# Patient Record
Sex: FEMALE | Race: BLACK OR AFRICAN AMERICAN | Employment: UNEMPLOYED | URBAN - METROPOLITAN AREA
[De-identification: names, ages, dates, MRNs, and addresses within clinical notes are randomized per-mention and may not be internally consistent; named-entity substitution may affect disease eponyms.]

---

## 2017-04-27 ENCOUNTER — HOSPITAL ENCOUNTER (EMERGENCY)
Age: 32
Discharge: HOME | End: 2017-04-27
Attending: EMERGENCY MEDICINE

## 2017-04-27 VITALS
BODY MASS INDEX: 26.91 KG/M2 | SYSTOLIC BLOOD PRESSURE: 124 MMHG | WEIGHT: 161.5 LBS | TEMPERATURE: 98.7 F | HEART RATE: 71 BPM | RESPIRATION RATE: 16 BRPM | HEIGHT: 65 IN | DIASTOLIC BLOOD PRESSURE: 69 MMHG | OXYGEN SATURATION: 99 %

## 2017-04-27 DIAGNOSIS — G89.29 CHRONIC ABDOMINAL PAIN: Primary | ICD-10-CM

## 2017-04-27 DIAGNOSIS — R10.9 CHRONIC ABDOMINAL PAIN: Primary | ICD-10-CM

## 2017-04-27 LAB
-: ABNORMAL
ABSOLUTE EOS #: 0.4 K/UL (ref 0–0.4)
ABSOLUTE LYMPH #: 2.9 K/UL (ref 1–4.8)
ABSOLUTE MONO #: 0.8 K/UL (ref 0.2–0.8)
ALBUMIN SERPL-MCNC: 3.8 G/DL (ref 3.5–5.2)
ALBUMIN/GLOBULIN RATIO: ABNORMAL (ref 1–2.5)
ALP BLD-CCNC: 73 U/L (ref 35–104)
ALT SERPL-CCNC: 11 U/L (ref 5–33)
AMORPHOUS: ABNORMAL
ANION GAP SERPL CALCULATED.3IONS-SCNC: 13 MMOL/L (ref 9–17)
AST SERPL-CCNC: 14 U/L
BACTERIA: ABNORMAL
BASOPHILS # BLD: 1 %
BASOPHILS ABSOLUTE: 0.1 K/UL (ref 0–0.2)
BILIRUB SERPL-MCNC: 0.17 MG/DL (ref 0.3–1.2)
BILIRUBIN DIRECT: <0.08 MG/DL
BILIRUBIN URINE: NEGATIVE
BILIRUBIN, INDIRECT: ABNORMAL MG/DL (ref 0–1)
BUN BLDV-MCNC: 13 MG/DL (ref 6–20)
BUN/CREAT BLD: 15 (ref 9–20)
CALCIUM SERPL-MCNC: 8.9 MG/DL (ref 8.6–10.4)
CASTS UA: ABNORMAL /LPF
CHLORIDE BLD-SCNC: 103 MMOL/L (ref 98–107)
CHP ED QC CHECK: YES
CO2: 24 MMOL/L (ref 20–31)
COLOR: YELLOW
COMMENT UA: ABNORMAL
CREAT SERPL-MCNC: 0.87 MG/DL (ref 0.5–0.9)
CRYSTALS, UA: ABNORMAL /HPF
DIFFERENTIAL TYPE: ABNORMAL
EOSINOPHILS RELATIVE PERCENT: 3 %
EPITHELIAL CELLS UA: ABNORMAL /HPF
GFR AFRICAN AMERICAN: >60 ML/MIN
GFR NON-AFRICAN AMERICAN: >60 ML/MIN
GFR SERPL CREATININE-BSD FRML MDRD: NORMAL ML/MIN/{1.73_M2}
GFR SERPL CREATININE-BSD FRML MDRD: NORMAL ML/MIN/{1.73_M2}
GLOBULIN: ABNORMAL G/DL (ref 1.5–3.8)
GLUCOSE BLD-MCNC: 98 MG/DL (ref 70–99)
GLUCOSE URINE: NEGATIVE
HCG, PREGNANCY URINE (POC): NEGATIVE
HCT VFR BLD CALC: 35.7 % (ref 36–46)
HEMOGLOBIN: 12.1 G/DL (ref 12–16)
KETONES, URINE: ABNORMAL
LEUKOCYTE ESTERASE, URINE: ABNORMAL
LYMPHOCYTES # BLD: 26 %
MCH RBC QN AUTO: 26.9 PG (ref 26–34)
MCHC RBC AUTO-ENTMCNC: 33.8 G/DL (ref 31–37)
MCV RBC AUTO: 79.6 FL (ref 80–100)
MONOCYTES # BLD: 7 %
MUCUS: ABNORMAL
NITRITE, URINE: NEGATIVE
OTHER OBSERVATIONS UA: ABNORMAL
PDW BLD-RTO: 14 % (ref 11.5–14.5)
PH UA: 6 (ref 5–8)
PLATELET # BLD: 280 K/UL (ref 130–400)
PLATELET ESTIMATE: ABNORMAL
PMV BLD AUTO: ABNORMAL FL (ref 6–12)
POTASSIUM SERPL-SCNC: 3.9 MMOL/L (ref 3.7–5.3)
PREGNANCY TEST URINE, POC: NORMAL
PROTEIN UA: NEGATIVE
RBC # BLD: 4.49 M/UL (ref 4–5.2)
RBC # BLD: ABNORMAL 10*6/UL
RBC UA: ABNORMAL /HPF (ref 0–2)
RENAL EPITHELIAL, UA: ABNORMAL /HPF
SEG NEUTROPHILS: 63 %
SEGMENTED NEUTROPHILS ABSOLUTE COUNT: 7.1 K/UL (ref 1.8–7.7)
SODIUM BLD-SCNC: 140 MMOL/L (ref 135–144)
SPECIFIC GRAVITY UA: 1.03 (ref 1–1.03)
TOTAL PROTEIN: 6.5 G/DL (ref 6.4–8.3)
TRICHOMONAS: ABNORMAL
TURBIDITY: CLEAR
URINE HGB: NEGATIVE
UROBILINOGEN, URINE: NORMAL
WBC # BLD: 11.3 K/UL (ref 3.5–11)
WBC # BLD: ABNORMAL 10*3/UL
WBC UA: ABNORMAL /HPF (ref 0–5)
YEAST: ABNORMAL

## 2017-04-27 PROCEDURE — 81001 URINALYSIS AUTO W/SCOPE: CPT

## 2017-04-27 PROCEDURE — 6360000002 HC RX W HCPCS: Performed by: NURSE PRACTITIONER

## 2017-04-27 PROCEDURE — 36415 COLL VENOUS BLD VENIPUNCTURE: CPT

## 2017-04-27 PROCEDURE — 87086 URINE CULTURE/COLONY COUNT: CPT

## 2017-04-27 PROCEDURE — 85025 COMPLETE CBC W/AUTO DIFF WBC: CPT

## 2017-04-27 PROCEDURE — 80048 BASIC METABOLIC PNL TOTAL CA: CPT

## 2017-04-27 PROCEDURE — 96372 THER/PROPH/DIAG INJ SC/IM: CPT

## 2017-04-27 PROCEDURE — 99283 EMERGENCY DEPT VISIT LOW MDM: CPT

## 2017-04-27 PROCEDURE — 80076 HEPATIC FUNCTION PANEL: CPT

## 2017-04-27 PROCEDURE — 84703 CHORIONIC GONADOTROPIN ASSAY: CPT

## 2017-04-27 RX ORDER — KETOROLAC TROMETHAMINE 30 MG/ML
60 INJECTION, SOLUTION INTRAMUSCULAR; INTRAVENOUS ONCE
Status: COMPLETED | OUTPATIENT
Start: 2017-04-27 | End: 2017-04-27

## 2017-04-27 RX ORDER — DICYCLOMINE HYDROCHLORIDE 10 MG/1
10 CAPSULE ORAL EVERY 6 HOURS PRN
Qty: 20 CAPSULE | Refills: 0 | Status: SHIPPED | OUTPATIENT
Start: 2017-04-27 | End: 2017-09-19 | Stop reason: ALTCHOICE

## 2017-04-27 RX ADMIN — KETOROLAC TROMETHAMINE 60 MG: 30 INJECTION, SOLUTION INTRAMUSCULAR at 01:11

## 2017-04-27 ASSESSMENT — PAIN DESCRIPTION - PAIN TYPE: TYPE: CHRONIC PAIN

## 2017-04-27 ASSESSMENT — PAIN DESCRIPTION - LOCATION: LOCATION: ABDOMEN

## 2017-04-27 ASSESSMENT — PAIN SCALES - GENERAL
PAINLEVEL_OUTOF10: 4
PAINLEVEL_OUTOF10: 8
PAINLEVEL_OUTOF10: 8

## 2017-04-27 ASSESSMENT — PAIN DESCRIPTION - ORIENTATION: ORIENTATION: LEFT;LOWER

## 2017-04-28 LAB
CULTURE: NORMAL
CULTURE: NORMAL
Lab: NORMAL
SPECIMEN DESCRIPTION: NORMAL
SPECIMEN DESCRIPTION: NORMAL
STATUS: NORMAL

## 2017-05-23 ENCOUNTER — TELEPHONE (OUTPATIENT)
Dept: FAMILY MEDICINE CLINIC | Age: 32
End: 2017-05-23

## 2017-07-30 ENCOUNTER — HOSPITAL ENCOUNTER (EMERGENCY)
Age: 32
Discharge: HOME | End: 2017-07-30
Payer: COMMERCIAL

## 2017-07-30 LAB
-: ABNORMAL
AMORPHOUS: ABNORMAL
BACTERIA: ABNORMAL
BILIRUBIN URINE: NEGATIVE
CASTS UA: ABNORMAL /LPF
COLOR: YELLOW
COMMENT UA: ABNORMAL
CRYSTALS, UA: ABNORMAL /HPF
DIRECT EXAM: ABNORMAL
EPITHELIAL CELLS UA: ABNORMAL /HPF
GLUCOSE URINE: NEGATIVE
HCG(URINE) PREGNANCY TEST: NEGATIVE
KETONES, URINE: NEGATIVE
LEUKOCYTE ESTERASE, URINE: ABNORMAL
Lab: ABNORMAL
MUCUS: ABNORMAL
NITRITE, URINE: NEGATIVE
OTHER OBSERVATIONS UA: ABNORMAL
PH UA: 5.5 (ref 5–8)
PROTEIN UA: NEGATIVE
RBC UA: ABNORMAL /HPF (ref 0–2)
RENAL EPITHELIAL, UA: ABNORMAL /HPF
SPECIFIC GRAVITY UA: 1.03 (ref 1–1.03)
SPECIMEN DESCRIPTION: ABNORMAL
STATUS: ABNORMAL
TRICHOMONAS: ABNORMAL
TURBIDITY: CLEAR
URINE HGB: ABNORMAL
UROBILINOGEN, URINE: NORMAL
WBC UA: ABNORMAL /HPF (ref 0–5)
YEAST: ABNORMAL

## 2017-07-30 PROCEDURE — 87591 N.GONORRHOEAE DNA AMP PROB: CPT

## 2017-07-30 PROCEDURE — 84703 CHORIONIC GONADOTROPIN ASSAY: CPT

## 2017-07-30 PROCEDURE — 87086 URINE CULTURE/COLONY COUNT: CPT

## 2017-07-30 PROCEDURE — 81001 URINALYSIS AUTO W/SCOPE: CPT

## 2017-07-30 PROCEDURE — 96372 THER/PROPH/DIAG INJ SC/IM: CPT

## 2017-07-30 PROCEDURE — 87491 CHLMYD TRACH DNA AMP PROBE: CPT

## 2017-07-30 PROCEDURE — 99283 EMERGENCY DEPT VISIT LOW MDM: CPT

## 2017-07-30 PROCEDURE — 87660 TRICHOMONAS VAGIN DIR PROBE: CPT

## 2017-07-30 PROCEDURE — 87510 GARDNER VAG DNA DIR PROBE: CPT

## 2017-07-30 PROCEDURE — 86403 PARTICLE AGGLUT ANTBDY SCRN: CPT

## 2017-07-30 PROCEDURE — 87480 CANDIDA DNA DIR PROBE: CPT

## 2017-07-31 LAB
C TRACH DNA GENITAL QL NAA+PROBE: NEGATIVE
CULTURE: ABNORMAL
CULTURE: ABNORMAL
Lab: ABNORMAL
N. GONORRHOEAE DNA: NEGATIVE
SPECIMEN DESCRIPTION: ABNORMAL
SPECIMEN DESCRIPTION: ABNORMAL
STATUS: ABNORMAL

## 2017-08-18 ENCOUNTER — OFFICE VISIT (OUTPATIENT)
Dept: INTERNAL MEDICINE | Age: 32
End: 2017-08-18
Payer: COMMERCIAL

## 2017-08-18 VITALS
DIASTOLIC BLOOD PRESSURE: 72 MMHG | HEIGHT: 65 IN | BODY MASS INDEX: 25.83 KG/M2 | SYSTOLIC BLOOD PRESSURE: 118 MMHG | HEART RATE: 75 BPM | WEIGHT: 155 LBS

## 2017-08-18 DIAGNOSIS — F17.200 SMOKER: ICD-10-CM

## 2017-08-18 DIAGNOSIS — R53.83 FATIGUE, UNSPECIFIED TYPE: Primary | ICD-10-CM

## 2017-08-18 DIAGNOSIS — Z02.1 PHYSICAL EXAM, PRE-EMPLOYMENT: ICD-10-CM

## 2017-08-18 PROCEDURE — 99213 OFFICE O/P EST LOW 20 MIN: CPT | Performed by: INTERNAL MEDICINE

## 2017-08-18 ASSESSMENT — PATIENT HEALTH QUESTIONNAIRE - PHQ9
SUM OF ALL RESPONSES TO PHQ QUESTIONS 1-9: 0
1. LITTLE INTEREST OR PLEASURE IN DOING THINGS: 0
2. FEELING DOWN, DEPRESSED OR HOPELESS: 0
SUM OF ALL RESPONSES TO PHQ9 QUESTIONS 1 & 2: 0

## 2017-08-23 ENCOUNTER — HOSPITAL ENCOUNTER (OUTPATIENT)
Age: 32
Setting detail: SPECIMEN
Discharge: HOME | End: 2017-08-23
Payer: COMMERCIAL

## 2017-08-23 DIAGNOSIS — Z02.1 PHYSICAL EXAM, PRE-EMPLOYMENT: ICD-10-CM

## 2017-08-23 LAB — TSH SERPL DL<=0.05 MIU/L-ACNC: 0.81 MIU/L (ref 0.3–5)

## 2017-08-24 ENCOUNTER — HOSPITAL ENCOUNTER (EMERGENCY)
Age: 32
Discharge: HOME | End: 2017-08-24
Attending: EMERGENCY MEDICINE
Payer: COMMERCIAL

## 2017-08-24 VITALS
HEIGHT: 65 IN | DIASTOLIC BLOOD PRESSURE: 70 MMHG | WEIGHT: 156 LBS | RESPIRATION RATE: 18 BRPM | OXYGEN SATURATION: 100 % | TEMPERATURE: 98.4 F | HEART RATE: 65 BPM | SYSTOLIC BLOOD PRESSURE: 120 MMHG | BODY MASS INDEX: 25.99 KG/M2

## 2017-08-24 DIAGNOSIS — N76.0 VAGINITIS AND VULVOVAGINITIS: Primary | ICD-10-CM

## 2017-08-24 LAB
-: ABNORMAL
AMORPHOUS: ABNORMAL
BACTERIA: ABNORMAL
BILIRUBIN URINE: NEGATIVE
CASTS UA: ABNORMAL /LPF
COLOR: YELLOW
COMMENT UA: ABNORMAL
CRYSTALS, UA: ABNORMAL /HPF
DIRECT EXAM: ABNORMAL
EPITHELIAL CELLS UA: ABNORMAL /HPF
GLUCOSE URINE: NEGATIVE
KETONES, URINE: NEGATIVE
LEUKOCYTE ESTERASE, URINE: ABNORMAL
Lab: ABNORMAL
MUCUS: ABNORMAL
NITRITE, URINE: NEGATIVE
OTHER OBSERVATIONS UA: ABNORMAL
PH UA: 6 (ref 5–8)
PROTEIN UA: ABNORMAL
RBC UA: ABNORMAL /HPF (ref 0–2)
RENAL EPITHELIAL, UA: ABNORMAL /HPF
SPECIFIC GRAVITY UA: 1.02 (ref 1–1.03)
SPECIMEN DESCRIPTION: ABNORMAL
STATUS: ABNORMAL
TRICHOMONAS: ABNORMAL
TURBIDITY: ABNORMAL
URINE HGB: ABNORMAL
UROBILINOGEN, URINE: NORMAL
WBC UA: ABNORMAL /HPF (ref 0–5)
YEAST: ABNORMAL

## 2017-08-24 PROCEDURE — 86403 PARTICLE AGGLUT ANTBDY SCRN: CPT

## 2017-08-24 PROCEDURE — 87660 TRICHOMONAS VAGIN DIR PROBE: CPT

## 2017-08-24 PROCEDURE — 99283 EMERGENCY DEPT VISIT LOW MDM: CPT

## 2017-08-24 PROCEDURE — 6370000000 HC RX 637 (ALT 250 FOR IP): Performed by: NURSE PRACTITIONER

## 2017-08-24 PROCEDURE — 87510 GARDNER VAG DNA DIR PROBE: CPT

## 2017-08-24 PROCEDURE — 87491 CHLMYD TRACH DNA AMP PROBE: CPT

## 2017-08-24 PROCEDURE — 84703 CHORIONIC GONADOTROPIN ASSAY: CPT

## 2017-08-24 PROCEDURE — 96372 THER/PROPH/DIAG INJ SC/IM: CPT

## 2017-08-24 PROCEDURE — 87086 URINE CULTURE/COLONY COUNT: CPT

## 2017-08-24 PROCEDURE — 87480 CANDIDA DNA DIR PROBE: CPT

## 2017-08-24 PROCEDURE — 87591 N.GONORRHOEAE DNA AMP PROB: CPT

## 2017-08-24 PROCEDURE — 81001 URINALYSIS AUTO W/SCOPE: CPT

## 2017-08-24 PROCEDURE — 6360000002 HC RX W HCPCS: Performed by: NURSE PRACTITIONER

## 2017-08-24 RX ORDER — CEFTRIAXONE SODIUM 250 MG/1
250 INJECTION, POWDER, FOR SOLUTION INTRAMUSCULAR; INTRAVENOUS ONCE
Status: COMPLETED | OUTPATIENT
Start: 2017-08-24 | End: 2017-08-24

## 2017-08-24 RX ORDER — AZITHROMYCIN 250 MG/1
1000 TABLET, FILM COATED ORAL ONCE
Status: COMPLETED | OUTPATIENT
Start: 2017-08-24 | End: 2017-08-24

## 2017-08-24 RX ORDER — METRONIDAZOLE 500 MG/1
500 TABLET ORAL 2 TIMES DAILY
Qty: 14 TABLET | Refills: 0 | Status: SHIPPED | OUTPATIENT
Start: 2017-08-24 | End: 2017-08-31

## 2017-08-24 RX ADMIN — CEFTRIAXONE SODIUM 250 MG: 250 INJECTION, POWDER, FOR SOLUTION INTRAMUSCULAR; INTRAVENOUS at 16:58

## 2017-08-24 RX ADMIN — AZITHROMYCIN 1000 MG: 250 TABLET, FILM COATED ORAL at 16:57

## 2017-08-24 ASSESSMENT — PAIN DESCRIPTION - LOCATION: LOCATION: VAGINA

## 2017-08-24 ASSESSMENT — ENCOUNTER SYMPTOMS
WHEEZING: 0
CONSTIPATION: 0
DIARRHEA: 0
SHORTNESS OF BREATH: 0
SINUS PRESSURE: 0
VOMITING: 0
RHINORRHEA: 0
NAUSEA: 0
SORE THROAT: 0
ABDOMINAL PAIN: 0
COUGH: 0
COLOR CHANGE: 0

## 2017-08-24 ASSESSMENT — PAIN SCALES - GENERAL: PAINLEVEL_OUTOF10: 2

## 2017-08-25 LAB
C TRACH DNA GENITAL QL NAA+PROBE: NEGATIVE
HCG, PREGNANCY URINE (POC): NEGATIVE
N. GONORRHOEAE DNA: NEGATIVE

## 2017-08-26 LAB
CULTURE: ABNORMAL
Lab: ABNORMAL
SPECIMEN DESCRIPTION: ABNORMAL
SPECIMEN DESCRIPTION: ABNORMAL
STATUS: ABNORMAL

## 2017-09-19 ENCOUNTER — OFFICE VISIT (OUTPATIENT)
Dept: INTERNAL MEDICINE | Age: 32
End: 2017-09-19
Payer: COMMERCIAL

## 2017-09-19 VITALS
WEIGHT: 157 LBS | DIASTOLIC BLOOD PRESSURE: 80 MMHG | SYSTOLIC BLOOD PRESSURE: 129 MMHG | BODY MASS INDEX: 26.13 KG/M2 | HEART RATE: 61 BPM

## 2017-09-19 DIAGNOSIS — N76.1 SUBACUTE VAGINITIS: ICD-10-CM

## 2017-09-19 DIAGNOSIS — Z23 NEEDS FLU SHOT: Primary | ICD-10-CM

## 2017-09-19 PROCEDURE — 99212 OFFICE O/P EST SF 10 MIN: CPT | Performed by: INTERNAL MEDICINE

## 2017-09-19 PROCEDURE — 90688 IIV4 VACCINE SPLT 0.5 ML IM: CPT | Performed by: INTERNAL MEDICINE

## 2017-09-19 PROCEDURE — 90471 IMMUNIZATION ADMIN: CPT | Performed by: INTERNAL MEDICINE

## 2017-09-19 ASSESSMENT — ENCOUNTER SYMPTOMS
BLURRED VISION: 0
VOMITING: 0
ABDOMINAL PAIN: 0

## 2017-10-13 ENCOUNTER — OFFICE VISIT (OUTPATIENT)
Dept: INTERNAL MEDICINE | Age: 32
End: 2017-10-13
Payer: COMMERCIAL

## 2017-10-13 ENCOUNTER — HOSPITAL ENCOUNTER (OUTPATIENT)
Age: 32
Setting detail: SPECIMEN
Discharge: HOME | End: 2017-10-13
Payer: COMMERCIAL

## 2017-10-13 VITALS
BODY MASS INDEX: 26.16 KG/M2 | SYSTOLIC BLOOD PRESSURE: 129 MMHG | WEIGHT: 157 LBS | HEART RATE: 90 BPM | DIASTOLIC BLOOD PRESSURE: 83 MMHG | HEIGHT: 65 IN

## 2017-10-13 DIAGNOSIS — Z12.4 PAP SMEAR FOR CERVICAL CANCER SCREENING: Primary | ICD-10-CM

## 2017-10-13 LAB
DIRECT EXAM: ABNORMAL
Lab: ABNORMAL
SPECIMEN DESCRIPTION: ABNORMAL
STATUS: ABNORMAL

## 2017-10-13 PROCEDURE — 99213 OFFICE O/P EST LOW 20 MIN: CPT | Performed by: INTERNAL MEDICINE

## 2017-10-13 ASSESSMENT — ENCOUNTER SYMPTOMS
RESPIRATORY NEGATIVE: 1
GASTROINTESTINAL NEGATIVE: 1

## 2017-10-13 NOTE — PROGRESS NOTES
Visit Information    Have you changed or started any medications since your last visit including any over-the-counter medicines, vitamins, or herbal medicines? no   Are you having any side effects from any of your medications? -  no  Have you stopped taking any of your medications? Is so, why? -  yes - no medications    Have you seen any other physician or provider since your last visit? No  Have you had any other diagnostic tests since your last visit? No  Have you been seen in the emergency room and/or had an admission to a hospital since we last saw you? No  Have you had your routine dental cleaning in the past 6 months? yes - routine cleaning    Have you activated your [employer] account? If not, what are your barriers?  Yes     Patient Care Team:  Young Edmond MD as PCP - General (Internal Medicine)    Medical History Review  Past Medical, Family, and Social History reviewed and does not contribute to the patient presenting condition    Health Maintenance   Topic Date Due    Cervical cancer screen  03/13/2017    DTaP/Tdap/Td vaccine (2 - Td) 04/25/2024    Flu vaccine  Completed    Pneumococcal med risk  Completed    HIV screen  Completed
sounds. Exam reveals no gallop. No murmur heard. Pulmonary/Chest: Breath sounds normal. No respiratory distress. She has no rales. Abdominal: Soft. Pelvic Exam:   External genitalia: General appearance; normal, Hair distribution; normal, Lesions absent  Vaginal: normal mucosa, no discharge  Cervix: cervical discharge present - white  Adnexa: normal adnexa in size, nontender and no masses  Uterus: normal single, nontender    PAP smear done with no complications. Vaginal culture sent      LABORATORY FINDINGS:    CBC:  Lab Results   Component Value Date    WBC 11.3 04/27/2017    HGB 12.1 04/27/2017     04/27/2017     BMP:    Lab Results   Component Value Date     04/27/2017    K 3.9 04/27/2017     04/27/2017    CO2 24 04/27/2017    BUN 13 04/27/2017    CREATININE 0.87 04/27/2017    GLUCOSE 98 04/27/2017     HEMOGLOBIN A1C: No results found for: LABA1C  MICROALBUMIN URINE: No results found for: MICROALBUR  FASTING LIPID PANEL:  Lab Results   Component Value Date    CHOL 161 05/15/2014    HDL 46 05/15/2014    TRIG 89 05/15/2014     LIVER PROFILE:  Lab Results   Component Value Date    ALT 11 04/27/2017    AST 14 04/27/2017    PROT 6.5 04/27/2017    BILITOT 0.17 04/27/2017    BILIDIR <0.08 04/27/2017    LABALBU 3.8 04/27/2017      THYROID FUNCTION:   Lab Results   Component Value Date    TSH 0.81 08/23/2017      URINE ANALYSIS: No results found for: [id number] [place]:      Health Maintenance Due   Topic Date Due    Cervical cancer screen  03/13/2017       ASSESSMENT AND PLAN:    1. Pap smear for cervical cancer screening    - PAP with HPV reflex  - Vaginitis DNA Probe  - Chlamydia/GC DNA, Thin Prep      FOLLOW UP:       1.  Florentino received counseling on the following healthy behaviors: nutrition, exercise and medication adherence  2. Patient given educational materials - see patient instructions  3. Discussed use, benefit, and side effects of prescribed medications.   Barriers to

## 2017-10-16 LAB
C TRACH DNA GENITAL QL NAA+PROBE: NEGATIVE
N. GONORRHOEAE DNA: NEGATIVE

## 2017-10-18 ENCOUNTER — TELEPHONE (OUTPATIENT)
Dept: HEMATOLOGY | Age: 32
End: 2017-10-18

## 2017-10-18 RX ORDER — METRONIDAZOLE 500 MG/1
500 TABLET ORAL 2 TIMES DAILY
Qty: 20 TABLET | Refills: 0 | Status: SHIPPED | OUTPATIENT
Start: 2017-10-18 | End: 2017-10-28

## 2017-10-18 NOTE — TELEPHONE ENCOUNTER
Pt is positive for Gardnerella vaginalis. I am prescribing Flagyl. Please call pt and ask her to  prescription.

## 2017-10-19 LAB — CYTOLOGY REPORT: NORMAL

## 2017-11-17 ENCOUNTER — HOSPITAL ENCOUNTER (EMERGENCY)
Age: 32
Discharge: HOME | End: 2017-11-17
Payer: COMMERCIAL

## 2017-11-17 VITALS
BODY MASS INDEX: 24.34 KG/M2 | RESPIRATION RATE: 16 BRPM | OXYGEN SATURATION: 100 % | WEIGHT: 146.1 LBS | HEIGHT: 65 IN | DIASTOLIC BLOOD PRESSURE: 75 MMHG | SYSTOLIC BLOOD PRESSURE: 133 MMHG | HEART RATE: 92 BPM | TEMPERATURE: 98.5 F

## 2017-11-17 DIAGNOSIS — B37.31 VAGINAL YEAST INFECTION: Primary | ICD-10-CM

## 2017-11-17 LAB
-: ABNORMAL
AMORPHOUS: ABNORMAL
BACTERIA: ABNORMAL
BILIRUBIN URINE: NEGATIVE
CASTS UA: ABNORMAL /LPF
CHP ED QC CHECK: NORMAL
COLOR: YELLOW
COMMENT UA: ABNORMAL
CRYSTALS, UA: ABNORMAL /HPF
DIRECT EXAM: ABNORMAL
EPITHELIAL CELLS UA: ABNORMAL /HPF (ref 0–5)
GLUCOSE URINE: NEGATIVE
KETONES, URINE: ABNORMAL
LEUKOCYTE ESTERASE, URINE: ABNORMAL
Lab: ABNORMAL
MUCUS: ABNORMAL
NITRITE, URINE: NEGATIVE
OTHER OBSERVATIONS UA: ABNORMAL
PH UA: 6 (ref 5–8)
PREGNANCY TEST URINE, POC: NEGATIVE
PROTEIN UA: NEGATIVE
RBC UA: ABNORMAL /HPF (ref 0–2)
RENAL EPITHELIAL, UA: ABNORMAL /HPF
SPECIFIC GRAVITY UA: 1.02 (ref 1–1.03)
SPECIMEN DESCRIPTION: ABNORMAL
STATUS: ABNORMAL
TRICHOMONAS: ABNORMAL
TURBIDITY: ABNORMAL
URINE HGB: ABNORMAL
UROBILINOGEN, URINE: NORMAL
WBC UA: ABNORMAL /HPF (ref 0–5)
YEAST: ABNORMAL

## 2017-11-17 PROCEDURE — 99283 EMERGENCY DEPT VISIT LOW MDM: CPT

## 2017-11-17 PROCEDURE — 87660 TRICHOMONAS VAGIN DIR PROBE: CPT

## 2017-11-17 PROCEDURE — 87510 GARDNER VAG DNA DIR PROBE: CPT

## 2017-11-17 PROCEDURE — 87491 CHLMYD TRACH DNA AMP PROBE: CPT

## 2017-11-17 PROCEDURE — 87480 CANDIDA DNA DIR PROBE: CPT

## 2017-11-17 PROCEDURE — 81001 URINALYSIS AUTO W/SCOPE: CPT

## 2017-11-17 PROCEDURE — 87591 N.GONORRHOEAE DNA AMP PROB: CPT

## 2017-11-17 PROCEDURE — 87086 URINE CULTURE/COLONY COUNT: CPT

## 2017-11-17 RX ORDER — FLUCONAZOLE 150 MG/1
150 TABLET ORAL ONCE
Qty: 1 TABLET | Refills: 1 | Status: SHIPPED | OUTPATIENT
Start: 2017-11-17 | End: 2017-11-17

## 2017-11-17 ASSESSMENT — PAIN SCALES - GENERAL: PAINLEVEL_OUTOF10: 7

## 2017-11-17 ASSESSMENT — PAIN DESCRIPTION - FREQUENCY: FREQUENCY: INTERMITTENT

## 2017-11-17 ASSESSMENT — PAIN DESCRIPTION - LOCATION: LOCATION: PERINEUM

## 2017-11-18 NOTE — ED PROVIDER NOTES
[address] [place] [address] ED  eMERGENCY dEPARTMENT eNCOUnter      Pt Name: Malina Villa  MRN: [id number]  [place] 1985  Date of evaluation: 11/17/2017  Provider: Veronica Madrigal NP    CHIEF COMPLAINT       Chief Complaint   Patient presents with    Other     irritation, onset 1 day after changing soap, (perineum)         HISTORY OF PRESENT ILLNESS  (Location/Symptom, Timing/Onset, Context/Setting, Quality, Duration, Modifying Factors, Severity.)   Malina Villa is a 29 y.o. female who presents to the emergency department Via private auto with complaint of vaginal itching that started yesterday. She denies pelvic pain or vaginal discharge. She was recently seen by her ObGyn and does not have any concerns for STDs. She was diagnosed with BV and has 1 day of Flagyl left. She also recently changed soaps. No abdominal pain, back pain, urinary complaints or fevers. Nursing Notes were reviewed. ALLERGIES     Review of patient's allergies indicates no known allergies. CURRENT MEDICATIONS       Discharge Medication List as of 11/17/2017  7:43 PM      CONTINUE these medications which have NOT CHANGED    Details   nicotine (NICODERM CQ) 7 MG/24HR Place 1 patch onto the skin every 24 hours, Disp-30 patch, R-3Normal             PAST MEDICAL HISTORY         Diagnosis Date    Chlamydia     IBS (irritable bowel syndrome) 5/7/2015    Trichomonas vaginalis infection 2013       SURGICAL HISTORY     History reviewed. No pertinent surgical history. FAMILY HISTORY     History reviewed. No pertinent family history. No family status information on file. SOCIAL HISTORY      reports that she has been smoking Cigars. She has never used smokeless tobacco. She reports that she drinks alcohol. She reports that she uses drugs, including Marijuana. REVIEW OF SYSTEMS    (2-9 systems for level 4, 10 or more for level 5)     Review of Systems   All other systems reviewed and are negative.        Except as noted above the remainder of the review of systems was reviewed and negative. PHYSICAL EXAM    (up to 7 for level 4, 8 or more for level 5)     ED Triage Vitals [11/17/17 1721]   BP Temp Temp Source Pulse Resp SpO2 Height Weight   133/75 98.5 °F (36.9 °C) Oral 92 16 100 % 5' 5\" (1.651 m) 146 lb 1.6 oz (66.3 kg)       Physical Exam   Constitutional: She is oriented to person, place, and time. She appears well-developed and well-nourished. HENT:   Head: Normocephalic and atraumatic. Cardiovascular: Regular rhythm. Pulmonary/Chest: Breath sounds normal. No respiratory distress. Abdominal: Soft. There is no tenderness. Genitourinary:   Genitourinary Comments: Vaginal introitus is erythematous. Vaginal walls are also significantly erythematous. Moderate to large amount of thick clumpy white discharge. Cervix is within normal limits. No adnexal or cervical motion tenderness. External genitalia is without lesions or ulcers   Neurological: She is alert and oriented to person, place, and time. Skin: Skin is warm and dry. DIAGNOSTIC RESULTS     EKG: All EKG's are interpreted by the Emergency Department Physician who either signs or Co-signs this chart in the absence of a cardiologist.      RADIOLOGY:   Non-plain film images such as CT, Ultrasound and MRI are read by the radiologist. Plain radiographic images are visualized and preliminarily interpreted by the emergency physician with the below findings:    Interpretation per the Radiologist below, if available at the time of this note:    No orders to display         LABS:  Labs Reviewed   VAGINITIS DNA PROBE - Abnormal; Notable for the following:        Result Value    Direct Exam POSITIVE for Gardnerella vaginalis. (*)     Direct Exam POSITIVE for Candida sp.  (*)     All other components within normal limits   UA W/REFLEX CULTURE - Abnormal; Notable for the following:     Turbidity UA CLOUDY (*)     Ketones, Urine 1+ (*)     Urine Hgb TRACE (*)

## 2017-11-20 LAB
C TRACH DNA GENITAL QL NAA+PROBE: NEGATIVE
N. GONORRHOEAE DNA: NEGATIVE

## 2017-11-27 ENCOUNTER — HOSPITAL ENCOUNTER (EMERGENCY)
Age: 32
Discharge: HOME | End: 2017-11-27
Attending: EMERGENCY MEDICINE
Payer: COMMERCIAL

## 2017-11-27 VITALS
RESPIRATION RATE: 16 BRPM | OXYGEN SATURATION: 100 % | BODY MASS INDEX: 24.53 KG/M2 | WEIGHT: 147.2 LBS | SYSTOLIC BLOOD PRESSURE: 128 MMHG | DIASTOLIC BLOOD PRESSURE: 72 MMHG | HEIGHT: 65 IN | TEMPERATURE: 98.7 F | HEART RATE: 92 BPM

## 2017-11-27 DIAGNOSIS — N39.0 URINARY TRACT INFECTION WITHOUT HEMATURIA, SITE UNSPECIFIED: ICD-10-CM

## 2017-11-27 DIAGNOSIS — R19.7 DIARRHEA, UNSPECIFIED TYPE: Primary | ICD-10-CM

## 2017-11-27 LAB
-: ABNORMAL
AMORPHOUS: ABNORMAL
BACTERIA: ABNORMAL
BILIRUBIN URINE: NEGATIVE
BILIRUBIN, POC: NORMAL
BLOOD URINE, POC: NORMAL
CASTS UA: ABNORMAL /LPF
CHP ED QC CHECK: NORMAL
CLARITY, POC: CLEAR
COLOR, POC: YELLOW
COLOR: YELLOW
COMMENT UA: ABNORMAL
CRYSTALS, UA: ABNORMAL /HPF
EPITHELIAL CELLS UA: ABNORMAL /HPF (ref 0–5)
GLUCOSE URINE, POC: NORMAL
GLUCOSE URINE: NEGATIVE
KETONES, POC: NORMAL
KETONES, URINE: NEGATIVE
LEUKOCYTE EST, POC: NORMAL
LEUKOCYTE ESTERASE, URINE: ABNORMAL
MUCUS: ABNORMAL
NITRITE, POC: NORMAL
NITRITE, URINE: NEGATIVE
OTHER OBSERVATIONS UA: ABNORMAL
PH UA: 6 (ref 5–8)
PH, POC: 6
PROTEIN UA: NEGATIVE
PROTEIN, POC: NORMAL
RBC UA: ABNORMAL /HPF (ref 0–2)
RENAL EPITHELIAL, UA: ABNORMAL /HPF
SPECIFIC GRAVITY UA: 1.02 (ref 1–1.03)
SPECIFIC GRAVITY, POC: 1.02
TRICHOMONAS: ABNORMAL
TURBIDITY: ABNORMAL
URINE HGB: NEGATIVE
UROBILINOGEN, POC: 0.2
UROBILINOGEN, URINE: NORMAL
WBC UA: ABNORMAL /HPF (ref 0–5)
YEAST: ABNORMAL

## 2017-11-27 PROCEDURE — 87086 URINE CULTURE/COLONY COUNT: CPT

## 2017-11-27 PROCEDURE — 87505 NFCT AGENT DETECTION GI: CPT

## 2017-11-27 PROCEDURE — 81001 URINALYSIS AUTO W/SCOPE: CPT

## 2017-11-27 PROCEDURE — 99283 EMERGENCY DEPT VISIT LOW MDM: CPT

## 2017-11-27 RX ORDER — CIPROFLOXACIN 500 MG/1
500 TABLET, FILM COATED ORAL 2 TIMES DAILY
Qty: 14 TABLET | Refills: 0 | Status: SHIPPED | OUTPATIENT
Start: 2017-11-27 | End: 2017-12-04

## 2017-11-27 ASSESSMENT — PAIN DESCRIPTION - FREQUENCY: FREQUENCY: CONTINUOUS

## 2017-11-27 ASSESSMENT — PAIN DESCRIPTION - LOCATION: LOCATION: RECTUM

## 2017-11-27 ASSESSMENT — ENCOUNTER SYMPTOMS
COUGH: 0
COLOR CHANGE: 0
WHEEZING: 0
SHORTNESS OF BREATH: 0
NAUSEA: 0
RHINORRHEA: 0
VOMITING: 0
ABDOMINAL PAIN: 0
SORE THROAT: 0
DIARRHEA: 1
CONSTIPATION: 0
SINUS PRESSURE: 0

## 2017-11-27 ASSESSMENT — PAIN DESCRIPTION - DESCRIPTORS: DESCRIPTORS: TENDER

## 2017-11-27 ASSESSMENT — PAIN SCALES - GENERAL: PAINLEVEL_OUTOF10: 6

## 2017-11-27 ASSESSMENT — PAIN SCALES - WONG BAKER: WONGBAKER_NUMERICALRESPONSE: 6

## 2017-11-27 NOTE — ED PROVIDER NOTES
[facility] ED  Emergency Department  Faculty Attestation     Pt Name: Irasema Brown  MRN: [id number]  [place] 1985  Date of evaluation: 11/27/17    I was personally available for consultation in the Emergency Department. Have reviewed everything on the chart that is available and agree with the documentation provided by the [facility], including discussion about the assessment, treatment plan and disposition. Irasema Brown is a 29 y.o. female who presents with Diarrhea (x5 days)      Vitals:   Vitals:    11/27/17 1647   BP: 128/72   Pulse: 92   Resp: 16   Temp: 98.7 °F (37.1 °C)   TempSrc: Oral   SpO2: 100%   Weight: 147 lb 3.2 oz (66.8 kg)   Height: 5' 5\" (1.651 m)       Impression:   1. Diarrhea, unspecified type    2.  Urinary tract infection without hematuria, site unspecified        Nicko Rey MD  Attending Emergency Physician      (Please note that portions of this note were completed with a voice recognition program.  Efforts were made to edit the dictations but occasionally words are mis-transcribed.)        Nicko Rey MD  11/27/17 2499

## 2017-11-28 LAB
CAMPYLOBACTER PCR: NORMAL
CULTURE: NORMAL
CULTURE: NORMAL
Lab: NORMAL
SALMONELLA PCR: NORMAL
SHIGATOXIN GENE PCR: NORMAL
SHIGELLA SP PCR: NORMAL
SPECIMEN DESCRIPTION: NORMAL
SPECIMEN DESCRIPTION: NORMAL
SPECIMEN: NORMAL
STATUS: NORMAL

## 2017-11-28 NOTE — ED PROVIDER NOTES
[facility] ED  eMERGENCY dEPARTMENT eNCOUnter      Pt Name: Vincent Licona  MRN: [id number]  [place] 1985  Date of evaluation: 11/27/2017  Provider: [address] [place] [address] NP, JEROD    CHIEF COMPLAINT       Chief Complaint   Patient presents with    Diarrhea     x5 days         HISTORY OF PRESENT ILLNESS  (Location/Symptom, Timing/Onset, Context/Setting, Quality, Duration, Modifying Factors, Severity.)   Vincent Licona is a 29 y.o. female who presents to the emergency department By private vehicle for evaluation of diarrhea. Patient states that for the last 5 days she's been having loose mucousy stools. She states that beginning she did have some cramping abdominal pain but the pain in her abdomen has subsided. She has not had any nausea or vomiting. She has a recent ill contacts. She has a fevers or chills. Nursing Notes were reviewed. ALLERGIES     Review of patient's allergies indicates no known allergies. CURRENT MEDICATIONS       Discharge Medication List as of 11/27/2017  5:59 PM      CONTINUE these medications which have NOT CHANGED    Details   nicotine (NICODERM CQ) 7 MG/24HR Place 1 patch onto the skin every 24 hours, Disp-30 patch, R-3Normal             PAST MEDICAL HISTORY         Diagnosis Date    Chlamydia     IBS (irritable bowel syndrome) 5/7/2015    Trichomonas vaginalis infection 2013       SURGICAL HISTORY     History reviewed. No pertinent surgical history. FAMILY HISTORY     History reviewed. No pertinent family history. No family status information on file. SOCIAL HISTORY      reports that she has been smoking Cigars. She has never used smokeless tobacco. She reports that she drinks alcohol. She reports that she uses drugs, including Marijuana. REVIEW OF SYSTEMS    (2-9 systems for level 4, 10 or more for level 5)     Review of Systems   Constitutional: Negative for chills, fever and unexpected weight change.    HENT: Negative for congestion,

## 2017-12-27 ENCOUNTER — APPOINTMENT (OUTPATIENT)
Dept: GENERAL RADIOLOGY | Age: 32
End: 2017-12-27
Payer: COMMERCIAL

## 2017-12-27 ENCOUNTER — HOSPITAL ENCOUNTER (EMERGENCY)
Age: 32
Discharge: HOME | End: 2017-12-27
Payer: COMMERCIAL

## 2017-12-27 VITALS
OXYGEN SATURATION: 100 % | SYSTOLIC BLOOD PRESSURE: 117 MMHG | DIASTOLIC BLOOD PRESSURE: 76 MMHG | BODY MASS INDEX: 25.17 KG/M2 | HEART RATE: 72 BPM | WEIGHT: 151.06 LBS | RESPIRATION RATE: 16 BRPM | TEMPERATURE: 98.1 F | HEIGHT: 65 IN

## 2017-12-27 DIAGNOSIS — R09.1 PLEURISY: ICD-10-CM

## 2017-12-27 DIAGNOSIS — J20.9 ACUTE BRONCHITIS, UNSPECIFIED ORGANISM: Primary | ICD-10-CM

## 2017-12-27 LAB
DIRECT EXAM: NORMAL
Lab: NORMAL
SPECIMEN DESCRIPTION: NORMAL
STATUS: NORMAL

## 2017-12-27 PROCEDURE — 71020 XR CHEST STANDARD TWO VW: CPT

## 2017-12-27 PROCEDURE — 87804 INFLUENZA ASSAY W/OPTIC: CPT

## 2017-12-27 PROCEDURE — 99283 EMERGENCY DEPT VISIT LOW MDM: CPT

## 2017-12-27 RX ORDER — HYDROCODONE BITARTRATE AND ACETAMINOPHEN 5; 325 MG/1; MG/1
1 TABLET ORAL EVERY 6 HOURS PRN
Qty: 10 TABLET | Refills: 0 | Status: SHIPPED | OUTPATIENT
Start: 2017-12-27 | End: 2018-01-03

## 2017-12-27 RX ORDER — BENZONATATE 100 MG/1
100 CAPSULE ORAL 3 TIMES DAILY PRN
Qty: 30 CAPSULE | Refills: 0 | Status: SHIPPED | OUTPATIENT
Start: 2017-12-27 | End: 2018-01-03

## 2017-12-27 RX ORDER — IBUPROFEN 600 MG/1
600 TABLET ORAL EVERY 6 HOURS PRN
Qty: 30 TABLET | Refills: 0 | Status: SHIPPED | OUTPATIENT
Start: 2017-12-27 | End: 2018-07-17

## 2017-12-27 RX ORDER — AZITHROMYCIN 250 MG/1
TABLET, FILM COATED ORAL
Qty: 1 PACKET | Refills: 0 | Status: SHIPPED | OUTPATIENT
Start: 2017-12-27 | End: 2018-01-06

## 2017-12-27 ASSESSMENT — ENCOUNTER SYMPTOMS
EYE PAIN: 0
ABDOMINAL PAIN: 0
EYE ITCHING: 0
EYE DISCHARGE: 0
ABDOMINAL DISTENTION: 0
COLOR CHANGE: 0
SINUS PRESSURE: 1
COUGH: 1
CHEST TIGHTNESS: 1
BACK PAIN: 1

## 2017-12-27 ASSESSMENT — PAIN DESCRIPTION - PAIN TYPE: TYPE: ACUTE PAIN

## 2017-12-27 ASSESSMENT — PAIN SCALES - GENERAL: PAINLEVEL_OUTOF10: 7

## 2017-12-27 ASSESSMENT — PAIN DESCRIPTION - LOCATION: LOCATION: CHEST

## 2017-12-27 NOTE — ED PROVIDER NOTES
heat intolerance and polydipsia. Genitourinary: Negative for frequency. Musculoskeletal: Positive for arthralgias and back pain. Skin: Negative. Negative for color change and pallor. Psychiatric/Behavioral: Negative for agitation and behavioral problems. Except as noted above the remainder of the review of systems was reviewed and negative. PHYSICAL EXAM    (up to 7 for level 4, 8 or more for level 5)     ED Triage Vitals [12/27/17 0935]   BP Temp Temp Source Pulse Resp SpO2 Height Weight   117/76 98.1 °F (36.7 °C) Oral 72 16 100 % 5' 5\" (1.651 m) 151 lb 1 oz (68.5 kg)       Physical Exam   Constitutional: She is oriented to person, place, and time. She appears well-developed and well-nourished. HENT:   Head: Normocephalic and atraumatic. Eyes: Conjunctivae are normal.   Neck: Normal range of motion. Neck supple. No JVD present. No tracheal deviation present. No thyromegaly present. Cardiovascular: Normal rate and regular rhythm. Pulmonary/Chest: No respiratory distress. She has wheezes. She exhibits tenderness. Abdominal: Soft. Bowel sounds are normal.   Musculoskeletal: Normal range of motion. Lymphadenopathy:     She has no cervical adenopathy. Neurological: She is alert and oriented to person, place, and time. Skin: Skin is warm and dry. Psychiatric: She has a normal mood and affect. Her behavior is normal.   Nursing note and vitals reviewed. DIAGNOSTIC RESULTS     EKG: All EKG's are interpreted by the Emergency Department Physician who either signs or Co-signs this chart in the absence of a cardiologist.    Not clinically indicated.       RADIOLOGY:   Non-plain film images such as CT, Ultrasound and MRI are read by the radiologist. [facility] radiographic images are visualized and preliminarily interpreted by the emergency physician with the below findings:      Xr Chest Standard (2 Vw)    Result Date: 12/27/2017  EXAMINATION: TWO VIEWS OF THE CHEST 12/27/2017 10:56 am COMPARISON: None. HISTORY: Ordering Physician Provided Reason for Exam: Chest pains today Acuity: Acute Type of Exam: Initial FINDINGS: The lungs are without acute focal process. There is no effusion or pneumothorax. The cardiomediastinal silhouette is normal. The osseous structures are unremarkable. Unremarkable chest.     Interpretation per the Radiologist below, if available at the time of this note:    XR CHEST STANDARD (2 VW)   Final Result   Unremarkable chest.               ED BEDSIDE ULTRASOUND:   Performed by ED Physician - none    LABS:  Labs Reviewed   RAPID INFLUENZA A/B ANTIGENS       All other labs were within normal range or not returned as of this dictation. EMERGENCY DEPARTMENT COURSE and DIFFERENTIAL DIAGNOSIS/MDM:   Vitals:    Vitals:    12/27/17 0935   BP: 117/76   Pulse: 72   Resp: 16   Temp: 98.1 °F (36.7 °C)   TempSrc: Oral   SpO2: 100%   Weight: 151 lb 1 oz (68.5 kg)   Height: 5' 5\" (1.651 m)     No evidence of pneumothorax or pneumonia. CONSULTS:  None    PROCEDURES:  Not clinically indicated. FINAL IMPRESSION      1. Acute bronchitis, unspecified organism    2. Pleurisy          DISPOSITION/PLAN   DISPOSITION Decision To Discharge 12/27/2017 12:02:31 PM      PATIENT REFERRED TO:   Miguel Cummings MD  [address] [place] [id number]  [phone]            DISCHARGE MEDICATIONS:     New Prescriptions    AZITHROMYCIN (ZITHROMAX) 250 MG TABLET    Take 2 tablets (500 mg) on Day 1, followed by 1 tablet (250 mg) once daily on Days 2 through 5. BENZONATATE (TESSALON PERLES) 100 MG CAPSULE    Take 1 capsule by mouth 3 times daily as needed for Cough    HYDROCODONE-ACETAMINOPHEN (NORCO) 5-325 MG PER TABLET    Take 1 tablet by mouth every 6 hours as needed for Pain .     IBUPROFEN (ADVIL;MOTRIN) 600 MG TABLET    Take 1 tablet by mouth every 6 hours as needed for Pain           (Please note that portions of this note were completed with a voice recognition program.  Efforts were made to edit the dictations but occasionally words are mis-transcribed.)    Carina Beltrán MD  Attending Emergency Physician           Carina Beltrán MD  12/27/17 1228       Carina Beltrán MD  12/27/17 [address], [place]  12/27/17 1228

## 2018-01-10 ENCOUNTER — HOSPITAL ENCOUNTER (OUTPATIENT)
Age: 33
Setting detail: SPECIMEN
Discharge: HOME | End: 2018-01-10
Payer: COMMERCIAL

## 2018-01-10 ENCOUNTER — OFFICE VISIT (OUTPATIENT)
Dept: OBGYN | Age: 33
End: 2018-01-10
Payer: COMMERCIAL

## 2018-01-10 VITALS
HEIGHT: 65 IN | HEART RATE: 66 BPM | DIASTOLIC BLOOD PRESSURE: 76 MMHG | BODY MASS INDEX: 25.44 KG/M2 | WEIGHT: 152.7 LBS | TEMPERATURE: 98.2 F | SYSTOLIC BLOOD PRESSURE: 116 MMHG | RESPIRATION RATE: 16 BRPM

## 2018-01-10 DIAGNOSIS — Z30.09 FAMILY PLANNING ADVICE: ICD-10-CM

## 2018-01-10 DIAGNOSIS — Z01.419 WELL WOMAN EXAM WITH ROUTINE GYNECOLOGICAL EXAM: Primary | ICD-10-CM

## 2018-01-10 DIAGNOSIS — Z11.3 SCREEN FOR STD (SEXUALLY TRANSMITTED DISEASE): ICD-10-CM

## 2018-01-10 DIAGNOSIS — N89.8 VAGINAL DISCHARGE: ICD-10-CM

## 2018-01-10 DIAGNOSIS — Z23 IMMUNIZATION DUE: ICD-10-CM

## 2018-01-10 LAB
HEPATITIS B SURFACE ANTIGEN: NONREACTIVE
HEPATITIS C ANTIBODY: NONREACTIVE
HIV AG/AB: NONREACTIVE
T. PALLIDUM, IGG: NONREACTIVE

## 2018-01-10 PROCEDURE — 86803 HEPATITIS C AB TEST: CPT

## 2018-01-10 PROCEDURE — 87660 TRICHOMONAS VAGIN DIR PROBE: CPT

## 2018-01-10 PROCEDURE — 87480 CANDIDA DNA DIR PROBE: CPT

## 2018-01-10 PROCEDURE — 87340 HEPATITIS B SURFACE AG IA: CPT

## 2018-01-10 PROCEDURE — 36415 COLL VENOUS BLD VENIPUNCTURE: CPT

## 2018-01-10 PROCEDURE — 87389 HIV-1 AG W/HIV-1&-2 AB AG IA: CPT

## 2018-01-10 PROCEDURE — 87591 N.GONORRHOEAE DNA AMP PROB: CPT

## 2018-01-10 PROCEDURE — 87491 CHLMYD TRACH DNA AMP PROBE: CPT

## 2018-01-10 PROCEDURE — 99395 PREV VISIT EST AGE 18-39: CPT | Performed by: OBSTETRICS & GYNECOLOGY

## 2018-01-10 PROCEDURE — 86780 TREPONEMA PALLIDUM: CPT

## 2018-01-10 PROCEDURE — 87510 GARDNER VAG DNA DIR PROBE: CPT

## 2018-01-10 RX ORDER — NORGESTIMATE AND ETHINYL ESTRADIOL 0.25-0.035
1 KIT ORAL DAILY
Qty: 1 PACKET | Refills: 3 | Status: SHIPPED | OUTPATIENT
Start: 2018-01-10 | End: 2018-07-17

## 2018-01-10 ASSESSMENT — PATIENT HEALTH QUESTIONNAIRE - PHQ9
SUM OF ALL RESPONSES TO PHQ QUESTIONS 1-9: 0
2. FEELING DOWN, DEPRESSED OR HOPELESS: 0
SUM OF ALL RESPONSES TO PHQ9 QUESTIONS 1 & 2: 0
1. LITTLE INTEREST OR PLEASURE IN DOING THINGS: 0

## 2018-01-10 NOTE — PROGRESS NOTES
there was no evidence of hepatosplenomegaly and there was no costal vertebral kia tenderness bilaterally. No hernias were appreciated. Psych: The patient had a normal Orientation to: Time, Place, Person, and Situation  There is no Mood / Affect changes    Breast:  (Chest)  normal appearance, no masses or tenderness. Bilateral nipple piercing       Pelvic Exam:  Vulva normal without lesions  Vagina normal   Cervix without visible lesions  Uterus non tender freely movable not enlarged  Adnexa normal      Rectovaginal exam: deferred            Musculosk:  Normal Gait and station was noted. Digits were evaluated without abnormal findings. ASSESSMENT:      29 y.o. Annual  1. Well woman exam with routine gynecological exam  Chlamydia/GC DNA, Thin Prep    VAGINITIS DNA PROBE   2. Screen for STD (sexually transmitted disease)  Hepatitis B Surface Antigen    Hepatitis C Antibody    HIV Screen    T. pallidum Ab   3. Family planning advice  norgestimate-ethinyl estradiol (ORTHO-CYCLEN, 28,) 0.25-35 MG-MCG per tablet   4. Immunization due  INFLUENZA, QUADV, 3 YRS AND OLDER, IM, MDV, 0.5ML (FLUZONE QUADV)   5. Vaginal discharge              Chief Complaint   Patient presents with    New Patient          Past Medical History:   Diagnosis Date    Chlamydia     IBS (irritable bowel syndrome) 5/7/2015    Trichomonas vaginalis infection 2013         Patient Active Problem List   Diagnosis    Overweight (BMI 25.0-29. 9)    Contraception    Need for MMR vaccine    Need for Tdap vaccination    Physical exam, pre-employment    Amenorrhea, secondary    Infertility, female, secondary    Chronic abdominal pain    IBS (irritable bowel syndrome)    Late period          Hereditary Breast, Ovarian, Colon and Uterine Cancer screening Done. Tobacco & Secondary smoke risks reviewed; instructed on cessation and avoidance      Counseling Completed: Refer to plan           PLAN:  1.  Well woman exam

## 2018-01-10 NOTE — PATIENT INSTRUCTIONS
or heart attack. You are even more at risk if you have high blood pressure, diabetes, high cholesterol, or if you are overweight. Your risk of stroke or blood clot is highest during your first year of taking birth control pills. Your risk is also high when you restart birth control pills after not taking them for 4 weeks or longer. Smoking can greatly increase your risk of blood clots, stroke, or heart attack. Your risk increases the older you are and the more you smoke. You should not take combination birth control pills if you smoke and are over 28years old. Do not use if you are pregnant. Stop taking this medicine and tell your doctor if you become pregnant, or if you miss two menstrual periods in a row. If you have recently had a baby, wait at least 4 weeks before taking birth control pills. You should not take birth control pills if you have:  · untreated or uncontrolled high blood pressure;  · heart disease (coronary artery disease, uncontrolled heart valve disorder, history of heart attack, stroke, or blood clot);  · chest pain;  · unusual vaginal bleeding that has not been checked by a doctor;  · problems with your eyes, kidneys or circulation caused by diabetes;  · a history of hormone-related cancer such as breast or uterine cancer;  · liver cancer;  · a history of jaundice caused by pregnancy or birth control pills; or  · severe migraine headaches (with aura, numbness, weakness, or vision changes). To make sure birth control pills are safe for you, tell your doctor if you have:  · heart disease, high blood pressure;  · high cholesterol or triglycerides;  · a history of depression;  · gallbladder disease;  · liver or kidney disease;  · diabetes;  · seizures or epilepsy;  · a history of irregular menstrual cycles; or  · a history of fibrocystic breast disease, lumps, nodules, or an abnormal mammogram.  The hormones in birth control pills can pass into breast milk and may harm a nursing baby.  This medication may also slow breast milk production. Do not use if you are breast feeding a baby. How should I take birth control pills? Follow all directions on your prescription label. Do not take this medicine in larger or smaller amounts or for longer than recommended. You will take your first pill on the first day of your period or on the first Sunday after your period begins. You may need to use back-up birth control, such as condoms or a spermicide, when you first start using this medicine. Follow your doctor's instructions. Take one pill every day, no more than 24 hours apart. When the pills run out, start a new pack the following day. You may get pregnant if you do not take one pill daily. Get your prescription refilled before you run out of pills completely. The 28 day birth control pack contains seven \"reminder\" pills to keep you on your regular cycle. Your period will usually begin while you are using these reminder pills. You may have breakthrough bleeding, especially during the first 3 months. Tell your doctor if this bleeding continues or is very heavy. Use a back-up birth control if you are sick with severe vomiting or diarrhea. If you need surgery or medical tests or if you will be on bed rest, you may need to stop using this medication for a short time. Any doctor or surgeon who treats you should know that you are using birth control pills. While taking birth control pills, you will need to visit your doctor regularly. Store this medication at room temperature away from moisture and heat. What happens if I miss a dose? Follow the patient instructions provided with your medicine. Ask your doctor or pharmacist if you do not understand these instructions. Missing a pill increases your risk of becoming pregnant. If you miss one active pill, take two pills on the day that you remember. Then take one pill per day for the rest of the pack.   If you miss two active pills in a row in Week 1 or 2, take two pills per day for two days in a row. Then take one pill per day for the rest of the pack. Use back-up birth control for at least 7 days following the missed pills. If you miss two active pills in a row in Week 3, throw out the rest of the pack and start a new pack the same day if you are a Day 1 starter. If you are a Sunday starter, keep taking a pill every day until Sunday. On Sunday, throw out the rest of the pack and start a new pack that day. If you miss three active pills in a row in Week 1, 2, or 3, throw out the rest of the pack and start a new pack on the same day if you are a Day 1 starter. If you are a Sunday starter, keep taking a pill every day until Sunday. On Sunday, throw out the rest of the pack and start a new pack that day. If you miss two or more pills, you may not have a period during the month. If you miss a period for two months in a row, call your doctor because you might be pregnant. If you miss a reminder pill, throw it away and keep taking one reminder pill per day until the pack is empty. You do not need back-up birth control if you miss a reminder pill. What happens if I overdose? Seek emergency medical attention or call the Poison Help line at 1-647.818.8370. What should I avoid while taking birth control pills? Do not smoke while taking birth control pills, especially if you are older than 28years of age. Birth control pills will not protect you from sexually transmitted diseases--including HIV and AIDS. Using a condom is the only way to protect yourself from these diseases. What are the possible side effects of birth control pills? Get emergency medical help if you have signs of an allergic reaction: hives; difficult breathing; swelling of your face, lips, tongue, or throat.   Stop using birth control pills and call your doctor at once if you have:  · signs of a stroke --sudden numbness or weakness (especially on one side of the body), sudden severe headache,

## 2018-01-11 ENCOUNTER — TELEPHONE (OUTPATIENT)
Dept: OBGYN | Age: 33
End: 2018-01-11

## 2018-01-11 DIAGNOSIS — B96.89 BV (BACTERIAL VAGINOSIS): Primary | ICD-10-CM

## 2018-01-11 DIAGNOSIS — N76.0 BV (BACTERIAL VAGINOSIS): Primary | ICD-10-CM

## 2018-01-11 LAB
C TRACH DNA GENITAL QL NAA+PROBE: NEGATIVE
DIRECT EXAM: ABNORMAL
Lab: ABNORMAL
N. GONORRHOEAE DNA: NEGATIVE
SPECIMEN DESCRIPTION: ABNORMAL
STATUS: ABNORMAL

## 2018-01-11 RX ORDER — METRONIDAZOLE 500 MG/1
500 TABLET ORAL 2 TIMES DAILY
Qty: 14 TABLET | Refills: 0 | Status: SHIPPED | OUTPATIENT
Start: 2018-01-11 | End: 2018-01-18

## 2018-01-11 NOTE — TELEPHONE ENCOUNTER
Call pt to see if she would like to come in today or tomorrow due to risks of flu in  [address] [place] [address] at this time.

## 2018-01-11 NOTE — TELEPHONE ENCOUNTER
1/11/18 called pt regarding flu vaccine pt will be in tomorrow 1/12/18 for flu vaccine she is on Dr. Donya Zamorano schedule is that ok. Please advise! . Thank you.

## 2018-01-11 NOTE — TELEPHONE ENCOUNTER
----- Message from Lidya Turner MD sent at 1/10/2018  5:40 PM EST -----  Regarding: open record  Please complete immunization so the pt record could be closed.

## 2018-02-16 ENCOUNTER — OFFICE VISIT (OUTPATIENT)
Dept: INTERNAL MEDICINE | Age: 33
End: 2018-02-16
Payer: COMMERCIAL

## 2018-02-16 VITALS
HEIGHT: 65 IN | HEART RATE: 74 BPM | BODY MASS INDEX: 24.49 KG/M2 | WEIGHT: 147 LBS | DIASTOLIC BLOOD PRESSURE: 90 MMHG | SYSTOLIC BLOOD PRESSURE: 132 MMHG

## 2018-02-16 DIAGNOSIS — K58.9 IRRITABLE BOWEL SYNDROME, UNSPECIFIED TYPE: ICD-10-CM

## 2018-02-16 PROCEDURE — 99213 OFFICE O/P EST LOW 20 MIN: CPT | Performed by: INTERNAL MEDICINE

## 2018-02-16 ASSESSMENT — ENCOUNTER SYMPTOMS
VOMITING: 0
ABDOMINAL PAIN: 0
SHORTNESS OF BREATH: 0
NAUSEA: 0
COUGH: 0

## 2018-02-16 NOTE — PROGRESS NOTES
[facility]    Progress Note    Date of patient's visit: 2/16/2018  YOB: 1985  Patient's Name:  Sloan Kehr    Patient Care Team:  Yeni Araiza MD as PCP - General (Internal Medicine)    REASON FOR VISIT: Routine outpatient follow for IBS    HISTORY OF PRESENT ILLNESS:    History was obtained from the patient. Sloan Kehr is a 29 y.o. is here for a For routine follow-up for IBS    No more IBS symptoms    Patient was not sure why she is here. She had follow-up with ObGyn month ago and had gardenella the vaginitis she was treated with Flagyl for 7 days    She complains of thin watery discharge, no itching no order  She is premenstrual  Mild lower abdominal pain  No other complaint    Patient Active Problem List   Diagnosis    Overweight (BMI 25.0-29. 9)    Contraception    Need for MMR vaccine    Need for Tdap vaccination    Physical exam, pre-employment    Amenorrhea, secondary    Infertility, female, secondary    Chronic abdominal pain    IBS (irritable bowel syndrome)    Late period       ALLERGIES    No Known Allergies      MEDICATIONS:      Current Outpatient Prescriptions on File Prior to Visit   Medication Sig Dispense Refill    ibuprofen (ADVIL;MOTRIN) 600 MG tablet Take 1 tablet by mouth every 6 hours as needed for Pain 30 tablet 0    norgestimate-ethinyl estradiol (ORTHO-CYCLEN, 28,) 0.25-35 MG-MCG per tablet Take 1 tablet by mouth daily for 28 days 1 tablet daily 1 packet 3    nicotine (NICODERM CQ) 7 MG/24HR Place 1 patch onto the skin every 24 hours 30 patch 3     No current facility-administered medications on file prior to visit. SOCIAL HISTORY    Reviewed and no change from previous record. Iván Michaels  reports that she has been smoking Cigars.   She has never used smokeless tobacco.    FAMILY HISTORY:    Reviewed and No change from previous visit    REVIEW OF SYSTEMS:    Review of Systems   Constitutional: Negative for chills and fever. Respiratory: Negative for cough and shortness of breath. Cardiovascular: Negative for chest pain and leg swelling. Gastrointestinal: Negative for abdominal pain, nausea and vomiting. Genitourinary: Negative. PHYSICAL EXAM:      Vitals:    02/16/18 1347   BP: (!) 132/90   Pulse: 74     Physical Exam   Constitutional: No distress. Eyes: Conjunctivae are normal.   Cardiovascular: Normal rate, regular rhythm and normal heart sounds. Exam reveals no friction rub. No murmur heard. Pulmonary/Chest: Effort normal and breath sounds normal. No respiratory distress. She has no wheezes. She has no rales. Abdominal: Soft. She exhibits no distension. There is no tenderness. Musculoskeletal: She exhibits no edema. LABORATORY FINDINGS:    CBC:  Lab Results   Component Value Date    WBC 11.3 04/27/2017    HGB 12.1 04/27/2017     04/27/2017     BMP:    Lab Results   Component Value Date     04/27/2017    K 3.9 04/27/2017     04/27/2017    CO2 24 04/27/2017    BUN 13 04/27/2017    CREATININE 0.87 04/27/2017    GLUCOSE 98 04/27/2017     HEMOGLOBIN A1C: No results found for: LABA1C  MICROALBUMIN URINE: No results found for: MICROALBUR  FASTING LIPID PANEL:  Lab Results   Component Value Date    CHOL 161 05/15/2014    HDL 46 05/15/2014    TRIG 89 05/15/2014     LIVER PROFILE:  Lab Results   Component Value Date    ALT 11 04/27/2017    AST 14 04/27/2017    PROT 6.5 04/27/2017    BILITOT 0.17 04/27/2017    BILIDIR <0.08 04/27/2017    LABALBU 3.8 04/27/2017      THYROID FUNCTION:   Lab Results   Component Value Date    TSH 0.81 08/23/2017      URINE ANALYSIS: No results found for: [address] [place]:    There are no preventive care reminders to display for this patient. ASSESSMENT AND PLAN:    1. IBS- resolved    No action required. Advised to keep appointment with ObGyn for birth control measures      FOLLOW UP:     1 year    1.   Dakota harrison counseling on the following healthy behaviors: 2. Patient given educational materials - see patient instructions  3. Discussed use, benefit, and side effects of prescribed medications. Barriers to medication compliance addressed. All patient questions answered. Pt voiced understanding. 4.  Reviewed prior labs and health maintenance  5. Continue current medications, diet and exercise.          David Alexander MD  PGY-2 Internal Medicine Resident  [facility]  2/16/2018  2:26 PM

## 2018-03-22 ENCOUNTER — OFFICE VISIT (OUTPATIENT)
Dept: OBGYN | Age: 33
End: 2018-03-22
Payer: COMMERCIAL

## 2018-03-22 VITALS
TEMPERATURE: 99.2 F | DIASTOLIC BLOOD PRESSURE: 82 MMHG | WEIGHT: 149.8 LBS | BODY MASS INDEX: 24.96 KG/M2 | SYSTOLIC BLOOD PRESSURE: 122 MMHG | HEIGHT: 65 IN | RESPIRATION RATE: 16 BRPM | HEART RATE: 75 BPM

## 2018-03-22 DIAGNOSIS — Z30.09 FAMILY PLANNING: Primary | ICD-10-CM

## 2018-03-22 PROCEDURE — G8482 FLU IMMUNIZE ORDER/ADMIN: HCPCS | Performed by: OBSTETRICS & GYNECOLOGY

## 2018-03-22 PROCEDURE — G8420 CALC BMI NORM PARAMETERS: HCPCS | Performed by: OBSTETRICS & GYNECOLOGY

## 2018-03-22 PROCEDURE — G8427 DOCREV CUR MEDS BY ELIG CLIN: HCPCS | Performed by: OBSTETRICS & GYNECOLOGY

## 2018-03-22 PROCEDURE — 4004F PT TOBACCO SCREEN RCVD TLK: CPT | Performed by: OBSTETRICS & GYNECOLOGY

## 2018-03-22 PROCEDURE — 99212 OFFICE O/P EST SF 10 MIN: CPT | Performed by: OBSTETRICS & GYNECOLOGY

## 2018-03-22 PROCEDURE — 99212 OFFICE O/P EST SF 10 MIN: CPT

## 2018-03-22 RX ORDER — MEDROXYPROGESTERONE ACETATE 150 MG/ML
INJECTION, SUSPENSION INTRAMUSCULAR
Qty: 1 ML | Refills: 3 | Status: ON HOLD | OUTPATIENT
Start: 2018-03-22 | End: 2019-06-23 | Stop reason: HOSPADM

## 2018-03-22 NOTE — PROGRESS NOTES
Gail Scales  3/22/2018    YOB: 1985          The patient was seen today. She is here regarding request to restart depo provera . Her bowels are regular and she is voiding without difficulty. HPI:  Gail Scales is a 29 y.o. female  No LMP recorded. Pt is unsure if she had a period this month. She stopped the Ortho cyclen last month due to having difficulty remembering to take the pill. She requests depo provera. Last intercourse 2 days ago. It was unprotected. Obstetric History       T0      L1     SAB0   TAB0   Ectopic0   Molar0   Multiple0   Live Births0       # Outcome Date GA Lbr Gael/2nd Weight Sex Delivery Anes PTL Lv   2             1 AB                   Past Medical History:   Diagnosis Date    Chlamydia     IBS (irritable bowel syndrome) 2015    Trichomonas vaginalis infection        History reviewed. No pertinent surgical history. History reviewed. No pertinent family history. Social History     Social History    Marital status: Single     Spouse name: N/A    Number of children: N/A    Years of education: N/A     Occupational History    Not on file.      Social History Main Topics    Smoking status: Current Every Day Smoker     Types: Cigars    Smokeless tobacco: Never Used    Alcohol use Yes      Comment: week end    Drug use: Yes     Types: Marijuana    Sexual activity: Not on file     Other Topics Concern    Not on file     Social History Narrative    No narrative on file         MEDICATIONS:  Current Outpatient Prescriptions on File Prior to Visit   Medication Sig Dispense Refill    ibuprofen (ADVIL;MOTRIN) 600 MG tablet Take 1 tablet by mouth every 6 hours as needed for Pain 30 tablet 0    norgestimate-ethinyl estradiol (ORTHO-CYCLEN, 28,) 0.25-35 MG-MCG per tablet Take 1 tablet by mouth daily for 28 days 1 tablet daily 1 packet 3    nicotine (NICODERM CQ) 7 MG/24HR Place 1 patch onto the skin every 24

## 2018-03-29 ENCOUNTER — NURSE ONLY (OUTPATIENT)
Dept: OBGYN | Age: 33
End: 2018-03-29
Payer: COMMERCIAL

## 2018-03-29 VITALS — RESPIRATION RATE: 24 BRPM | HEIGHT: 67 IN | WEIGHT: 152 LBS | BODY MASS INDEX: 23.86 KG/M2

## 2018-03-29 DIAGNOSIS — Z30.09 FAMILY PLANNING: Primary | ICD-10-CM

## 2018-03-29 LAB
CONTROL: NORMAL
PREGNANCY TEST URINE, POC: NEGATIVE

## 2018-03-29 PROCEDURE — 99211 OFF/OP EST MAY X REQ PHY/QHP: CPT | Performed by: OBSTETRICS & GYNECOLOGY

## 2018-03-29 PROCEDURE — 81025 URINE PREGNANCY TEST: CPT

## 2018-03-29 PROCEDURE — 96372 THER/PROPH/DIAG INJ SC/IM: CPT | Performed by: OBSTETRICS & GYNECOLOGY

## 2018-03-29 RX ORDER — MEDROXYPROGESTERONE ACETATE 150 MG/ML
150 INJECTION, SUSPENSION INTRAMUSCULAR ONCE
Status: COMPLETED | OUTPATIENT
Start: 2018-03-29 | End: 2018-03-29

## 2018-03-29 RX ADMIN — MEDROXYPROGESTERONE ACETATE 150 MG: 150 INJECTION, SUSPENSION INTRAMUSCULAR at 16:53

## 2018-03-29 NOTE — PROGRESS NOTES
Patient here for Depo-Provera. Patient denies having any issues due to Depo injection. Per provider order patient given and tolerated well. Patient will return in 11-12 weeks for next injection.

## 2018-04-16 ENCOUNTER — HOSPITAL ENCOUNTER (EMERGENCY)
Age: 33
Discharge: HOME | End: 2018-04-16
Attending: EMERGENCY MEDICINE
Payer: COMMERCIAL

## 2018-04-16 VITALS
RESPIRATION RATE: 16 BRPM | HEART RATE: 55 BPM | DIASTOLIC BLOOD PRESSURE: 80 MMHG | BODY MASS INDEX: 22.36 KG/M2 | HEIGHT: 69 IN | WEIGHT: 151 LBS | SYSTOLIC BLOOD PRESSURE: 123 MMHG | TEMPERATURE: 98.3 F

## 2018-04-16 DIAGNOSIS — R11.2 NON-INTRACTABLE VOMITING WITH NAUSEA, UNSPECIFIED VOMITING TYPE: Primary | ICD-10-CM

## 2018-04-16 LAB
-: ABNORMAL
ABSOLUTE EOS #: 0.4 K/UL (ref 0–0.4)
ABSOLUTE IMMATURE GRANULOCYTE: ABNORMAL K/UL (ref 0–0.3)
ABSOLUTE LYMPH #: 2.4 K/UL (ref 1–4.8)
ABSOLUTE MONO #: 0.5 K/UL (ref 0.2–0.8)
ALBUMIN SERPL-MCNC: 3.9 G/DL (ref 3.5–5.2)
ALBUMIN/GLOBULIN RATIO: ABNORMAL (ref 1–2.5)
ALP BLD-CCNC: 54 U/L (ref 35–104)
ALT SERPL-CCNC: 16 U/L (ref 5–33)
AMORPHOUS: ABNORMAL
AMYLASE: 81 U/L (ref 28–100)
ANION GAP SERPL CALCULATED.3IONS-SCNC: 14 MMOL/L (ref 9–17)
AST SERPL-CCNC: 17 U/L
BACTERIA: ABNORMAL
BASOPHILS # BLD: 0 % (ref 0–2)
BASOPHILS ABSOLUTE: 0.1 K/UL (ref 0–0.2)
BILIRUB SERPL-MCNC: 0.38 MG/DL (ref 0.3–1.2)
BILIRUBIN URINE: NEGATIVE
BUN BLDV-MCNC: 9 MG/DL (ref 6–20)
BUN/CREAT BLD: 11 (ref 9–20)
CALCIUM SERPL-MCNC: 8.5 MG/DL (ref 8.6–10.4)
CASTS UA: ABNORMAL /LPF
CHLORIDE BLD-SCNC: 104 MMOL/L (ref 98–107)
CHP ED QC CHECK: NORMAL
CHP ED QC CHECK: NORMAL
CO2: 22 MMOL/L (ref 20–31)
COLOR: YELLOW
COMMENT UA: ABNORMAL
CREAT SERPL-MCNC: 0.85 MG/DL (ref 0.5–0.9)
CRYSTALS, UA: ABNORMAL /HPF
DIFFERENTIAL TYPE: ABNORMAL
EOSINOPHILS RELATIVE PERCENT: 3 % (ref 1–4)
EPITHELIAL CELLS UA: ABNORMAL /HPF (ref 0–5)
GFR AFRICAN AMERICAN: >60 ML/MIN
GFR NON-AFRICAN AMERICAN: >60 ML/MIN
GFR SERPL CREATININE-BSD FRML MDRD: ABNORMAL ML/MIN/{1.73_M2}
GFR SERPL CREATININE-BSD FRML MDRD: ABNORMAL ML/MIN/{1.73_M2}
GLUCOSE BLD-MCNC: 96 MG/DL (ref 70–99)
GLUCOSE URINE: NEGATIVE
HCT VFR BLD CALC: 39.9 % (ref 36–46)
HEMOGLOBIN: 12.6 G/DL (ref 12–16)
IMMATURE GRANULOCYTES: ABNORMAL %
KETONES, URINE: NEGATIVE
LEUKOCYTE ESTERASE, URINE: NEGATIVE
LIPASE: 22 U/L (ref 13–60)
LYMPHOCYTES # BLD: 20 % (ref 24–44)
MCH RBC QN AUTO: 26.3 PG (ref 26–34)
MCHC RBC AUTO-ENTMCNC: 31.6 G/DL (ref 31–37)
MCV RBC AUTO: 83.2 FL (ref 80–100)
MONOCYTES # BLD: 4 % (ref 1–7)
MUCUS: ABNORMAL
NITRITE, URINE: NEGATIVE
NRBC AUTOMATED: ABNORMAL PER 100 WBC
OTHER OBSERVATIONS UA: ABNORMAL
PDW BLD-RTO: 17 % (ref 11.5–14.5)
PH UA: 7 (ref 5–8)
PLATELET # BLD: 287 K/UL (ref 130–400)
PLATELET ESTIMATE: ABNORMAL
PMV BLD AUTO: 8.1 FL (ref 6–12)
POTASSIUM SERPL-SCNC: 4.5 MMOL/L (ref 3.7–5.3)
PREGNANCY TEST URINE, POC: NORMAL
PROTEIN UA: ABNORMAL
RBC # BLD: 4.8 M/UL (ref 4–5.2)
RBC # BLD: ABNORMAL 10*6/UL
RBC UA: ABNORMAL /HPF (ref 0–2)
RENAL EPITHELIAL, UA: ABNORMAL /HPF
SEG NEUTROPHILS: 73 % (ref 36–66)
SEGMENTED NEUTROPHILS ABSOLUTE COUNT: 8.8 K/UL (ref 1.8–7.7)
SODIUM BLD-SCNC: 140 MMOL/L (ref 135–144)
SPECIFIC GRAVITY UA: 1.02 (ref 1–1.03)
TOTAL PROTEIN: 7.1 G/DL (ref 6.4–8.3)
TRICHOMONAS: ABNORMAL
TURBIDITY: ABNORMAL
URINE HGB: ABNORMAL
UROBILINOGEN, URINE: NORMAL
WBC # BLD: 12.1 K/UL (ref 3.5–11)
WBC # BLD: ABNORMAL 10*3/UL
WBC UA: ABNORMAL /HPF (ref 0–5)
YEAST: ABNORMAL

## 2018-04-16 PROCEDURE — 85025 COMPLETE CBC W/AUTO DIFF WBC: CPT

## 2018-04-16 PROCEDURE — 87086 URINE CULTURE/COLONY COUNT: CPT

## 2018-04-16 PROCEDURE — 83690 ASSAY OF LIPASE: CPT

## 2018-04-16 PROCEDURE — 81001 URINALYSIS AUTO W/SCOPE: CPT

## 2018-04-16 PROCEDURE — 6360000002 HC RX W HCPCS: Performed by: PHYSICIAN ASSISTANT

## 2018-04-16 PROCEDURE — 84703 CHORIONIC GONADOTROPIN ASSAY: CPT

## 2018-04-16 PROCEDURE — 80053 COMPREHEN METABOLIC PANEL: CPT

## 2018-04-16 PROCEDURE — 2580000003 HC RX 258: Performed by: PHYSICIAN ASSISTANT

## 2018-04-16 PROCEDURE — 96372 THER/PROPH/DIAG INJ SC/IM: CPT

## 2018-04-16 PROCEDURE — 96361 HYDRATE IV INFUSION ADD-ON: CPT

## 2018-04-16 PROCEDURE — 99284 EMERGENCY DEPT VISIT MOD MDM: CPT

## 2018-04-16 PROCEDURE — 96374 THER/PROPH/DIAG INJ IV PUSH: CPT

## 2018-04-16 PROCEDURE — 82150 ASSAY OF AMYLASE: CPT

## 2018-04-16 RX ORDER — ONDANSETRON 4 MG/1
4 TABLET, ORALLY DISINTEGRATING ORAL EVERY 8 HOURS PRN
Qty: 20 TABLET | Refills: 0 | Status: SHIPPED | OUTPATIENT
Start: 2018-04-16 | End: 2018-07-17

## 2018-04-16 RX ORDER — DICYCLOMINE HYDROCHLORIDE 10 MG/ML
20 INJECTION INTRAMUSCULAR ONCE
Status: COMPLETED | OUTPATIENT
Start: 2018-04-16 | End: 2018-04-16

## 2018-04-16 RX ORDER — 0.9 % SODIUM CHLORIDE 0.9 %
1000 INTRAVENOUS SOLUTION INTRAVENOUS ONCE
Status: COMPLETED | OUTPATIENT
Start: 2018-04-16 | End: 2018-04-16

## 2018-04-16 RX ORDER — DICYCLOMINE HCL 20 MG
20 TABLET ORAL EVERY 6 HOURS
Qty: 40 TABLET | Refills: 0 | Status: SHIPPED | OUTPATIENT
Start: 2018-04-16 | End: 2018-07-17

## 2018-04-16 RX ORDER — PROMETHAZINE HYDROCHLORIDE 25 MG/ML
12.5 INJECTION, SOLUTION INTRAMUSCULAR; INTRAVENOUS ONCE
Status: COMPLETED | OUTPATIENT
Start: 2018-04-16 | End: 2018-04-16

## 2018-04-16 RX ADMIN — PROMETHAZINE HYDROCHLORIDE 12.5 MG: 25 INJECTION INTRAMUSCULAR; INTRAVENOUS at 16:25

## 2018-04-16 RX ADMIN — DICYCLOMINE HYDROCHLORIDE 20 MG: 20 INJECTION, SOLUTION INTRAMUSCULAR at 16:25

## 2018-04-16 RX ADMIN — SODIUM CHLORIDE 1000 ML: 9 INJECTION, SOLUTION INTRAVENOUS at 16:27

## 2018-04-16 ASSESSMENT — PAIN SCALES - GENERAL: PAINLEVEL_OUTOF10: 8

## 2018-04-17 LAB
CULTURE: NORMAL
CULTURE: NORMAL
HCG, PREGNANCY URINE (POC): NEGATIVE
Lab: NORMAL
SPECIMEN DESCRIPTION: NORMAL
SPECIMEN DESCRIPTION: NORMAL
STATUS: NORMAL

## 2018-05-03 ENCOUNTER — TELEPHONE (OUTPATIENT)
Dept: ADMINISTRATIVE | Age: 33
End: 2018-05-03

## 2018-05-22 ENCOUNTER — HOSPITAL ENCOUNTER (OUTPATIENT)
Age: 33
Setting detail: SPECIMEN
Discharge: HOME | End: 2018-05-22
Payer: COMMERCIAL

## 2018-05-22 ENCOUNTER — OFFICE VISIT (OUTPATIENT)
Dept: OBGYN | Age: 33
End: 2018-05-22
Payer: COMMERCIAL

## 2018-05-22 VITALS
HEIGHT: 65 IN | HEART RATE: 88 BPM | DIASTOLIC BLOOD PRESSURE: 81 MMHG | SYSTOLIC BLOOD PRESSURE: 129 MMHG | BODY MASS INDEX: 25.33 KG/M2 | WEIGHT: 152 LBS

## 2018-05-22 DIAGNOSIS — N89.8 VAGINAL DISCHARGE: Primary | ICD-10-CM

## 2018-05-22 LAB
DIRECT EXAM: ABNORMAL
Lab: ABNORMAL
SPECIMEN DESCRIPTION: ABNORMAL
STATUS: ABNORMAL

## 2018-05-22 PROCEDURE — 99213 OFFICE O/P EST LOW 20 MIN: CPT | Performed by: OBSTETRICS & GYNECOLOGY

## 2018-05-22 PROCEDURE — 87480 CANDIDA DNA DIR PROBE: CPT | Performed by: OBSTETRICS & GYNECOLOGY

## 2018-05-22 PROCEDURE — G8427 DOCREV CUR MEDS BY ELIG CLIN: HCPCS | Performed by: OBSTETRICS & GYNECOLOGY

## 2018-05-22 PROCEDURE — G8419 CALC BMI OUT NRM PARAM NOF/U: HCPCS | Performed by: OBSTETRICS & GYNECOLOGY

## 2018-05-22 PROCEDURE — 87800 DETECT AGNT MULT DNA DIREC: CPT | Performed by: OBSTETRICS & GYNECOLOGY

## 2018-05-22 PROCEDURE — 4004F PT TOBACCO SCREEN RCVD TLK: CPT | Performed by: OBSTETRICS & GYNECOLOGY

## 2018-05-23 ENCOUNTER — TELEPHONE (OUTPATIENT)
Dept: OBGYN | Age: 33
End: 2018-05-23

## 2018-05-23 LAB
C TRACH DNA GENITAL QL NAA+PROBE: NEGATIVE
N. GONORRHOEAE DNA: NEGATIVE

## 2018-05-23 RX ORDER — METRONIDAZOLE 500 MG/1
500 TABLET ORAL 2 TIMES DAILY
Qty: 14 TABLET | Refills: 0 | Status: SHIPPED | OUTPATIENT
Start: 2018-05-23 | End: 2018-05-30

## 2018-06-21 ENCOUNTER — NURSE ONLY (OUTPATIENT)
Dept: OBGYN | Age: 33
End: 2018-06-21
Payer: COMMERCIAL

## 2018-06-21 VITALS
WEIGHT: 150.2 LBS | SYSTOLIC BLOOD PRESSURE: 111 MMHG | DIASTOLIC BLOOD PRESSURE: 86 MMHG | TEMPERATURE: 98.6 F | BODY MASS INDEX: 25.02 KG/M2 | HEIGHT: 65 IN | RESPIRATION RATE: 16 BRPM | HEART RATE: 80 BPM

## 2018-06-21 DIAGNOSIS — Z30.09 FAMILY PLANNING: Primary | ICD-10-CM

## 2018-06-21 PROCEDURE — 96372 THER/PROPH/DIAG INJ SC/IM: CPT | Performed by: OBSTETRICS & GYNECOLOGY

## 2018-06-21 PROCEDURE — 99211 OFF/OP EST MAY X REQ PHY/QHP: CPT | Performed by: OBSTETRICS & GYNECOLOGY

## 2018-06-21 RX ORDER — MEDROXYPROGESTERONE ACETATE 150 MG/ML
150 INJECTION, SUSPENSION INTRAMUSCULAR ONCE
Status: COMPLETED | OUTPATIENT
Start: 2018-06-21 | End: 2018-06-21

## 2018-06-21 RX ADMIN — MEDROXYPROGESTERONE ACETATE 150 MG: 150 INJECTION, SUSPENSION INTRAMUSCULAR at 10:15

## 2018-07-17 ENCOUNTER — HOSPITAL ENCOUNTER (EMERGENCY)
Age: 33
Discharge: HOME | End: 2018-07-17
Attending: EMERGENCY MEDICINE
Payer: COMMERCIAL

## 2018-07-17 ENCOUNTER — APPOINTMENT (OUTPATIENT)
Dept: GENERAL RADIOLOGY | Age: 33
End: 2018-07-17
Payer: COMMERCIAL

## 2018-07-17 VITALS
WEIGHT: 150 LBS | OXYGEN SATURATION: 100 % | TEMPERATURE: 98.6 F | HEART RATE: 98 BPM | BODY MASS INDEX: 24.99 KG/M2 | DIASTOLIC BLOOD PRESSURE: 73 MMHG | HEIGHT: 65 IN | RESPIRATION RATE: 16 BRPM | SYSTOLIC BLOOD PRESSURE: 122 MMHG

## 2018-07-17 DIAGNOSIS — S16.1XXA ACUTE STRAIN OF NECK MUSCLE, INITIAL ENCOUNTER: ICD-10-CM
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
## 2018-07-17 LAB
CHP ED QC CHECK: NORMAL
PREGNANCY TEST URINE, POC: NEGATIVE

## 2018-07-17 PROCEDURE — 99284 EMERGENCY DEPT VISIT MOD MDM: CPT

## 2018-07-17 PROCEDURE — 6370000000 HC RX 637 (ALT 250 FOR IP): Performed by: PHYSICIAN ASSISTANT

## 2018-07-17 PROCEDURE — 84703 CHORIONIC GONADOTROPIN ASSAY: CPT

## 2018-07-17 PROCEDURE — 72040 X-RAY EXAM NECK SPINE 2-3 VW: CPT

## 2018-07-17 RX ORDER — ACETAMINOPHEN 325 MG/1
650 TABLET ORAL ONCE
Status: COMPLETED | OUTPATIENT
Start: 2018-07-17 | End: 2018-07-17

## 2018-07-17 RX ORDER — METHOCARBAMOL 750 MG/1
750 TABLET, FILM COATED ORAL 4 TIMES DAILY
Qty: 40 TABLET | Refills: 0 | Status: SHIPPED | OUTPATIENT
Start: 2018-07-17 | End: 2018-07-27

## 2018-07-17 RX ORDER — IBUPROFEN 800 MG/1
800 TABLET ORAL EVERY 8 HOURS PRN
Qty: 30 TABLET | Refills: 0 | Status: SHIPPED | OUTPATIENT
Start: 2018-07-17 | End: 2019-02-09

## 2018-07-17 RX ADMIN — ACETAMINOPHEN 650 MG: 325 TABLET ORAL at 13:59

## 2018-07-17 ASSESSMENT — PAIN SCALES - GENERAL
PAINLEVEL_OUTOF10: 8
PAINLEVEL_OUTOF10: 8
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
## 2018-07-18 ENCOUNTER — CARE COORDINATION (OUTPATIENT)
Dept: CARE COORDINATION | Age: 33
End: 2018-07-18

## 2018-07-18 LAB — HCG, PREGNANCY URINE (POC): NEGATIVE

## 2018-07-18 NOTE — CARE COORDINATION
Attempted to reach patient for ed follow up, patient answered but stated she was at work so could not talk, she was reminded to call pcp for follow up and return call for any further questions or concerns

## 2018-12-18 ENCOUNTER — HOSPITAL ENCOUNTER (EMERGENCY)
Age: 33
Discharge: HOME | End: 2018-12-18
Attending: EMERGENCY MEDICINE
Payer: COMMERCIAL

## 2018-12-18 VITALS
RESPIRATION RATE: 18 BRPM | HEIGHT: 65 IN | OXYGEN SATURATION: 100 % | DIASTOLIC BLOOD PRESSURE: 79 MMHG | TEMPERATURE: 99.1 F | BODY MASS INDEX: 31.56 KG/M2 | WEIGHT: 189.44 LBS | SYSTOLIC BLOOD PRESSURE: 129 MMHG | HEART RATE: 73 BPM

## 2018-12-18 DIAGNOSIS — M54.6 THORACOLUMBAR BACK PAIN: ICD-10-CM
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
## 2018-12-18 DIAGNOSIS — M54.50 THORACOLUMBAR BACK PAIN: ICD-10-CM

## 2018-12-18 PROCEDURE — 81003 URINALYSIS AUTO W/O SCOPE: CPT

## 2018-12-18 PROCEDURE — 99283 EMERGENCY DEPT VISIT LOW MDM: CPT

## 2018-12-18 PROCEDURE — 84703 CHORIONIC GONADOTROPIN ASSAY: CPT

## 2018-12-18 RX ORDER — METAXALONE 800 MG/1
800 TABLET ORAL 3 TIMES DAILY
Qty: 30 TABLET | Refills: 0 | Status: SHIPPED | OUTPATIENT
Start: 2018-12-18 | End: 2018-12-28

## 2018-12-18 RX ORDER — IBUPROFEN 600 MG/1
600 TABLET ORAL EVERY 6 HOURS PRN
Qty: 40 TABLET | Refills: 0 | Status: SHIPPED | OUTPATIENT
Start: 2018-12-18 | End: 2019-02-09

## 2018-12-18 ASSESSMENT — PAIN SCALES - GENERAL: PAINLEVEL_OUTOF10: 7

## 2018-12-18 ASSESSMENT — ENCOUNTER SYMPTOMS
SHORTNESS OF BREATH: 0
BACK PAIN: 1
ABDOMINAL PAIN: 0
COUGH: 0
EYE PAIN: 0
COLOR CHANGE: 0
SORE THROAT: 0

## 2018-12-18 ASSESSMENT — PAIN DESCRIPTION - PAIN TYPE: TYPE: ACUTE PAIN

## 2018-12-18 ASSESSMENT — PAIN DESCRIPTION - DESCRIPTORS: DESCRIPTORS: ACHING

## 2018-12-18 NOTE — ED PROVIDER NOTES
that she drinks alcohol. She reports that she does not use drugs. REVIEW OF SYSTEMS    (2-9 systems for level 4, 10 or more for level 5)     Review of Systems   Constitutional: Negative for activity change, appetite change, chills and fever. HENT: Negative for sore throat. Eyes: Negative for pain and visual disturbance. Respiratory: Negative for cough and shortness of breath. Cardiovascular: Negative for chest pain and leg swelling. Gastrointestinal: Negative for abdominal pain. Genitourinary: Negative for difficulty urinating, dysuria, flank pain and hematuria. Musculoskeletal: Positive for back pain. Negative for arthralgias, joint swelling and neck pain. Negative for loss of bowel or bladder control, saddle anesthesia, spinous process pain. Skin: Negative for color change, pallor, rash and wound. Neurological: Negative for syncope, light-headedness and headaches. Except as noted above the remainder of the review of systems was reviewed and negative. PHYSICAL EXAM    (up to 7 for level 4, 8 or more for level 5)     ED Triage Vitals [12/18/18 1423]   BP Temp Temp src Pulse Resp SpO2 Height Weight   129/79 99.1 °F (37.3 °C) -- 73 18 100 % 5' 5\" (1.651 m) 189 lb 7 oz (85.9 kg)         General Appearance:  Alert, cooperative, Pleasant, giggling    Head:  Normocephalic,  No visible palpable signs of trauma. Eyes:  conjunctiva/corneas clear, EOM's intact. PERRLA.      ENT: Mucous membranes moist. Ear exam reveals gray and flat tympanic membranes. Throat is pink. No facial tenderness. Neck: Supple,  trachea midline. No C-spine tenderness. Lungs:   Lungs clear to auscultation. Juliene Asiya Respirations eupneic. No thoracic pain. Heart: RRR, Normal skin color   Extremities:      Back:   Full ROM without painOr distal swelling. No erythema or ecchymosis with the examination of the back, abdomen and upper torso.     Very mild tenderness with palpation over the paraspinal areas of

## 2018-12-21 LAB — HCG, PREGNANCY URINE (POC): NEGATIVE

## 2019-02-09 ENCOUNTER — HOSPITAL ENCOUNTER (EMERGENCY)
Age: 34
Discharge: HOME | End: 2019-02-09
Attending: EMERGENCY MEDICINE
Payer: COMMERCIAL

## 2019-02-09 VITALS
DIASTOLIC BLOOD PRESSURE: 73 MMHG | SYSTOLIC BLOOD PRESSURE: 128 MMHG | HEART RATE: 76 BPM | WEIGHT: 194.4 LBS | HEIGHT: 65 IN | BODY MASS INDEX: 32.39 KG/M2 | RESPIRATION RATE: 16 BRPM | OXYGEN SATURATION: 100 % | TEMPERATURE: 98.1 F

## 2019-02-09 DIAGNOSIS — M43.6 TORTICOLLIS: Primary | ICD-10-CM

## 2019-02-09 LAB
CHP ED QC CHECK: YES
PREGNANCY TEST URINE, POC: NEGATIVE

## 2019-02-09 PROCEDURE — 84703 CHORIONIC GONADOTROPIN ASSAY: CPT

## 2019-02-09 PROCEDURE — 99283 EMERGENCY DEPT VISIT LOW MDM: CPT

## 2019-02-09 RX ORDER — METHOCARBAMOL 750 MG/1
750 TABLET, FILM COATED ORAL 3 TIMES DAILY
Qty: 30 TABLET | Refills: 0 | Status: SHIPPED | OUTPATIENT
Start: 2019-02-09 | End: 2020-03-18 | Stop reason: ALTCHOICE

## 2019-02-09 RX ORDER — IBUPROFEN 800 MG/1
800 TABLET ORAL EVERY 8 HOURS PRN
Qty: 15 TABLET | Refills: 0 | Status: SHIPPED | OUTPATIENT
Start: 2019-02-09 | End: 2019-05-02

## 2019-02-09 ASSESSMENT — PAIN DESCRIPTION - DESCRIPTORS: DESCRIPTORS: SPASM;TENDER

## 2019-02-09 ASSESSMENT — PAIN DESCRIPTION - LOCATION: LOCATION: BACK

## 2019-02-09 ASSESSMENT — ENCOUNTER SYMPTOMS
COUGH: 0
BACK PAIN: 0
COLOR CHANGE: 0
SHORTNESS OF BREATH: 0

## 2019-02-09 ASSESSMENT — PAIN DESCRIPTION - FREQUENCY: FREQUENCY: INTERMITTENT

## 2019-02-09 ASSESSMENT — PAIN SCALES - GENERAL: PAINLEVEL_OUTOF10: 7

## 2019-02-11 LAB — HCG, PREGNANCY URINE (POC): NEGATIVE

## 2019-05-02 ENCOUNTER — HOSPITAL ENCOUNTER (EMERGENCY)
Age: 34
Discharge: HOME | End: 2019-05-02
Attending: EMERGENCY MEDICINE
Payer: COMMERCIAL

## 2019-05-02 VITALS
OXYGEN SATURATION: 100 % | SYSTOLIC BLOOD PRESSURE: 140 MMHG | WEIGHT: 196.4 LBS | BODY MASS INDEX: 32.72 KG/M2 | DIASTOLIC BLOOD PRESSURE: 88 MMHG | TEMPERATURE: 97.9 F | HEART RATE: 67 BPM | RESPIRATION RATE: 16 BRPM | HEIGHT: 65 IN

## 2019-05-02 VITALS
RESPIRATION RATE: 16 BRPM | HEART RATE: 72 BPM | TEMPERATURE: 98.6 F | SYSTOLIC BLOOD PRESSURE: 139 MMHG | OXYGEN SATURATION: 100 % | DIASTOLIC BLOOD PRESSURE: 89 MMHG

## 2019-05-02 DIAGNOSIS — R10.30 LOWER ABDOMINAL PAIN: Primary | ICD-10-CM

## 2019-05-02 DIAGNOSIS — R10.9 ABDOMINAL PAIN, UNSPECIFIED ABDOMINAL LOCATION: Primary | ICD-10-CM

## 2019-05-02 LAB
ABSOLUTE EOS #: 0.22 K/UL (ref 0–0.44)
ABSOLUTE IMMATURE GRANULOCYTE: 0.04 K/UL (ref 0–0.3)
ABSOLUTE LYMPH #: 2.09 K/UL (ref 1.1–3.7)
ABSOLUTE MONO #: 0.67 K/UL (ref 0.1–1.2)
ALBUMIN SERPL-MCNC: 4 G/DL (ref 3.5–5.2)
ALBUMIN/GLOBULIN RATIO: NORMAL (ref 1–2.5)
ALP BLD-CCNC: 74 U/L (ref 35–104)
ALT SERPL-CCNC: 13 U/L (ref 5–33)
AMYLASE: 82 U/L (ref 28–100)
ANION GAP SERPL CALCULATED.3IONS-SCNC: 12 MMOL/L (ref 9–17)
AST SERPL-CCNC: 17 U/L
BASOPHILS # BLD: 1 % (ref 0–2)
BASOPHILS ABSOLUTE: 0.05 K/UL (ref 0–0.2)
BILIRUB SERPL-MCNC: 0.33 MG/DL (ref 0.3–1.2)
BILIRUBIN DIRECT: 0.09 MG/DL
BILIRUBIN, INDIRECT: 0.24 MG/DL (ref 0–1)
BUN BLDV-MCNC: 9 MG/DL (ref 6–20)
BUN/CREAT BLD: 13 (ref 9–20)
CALCIUM SERPL-MCNC: 9.1 MG/DL (ref 8.6–10.4)
CHLORIDE BLD-SCNC: 103 MMOL/L (ref 98–107)
CO2: 22 MMOL/L (ref 20–31)
CREAT SERPL-MCNC: 0.72 MG/DL (ref 0.5–0.9)
DIFFERENTIAL TYPE: ABNORMAL
EOSINOPHILS RELATIVE PERCENT: 2 % (ref 1–4)
GFR AFRICAN AMERICAN: >60 ML/MIN
GFR NON-AFRICAN AMERICAN: >60 ML/MIN
GFR SERPL CREATININE-BSD FRML MDRD: ABNORMAL ML/MIN/{1.73_M2}
GFR SERPL CREATININE-BSD FRML MDRD: ABNORMAL ML/MIN/{1.73_M2}
GLOBULIN: NORMAL G/DL (ref 1.5–3.8)
GLUCOSE BLD-MCNC: 103 MG/DL (ref 70–99)
HCT VFR BLD CALC: 43.3 % (ref 36.3–47.1)
HEMOGLOBIN: 13.4 G/DL (ref 11.9–15.1)
IMMATURE GRANULOCYTES: 0 %
LIPASE: 23 U/L (ref 13–60)
LYMPHOCYTES # BLD: 22 % (ref 24–43)
MCH RBC QN AUTO: 26.6 PG (ref 25.2–33.5)
MCHC RBC AUTO-ENTMCNC: 30.9 G/DL (ref 28.4–34.8)
MCV RBC AUTO: 86.1 FL (ref 82.6–102.9)
MONOCYTES # BLD: 7 % (ref 3–12)
NRBC AUTOMATED: 0 PER 100 WBC
PDW BLD-RTO: 12.9 % (ref 11.8–14.4)
PLATELET # BLD: 269 K/UL (ref 138–453)
PLATELET ESTIMATE: ABNORMAL
PMV BLD AUTO: 9.7 FL (ref 8.1–13.5)
POTASSIUM SERPL-SCNC: 3.8 MMOL/L (ref 3.7–5.3)
RBC # BLD: 5.03 M/UL (ref 3.95–5.11)
RBC # BLD: ABNORMAL 10*6/UL
SEG NEUTROPHILS: 68 % (ref 36–65)
SEGMENTED NEUTROPHILS ABSOLUTE COUNT: 6.46 K/UL (ref 1.5–8.1)
SODIUM BLD-SCNC: 137 MMOL/L (ref 135–144)
TOTAL PROTEIN: 7 G/DL (ref 6.4–8.3)
WBC # BLD: 9.5 K/UL (ref 3.5–11.3)
WBC # BLD: ABNORMAL 10*3/UL

## 2019-05-02 PROCEDURE — 81003 URINALYSIS AUTO W/O SCOPE: CPT

## 2019-05-02 PROCEDURE — C9113 INJ PANTOPRAZOLE SODIUM, VIA: HCPCS | Performed by: NURSE PRACTITIONER

## 2019-05-02 PROCEDURE — 36415 COLL VENOUS BLD VENIPUNCTURE: CPT

## 2019-05-02 PROCEDURE — 85025 COMPLETE CBC W/AUTO DIFF WBC: CPT

## 2019-05-02 PROCEDURE — 82150 ASSAY OF AMYLASE: CPT

## 2019-05-02 PROCEDURE — 2580000003 HC RX 258: Performed by: NURSE PRACTITIONER

## 2019-05-02 PROCEDURE — 83690 ASSAY OF LIPASE: CPT

## 2019-05-02 PROCEDURE — 80076 HEPATIC FUNCTION PANEL: CPT

## 2019-05-02 PROCEDURE — 84703 CHORIONIC GONADOTROPIN ASSAY: CPT

## 2019-05-02 PROCEDURE — 99283 EMERGENCY DEPT VISIT LOW MDM: CPT

## 2019-05-02 PROCEDURE — 99284 EMERGENCY DEPT VISIT MOD MDM: CPT

## 2019-05-02 PROCEDURE — 96374 THER/PROPH/DIAG INJ IV PUSH: CPT

## 2019-05-02 PROCEDURE — 80048 BASIC METABOLIC PNL TOTAL CA: CPT

## 2019-05-02 PROCEDURE — 96375 TX/PRO/DX INJ NEW DRUG ADDON: CPT

## 2019-05-02 PROCEDURE — 6360000002 HC RX W HCPCS: Performed by: NURSE PRACTITIONER

## 2019-05-02 RX ORDER — 0.9 % SODIUM CHLORIDE 0.9 %
1000 INTRAVENOUS SOLUTION INTRAVENOUS ONCE
Status: COMPLETED | OUTPATIENT
Start: 2019-05-02 | End: 2019-05-02

## 2019-05-02 RX ORDER — ONDANSETRON 2 MG/ML
4 INJECTION INTRAMUSCULAR; INTRAVENOUS ONCE
Status: COMPLETED | OUTPATIENT
Start: 2019-05-02 | End: 2019-05-02

## 2019-05-02 RX ORDER — PANTOPRAZOLE SODIUM 40 MG/10ML
40 INJECTION, POWDER, LYOPHILIZED, FOR SOLUTION INTRAVENOUS ONCE
Status: COMPLETED | OUTPATIENT
Start: 2019-05-02 | End: 2019-05-02

## 2019-05-02 RX ORDER — IBUPROFEN 800 MG/1
800 TABLET ORAL EVERY 6 HOURS PRN
Qty: 21 TABLET | Refills: 0 | Status: ON HOLD | OUTPATIENT
Start: 2019-05-02 | End: 2019-06-23 | Stop reason: HOSPADM

## 2019-05-02 RX ORDER — 0.9 % SODIUM CHLORIDE 0.9 %
10 VIAL (ML) INJECTION ONCE
Status: COMPLETED | OUTPATIENT
Start: 2019-05-02 | End: 2019-05-02

## 2019-05-02 RX ORDER — DICYCLOMINE HYDROCHLORIDE 10 MG/1
10 CAPSULE ORAL EVERY 6 HOURS PRN
Qty: 20 CAPSULE | Refills: 0 | Status: SHIPPED | OUTPATIENT
Start: 2019-05-02 | End: 2021-03-16

## 2019-05-02 RX ORDER — OMEPRAZOLE 40 MG/1
40 CAPSULE, DELAYED RELEASE ORAL DAILY
Qty: 30 CAPSULE | Refills: 0 | Status: SHIPPED | OUTPATIENT
Start: 2019-05-02 | End: 2021-03-16

## 2019-05-02 RX ADMIN — PANTOPRAZOLE SODIUM 40 MG: 40 INJECTION, POWDER, FOR SOLUTION INTRAVENOUS at 22:49

## 2019-05-02 RX ADMIN — SODIUM CHLORIDE 1000 ML: 9 INJECTION, SOLUTION INTRAVENOUS at 22:42

## 2019-05-02 RX ADMIN — ONDANSETRON 4 MG: 2 INJECTION INTRAMUSCULAR; INTRAVENOUS at 22:49

## 2019-05-02 RX ADMIN — SODIUM CHLORIDE 10 ML: 9 INJECTION, SOLUTION INTRAMUSCULAR; INTRAVENOUS; SUBCUTANEOUS at 23:49

## 2019-05-02 ASSESSMENT — ENCOUNTER SYMPTOMS
VOMITING: 0
VOMITING: 0
WHEEZING: 0
COLOR CHANGE: 0
CONSTIPATION: 0
SINUS PRESSURE: 0
COUGH: 0
SHORTNESS OF BREATH: 0
CONSTIPATION: 0
NAUSEA: 0
WHEEZING: 0
DIARRHEA: 0
ABDOMINAL PAIN: 0
COUGH: 0
SINUS PRESSURE: 0
RHINORRHEA: 0
ABDOMINAL PAIN: 1
SORE THROAT: 0
SHORTNESS OF BREATH: 0
RHINORRHEA: 0
SORE THROAT: 0
NAUSEA: 0
DIARRHEA: 0
COLOR CHANGE: 0

## 2019-05-02 ASSESSMENT — PAIN SCALES - GENERAL: PAINLEVEL_OUTOF10: 9

## 2019-05-02 NOTE — LETTER
[facility] ED  [facility] [address]  Phone: [phone]             May 2, 2019    Patient: David Campbell   YOB: 1985   Date of Visit: 5/2/2019       To Whom It May Concern:    Katherine Tyler was seen and treated in our emergency department on 5/2/2019.  She may return to work on May 3,2019      Sincerely,             Signature:__________________________________

## 2019-05-03 LAB — HCG, PREGNANCY URINE (POC): NEGATIVE

## 2019-05-03 NOTE — ED PROVIDER NOTES
Neck supple. Cardiovascular: Normal rate and regular rhythm. Pulmonary/Chest: Effort normal and breath sounds normal. No stridor. No respiratory distress. Abdominal: Soft. Bowel sounds are normal. There is tenderness in the epigastric area. Musculoskeletal: Normal range of motion. Lymphadenopathy:     She has no cervical adenopathy. Neurological: She is alert and oriented to person, place, and time. Skin: Skin is warm and dry. No rash noted. Psychiatric: She has a normal mood and affect. Vitals reviewed.  ]    LABS:  Labs Reviewed   CBC WITH AUTO DIFFERENTIAL - Abnormal; Notable for the following components:       Result Value    Seg Neutrophils 68 (*)     Lymphocytes 22 (*)     All other components within normal limits   BASIC METABOLIC PANEL - Abnormal; Notable for the following components:    Glucose 103 (*)     All other components within normal limits   LIPASE   AMYLASE   HEPATIC FUNCTION PANEL   URINE RT REFLEX TO CULTURE       All other labs were within normal range or not returned as of this dictation. EMERGENCY DEPARTMENT COURSE and DIFFERENTIAL DIAGNOSIS/MDM:   Vitals:    Vitals:    05/02/19 2227 05/02/19 2229   BP: (!) 140/88    Pulse: 67    Resp: 16    Temp: 97.9 °F (36.6 °C)    TempSrc: Oral    SpO2: 100%    Weight:  196 lb 6.4 oz (89.1 kg)   Height:  5' 5\" (1.651 m)       Medical Decision Making: Since laboratory studies to include liver enzymes and amylase and lipase are negative. Patient was have gastritis. We'll place the patient on Prilosec and some Bentyl. Follow up with primary care physician. Medications   pantoprazole (PROTONIX) injection 40 mg (40 mg Intravenous Given 5/2/19 2249)     And   sodium chloride (PF) 0.9 % injection 10 mL (10 mLs Intravenous Given 5/2/19 2349)   ondansetron (ZOFRAN) injection 4 mg (4 mg Intravenous Given 5/2/19 2249)   0.9 % sodium chloride bolus (0 mLs Intravenous Stopped 5/2/19 2342)       FINAL IMPRESSION      1.  Abdominal pain, unspecified abdominal location          DISPOSITION/PLAN   DISPOSITION Decision To Discharge 05/02/2019 11:33:32 PM      PATIENT REFERRED TO:   Destiny Sanches MD  [place] [address] [facility] [address] [facility]  [phone]    Call in 2 days      [facility]  [address] [facility]  [phone]    If symptoms worsen      DISCHARGE MEDICATIONS:     Discharge Medication List as of 5/2/2019 11:34 PM      START taking these medications    Details   omeprazole (PRILOSEC) 40 MG delayed release capsule Take 1 capsule by mouth daily, Disp-30 capsule, R-0Print      dicyclomine (BENTYL) 10 MG capsule Take 1 capsule by mouth every 6 hours as needed (cramps), Disp-20 capsule, R-0Print                 (Please note that portions of this note were completed with a voice recognition program.  Efforts were made to edit the dictations but occasionally words are mis-transcribed.)    [address] [facility] Health System West CampusLaZure Scientific JAIMIE, RIO - CNP  Certified Nurse Practitioner          RIO Nava CNP  05/02/19 [id number]

## 2019-05-03 NOTE — ED PROVIDER NOTES
[facility] ED  eMERGENCY dEPARTMENT eNCOUnter      Pt Name: Lissa Guerrero  MRN: [id number]  Ed[place]uliet 1985  Date of evaluation: 5/2/2019  Provider: Jose Sr NP, APRN - Flory [id number]       Chief Complaint   Patient presents with    Pelvic Pain         HISTORY OF PRESENT ILLNESS  (Location/Symptom, Timing/Onset, Context/Setting, Quality, Duration, Modifying Factors, Severity.)   Lissa Guerrero is a 34 y.o. female who presents to the emergency department by private vehicle for evasive pain to the lower abdomen that she's had for the last 2 days. She states that she is on over-the-counter medication to \"prepare her for pregnancy\". She states that she has a cramping pain all across the lower abdomen. She denies any associated nausea or vomiting. She is not experiencing any pain or burning which urinates or blood in her urine. She denies any vaginal bleeding or vaginal discharge. She states that her last period was in March and in April she had just spotted. She states that there is a chance of pregnancy      Nursing Notes were reviewed. ALLERGIES     Patient has no known allergies. CURRENT MEDICATIONS       Discharge Medication List as of 5/2/2019  8:16 PM      CONTINUE these medications which have NOT CHANGED    Details   methocarbamol (ROBAXIN-750) 750 MG tablet Take 1 tablet by mouth 3 times daily   WARNING:  May cause drowsiness.  May impair ability to operate vehicles or machinery.  Do not use in combination with alcohol., Disp-30 tablet, R-0Print      medroxyPROGESTERone (DEPO-PROVERA) 150 MG/ML injection Please Give IM Injection every 12 weeks. , Disp-1 mL, R-3Normal             PAST MEDICAL HISTORY         Diagnosis Date    Chlamydia     IBS (irritable bowel syndrome) 5/7/2015    Infertility, female, secondary     Trichomonas vaginalis infection 2013       SURGICAL HISTORY     History reviewed. No pertinent surgical history.       FAMILY HISTORY     History reviewed. No pertinent family history. No family status information on file. SOCIAL HISTORY      reports that she has been smoking cigars. She has never used smokeless tobacco. She reports that she drinks alcohol. She reports that she does not use drugs. REVIEW OF SYSTEMS    (2-9 systems for level 4, 10 or more for level 5)     Review of Systems   Constitutional: Negative for chills, fever and unexpected weight change. HENT: Negative for congestion, rhinorrhea, sinus pressure and sore throat. Respiratory: Negative for cough, shortness of breath and wheezing. Cardiovascular: Negative for chest pain and palpitations. Gastrointestinal: Negative for abdominal pain, constipation, diarrhea, nausea and vomiting. Genitourinary: Positive for pelvic pain. Negative for dysuria and hematuria. Musculoskeletal: Negative for arthralgias and myalgias. Skin: Negative for color change and rash. Neurological: Negative for dizziness, weakness and headaches. Hematological: Negative for adenopathy. Except as noted above the remainder of the review of systems was reviewed and negative. PHYSICAL EXAM    (up to 7 for level 4, 8 or more for level 5)     ED Triage Vitals [05/02/19 1959]   BP Temp Temp Source Pulse Resp SpO2 Height Weight   (!) 136/94 98.6 °F (37 °C) Oral 72 16 100 % -- --       Physical Exam   Constitutional: She is oriented to person, place, and time. She appears well-developed and well-nourished. HENT:   Head: Normocephalic and atraumatic. Mouth/Throat: Oropharynx is clear and moist.   Eyes: Pupils are equal, round, and reactive to light. Conjunctivae are normal.   Neck: Normal range of motion. Neck supple. Cardiovascular: Normal rate and regular rhythm. Pulmonary/Chest: Effort normal and breath sounds normal. No stridor. No respiratory distress. Abdominal: Soft. Bowel sounds are normal.   Musculoskeletal: Normal range of motion.    Lymphadenopathy:     She has no cervical adenopathy. Neurological: She is alert and oriented to person, place, and time. Skin: Skin is warm and dry. No rash noted. Psychiatric: She has a normal mood and affect. Vitals reviewed. LABS:  Labs Reviewed - No data to display    All other labs were within normal range or not returned as of this dictation. EMERGENCY DEPARTMENT COURSE and DIFFERENTIAL DIAGNOSIS/MDM:   Vitals:    Vitals:    05/02/19 1959 05/02/19 2010   BP: (!) 136/94 139/89   Pulse: 72    Resp: 16    Temp: 98.6 °F (37 °C)    TempSrc: Oral    SpO2: 100%        Medical Decision Making: Analysis and urine prybar negative. Patient's physical exam is benign. She'll be discharged home. Follow up with her doctor. This pain most likely is related to the medication she is taking over-the-counter to help her  for pregnancy    FINAL IMPRESSION      1.  Lower abdominal pain          DISPOSITION/PLAN   DISPOSITION Decision To Discharge 05/02/2019 08:14:15 PM      PATIENT REFERRED TO:   Delvin Rincon MD  [address] [address]     Call in 2 days      [facility] [place] [facility]  [address] [facility]  [phone]    If symptoms worsen      DISCHARGE MEDICATIONS:     Discharge Medication List as of 5/2/2019  8:16 PM              (Please note that portions of this note were completed with a voice recognition program.  Efforts were made to edit the dictations but occasionally words are mis-transcribed.)    Daniel Caal NP, APRN - [place]  Certified Nurse Practitioner          RIO Mckeon - JEROD  05/02/19 [id number]

## 2019-05-03 NOTE — ED NOTES
Pt arrived to ED via private auto with c/o  Upper abdominal pain and emesis that started a few hours ago. Pt was seen here earlier today and states she had two episodes of emesis since she was discharged. Pt is afebrile and vitals are stable. Bowel sounds active in all 4 quadrants. Even, non-labored breathing.        Bertrand Calix RN  05/02/19 59903 Jennifer Sifuentes RN  05/02/19 3086

## 2019-06-14 ENCOUNTER — HOSPITAL ENCOUNTER (EMERGENCY)
Age: 34
Discharge: HOME | End: 2019-06-14
Attending: EMERGENCY MEDICINE
Payer: COMMERCIAL

## 2019-06-14 VITALS
RESPIRATION RATE: 18 BRPM | BODY MASS INDEX: 33.27 KG/M2 | WEIGHT: 199.7 LBS | SYSTOLIC BLOOD PRESSURE: 127 MMHG | DIASTOLIC BLOOD PRESSURE: 69 MMHG | TEMPERATURE: 98.3 F | HEART RATE: 75 BPM | HEIGHT: 65 IN | OXYGEN SATURATION: 100 %

## 2019-06-14 DIAGNOSIS — Z32.01 PREGNANCY TEST POSITIVE: Primary | ICD-10-CM

## 2019-06-14 LAB
-: ABNORMAL
AMORPHOUS: ABNORMAL
BACTERIA: ABNORMAL
BILIRUBIN URINE: NEGATIVE
CASTS UA: ABNORMAL /LPF
CHP ED QC CHECK: NORMAL
COLOR: YELLOW
COMMENT UA: ABNORMAL
CRYSTALS, UA: ABNORMAL /HPF
EPITHELIAL CELLS UA: ABNORMAL /HPF (ref 0–5)
GLUCOSE URINE: NEGATIVE
HCG, PREGNANCY URINE (POC): POSITIVE
KETONES, URINE: NEGATIVE
LEUKOCYTE ESTERASE, URINE: NEGATIVE
MUCUS: ABNORMAL
NITRITE, URINE: NEGATIVE
OTHER OBSERVATIONS UA: ABNORMAL
PH UA: 6 (ref 5–8)
PREGNANCY TEST URINE, POC: POSITIVE
PROTEIN UA: NEGATIVE
RBC UA: ABNORMAL /HPF (ref 0–2)
RENAL EPITHELIAL, UA: ABNORMAL /HPF
SPECIFIC GRAVITY UA: 1.02 (ref 1–1.03)
TRICHOMONAS: ABNORMAL
TURBIDITY: ABNORMAL
URINE HGB: NEGATIVE
UROBILINOGEN, URINE: NORMAL
WBC UA: ABNORMAL /HPF (ref 0–5)
YEAST: ABNORMAL

## 2019-06-14 PROCEDURE — 81025 URINE PREGNANCY TEST: CPT

## 2019-06-14 PROCEDURE — 99284 EMERGENCY DEPT VISIT MOD MDM: CPT

## 2019-06-14 PROCEDURE — 87086 URINE CULTURE/COLONY COUNT: CPT

## 2019-06-14 PROCEDURE — 81001 URINALYSIS AUTO W/SCOPE: CPT

## 2019-06-14 NOTE — ED PROVIDER NOTES
[address] [place] [address] ED  eMERGENCY dEPARTMENT eNCOUnter      Pt Name: Jer Petty  MRN: [id number]  [place] 1985  Date of evaluation: 6/14/2019  Provider: Dejah Munoz MD    CHIEF COMPLAINT       Chief Complaint   Patient presents with    Abdominal Cramping     lower abd/pelvic         HISTORY OF PRESENT ILLNESS  (Location/Symptom, Timing/Onset, Context/Setting, Quality, Duration, Modifying Factors, Severity.)   Jer Petty is a 34 y.o. female who presents to the emergency department for evaluation of suprapubic discomfort. Patient has had discomfort for 2 days. She has irregular periods. She had a negative pregnancy test on May 13. She denies any vaginal discharge or bleeding. No flank pain back pain fevers or chills. She states she just has an intermittent cramping sensation      Nursing Notes were reviewed. ALLERGIES     Patient has no known allergies. CURRENT MEDICATIONS       Previous Medications    DICYCLOMINE (BENTYL) 10 MG CAPSULE    Take 1 capsule by mouth every 6 hours as needed (cramps)    IBUPROFEN (IBU) 800 MG TABLET    Take 1 tablet by mouth every 6 hours as needed for Pain    MEDROXYPROGESTERONE (DEPO-PROVERA) 150 MG/ML INJECTION    Please Give IM Injection every 12 weeks. METHOCARBAMOL (ROBAXIN-750) 750 MG TABLET    Take 1 tablet by mouth 3 times daily   WARNING:  May cause drowsiness.  May impair ability to operate vehicles or machinery.  Do not use in combination with alcohol. OMEPRAZOLE (PRILOSEC) 40 MG DELAYED RELEASE CAPSULE    Take 1 capsule by mouth daily       PAST MEDICAL HISTORY         Diagnosis Date    Chlamydia     IBS (irritable bowel syndrome) 5/7/2015    Infertility, female, secondary     Trichomonas vaginalis infection 2013       SURGICAL HISTORY     History reviewed. No pertinent surgical history. FAMILY HISTORY     History reviewed. No pertinent family history. No family status information on file.         SOCIAL HISTORY reports that she has been smoking cigars. She has never used smokeless tobacco. She reports that she drinks alcohol. She reports that she does not use drugs. REVIEW OF SYSTEMS    (2-9 systems for level 4, 10 or more for level 5)     Review of Systems   All other systems reviewed and are negative. Except as noted above the remainder of the review of systems was reviewed and negative. PHYSICAL EXAM    (up to 7 for level 4, 8 or more for level 5)     Vitals:    06/14/19 0007   BP: 127/69   Pulse: 75   Resp: 18   Temp: 98.3 °F (36.8 °C)   TempSrc: Oral   SpO2: 100%   Weight: 199 lb 11.2 oz (90.6 kg)   Height: 5' 5\" (1.651 m)       Physical exam reflects a well-nourished well-hydrated female. She is afebrile with stable vital signs to include pulse ox of 100% on room air. She is not hypoxic. She is alert conversive and appropriate in behavior. She is not in distress. Integument warm and dry. Oral pharyngeal exam is without lesion. Heart regular rate and rhythm lungs are clear to auscultation. Her abdomen is completely benign soft throughout no focal pain rebound or guarding no reproducible discomfort. Extremities show no gross abnormality. DIAGNOSTIC RESULTS     LABS:  Labs Reviewed   POCT URINE PREGNANCY - Normal   URINE CULTURE   URINALYSIS       All other labs were within normal range or not returned as of this dictation. EMERGENCY DEPARTMENT COURSE and DIFFERENTIAL DIAGNOSIS/MDM:   Vitals:    Vitals:    06/14/19 0007   BP: 127/69   Pulse: 75   Resp: 18   Temp: 98.3 °F (36.8 °C)   TempSrc: Oral   SpO2: 100%   Weight: 199 lb 11.2 oz (90.6 kg)   Height: 5' 5\" (1.651 m)     She is evaluated. Pregnancy test confirmed positive. She is quite early on in this pregnancy. There is no indication for ultrasound at this point her abdominal exam is completely benign she has no vaginal bleeding and she is too early for any conclusive imaging studies. She does have an OB with whom she follows.   She will contact her OB in the morning to facilitate reevaluation and additional care. CONSULTS:  None    PROCEDURES:  None    FINAL IMPRESSION      1.  Pregnancy test positive          DISPOSITION/PLAN   DISPOSITION Decision To Discharge 06/14/2019 12:51:38 AM      PATIENT REFERRED TO:   contact your OB physician in the morning for re-evaluation          [facility] ED  [address] [facility]  [phone]    As needed      DISCHARGE MEDICATIONS:     New Prescriptions    No medications on file             (Please note that portions of this note were completed with a voice recognition program.  Efforts were made to edit the dictations but occasionally words are mis-transcribed.)    Johan Burton MD  Attending Emergency Physician          Johan Burton MD  06/14/19 1573

## 2019-06-14 NOTE — ED NOTES
Pt arrived to ED with c/o abdominal discomfort in pelvic region that started a couple days ago. Pt denies Emesis. Pt denies changes with urination. Pt states regular bowel movements. Pt had a positive pregnancy test via Urine. Pt updated about pregnancy by Dr. Shoaib Falk. Respirations non labored. Skin warm and dry. Skin color appropriate to race. Pt A&Ox4.       Elieco Duran RN  06/14/19 3715

## 2019-06-15 LAB
CULTURE: NORMAL
Lab: NORMAL
SPECIMEN DESCRIPTION: NORMAL

## 2019-06-17 ENCOUNTER — TELEPHONE (OUTPATIENT)
Dept: ADMINISTRATIVE | Age: 34
End: 2019-06-17

## 2019-06-21 ENCOUNTER — HOSPITAL ENCOUNTER (INPATIENT)
Age: 34
LOS: 1 days | Discharge: HOME | DRG: 545 | End: 2019-06-24
Attending: EMERGENCY MEDICINE | Admitting: OBSTETRICS & GYNECOLOGY
Payer: COMMERCIAL

## 2019-06-21 ENCOUNTER — APPOINTMENT (OUTPATIENT)
Dept: ULTRASOUND IMAGING | Age: 34
DRG: 545 | End: 2019-06-21
Payer: COMMERCIAL

## 2019-06-21 DIAGNOSIS — R10.30 LOWER ABDOMINAL PAIN: Primary | ICD-10-CM

## 2019-06-21 DIAGNOSIS — Z98.890 S/P LAPAROTOMY: ICD-10-CM

## 2019-06-21 DIAGNOSIS — B96.89 BACTERIAL VAGINOSIS: ICD-10-CM

## 2019-06-21 DIAGNOSIS — E34.9 ELEVATED SERUM HCG: ICD-10-CM

## 2019-06-21 DIAGNOSIS — N76.0 BACTERIAL VAGINOSIS: ICD-10-CM

## 2019-06-21 LAB
-: ABNORMAL
AMORPHOUS: ABNORMAL
BACTERIA: ABNORMAL
BILIRUBIN URINE: NEGATIVE
CASTS UA: ABNORMAL /LPF (ref 0–2)
COLOR: YELLOW
COMMENT UA: ABNORMAL
CRYSTALS, UA: ABNORMAL /HPF
DIRECT EXAM: ABNORMAL
EPITHELIAL CELLS UA: ABNORMAL /HPF (ref 0–5)
GLUCOSE URINE: NEGATIVE
HCG QUANTITATIVE: ABNORMAL IU/L
KETONES, URINE: NEGATIVE
LEUKOCYTE ESTERASE, URINE: ABNORMAL
Lab: ABNORMAL
MUCUS: ABNORMAL
NITRITE, URINE: NEGATIVE
OTHER OBSERVATIONS UA: ABNORMAL
PH UA: 6 (ref 5–8)
PROTEIN UA: ABNORMAL
RBC UA: ABNORMAL /HPF (ref 0–2)
RENAL EPITHELIAL, UA: ABNORMAL /HPF
SPECIFIC GRAVITY UA: 1.03 (ref 1–1.03)
SPECIMEN DESCRIPTION: ABNORMAL
TRICHOMONAS: ABNORMAL
TURBIDITY: ABNORMAL
URINE HGB: NEGATIVE
UROBILINOGEN, URINE: NORMAL
WBC UA: ABNORMAL /HPF (ref 0–5)
YEAST: ABNORMAL

## 2019-06-21 PROCEDURE — 87480 CANDIDA DNA DIR PROBE: CPT

## 2019-06-21 PROCEDURE — 87660 TRICHOMONAS VAGIN DIR PROBE: CPT

## 2019-06-21 PROCEDURE — 76817 TRANSVAGINAL US OBSTETRIC: CPT

## 2019-06-21 PROCEDURE — 99285 EMERGENCY DEPT VISIT HI MDM: CPT

## 2019-06-21 PROCEDURE — 87086 URINE CULTURE/COLONY COUNT: CPT

## 2019-06-21 PROCEDURE — 6360000002 HC RX W HCPCS: Performed by: NURSE PRACTITIONER

## 2019-06-21 PROCEDURE — 87491 CHLMYD TRACH DNA AMP PROBE: CPT

## 2019-06-21 PROCEDURE — 87510 GARDNER VAG DNA DIR PROBE: CPT

## 2019-06-21 PROCEDURE — 96374 THER/PROPH/DIAG INJ IV PUSH: CPT

## 2019-06-21 PROCEDURE — 87591 N.GONORRHOEAE DNA AMP PROB: CPT

## 2019-06-21 PROCEDURE — 81001 URINALYSIS AUTO W/SCOPE: CPT

## 2019-06-21 PROCEDURE — 84702 CHORIONIC GONADOTROPIN TEST: CPT

## 2019-06-21 PROCEDURE — 2580000003 HC RX 258: Performed by: NURSE PRACTITIONER

## 2019-06-21 RX ORDER — 0.9 % SODIUM CHLORIDE 0.9 %
1000 INTRAVENOUS SOLUTION INTRAVENOUS ONCE
Status: COMPLETED | OUTPATIENT
Start: 2019-06-22 | End: 2019-06-22

## 2019-06-21 RX ORDER — ONDANSETRON 2 MG/ML
4 INJECTION INTRAMUSCULAR; INTRAVENOUS ONCE
Status: COMPLETED | OUTPATIENT
Start: 2019-06-21 | End: 2019-06-21

## 2019-06-21 RX ADMIN — SODIUM CHLORIDE 1000 ML: 9 INJECTION, SOLUTION INTRAVENOUS at 23:00

## 2019-06-21 RX ADMIN — ONDANSETRON 4 MG: 2 INJECTION INTRAMUSCULAR; INTRAVENOUS at 21:31

## 2019-06-21 ASSESSMENT — PAIN DESCRIPTION - DESCRIPTORS: DESCRIPTORS: THROBBING

## 2019-06-21 ASSESSMENT — PAIN DESCRIPTION - ONSET: ONSET: SUDDEN

## 2019-06-21 ASSESSMENT — PAIN DESCRIPTION - LOCATION: LOCATION: ABDOMEN

## 2019-06-21 ASSESSMENT — ENCOUNTER SYMPTOMS
BACK PAIN: 0
VOMITING: 0
NAUSEA: 0
ABDOMINAL PAIN: 1

## 2019-06-21 ASSESSMENT — PAIN DESCRIPTION - FREQUENCY: FREQUENCY: CONTINUOUS

## 2019-06-21 ASSESSMENT — PAIN SCALES - GENERAL: PAINLEVEL_OUTOF10: 8

## 2019-06-21 ASSESSMENT — PAIN DESCRIPTION - PAIN TYPE: TYPE: ACUTE PAIN

## 2019-06-21 NOTE — LETTER
[facility]  [address] [facility] [address]  Dept: [phone]  Loc: [phone]      To whom it may concern,   Please excuse Shyann Malinconchavelia, from work today 6/22/19 as she was accompanying her daughter overnight and into this morning at the hospital.      Please don't hesitate to call with any questions or concerns      Sincerely,               Tatum Jaimes DO  Ob/Gyn Resident  Charanjit Lemons  6/22/2019, 6:33 AM

## 2019-06-22 ENCOUNTER — ANESTHESIA EVENT (OUTPATIENT)
Dept: OPERATING ROOM | Age: 34
DRG: 545 | End: 2019-06-22
Payer: COMMERCIAL

## 2019-06-22 ENCOUNTER — ANESTHESIA (OUTPATIENT)
Dept: OPERATING ROOM | Age: 34
DRG: 545 | End: 2019-06-22
Payer: COMMERCIAL

## 2019-06-22 VITALS — OXYGEN SATURATION: 100 % | TEMPERATURE: 97.6 F | SYSTOLIC BLOOD PRESSURE: 108 MMHG | DIASTOLIC BLOOD PRESSURE: 47 MMHG

## 2019-06-22 PROBLEM — N92.6 IRREGULAR MENSES: Status: ACTIVE | Noted: 2019-06-22

## 2019-06-22 PROBLEM — O26.899 ABDOMINAL PAIN AFFECTING PREGNANCY: Status: ACTIVE | Noted: 2019-06-22

## 2019-06-22 PROBLEM — R10.9 ABDOMINAL PAIN AFFECTING PREGNANCY: Status: ACTIVE | Noted: 2019-06-22

## 2019-06-22 PROBLEM — O36.80X0 PREGNANCY OF UNKNOWN ANATOMIC LOCATION: Status: ACTIVE | Noted: 2019-06-22

## 2019-06-22 PROBLEM — Z98.890 S/P LAPAROTOMY: Status: ACTIVE | Noted: 2019-06-22

## 2019-06-22 LAB
ABSOLUTE EOS #: 0.09 K/UL (ref 0–0.44)
ABSOLUTE EOS #: 0.31 K/UL (ref 0–0.44)
ABSOLUTE IMMATURE GRANULOCYTE: 0.05 K/UL (ref 0–0.3)
ABSOLUTE IMMATURE GRANULOCYTE: 0.09 K/UL (ref 0–0.3)
ABSOLUTE LYMPH #: 0.77 K/UL (ref 1.1–3.7)
ABSOLUTE LYMPH #: 0.91 K/UL (ref 1.1–3.7)
ABSOLUTE MONO #: 0.37 K/UL (ref 0.1–1.2)
ABSOLUTE MONO #: 0.75 K/UL (ref 0.1–1.2)
ALBUMIN SERPL-MCNC: 3.1 G/DL (ref 3.5–5.2)
ALBUMIN/GLOBULIN RATIO: 1.3 (ref 1–2.5)
ALP BLD-CCNC: 40 U/L (ref 35–104)
ALT SERPL-CCNC: 9 U/L (ref 5–33)
ANION GAP SERPL CALCULATED.3IONS-SCNC: 13 MMOL/L (ref 9–17)
AST SERPL-CCNC: 11 U/L
BASOPHILS # BLD: 0 % (ref 0–2)
BASOPHILS # BLD: 0 % (ref 0–2)
BASOPHILS ABSOLUTE: 0.03 K/UL (ref 0–0.2)
BASOPHILS ABSOLUTE: <0.03 K/UL (ref 0–0.2)
BILIRUB SERPL-MCNC: 0.3 MG/DL (ref 0.3–1.2)
BUN BLDV-MCNC: 8 MG/DL (ref 6–20)
BUN/CREAT BLD: ABNORMAL (ref 9–20)
CALCIUM SERPL-MCNC: 7.7 MG/DL (ref 8.6–10.4)
CHLORIDE BLD-SCNC: 103 MMOL/L (ref 98–107)
CO2: 19 MMOL/L (ref 20–31)
CREAT SERPL-MCNC: 0.63 MG/DL (ref 0.5–0.9)
CULTURE: NORMAL
DIFFERENTIAL TYPE: ABNORMAL
DIFFERENTIAL TYPE: ABNORMAL
EOSINOPHILS RELATIVE PERCENT: 1 % (ref 1–4)
EOSINOPHILS RELATIVE PERCENT: 2 % (ref 1–4)
GFR AFRICAN AMERICAN: >60 ML/MIN
GFR NON-AFRICAN AMERICAN: >60 ML/MIN
GFR SERPL CREATININE-BSD FRML MDRD: ABNORMAL ML/MIN/{1.73_M2}
GFR SERPL CREATININE-BSD FRML MDRD: ABNORMAL ML/MIN/{1.73_M2}
GLUCOSE BLD-MCNC: 143 MG/DL (ref 70–99)
HCT VFR BLD CALC: 30.8 % (ref 36.3–47.1)
HCT VFR BLD CALC: 36.9 % (ref 36.3–47.1)
HEMOGLOBIN: 11.1 G/DL (ref 11.9–15.1)
HEMOGLOBIN: 9.2 G/DL (ref 11.9–15.1)
IMMATURE GRANULOCYTES: 0 %
IMMATURE GRANULOCYTES: 1 %
LYMPHOCYTES # BLD: 5 % (ref 24–43)
LYMPHOCYTES # BLD: 6 % (ref 24–43)
Lab: NORMAL
MCH RBC QN AUTO: 26.9 PG (ref 25.2–33.5)
MCH RBC QN AUTO: 27 PG (ref 25.2–33.5)
MCHC RBC AUTO-ENTMCNC: 29.9 G/DL (ref 28.4–34.8)
MCHC RBC AUTO-ENTMCNC: 30.1 G/DL (ref 28.4–34.8)
MCV RBC AUTO: 89.3 FL (ref 82.6–102.9)
MCV RBC AUTO: 90.3 FL (ref 82.6–102.9)
MONOCYTES # BLD: 2 % (ref 3–12)
MONOCYTES # BLD: 5 % (ref 3–12)
NRBC AUTOMATED: 0 PER 100 WBC
NRBC AUTOMATED: 0 PER 100 WBC
PDW BLD-RTO: 13.9 % (ref 11.8–14.4)
PDW BLD-RTO: 14 % (ref 11.8–14.4)
PLATELET # BLD: 223 K/UL (ref 138–453)
PLATELET # BLD: 235 K/UL (ref 138–453)
PLATELET ESTIMATE: ABNORMAL
PLATELET ESTIMATE: ABNORMAL
PMV BLD AUTO: 10 FL (ref 8.1–13.5)
PMV BLD AUTO: 9.9 FL (ref 8.1–13.5)
POTASSIUM SERPL-SCNC: 4.4 MMOL/L (ref 3.7–5.3)
RBC # BLD: 3.41 M/UL (ref 3.95–5.11)
RBC # BLD: 4.13 M/UL (ref 3.95–5.11)
RBC # BLD: ABNORMAL 10*6/UL
RBC # BLD: ABNORMAL 10*6/UL
SEG NEUTROPHILS: 87 % (ref 36–65)
SEG NEUTROPHILS: 90 % (ref 36–65)
SEGMENTED NEUTROPHILS ABSOLUTE COUNT: 12.69 K/UL (ref 1.5–8.1)
SEGMENTED NEUTROPHILS ABSOLUTE COUNT: 14.56 K/UL (ref 1.5–8.1)
SODIUM BLD-SCNC: 135 MMOL/L (ref 135–144)
SPECIMEN DESCRIPTION: NORMAL
TOTAL PROTEIN: 5.5 G/DL (ref 6.4–8.3)
WBC # BLD: 14.6 K/UL (ref 3.5–11.3)
WBC # BLD: 16.1 K/UL (ref 3.5–11.3)
WBC # BLD: ABNORMAL 10*3/UL
WBC # BLD: ABNORMAL 10*3/UL

## 2019-06-22 PROCEDURE — 10T20ZZ RESECTION OF PRODUCTS OF CONCEPTION, ECTOPIC, OPEN APPROACH: ICD-10-PCS | Performed by: OBSTETRICS & GYNECOLOGY

## 2019-06-22 PROCEDURE — 7100000001 HC PACU RECOVERY - ADDTL 15 MIN: Performed by: OBSTETRICS & GYNECOLOGY

## 2019-06-22 PROCEDURE — 6360000002 HC RX W HCPCS: Performed by: EMERGENCY MEDICINE

## 2019-06-22 PROCEDURE — 96375 TX/PRO/DX INJ NEW DRUG ADDON: CPT

## 2019-06-22 PROCEDURE — 86850 RBC ANTIBODY SCREEN: CPT

## 2019-06-22 PROCEDURE — 2780000010 HC IMPLANT OTHER: Performed by: OBSTETRICS & GYNECOLOGY

## 2019-06-22 PROCEDURE — 3700000000 HC ANESTHESIA ATTENDED CARE: Performed by: OBSTETRICS & GYNECOLOGY

## 2019-06-22 PROCEDURE — 86900 BLOOD TYPING SEROLOGIC ABO: CPT

## 2019-06-22 PROCEDURE — 3600000004 HC SURGERY LEVEL 4 BASE: Performed by: OBSTETRICS & GYNECOLOGY

## 2019-06-22 PROCEDURE — 3600000014 HC SURGERY LEVEL 4 ADDTL 15MIN: Performed by: OBSTETRICS & GYNECOLOGY

## 2019-06-22 PROCEDURE — 85025 COMPLETE CBC W/AUTO DIFF WBC: CPT

## 2019-06-22 PROCEDURE — 2580000003 HC RX 258: Performed by: NURSE ANESTHETIST, CERTIFIED REGISTERED

## 2019-06-22 PROCEDURE — 59120 TREAT ECTOPIC PREGNANCY: CPT | Performed by: OBSTETRICS & GYNECOLOGY

## 2019-06-22 PROCEDURE — 6370000000 HC RX 637 (ALT 250 FOR IP): Performed by: STUDENT IN AN ORGANIZED HEALTH CARE EDUCATION/TRAINING PROGRAM

## 2019-06-22 PROCEDURE — 80053 COMPREHEN METABOLIC PANEL: CPT

## 2019-06-22 PROCEDURE — 0W9G4ZZ DRAINAGE OF PERITONEAL CAVITY, PERCUTANEOUS ENDOSCOPIC APPROACH: ICD-10-PCS | Performed by: OBSTETRICS & GYNECOLOGY

## 2019-06-22 PROCEDURE — 36415 COLL VENOUS BLD VENIPUNCTURE: CPT

## 2019-06-22 PROCEDURE — 6360000002 HC RX W HCPCS: Performed by: ANESTHESIOLOGY

## 2019-06-22 PROCEDURE — 2580000003 HC RX 258: Performed by: OBSTETRICS & GYNECOLOGY

## 2019-06-22 PROCEDURE — 3700000001 HC ADD 15 MINUTES (ANESTHESIA): Performed by: OBSTETRICS & GYNECOLOGY

## 2019-06-22 PROCEDURE — 86920 COMPATIBILITY TEST SPIN: CPT

## 2019-06-22 PROCEDURE — 88305 TISSUE EXAM BY PATHOLOGIST: CPT

## 2019-06-22 PROCEDURE — 2500000003 HC RX 250 WO HCPCS: Performed by: NURSE ANESTHETIST, CERTIFIED REGISTERED

## 2019-06-22 PROCEDURE — 86901 BLOOD TYPING SEROLOGIC RH(D): CPT

## 2019-06-22 PROCEDURE — 0WJG4ZZ INSPECTION OF PERITONEAL CAVITY, PERCUTANEOUS ENDOSCOPIC APPROACH: ICD-10-PCS | Performed by: OBSTETRICS & GYNECOLOGY

## 2019-06-22 PROCEDURE — 2720000010 HC SURG SUPPLY STERILE: Performed by: OBSTETRICS & GYNECOLOGY

## 2019-06-22 PROCEDURE — 2709999900 HC NON-CHARGEABLE SUPPLY: Performed by: OBSTETRICS & GYNECOLOGY

## 2019-06-22 PROCEDURE — 6360000002 HC RX W HCPCS: Performed by: STUDENT IN AN ORGANIZED HEALTH CARE EDUCATION/TRAINING PROGRAM

## 2019-06-22 PROCEDURE — 6360000002 HC RX W HCPCS: Performed by: NURSE ANESTHETIST, CERTIFIED REGISTERED

## 2019-06-22 PROCEDURE — 7100000000 HC PACU RECOVERY - FIRST 15 MIN: Performed by: OBSTETRICS & GYNECOLOGY

## 2019-06-22 PROCEDURE — 2580000003 HC RX 258: Performed by: STUDENT IN AN ORGANIZED HEALTH CARE EDUCATION/TRAINING PROGRAM

## 2019-06-22 PROCEDURE — 0UT50ZZ RESECTION OF RIGHT FALLOPIAN TUBE, OPEN APPROACH: ICD-10-PCS | Performed by: OBSTETRICS & GYNECOLOGY

## 2019-06-22 RX ORDER — SODIUM CHLORIDE 0.9 % (FLUSH) 0.9 %
10 SYRINGE (ML) INJECTION PRN
Status: DISCONTINUED | OUTPATIENT
Start: 2019-06-22 | End: 2019-06-24 | Stop reason: HOSPADM

## 2019-06-22 RX ORDER — MAGNESIUM HYDROXIDE 1200 MG/15ML
LIQUID ORAL PRN
Status: DISCONTINUED | OUTPATIENT
Start: 2019-06-22 | End: 2019-06-22 | Stop reason: ALTCHOICE

## 2019-06-22 RX ORDER — DIPHENHYDRAMINE HYDROCHLORIDE 50 MG/ML
12.5 INJECTION INTRAMUSCULAR; INTRAVENOUS
Status: DISCONTINUED | OUTPATIENT
Start: 2019-06-22 | End: 2019-06-22 | Stop reason: HOSPADM

## 2019-06-22 RX ORDER — MORPHINE SULFATE 4 MG/ML
2 INJECTION, SOLUTION INTRAMUSCULAR; INTRAVENOUS EVERY 5 MIN PRN
Status: DISCONTINUED | OUTPATIENT
Start: 2019-06-22 | End: 2019-06-22 | Stop reason: HOSPADM

## 2019-06-22 RX ORDER — IBUPROFEN 800 MG/1
800 TABLET ORAL EVERY 8 HOURS
Status: DISCONTINUED | OUTPATIENT
Start: 2019-06-22 | End: 2019-06-24 | Stop reason: HOSPADM

## 2019-06-22 RX ORDER — OXYCODONE HYDROCHLORIDE AND ACETAMINOPHEN 5; 325 MG/1; MG/1
2 TABLET ORAL PRN
Status: DISCONTINUED | OUTPATIENT
Start: 2019-06-22 | End: 2019-06-22 | Stop reason: HOSPADM

## 2019-06-22 RX ORDER — ONDANSETRON 2 MG/ML
4 INJECTION INTRAMUSCULAR; INTRAVENOUS
Status: DISCONTINUED | OUTPATIENT
Start: 2019-06-22 | End: 2019-06-22 | Stop reason: HOSPADM

## 2019-06-22 RX ORDER — PROMETHAZINE HYDROCHLORIDE 25 MG/ML
12.5 INJECTION, SOLUTION INTRAMUSCULAR; INTRAVENOUS EVERY 6 HOURS PRN
Status: DISCONTINUED | OUTPATIENT
Start: 2019-06-22 | End: 2019-06-24 | Stop reason: HOSPADM

## 2019-06-22 RX ORDER — ACETAMINOPHEN 10 MG/ML
1000 INJECTION, SOLUTION INTRAVENOUS EVERY 12 HOURS PRN
Status: DISPENSED | OUTPATIENT
Start: 2019-06-22 | End: 2019-06-23

## 2019-06-22 RX ORDER — SODIUM CHLORIDE, SODIUM LACTATE, POTASSIUM CHLORIDE, CALCIUM CHLORIDE 600; 310; 30; 20 MG/100ML; MG/100ML; MG/100ML; MG/100ML
INJECTION, SOLUTION INTRAVENOUS CONTINUOUS PRN
Status: DISCONTINUED | OUTPATIENT
Start: 2019-06-22 | End: 2019-06-22 | Stop reason: SDUPTHER

## 2019-06-22 RX ORDER — PROPOFOL 10 MG/ML
INJECTION, EMULSION INTRAVENOUS PRN
Status: DISCONTINUED | OUTPATIENT
Start: 2019-06-22 | End: 2019-06-22 | Stop reason: SDUPTHER

## 2019-06-22 RX ORDER — DEXAMETHASONE SODIUM PHOSPHATE 10 MG/ML
INJECTION INTRAMUSCULAR; INTRAVENOUS PRN
Status: DISCONTINUED | OUTPATIENT
Start: 2019-06-22 | End: 2019-06-22 | Stop reason: SDUPTHER

## 2019-06-22 RX ORDER — SODIUM CHLORIDE 9 MG/ML
INJECTION, SOLUTION INTRAVENOUS CONTINUOUS
Status: DISCONTINUED | OUTPATIENT
Start: 2019-06-22 | End: 2019-06-24 | Stop reason: HOSPADM

## 2019-06-22 RX ORDER — FENTANYL CITRATE 50 UG/ML
INJECTION, SOLUTION INTRAMUSCULAR; INTRAVENOUS PRN
Status: DISCONTINUED | OUTPATIENT
Start: 2019-06-22 | End: 2019-06-22 | Stop reason: SDUPTHER

## 2019-06-22 RX ORDER — OXYCODONE HYDROCHLORIDE AND ACETAMINOPHEN 5; 325 MG/1; MG/1
1 TABLET ORAL PRN
Status: DISCONTINUED | OUTPATIENT
Start: 2019-06-22 | End: 2019-06-22 | Stop reason: HOSPADM

## 2019-06-22 RX ORDER — LABETALOL HYDROCHLORIDE 5 MG/ML
5 INJECTION, SOLUTION INTRAVENOUS EVERY 10 MIN PRN
Status: DISCONTINUED | OUTPATIENT
Start: 2019-06-22 | End: 2019-06-22 | Stop reason: HOSPADM

## 2019-06-22 RX ORDER — MAGNESIUM HYDROXIDE 1200 MG/15ML
LIQUID ORAL CONTINUOUS PRN
Status: COMPLETED | OUTPATIENT
Start: 2019-06-22 | End: 2019-06-22

## 2019-06-22 RX ORDER — IBUPROFEN 800 MG/1
800 TABLET ORAL EVERY 8 HOURS PRN
Status: DISCONTINUED | OUTPATIENT
Start: 2019-06-22 | End: 2019-06-22

## 2019-06-22 RX ORDER — NEOSTIGMINE METHYLSULFATE 5 MG/5 ML
SYRINGE (ML) INTRAVENOUS PRN
Status: DISCONTINUED | OUTPATIENT
Start: 2019-06-22 | End: 2019-06-22 | Stop reason: SDUPTHER

## 2019-06-22 RX ORDER — LIDOCAINE HYDROCHLORIDE 10 MG/ML
INJECTION, SOLUTION EPIDURAL; INFILTRATION; INTRACAUDAL; PERINEURAL PRN
Status: DISCONTINUED | OUTPATIENT
Start: 2019-06-22 | End: 2019-06-22 | Stop reason: SDUPTHER

## 2019-06-22 RX ORDER — OXYCODONE HYDROCHLORIDE AND ACETAMINOPHEN 5; 325 MG/1; MG/1
2 TABLET ORAL EVERY 4 HOURS PRN
Status: DISCONTINUED | OUTPATIENT
Start: 2019-06-22 | End: 2019-06-24 | Stop reason: HOSPADM

## 2019-06-22 RX ORDER — OXYCODONE HYDROCHLORIDE AND ACETAMINOPHEN 5; 325 MG/1; MG/1
1 TABLET ORAL EVERY 4 HOURS PRN
Status: DISCONTINUED | OUTPATIENT
Start: 2019-06-22 | End: 2019-06-24 | Stop reason: HOSPADM

## 2019-06-22 RX ORDER — FENTANYL CITRATE 50 UG/ML
50 INJECTION, SOLUTION INTRAMUSCULAR; INTRAVENOUS ONCE
Status: COMPLETED | OUTPATIENT
Start: 2019-06-22 | End: 2019-06-22

## 2019-06-22 RX ORDER — CEFAZOLIN SODIUM 2 G/50ML
SOLUTION INTRAVENOUS PRN
Status: DISCONTINUED | OUTPATIENT
Start: 2019-06-22 | End: 2019-06-22 | Stop reason: SDUPTHER

## 2019-06-22 RX ORDER — GLYCOPYRROLATE 1 MG/5 ML
SYRINGE (ML) INTRAVENOUS PRN
Status: DISCONTINUED | OUTPATIENT
Start: 2019-06-22 | End: 2019-06-22 | Stop reason: SDUPTHER

## 2019-06-22 RX ORDER — METRONIDAZOLE 500 MG/1
500 TABLET ORAL EVERY 12 HOURS SCHEDULED
Status: DISCONTINUED | OUTPATIENT
Start: 2019-06-22 | End: 2019-06-24 | Stop reason: HOSPADM

## 2019-06-22 RX ORDER — ONDANSETRON 4 MG/1
4 TABLET, ORALLY DISINTEGRATING ORAL ONCE
Status: DISCONTINUED | OUTPATIENT
Start: 2019-06-22 | End: 2019-06-24 | Stop reason: HOSPADM

## 2019-06-22 RX ORDER — ONDANSETRON 2 MG/ML
INJECTION INTRAMUSCULAR; INTRAVENOUS PRN
Status: DISCONTINUED | OUTPATIENT
Start: 2019-06-22 | End: 2019-06-22 | Stop reason: SDUPTHER

## 2019-06-22 RX ORDER — FENTANYL CITRATE 50 UG/ML
25 INJECTION, SOLUTION INTRAMUSCULAR; INTRAVENOUS EVERY 5 MIN PRN
Status: DISCONTINUED | OUTPATIENT
Start: 2019-06-22 | End: 2019-06-22 | Stop reason: HOSPADM

## 2019-06-22 RX ORDER — DIPHENHYDRAMINE HCL 25 MG
25 TABLET ORAL EVERY 6 HOURS PRN
Status: DISCONTINUED | OUTPATIENT
Start: 2019-06-22 | End: 2019-06-24 | Stop reason: HOSPADM

## 2019-06-22 RX ORDER — ONDANSETRON 2 MG/ML
4 INJECTION INTRAMUSCULAR; INTRAVENOUS EVERY 8 HOURS PRN
Status: DISCONTINUED | OUTPATIENT
Start: 2019-06-22 | End: 2019-06-24 | Stop reason: HOSPADM

## 2019-06-22 RX ORDER — ROCURONIUM BROMIDE 10 MG/ML
INJECTION, SOLUTION INTRAVENOUS PRN
Status: DISCONTINUED | OUTPATIENT
Start: 2019-06-22 | End: 2019-06-22 | Stop reason: SDUPTHER

## 2019-06-22 RX ORDER — PHENYLEPHRINE HYDROCHLORIDE 10 MG/ML
INJECTION INTRAVENOUS PRN
Status: DISCONTINUED | OUTPATIENT
Start: 2019-06-22 | End: 2019-06-22 | Stop reason: SDUPTHER

## 2019-06-22 RX ORDER — DOCUSATE SODIUM 100 MG/1
100 CAPSULE, LIQUID FILLED ORAL DAILY
Status: DISCONTINUED | OUTPATIENT
Start: 2019-06-23 | End: 2019-06-24 | Stop reason: HOSPADM

## 2019-06-22 RX ADMIN — Medication 2 MG: at 04:34

## 2019-06-22 RX ADMIN — HYDROMORPHONE HYDROCHLORIDE 0.5 MG: 1 INJECTION, SOLUTION INTRAMUSCULAR; INTRAVENOUS; SUBCUTANEOUS at 12:30

## 2019-06-22 RX ADMIN — Medication 0.4 MG: at 04:34

## 2019-06-22 RX ADMIN — PHENYLEPHRINE HYDROCHLORIDE 200 MCG: 10 INJECTION INTRAVENOUS at 03:22

## 2019-06-22 RX ADMIN — METRONIDAZOLE 500 MG: 500 TABLET, FILM COATED ORAL at 19:55

## 2019-06-22 RX ADMIN — OXYCODONE HYDROCHLORIDE AND ACETAMINOPHEN 1 TABLET: 5; 325 TABLET ORAL at 21:01

## 2019-06-22 RX ADMIN — METRONIDAZOLE 500 MG: 500 TABLET, FILM COATED ORAL at 10:33

## 2019-06-22 RX ADMIN — DEXAMETHASONE SODIUM PHOSPHATE 10 MG: 10 INJECTION INTRAMUSCULAR; INTRAVENOUS at 02:59

## 2019-06-22 RX ADMIN — HYDROMORPHONE HYDROCHLORIDE 0.5 MG: 1 INJECTION, SOLUTION INTRAMUSCULAR; INTRAVENOUS; SUBCUTANEOUS at 09:35

## 2019-06-22 RX ADMIN — PHENYLEPHRINE HYDROCHLORIDE 100 MCG: 10 INJECTION INTRAVENOUS at 04:27

## 2019-06-22 RX ADMIN — OXYCODONE HYDROCHLORIDE AND ACETAMINOPHEN 1 TABLET: 5; 325 TABLET ORAL at 10:34

## 2019-06-22 RX ADMIN — ONDANSETRON 4 MG: 2 INJECTION, SOLUTION INTRAMUSCULAR; INTRAVENOUS at 04:17

## 2019-06-22 RX ADMIN — PHENYLEPHRINE HYDROCHLORIDE 100 MCG: 10 INJECTION INTRAVENOUS at 04:13

## 2019-06-22 RX ADMIN — FENTANYL CITRATE 50 MCG: 50 INJECTION INTRAMUSCULAR; INTRAVENOUS at 04:42

## 2019-06-22 RX ADMIN — FENTANYL CITRATE 50 MCG: 50 INJECTION INTRAMUSCULAR; INTRAVENOUS at 04:41

## 2019-06-22 RX ADMIN — MORPHINE SULFATE 2 MG: 4 INJECTION INTRAVENOUS at 05:30

## 2019-06-22 RX ADMIN — HYDROMORPHONE HYDROCHLORIDE 0.5 MG: 1 INJECTION, SOLUTION INTRAMUSCULAR; INTRAVENOUS; SUBCUTANEOUS at 16:24

## 2019-06-22 RX ADMIN — CEFAZOLIN SODIUM 2 G: 2 SOLUTION INTRAVENOUS at 03:45

## 2019-06-22 RX ADMIN — PHENYLEPHRINE HYDROCHLORIDE 100 MCG: 10 INJECTION INTRAVENOUS at 03:55

## 2019-06-22 RX ADMIN — SODIUM CHLORIDE: 9 INJECTION, SOLUTION INTRAVENOUS at 07:28

## 2019-06-22 RX ADMIN — SODIUM CHLORIDE, POTASSIUM CHLORIDE, SODIUM LACTATE AND CALCIUM CHLORIDE: 600; 310; 30; 20 INJECTION, SOLUTION INTRAVENOUS at 02:35

## 2019-06-22 RX ADMIN — FENTANYL CITRATE 50 MCG: 50 INJECTION INTRAMUSCULAR; INTRAVENOUS at 00:55

## 2019-06-22 RX ADMIN — PHENYLEPHRINE HYDROCHLORIDE 100 MCG: 10 INJECTION INTRAVENOUS at 03:40

## 2019-06-22 RX ADMIN — IBUPROFEN 800 MG: 800 TABLET, FILM COATED ORAL at 18:38

## 2019-06-22 RX ADMIN — SODIUM CHLORIDE, POTASSIUM CHLORIDE, SODIUM LACTATE AND CALCIUM CHLORIDE: 600; 310; 30; 20 INJECTION, SOLUTION INTRAVENOUS at 04:10

## 2019-06-22 RX ADMIN — PROPOFOL 50 MG: 10 INJECTION, EMULSION INTRAVENOUS at 04:58

## 2019-06-22 RX ADMIN — PHENYLEPHRINE HYDROCHLORIDE 200 MCG: 10 INJECTION INTRAVENOUS at 03:20

## 2019-06-22 RX ADMIN — PROPOFOL 200 MG: 10 INJECTION, EMULSION INTRAVENOUS at 02:40

## 2019-06-22 RX ADMIN — LIDOCAINE HYDROCHLORIDE 50 MG: 10 INJECTION, SOLUTION EPIDURAL; INFILTRATION; INTRACAUDAL; PERINEURAL at 02:40

## 2019-06-22 RX ADMIN — FENTANYL CITRATE 100 MCG: 50 INJECTION INTRAMUSCULAR; INTRAVENOUS at 02:40

## 2019-06-22 RX ADMIN — ROCURONIUM BROMIDE 50 MG: 10 INJECTION INTRAVENOUS at 02:40

## 2019-06-22 RX ADMIN — PHENYLEPHRINE HYDROCHLORIDE 100 MCG: 10 INJECTION INTRAVENOUS at 03:52

## 2019-06-22 RX ADMIN — HYDROMORPHONE HYDROCHLORIDE 0.5 MG: 1 INJECTION, SOLUTION INTRAMUSCULAR; INTRAVENOUS; SUBCUTANEOUS at 07:28

## 2019-06-22 RX ADMIN — MORPHINE SULFATE 2 MG: 4 INJECTION INTRAVENOUS at 05:25

## 2019-06-22 ASSESSMENT — PULMONARY FUNCTION TESTS
PIF_VALUE: 21
PIF_VALUE: 21
PIF_VALUE: 22
PIF_VALUE: 23
PIF_VALUE: 24
PIF_VALUE: 31
PIF_VALUE: 23
PIF_VALUE: 24
PIF_VALUE: 25
PIF_VALUE: 25
PIF_VALUE: 21
PIF_VALUE: 23
PIF_VALUE: 15
PIF_VALUE: 22
PIF_VALUE: 23
PIF_VALUE: 22
PIF_VALUE: 4
PIF_VALUE: 22
PIF_VALUE: 24
PIF_VALUE: 9
PIF_VALUE: 23
PIF_VALUE: 22
PIF_VALUE: 23
PIF_VALUE: 16
PIF_VALUE: 1
PIF_VALUE: 24
PIF_VALUE: 21
PIF_VALUE: 15
PIF_VALUE: 24
PIF_VALUE: 3
PIF_VALUE: 20
PIF_VALUE: 22
PIF_VALUE: 15
PIF_VALUE: 24
PIF_VALUE: 23
PIF_VALUE: 4
PIF_VALUE: 0
PIF_VALUE: 21
PIF_VALUE: 23
PIF_VALUE: 33
PIF_VALUE: 23
PIF_VALUE: 22
PIF_VALUE: 3
PIF_VALUE: 24
PIF_VALUE: 20
PIF_VALUE: 23
PIF_VALUE: 2
PIF_VALUE: 24
PIF_VALUE: 23
PIF_VALUE: 24
PIF_VALUE: 24
PIF_VALUE: 15
PIF_VALUE: 13
PIF_VALUE: 2
PIF_VALUE: 24
PIF_VALUE: 22
PIF_VALUE: 25
PIF_VALUE: 21
PIF_VALUE: 24
PIF_VALUE: 23
PIF_VALUE: 23
PIF_VALUE: 25
PIF_VALUE: 23
PIF_VALUE: 25
PIF_VALUE: 15
PIF_VALUE: 25
PIF_VALUE: 25
PIF_VALUE: 15
PIF_VALUE: 22
PIF_VALUE: 23
PIF_VALUE: 2
PIF_VALUE: 24
PIF_VALUE: 22
PIF_VALUE: 18
PIF_VALUE: 1
PIF_VALUE: 23
PIF_VALUE: 22
PIF_VALUE: 24
PIF_VALUE: 22
PIF_VALUE: 24
PIF_VALUE: 15
PIF_VALUE: 25
PIF_VALUE: 23
PIF_VALUE: 15
PIF_VALUE: 22
PIF_VALUE: 21
PIF_VALUE: 24
PIF_VALUE: 1
PIF_VALUE: 15
PIF_VALUE: 24
PIF_VALUE: 4
PIF_VALUE: 15
PIF_VALUE: 34
PIF_VALUE: 7
PIF_VALUE: 25
PIF_VALUE: 24
PIF_VALUE: 0
PIF_VALUE: 22
PIF_VALUE: 0
PIF_VALUE: 22
PIF_VALUE: 31
PIF_VALUE: 15
PIF_VALUE: 25
PIF_VALUE: 24
PIF_VALUE: 15
PIF_VALUE: 24
PIF_VALUE: 15
PIF_VALUE: 21
PIF_VALUE: 24
PIF_VALUE: 15
PIF_VALUE: 23
PIF_VALUE: 31
PIF_VALUE: 24
PIF_VALUE: 0
PIF_VALUE: 4
PIF_VALUE: 30
PIF_VALUE: 24
PIF_VALUE: 15
PIF_VALUE: 22
PIF_VALUE: 23
PIF_VALUE: 33
PIF_VALUE: 21
PIF_VALUE: 24
PIF_VALUE: 22
PIF_VALUE: 15
PIF_VALUE: 34
PIF_VALUE: 2
PIF_VALUE: 18
PIF_VALUE: 10
PIF_VALUE: 31
PIF_VALUE: 24
PIF_VALUE: 22
PIF_VALUE: 30
PIF_VALUE: 15
PIF_VALUE: 1
PIF_VALUE: 24
PIF_VALUE: 24
PIF_VALUE: 15
PIF_VALUE: 20
PIF_VALUE: 22
PIF_VALUE: 22
PIF_VALUE: 23
PIF_VALUE: 24
PIF_VALUE: 23
PIF_VALUE: 24
PIF_VALUE: 4
PIF_VALUE: 22
PIF_VALUE: 29
PIF_VALUE: 23
PIF_VALUE: 24
PIF_VALUE: 15
PIF_VALUE: 35
PIF_VALUE: 22
PIF_VALUE: 29

## 2019-06-22 ASSESSMENT — PAIN DESCRIPTION - FREQUENCY: FREQUENCY: INTERMITTENT

## 2019-06-22 ASSESSMENT — PAIN SCALES - GENERAL
PAINLEVEL_OUTOF10: 7
PAINLEVEL_OUTOF10: 4
PAINLEVEL_OUTOF10: 7
PAINLEVEL_OUTOF10: 7
PAINLEVEL_OUTOF10: 4
PAINLEVEL_OUTOF10: 9
PAINLEVEL_OUTOF10: 6
PAINLEVEL_OUTOF10: 5
PAINLEVEL_OUTOF10: 8
PAINLEVEL_OUTOF10: 3
PAINLEVEL_OUTOF10: 8
PAINLEVEL_OUTOF10: 9
PAINLEVEL_OUTOF10: 8
PAINLEVEL_OUTOF10: 7
PAINLEVEL_OUTOF10: 5

## 2019-06-22 ASSESSMENT — PAIN DESCRIPTION - PAIN TYPE
TYPE: SURGICAL PAIN

## 2019-06-22 ASSESSMENT — PAIN DESCRIPTION - LOCATION
LOCATION: ABDOMEN

## 2019-06-22 ASSESSMENT — PAIN DESCRIPTION - ORIENTATION
ORIENTATION: LOWER
ORIENTATION: LOWER
ORIENTATION: MID;LOWER

## 2019-06-22 ASSESSMENT — LIFESTYLE VARIABLES: SMOKING_STATUS: 1

## 2019-06-22 ASSESSMENT — PAIN DESCRIPTION - DESCRIPTORS
DESCRIPTORS: ACHING
DESCRIPTORS: ACHING;BURNING
DESCRIPTORS: THROBBING

## 2019-06-22 ASSESSMENT — PAIN DESCRIPTION - PROGRESSION: CLINICAL_PROGRESSION: GRADUALLY IMPROVING

## 2019-06-22 ASSESSMENT — PAIN DESCRIPTION - ONSET: ONSET: ON-GOING

## 2019-06-22 NOTE — ANESTHESIA POSTPROCEDURE EVALUATION
Department of Anesthesiology  Postprocedure Note    Patient: Franklyn Griffin  MRN: [id number]  YOB: 1985  Date of evaluation: 6/22/2019  Time:  5:50 AM     Procedure Summary     Date:  06/22/19 Room / Location:  [address] OR    Anesthesia Start:  0234 Anesthesia Stop:  0518    Procedure:  LAPAROSCOPY EXPLORATORY CONVERTED TO OPEN, RIGHT SALPINGECTOMY (N/A ) Diagnosis:  (Ectopic Pregnancy)    Surgeon:  Jose Martin Sanford DO Responsible Provider:  Catarina Batista MD    Anesthesia Type:  general ASA Status:  2 - Emergent          Anesthesia Type: general    Bhavani Phase I: Bhavani Score: 8    Bhavani Phase II:      Last vitals: Reviewed and per EMR flowsheets.        Anesthesia Post Evaluation    Patient location during evaluation: PACU  Patient participation: complete - patient participated  Level of consciousness: sleepy but conscious  Pain score: 8 (same as preop)  Nausea & Vomiting: no nausea  Cardiovascular status: hemodynamically stable  Respiratory status: nasal cannula  Hydration status: euvolemic

## 2019-06-22 NOTE — ED PROVIDER NOTES
[facility]//MED SURG  Emergency Department Encounter  Mid Level Provider     Pt Name: David Campbell  MRN: [id number]  [place] 1985  Date of evaluation: 6/21/19  PCP:  Re Montoya       Chief Complaint   Patient presents with    Abdominal Pain       HISTORY OF PRESENT ILLNESS  (Location/Symptom, Timing/Onset,Context/Setting, Quality, Duration, Modifying Factors, Severity.)      David Campbell is a 34 y.o. female who presents with sudden onset abdominal pain. Patient states she is pregnant. Patient does not know how many weeks ago she does not know her last menstrual cycle. She states she does know she was not pregnant in May because she had a pregnancy test before having her cholecystectomy surgery. Patient states no urinary symptoms. No vaginal discharge or bleeding. No concern for STD. The patient second pregnancy with no history of miscarriage. One viable birth    [address] / SOCIAL / FAMILY HISTORY      has a past medical history of Chlamydia, IBS (irritable bowel syndrome), Infertility, female, secondary, and Trichomonas vaginalis infection. has a past surgical history that includes pelvic laparoscopy (06/22/2019); salpingectomy (Right, 06/22/2019); Cholecystectomy; and laparoscopy (N/A, 6/22/2019).     Social History     Socioeconomic History    Marital status: Single     Spouse name: Not on file    Number of children: Not on file    Years of education: Not on file    Highest education level: Not on file   Occupational History    Not on file   Social Needs    Financial resource strain: Not on file    Food insecurity:     Worry: Not on file     Inability: Not on file    Transportation needs:     Medical: Not on file     Non-medical: Not on file   Tobacco Use    Smoking status: Current Every Day Smoker     Types: Cigars    Smokeless tobacco: Never Used    Tobacco comment: black and courtney, 1-2 a day   Substance and Sexual Activity    Alcohol use: Yes     Comment: week end    Drug use: No    Sexual activity: Yes     Partners: Male     Birth control/protection: Injection   Lifestyle    Physical activity:     Days per week: Not on file     Minutes per session: Not on file    Stress: Not on file   Relationships    Social connections:     Talks on phone: Not on file     Gets together: Not on file     Attends Quaker service: Not on file     Active member of club or organization: Not on file     Attends meetings of clubs or organizations: Not on file     Relationship status: Not on file    Intimate partner violence:     Fear of current or ex partner: Not on file     Emotionally abused: Not on file     Physically abused: Not on file     Forced sexual activity: Not on file   Other Topics Concern    Not on file   Social History Narrative    Not on file       History reviewed. No pertinent family history. Allergies:  Patient has no known allergies. Home Medications:  Prior to Admission medications    Medication Sig Start Date End Date Taking? Authorizing Provider   ferrous sulfate (FE TABS) 325 (65 Fe) MG EC tablet Take 1 tablet by mouth 2 times daily 6/24/19  Yes Duke Logan, DO   ibuprofen (ADVIL;MOTRIN) 800 MG tablet Take 1 tablet by mouth every 8 hours as needed for Pain Take with food 6/23/19  Yes Willow Almanza, DO   docusate sodium (COLACE) 100 MG capsule Take 1 capsule by mouth daily as needed for Constipation 6/23/19  Yes Agustina Almanza, DO   methocarbamol (ROBAXIN-750) 750 MG tablet Take 1 tablet by mouth 3 times daily   WARNING:  May cause drowsiness.  May impair ability to operate vehicles or machinery.  Do not use in combination with alcohol.  2/9/19  Yes RIO Morrissey CNP   omeprazole (PRILOSEC) 40 MG delayed release capsule Take 1 capsule by mouth daily 5/2/19   RIO Veronica CNP   dicyclomine (BENTYL) 10 MG capsule Take 1 capsule by mouth every 6 hours as needed (cramps) 5/2/19   Natasha Huntley Cloyde Stan, APRN - CNP       REVIEW OF SYSTEMS    (2-9 systems for level 4, 10 or more for level 5)      Review of Systems   Constitutional: Negative for chills and fever. Gastrointestinal: Positive for abdominal pain. Negative for nausea and vomiting. Genitourinary: Negative for difficulty urinating, dysuria, frequency, urgency, vaginal bleeding and vaginal discharge. Musculoskeletal: Negative for back pain. PHYSICALEXAM   (upto 7 for level 4, 8 or more for level 5)      INITIAL VITALS:  height is 5' 5\" (1.651 m) and weight is 195 lb (88.5 kg). Her temperature is 98.7 °F (37.1 °C). Her blood pressure is 106/60 and her pulse is 72. Her respiration is 14 and oxygen saturation is 99%. Physical Exam   Constitutional: She is oriented to person, place, and time. She appears well-developed and well-nourished. No distress. HENT:   Head: Normocephalic. Eyes: Pupils are equal, round, and reactive to light. Neck: Normal range of motion. Neck supple. Cardiovascular: Normal rate. Pulmonary/Chest: Effort normal. No respiratory distress. Abdominal: Soft. There is tenderness (Suprapubic). Genitourinary:   Genitourinary Comments: Small amount of white liquid discharge in vault. No adnexal tenderness bilaterally. There is cervical motion tenderness   Musculoskeletal: Normal range of motion. Neurological: She is alert and oriented to person, place, and time. Skin: Skin is warm and dry. Capillary refill takes less than 2 seconds. Psychiatric: She has a normal mood and affect. Her behavior is normal. Judgment and thought content normal.   Nursing note and vitals reviewed.       DIFFERENTIAL  DIAGNOSIS   Ectopic, miscarriage, STD, UTI    PLAN (LABS / IMAGING / EKG):  Orders Placed This Encounter   Procedures    C.trachomatis N.gonorrhoeae DNA    VAGINITIS DNA PROBE    Urine Culture    US OB TRANSVAGINAL    Urinalysis Reflex to Culture    HCG, Quantitative, Pregnancy    Microscopic Urinalysis    Complex free fluid may represent hemoperitoneum or recent cyst rupture. Recommend close clinical follow-up. LABS:  Results for orders placed or performed during the hospital encounter of 06/21/19   C.trachomatis N.gonorrhoeae DNA   Result Value Ref Range    Specimen Description . CERVIX     C. trachomatis DNA NEGATIVE NEGATIVE    N. gonorrhoeae DNA NEGATIVE NEGATIVE   VAGINITIS DNA PROBE   Result Value Ref Range    Specimen Description . VAGINA     Special Requests NOT REPORTED     Direct Exam POSITIVE for Gardnerella vaginalis. (A)     Direct Exam NEGATIVE for Trichomonas vaginalis     Direct Exam NEGATIVE for Candida sp. Direct Exam       Method of testing is a DNA probe intended for detection and identification of Candida species, Gardnerella vaginalis, and Trichomonas vaginalis nucleic acid in vaginal fluid specimens from patients with symptoms of vaginitis/vaginosis. Urine Culture   Result Value Ref Range    Specimen Description . CLEAN CATCH URINE     Special Requests NOT REPORTED     Culture NO SIGNIFICANT GROWTH    Urinalysis Reflex to Culture   Result Value Ref Range    Color, UA YELLOW YELLOW    Turbidity UA TURBID (A) CLEAR    Glucose, Ur NEGATIVE NEGATIVE    Bilirubin Urine NEGATIVE NEGATIVE    Ketones, Urine NEGATIVE NEGATIVE    Specific Gravity, UA 1.028 1.005 - 1.030    Urine Hgb NEGATIVE NEGATIVE    pH, UA 6.0 5.0 - 8.0    Protein, UA 1+ (A) NEGATIVE    Urobilinogen, Urine Normal Normal    Nitrite, Urine NEGATIVE NEGATIVE    Leukocyte Esterase, Urine TRACE (A) NEGATIVE    Urinalysis Comments Culture ordered based on defined criteria.     HCG, Quantitative, Pregnancy   Result Value Ref Range    hCG Quant 25,920 (H) <5 IU/L   Microscopic Urinalysis   Result Value Ref Range    -          WBC, UA 2 TO 5 0 - 5 /HPF    RBC, UA 0 TO 2 0 - 2 /HPF    Casts UA NOT REPORTED 0 - 2 /LPF    Crystals UA NOT REPORTED None /HPF    Epithelial Cells UA 10 TO 20 0 - 5 /HPF    Renal Epithelial, Urine NOT REPORTED 0 /HPF    Bacteria, UA FEW (A) None    Mucus, UA NOT REPORTED None    Trichomonas, UA NOT REPORTED None    Amorphous, UA NOT REPORTED None    Other Observations UA NOT REPORTED NOT REQ.     Yeast, UA NOT REPORTED None   CBC Auto Differential   Result Value Ref Range    WBC 16.1 (H) 3.5 - 11.3 k/uL    RBC 4.13 3.95 - 5.11 m/uL    Hemoglobin 11.1 (L) 11.9 - 15.1 g/dL    Hematocrit 36.9 36.3 - 47.1 %    MCV 89.3 82.6 - 102.9 fL    MCH 26.9 25.2 - 33.5 pg    MCHC 30.1 28.4 - 34.8 g/dL    RDW 13.9 11.8 - 14.4 %    Platelets 478 357 - 480 k/uL    MPV 9.9 8.1 - 13.5 fL    NRBC Automated 0.0 0.0 per 100 WBC    Differential Type NOT REPORTED     Seg Neutrophils 90 (H) 36 - 65 %    Lymphocytes 6 (L) 24 - 43 %    Monocytes 2 (L) 3 - 12 %    Eosinophils % 1 1 - 4 %    Basophils 0 0 - 2 %    Immature Granulocytes 1 (H) 0 %    Segs Absolute 14.56 (H) 1.50 - 8.10 k/uL    Absolute Lymph # 0.91 (L) 1.10 - 3.70 k/uL    Absolute Mono # 0.37 0.10 - 1.20 k/uL    Absolute Eos # 0.09 0.00 - 0.44 k/uL    Basophils # 0.03 0.00 - 0.20 k/uL    Absolute Immature Granulocyte 0.09 0.00 - 0.30 k/uL    WBC Morphology NOT REPORTED     RBC Morphology NOT REPORTED     Platelet Estimate NOT REPORTED    CBC WITH AUTO DIFFERENTIAL   Result Value Ref Range    WBC 14.6 (H) 3.5 - 11.3 k/uL    RBC 3.41 (L) 3.95 - 5.11 m/uL    Hemoglobin 9.2 (L) 11.9 - 15.1 g/dL    Hematocrit 30.8 (L) 36.3 - 47.1 %    MCV 90.3 82.6 - 102.9 fL    MCH 27.0 25.2 - 33.5 pg    MCHC 29.9 28.4 - 34.8 g/dL    RDW 14.0 11.8 - 14.4 %    Platelets 517 203 - 037 k/uL    MPV 10.0 8.1 - 13.5 fL    NRBC Automated 0.0 0.0 per 100 WBC    Differential Type NOT REPORTED     WBC Morphology NOT REPORTED     RBC Morphology NOT REPORTED     Platelet Estimate NOT REPORTED     Seg Neutrophils 87 (H) 36 - 65 %    Lymphocytes 5 (L) 24 - 43 %    Monocytes 5 3 - 12 %    Eosinophils % 2 1 - 4 %    Basophils 0 0 - 2 %    Immature Granulocytes 0 0 %    Segs Absolute 12.69 (H) 1.50 - 8.10 k/uL    Absolute Lymph # 0.77 (L) 1.10 - 3.70 k/uL    Absolute Mono # 0.75 0.10 - 1.20 k/uL    Absolute Eos # 0.31 0.00 - 0.44 k/uL    Basophils # <0.03 0.00 - 0.20 k/uL    Absolute Immature Granulocyte 0.05 0.00 - 0.30 k/uL   COMPREHENSIVE METABOLIC PANEL   Result Value Ref Range    Glucose 143 (H) 70 - 99 mg/dL    BUN 8 6 - 20 mg/dL    CREATININE 0.63 0.50 - 0.90 mg/dL    Bun/Cre Ratio NOT REPORTED 9 - 20    Calcium 7.7 (L) 8.6 - 10.4 mg/dL    Sodium 135 135 - 144 mmol/L    Potassium 4.4 3.7 - 5.3 mmol/L    Chloride 103 98 - 107 mmol/L    CO2 19 (L) 20 - 31 mmol/L    Anion Gap 13 9 - 17 mmol/L    Alkaline Phosphatase 40 35 - 104 U/L    ALT 9 5 - 33 U/L    AST 11 <32 U/L    Total Bilirubin 0.30 0.3 - 1.2 mg/dL    Total Protein 5.5 (L) 6.4 - 8.3 g/dL    Alb 3.1 (L) 3.5 - 5.2 g/dL    Albumin/Globulin Ratio 1.3 1.0 - 2.5    GFR Non-African American >60 >60 mL/min    GFR African American >60 >60 mL/min    GFR Comment          GFR Staging NOT REPORTED    CBC   Result Value Ref Range    WBC 11.9 (H) 3.5 - 11.3 k/uL    RBC 2.63 (L) 3.95 - 5.11 m/uL    Hemoglobin 7.2 (L) 11.9 - 15.1 g/dL    Hematocrit 23.5 (L) 36.3 - 47.1 %    MCV 89.4 82.6 - 102.9 fL    MCH 27.4 25.2 - 33.5 pg    MCHC 30.6 28.4 - 34.8 g/dL    RDW 14.1 11.8 - 14.4 %    Platelets 407 886 - 706 k/uL    MPV 10.4 8.1 - 13.5 fL    NRBC Automated 0.0 0.0 per 100 WBC   CBC Auto Differential   Result Value Ref Range    WBC 10.6 3.5 - 11.3 k/uL    RBC 3.13 (L) 3.95 - 5.11 m/uL    Hemoglobin 8.8 (L) 11.9 - 15.1 g/dL    Hematocrit 28.4 (L) 36.3 - 47.1 %    MCV 90.7 82.6 - 102.9 fL    MCH 28.1 25.2 - 33.5 pg    MCHC 31.0 28.4 - 34.8 g/dL    RDW 14.4 11.8 - 14.4 %    Platelets 156 738 - 902 k/uL    MPV 10.2 8.1 - 13.5 fL    NRBC Automated 0.0 0.0 per 100 WBC    Differential Type NOT REPORTED     WBC Morphology NOT REPORTED     RBC Morphology NOT REPORTED     Platelet Estimate NOT REPORTED     Seg Neutrophils 63 36 - 65 %    Lymphocytes 27 24 - 43 %    Monocytes 9 3 - 12 %    Eosinophils % 0 (L) 1 - 4 %    Basophils 1 0 - 2 %    Immature Granulocytes 1 (H) 0 %    Segs Absolute 6.65 1.50 - 8.10 k/uL    Absolute Lymph # 2.82 1.10 - 3.70 k/uL    Absolute Mono # 0.91 0.10 - 1.20 k/uL    Absolute Eos # 0.03 0.00 - 0.44 k/uL    Basophils # 0.05 0.00 - 0.20 k/uL    Absolute Immature Granulocyte 0.10 0.00 - 0.30 k/uL   Surgical Pathology   Result Value Ref Range    Surgical Pathology Report       [id number]  [facility]  ANATOMIC PATHOLOGY  [address] [place] [address],  [address] [place] [address] [facility], [address]  [phone]  Fax: [phone]    [address] [facility]     Patient Name: Lesvia Prasad [facility] Rec: [id number]  Path Number: [id number]  Collected: 6/22/2019  Received: 6/24/2019  Reported: 6/25/2019 21:28    -- Diagnosis --  RIGHT FALLOPIAN TUBE, SALPINGECTOMY:            -  INTRATUBAL HYDROPIC CHORIONIC VILLI WITH HEMORRHAGE,  CONSISTENT WITH ECTOPIC TUBAL PREGNANCY. -  NEGATIVE FOR FETAL TISSUE OR MALIGNANCY. Emil Vega M.D.  **Electronically Signed Out**         jet/6/25/2019       Clinical Information  Pre-op Diagnosis:  ECTOPIC PREGNANCY   Operative Findings:  RIGHT FALLOPIAN TUBE, RIGHT CORNUAL ECTOPIC  Operation Performed:  LAPAROSCOPIC EXPLORATORY CONVERTED TO OPEN    Source of Specimen  1: RIGHT FALLOPIAN TUBE, RIGHT CORUAL ECTOPIC (A)    Gross Description  \"JACOBY VALIENTE, RIGHT FALLOPIAN TUBE, RIGHT CORNUAL  ECTOPIC\" 7.5 cm  long x 1.0 cm in diameter disrupted fimbriated fallopian tube segment. In the disrupted area, there is villous tissue. No fetal tissue is  seen. The remaining lumen is unremarkable. Cassette summary:  \"A\"  disrupted area villous tissue, \"B\" fimbriated end entirely. tm      Microscopic Description  Microscopic examination performed.       TYPE AND SCREEN   Result Value Ref Range    Expiration Date 06/25/2019     Arm Band Number [id number]     ABO/Rh O POSITIVE     Antibody Screen NEGATIVE Unit Number [id number]     Product Code Leukocyte Reduced Red Cell     Unit Divison 00     Dispense Status TRANSFUSED     Transfusion Status OK TO TRANSFUSE     Crossmatch Result COMPATIBLE     Unit Number [id number]     Product Code Leukocyte Reduced Red Cell     Unit Divison 00     Dispense Status TRANSFUSED     Transfusion Status OK TO TRANSFUSE     Crossmatch Result COMPATIBLE        EMERGENCY DEPARTMENT COURSE:  Patient with emesis in room. Zofran ordered. Patient advised of ultrasound result. She is aware obstetrician coming down to see her  Dr. Raoul Geller to [address] [facility] client    CONSULTS:  [id number]: OB Agrees to see patient    PROCEDURES:  None    FINAL IMPRESSION      1. Lower abdominal pain    2. Elevated serum hCG    3. Bacterial vaginosis    4. Laparoscopic converted to open wedge resection of right cornual ectopic, right salpingectomy and evacuation of hemoperitoneum 6/22/19          DISPOSITION / PLAN     DISPOSITION     PATIENT REFERRED TO:  Kimberly Austin DO  [address] [facility]  [phone]    In 2 weeks  For postoperative visit    [address] [facility] [address]:  Discharge Medication List as of 6/24/2019  5:37 PM      START taking these medications    Details   ferrous sulfate (FE TABS) 325 (65 Fe) MG EC tablet Take 1 tablet by mouth 2 times daily, Disp-90 tablet, R-3Print      oxyCODONE-acetaminophen (PERCOCET) 5-325 MG per tablet Take 1 tablet by mouth every 6 hours as needed for Pain for up to 7 days. Intended supply: 7 days.  Take lowest dose possible to manage pain, Disp-21 tablet, R-0Print      docusate sodium (COLACE) 100 MG capsule Take 1 capsule by mouth daily as needed for Constipation, Disp-60 capsule, R-0Print             Lysle Marina, APRN - CNP   Emergency Medicine Nurse Practitioner    (Please note that portions of this note were completed with a voice recognitionprogram.  Efforts were made to edit the dictations but occasionally words are mis-transcribed.)        Alecia , RIO - CNP  06/21/19 [address] [facility], APRN - CNP  07/01/19 1032

## 2019-06-22 NOTE — ED NOTES
Bed: 10  Expected date: 6/21/19  Expected time: 8:17 PM  Means of arrival: [facility] SURGICAL AND CARDIOVASCULAR [facility]  Comments:  Medic 6      Ernesto Mccallum [facility]  06/21/19 2023

## 2019-06-22 NOTE — CARE COORDINATION
Case Management Initial Discharge Plan  Chris Dumas,             Met with:patient to discuss discharge plans. Information verified: address, contacts, phone number, , insurance Yes  PCP: [facility]  Date of last visit: unknown    Insurance Provider: NAYLA MENDES    Discharge Planning    Living Arrangements:  Children   Support Systems:  Family Members    Home has 2 stories  3 stairs to climb to get into front door, 1 flight stairs to climb to reach second floor  Location of bedroom/bathroom in home main    Patient able to perform ADL's:Independent    Current Services (outpatient & in home) none  DME equipment: none  DME provider: none    Pharmacy: [facility] Medications:  No  Does patient want to participate in local refill/ meds to beds program?  Yes    Potential Assistance Needed:  N/A    Patient agreeable to home care: No  Somis of choice provided:  no    Prior SNF/Rehab Placement and Facility: none  Agreeable to SNF/Rehab: No  Somis of choice provided: n/a   Evaluation: n/a    Expected Discharge date:  19  Patient expects to be discharged to:  home  Follow Up Appointment: Best Day/ Time: Monday AM    Transportation provider: none  Transportation arrangements needed for discharge: No    Readmission Risk              Risk of Unplanned Readmission:        9             Does patient have a readmission risk score greater than 14?: No  If yes, follow-up appointment must be made within 7 days of discharge.      Discharge Plan: home independent          Electronically signed by Elizabeth Pleitez RN on 19 at 6:11 PM

## 2019-06-22 NOTE — CONSULTS
[facility]    Patient Name: Candice Yanez     Patient : 1985  Room/Bed: [facility]  Admission Date/Time: 2019  8:23 PM  Primary Care Physician: No primary care provider on file. Consulting Provider: Dr. Tawanda Selby  Reason for Consult: concern for ectopic    CC:   Chief Complaint   Patient presents with    Abdominal Pain                HPI: Candice Yanez is a 34 y.o. female  presents sudden-onset lower abdominal pain that started 7:30 pm. No LMP recorded (lmp unknown). Patient is pregnant. Patient had a positive pregnancy test on 19 (with a previous negative test on 19). Reports no blood work or ultrasound was done at that time. Patient does not know her LMP. Mother at bedside reports irregular periods (sometimes 5 months apart) with Hx of Depo-Provera use for a couple of years. Reports Hx of one TAB previously, hx of cholecystectomy and Hx of  section \"because I wouldn't dilate\". She denies associated nausea or vomiting at home but RN note in the ED reveals episode of emesis in the ED. Concern for potential ruptured ectopic today with BHCG of 25,920 and no intrauterine or extrauterine gestation and complex free fluid in the abdomen. Spoke to radiology who reports the endometrium does not appear like a typical molar pregnancy. REVIEW OF SYSTEMS:   A minimum of an eleven point review of systems was completed.     Constitutional: negative fever, negative chills  HEENT: negative visual disturbances, negative headaches  Respiratory: negative dyspnea, negative cough  Cardiovascular: negative chest pain,  negative palpitations  Gastrointestinal: positive abdominal pain, negative RUQ pain, negative N/V, negative diarrhea, negative constipation  Genitourinary: negative dysuria, negative vaginal discharge, negative vaginal bleeding  Dermatological: negative rash, negative wounds  Hematologic: negative bleeding/clotting disorder  Immunologic: non-edematous  Psych:  oriented to time, place and person, mood and affect are within normal limits     OMM EXAM:  The patient did not complain of a Chief complaint requiring OMM. Chief Complaint:none    Structural Exam: No Interest    LAB RESULTS:  Results for orders placed or performed during the hospital encounter of 06/21/19   VAGINITIS DNA PROBE   Result Value Ref Range    Specimen Description . VAGINA     Special Requests NOT REPORTED     Direct Exam POSITIVE for Gardnerella vaginalis. (A)     Direct Exam NEGATIVE for Trichomonas vaginalis     Direct Exam NEGATIVE for Candida sp. Direct Exam       Method of testing is a DNA probe intended for detection and identification of Candida species, Gardnerella vaginalis, and Trichomonas vaginalis nucleic acid in vaginal fluid specimens from patients with symptoms of vaginitis/vaginosis. Urinalysis Reflex to Culture   Result Value Ref Range    Color, UA YELLOW YELLOW    Turbidity UA TURBID (A) CLEAR    Glucose, Ur NEGATIVE NEGATIVE    Bilirubin Urine NEGATIVE NEGATIVE    Ketones, Urine NEGATIVE NEGATIVE    Specific Gravity, UA 1.028 1.005 - 1.030    Urine Hgb NEGATIVE NEGATIVE    pH, UA 6.0 5.0 - 8.0    Protein, UA 1+ (A) NEGATIVE    Urobilinogen, Urine Normal Normal    Nitrite, Urine NEGATIVE NEGATIVE    Leukocyte Esterase, Urine TRACE (A) NEGATIVE    Urinalysis Comments Culture ordered based on defined criteria. HCG, Quantitative, Pregnancy   Result Value Ref Range    hCG Quant 25,920 (H) <5 IU/L   Microscopic Urinalysis   Result Value Ref Range    -          WBC, UA 2 TO 5 0 - 5 /HPF    RBC, UA 0 TO 2 0 - 2 /HPF    Casts UA NOT REPORTED 0 - 2 /LPF    Crystals UA NOT REPORTED None /HPF    Epithelial Cells UA 10 TO 20 0 - 5 /HPF    Renal Epithelial, Urine NOT REPORTED 0 /HPF    Bacteria, UA FEW (A) None    Mucus, UA NOT REPORTED None    Trichomonas, UA NOT REPORTED None    Amorphous, UA NOT REPORTED None    Other Observations UA NOT REPORTED NOT REQ. PLAN:    Ramya Mcwilliams is a 34 y.o. female [id number] with lower abdominal pain with suspected ruptured ectopic pregnancy  - VSS with intermittent lower blood pressures  - BHCG 25,920 and no intrauterine or extrauterine gestation with complex free fluid in the pelvis that may represent hemoperitoneum or recent cyst rupture  - Hgb 13.4 on 5/2, now 11.1  - Leukocytosis of 16.1  - Patient requesting pain medication as she is \"very uncomfortable\"  - Pelvic exam with tenderness over the uterus and voluntary guarding, but no peritoneal signs  - Hemodynamically stable, but with a high BHCG level and a negative pregnancy test on 5/2 and sudden onset abdominal pain, concern for ruptured ectopic pregnancy  - Spoke to radiology who reports the endometrium does not appear like a typical molar pregnancy. - Patient consented for exploratory laparoscopy, possible salpingectomy or salpingostomy possible oophorectomy and other necessary procedures     Patient Active Problem List    Diagnosis Date Noted    Late period 01/08/2016    Chronic abdominal pain 05/07/2015    IBS (irritable bowel syndrome) 05/07/2015    Amenorrhea, secondary 10/24/2014    Infertility, female, secondary 10/24/2014    Need for MMR vaccine 04/25/2014    Need for Tdap vaccination 04/25/2014    Physical exam, pre-employment 04/25/2014    Overweight (BMI 25.0-29.9) 02/14/2013    Contraception 02/14/2013       Plan discussed with Dr. Beacher Kocher, who is agreeable.      Attending's Name: Dr. Ashly WheelerrDO  Ob/Gyn Resident  Pager: [phone]  6/22/2019, 1:31 AM

## 2019-06-22 NOTE — DISCHARGE SUMMARY
Gyn Discharge Summary    Patient Name: Ponce Wood  Patient : 1985  Primary Care Physician: Αμαλίας 28 Date: 2019    Principal Diagnosis: ectopic pregnancy    Other Diagnosis:   Abdominal pain affecting pregnancy [O26.899, R10.9]  S/P ectopic pregnancy [Z87.59]  Patient Active Problem List   Diagnosis    Overweight (BMI 25.0-29. 9)    Contraception    Need for MMR vaccine    Need for Tdap vaccination    Physical exam, pre-employment    Amenorrhea, secondary    Infertility, female, secondary    Chronic abdominal pain    IBS (irritable bowel syndrome)    Late period    Irregular menses    Pregnancy of unknown anatomic location    Abdominal pain affecting pregnancy    Lower abdominal pain    Elevated serum hCG    Pregnancy, ectopic, cornual    Laparoscopic converted to open wedge resection of right cornual ectopic, right salpingectomy and evacuation of hemoperitoneum 19    S/P ectopic pregnancy       Infection: No  Hospital Acquired: No    Surgical Operations & Procedures: Exploratory laparoscopy converted to open laparotomy with wedge resection of cornual ectopic, right salpingectomy and evacuation of hemoperitoneum    Consultations: none and Anesthesia    Pertinent Findings & Procedures:   Ponce Wood is a 34 y.o. female , admitted for suspected ectopic pregnancy; received Ancef x1 when case was converted from laparoscopy to laparotomy. She underwent Exploratory laparoscopy converted to open laparotomy with wedge resection of cornual ectopic, right salpingectomy and evacuation of hemoperitoneum on 19. Course of patient: normal    POD#0: Hgb 11.1 to 9.2; repeat on POD#1 was 7.2 and patient was symptomatic with lighteadedness and dizziness. Patient received 2U PRBCs. Repeat CBC was WNL. Orthostatic BPs were performed prior to discharge and was negative. Patient received Depo-provera.      Discharge to: Home    Readmission planned: No    Recommendations on Discharge:     Medications:     Medication List      START taking these medications    docusate sodium 100 MG capsule  Commonly known as:  COLACE  Take 1 capsule by mouth daily as needed for Constipation     ferrous sulfate 325 (65 Fe) MG EC tablet  Commonly known as:  FE TABS  Take 1 tablet by mouth 2 times daily     oxyCODONE-acetaminophen 5-325 MG per tablet  Commonly known as:  PERCOCET  Take 1 tablet by mouth every 6 hours as needed for Pain for up to 7 days. Intended supply: 7 days. Take lowest dose possible to manage pain        CHANGE how you take these medications    ibuprofen 800 MG tablet  Commonly known as:  ADVIL;MOTRIN  Take 1 tablet by mouth every 8 hours as needed for Pain Take with food  What changed:    · when to take this  · additional instructions        STOP taking these medications    medroxyPROGESTERone 150 MG/ML injection  Commonly known as:  DEPO-PROVERA        ASK your doctor about these medications    dicyclomine 10 MG capsule  Commonly known as:  BENTYL  Take 1 capsule by mouth every 6 hours as needed (cramps)     methocarbamol 750 MG tablet  Commonly known as:  ROBAXIN-750  Take 1 tablet by mouth 3 times daily   WARNING:  May cause drowsiness.  May impair ability to operate vehicles or machinery.  Do not use in combination with alcohol. omeprazole 40 MG delayed release capsule  Commonly known as:  PRILOSEC  Take 1 capsule by mouth daily           Where to Get Your Medications      You can get these medications from any pharmacy    Bring a paper prescription for each of these medications  · docusate sodium 100 MG capsule  · ferrous sulfate 325 (65 Fe) MG EC tablet  · ibuprofen 800 MG tablet  · oxyCODONE-acetaminophen 5-325 MG per tablet           Activity: pelvic rest x 6 weeks, no driving on narcotics, no lifting greater than 15 lbs  Diet: regular diet  Follow up: Follow up in 1-2 weeks. Discharge instructions reviewed and questions answered.     Condition on discharge: good and stable   Discharge Date: 6/24/19    Comments:  Home care, Follow-up care, restrictions reviewed.     Nidia Blanchard DO  Ob/Gyn Resident  [facility]  6/24/2019, 4:09 PM

## 2019-06-22 NOTE — ED PROVIDER NOTES
Segs Absolute 14.56 (*)     Absolute Lymph # 0.91 (*)     All other components within normal limits   C.TRACHOMATIS N.GONORRHOEAE DNA   URINE CULTURE   TYPE AND SCREEN       Us Ob Transvaginal    Result Date: 6/21/2019  EXAMINATION: FIRST TRIMESTER OBSTETRIC ULTRASOUND 6/21/2019 TECHNIQUE: Transvaginal first trimester obstetric pelvic ultrasound was performed with color Doppler flow evaluation. Color Doppler and spectral analysis performed. COMPARISON: 09/25/2014 HISTORY: ORDERING SYSTEM PROVIDED HISTORY: pain in preg; r/o ectopic FINDINGS: Uterus: 10.0 x 6.4 x 6.0 cm Gestational Sac(s):  None visualized Yolk Sac:  Not applicable Fetal Pole:  Not applicable Crown Rump Length:  Not applicable Fetal Heart Rate:  Not applicable Right ovary: 3.1 x 2.6 x 2.6 cm with normal Doppler signal. Left ovary: 4.0 x 6.4 x 6.0 cm with normal Doppler signal. Free fluid: Small amount of complex fluid in the pelvis. No intrauterine or extrauterine gestation visualized. Complex free fluid may represent hemoperitoneum or recent cyst rupture. Recommend close clinical follow-up. RECENT VITALS:     Temp: 98.5 °F (36.9 °C),  Pulse: 77, Resp: 16, BP: 113/62, SpO2: 100 %    This patient is a 34 y.o. Female with chief complaint of acute onset of lower abdominal pain. Work-up initiated, patient found to have elevated serum hCG of 25,000. Transvaginal ultrasound was negative for intrauterine and ectopic pregnancy. OB was consulted. They came down and evaluated the patient. Currently awaiting to hear back plan from attending. 1:17 AM  OB team spoke with attending and discussed patient to go to OR for laparascopy. OUTSTANDING TASKS / RECOMMENDATIONS:    1. Follow up with OB recommendations     FINAL IMPRESSION:     1. Lower abdominal pain    2. Elevated serum hCG    3.  Bacterial vaginosis        DISPOSITION:         DISPOSITION:  []  Discharge   []  Transfer -    [x]  Admission -  OB   []  Against Medical Advice   [] Eloped   FOLLOW-UP: No follow-up provider specified.    DISCHARGE MEDICATIONS: New Prescriptions    No medications on file           Angelita Halsted, MD  Emergency Medicine Resident  [facility] MD  Resident  06/22/19 7258

## 2019-06-22 NOTE — H&P
OB/GYN H&P  [facility]    Patient Name: Isidro Tom     Patient : 1985  Room/Bed: 10/  Admission Date/Time: 2019  8:23 PM  Primary Care Physician: No primary care provider on file. Consulting Provider: Dr. Bj Frances  Reason for Consult: concern for ectopic    CC:   Chief Complaint   Patient presents with    Abdominal Pain                HPI: Isidro Tom is a 34 y.o. female  presents sudden-onset lower abdominal pain that started 7:30 pm. No LMP recorded (lmp unknown). Patient is pregnant. Patient had a positive pregnancy test on 19 (with a previous negative test on 19). Reports no blood work or ultrasound was done at that time. Patient does not know her LMP. Mother at bedside reports irregular periods (sometimes 5 months apart) with Hx of Depo-Provera use for a couple of years. Reports Hx of one TAB previously, hx of cholecystectomy and Hx of  section \"because I wouldn't dilate\". She denies any vaginal bleeding or spotting, denies any passage of tissue. She denies associated nausea or vomiting at home but RN note in the ED reveals episode of emesis in the ED. Concern for potential ruptured ectopic today with BHCG of 25,920 and no intrauterine or extrauterine gestation and complex free fluid in the abdomen. Spoke to radiology who reports the endometrium does not appear like a typical molar pregnancy. REVIEW OF SYSTEMS:   A minimum of an eleven point review of systems was completed.     Constitutional: negative fever, negative chills  HEENT: negative visual disturbances, negative headaches  Respiratory: negative dyspnea, negative cough  Cardiovascular: negative chest pain,  negative palpitations  Gastrointestinal: positive abdominal pain, negative RUQ pain, negative N/V, negative diarrhea, negative constipation  Genitourinary: negative dysuria, negative vaginal discharge, negative vaginal bleeding  Dermatological: negative rash, negative wounds  Hematologic: negative bleeding/clotting disorder  Immunologic: negative recent illness, negative recent sick contact, negative allergic reactions  Lymphatic: negative lymph nodes  Musculoskeletal: negative back pain, negative myalgias, negative arthralgias  Neurological:  negative dizziness, negative weakness  Behavior/Psych: negative depression, negative anxiety    _______________________________________________________________________      OBSTETRICAL HISTORY:   OB History    Para Term  AB Living   3 1 1 0 1 1   SAB TAB Ectopic Molar Multiple Live Births   0 0 0 0 0 0      # Outcome Date GA Lbr Gael/2nd Weight Sex Delivery Anes PTL Lv   3 Current            2 Term      Vag-Spont      1 AB                PAST MEDICAL HISTORY:   has a past medical history of Chlamydia, IBS (irritable bowel syndrome), Infertility, female, secondary, and Trichomonas vaginalis infection. PAST SURGICAL HISTORY:   has no past surgical history on file. ALLERGIES:  Allergies as of 2019    (No Known Allergies)       MEDICATIONS:  Current Facility-Administered Medications   Medication Dose Route Frequency Provider Last Rate Last Dose    0.9 % sodium chloride bolus  1,000 mL Intravenous Once RIO Silver CNP         Current Outpatient Medications   Medication Sig Dispense Refill    ibuprofen (IBU) 800 MG tablet Take 1 tablet by mouth every 6 hours as needed for Pain 21 tablet 0    omeprazole (PRILOSEC) 40 MG delayed release capsule Take 1 capsule by mouth daily 30 capsule 0    dicyclomine (BENTYL) 10 MG capsule Take 1 capsule by mouth every 6 hours as needed (cramps) 20 capsule 0    methocarbamol (ROBAXIN-750) 750 MG tablet Take 1 tablet by mouth 3 times daily   WARNING:  May cause drowsiness.  May impair ability to operate vehicles or machinery.  Do not use in combination with alcohol. 30 tablet 0    medroxyPROGESTERone (DEPO-PROVERA) 150 MG/ML injection Please Give IM Injection every 12 weeks. 1 mL 3       FAMILY HISTORY:  Family History of Breast, Ovarian, Colon or Uterine Cancer: No   family history is not on file. SOCIAL HISTORY:   reports that she has been smoking cigars. She has never used smokeless tobacco. She reports that she drinks alcohol. She reports that she does not use drugs. ________________________________________________________________________                                    Kendall Prophet:  Vitals:    06/21/19 2302 06/21/19 2320 06/21/19 2331 06/22/19 0054   BP: (!) 98/51 (!) 94/32 (!) 94/52 (!) 113/59   Pulse:    77   Resp:    16   Temp:       TempSrc:       SpO2: 99% 99% 100% 100%   Weight:       Height:                                                        INPUT/OUTPUT:  No intake/output data recorded. No intake/output data recorded. PHYSICAL EXAM:     General Appearance: Appears healthy. Alert; in no acute distress. Pleasant. Skin: Skin color, texture, turgor normal. No rashes or lesions. Respiratory: Normal expansion. Clear to auscultation. No rales, rhonchi, or wheezing.   Cardiovascular: regular rate and rhythm, no murmurs rubs or gallops  Abdomen: soft, non-tender, non-distended, no right upper quadrant tenderness and no CVA tenderness, pfannenstiel and laparoscopic cholecystectomy abdominal scars  Pelvic Exam: tender midline tenderness, no RLQ or LLQ tenderness, bimanual reveals 10 week sized uterus, positive guarding, negative peritoneal signs  External genitalia: General appearance; normal, Hair distribution; normal, Lesions absent  Urinary system: urethral meatus normal, negative bladder sweep  Vaginal: normal mucosa,   Cervix: normal appearing cervix without discharge or lesions, negative CMT  Adnexa: normal adnexa in size, nontender and no masses  Uterus: normal single, nontender  Rectal Exam: exam declined by patient  Musculoskeletal: Spine range of motion normal. Muscular strength intact. , Range of motion normal in hips, knees, shoulders, and spine. , no gross abnormalities  Extremities: non-tender BLE and non-edematous  Psych:  oriented to time, place and person, mood and affect are within normal limits     OMM EXAM:  The patient did not complain of a Chief complaint requiring OMM. Chief Complaint:none    Structural Exam: No Interest    LAB RESULTS:  Results for orders placed or performed during the hospital encounter of 06/21/19   VAGINITIS DNA PROBE   Result Value Ref Range    Specimen Description . VAGINA     Special Requests NOT REPORTED     Direct Exam POSITIVE for Gardnerella vaginalis. (A)     Direct Exam NEGATIVE for Trichomonas vaginalis     Direct Exam NEGATIVE for Candida sp. Direct Exam       Method of testing is a DNA probe intended for detection and identification of Candida species, Gardnerella vaginalis, and Trichomonas vaginalis nucleic acid in vaginal fluid specimens from patients with symptoms of vaginitis/vaginosis. Urinalysis Reflex to Culture   Result Value Ref Range    Color, UA YELLOW YELLOW    Turbidity UA TURBID (A) CLEAR    Glucose, Ur NEGATIVE NEGATIVE    Bilirubin Urine NEGATIVE NEGATIVE    Ketones, Urine NEGATIVE NEGATIVE    Specific Gravity, UA 1.028 1.005 - 1.030    Urine Hgb NEGATIVE NEGATIVE    pH, UA 6.0 5.0 - 8.0    Protein, UA 1+ (A) NEGATIVE    Urobilinogen, Urine Normal Normal    Nitrite, Urine NEGATIVE NEGATIVE    Leukocyte Esterase, Urine TRACE (A) NEGATIVE    Urinalysis Comments Culture ordered based on defined criteria.     HCG, Quantitative, Pregnancy   Result Value Ref Range    hCG Quant 25,920 (H) <5 IU/L   Microscopic Urinalysis   Result Value Ref Range    -          WBC, UA 2 TO 5 0 - 5 /HPF    RBC, UA 0 TO 2 0 - 2 /HPF    Casts UA NOT REPORTED 0 - 2 /LPF    Crystals UA NOT REPORTED None /HPF    Epithelial Cells UA 10 TO 20 0 - 5 /HPF    Renal Epithelial, Urine NOT REPORTED 0 /HPF    Bacteria, UA FEW (A) None    Mucus, UA NOT REPORTED None    Trichomonas, UA NOT REPORTED None    Amorphous, UA NOT REPORTED None    Other Observations UA NOT REPORTED NOT REQ. Yeast, UA NOT REPORTED None         DIAGNOSTICS:  Us Ob Transvaginal    Result Date: 6/21/2019  EXAMINATION: FIRST TRIMESTER OBSTETRIC ULTRASOUND 6/21/2019 TECHNIQUE: Transvaginal first trimester obstetric pelvic ultrasound was performed with color Doppler flow evaluation. Color Doppler and spectral analysis performed. COMPARISON: 09/25/2014 HISTORY: ORDERING SYSTEM PROVIDED HISTORY: pain in preg; r/o ectopic FINDINGS: Uterus: 10.0 x 6.4 x 6.0 cm Gestational Sac(s):  None visualized Yolk Sac:  Not applicable Fetal Pole:  Not applicable Crown Rump Length:  Not applicable Fetal Heart Rate:  Not applicable Right ovary: 3.1 x 2.6 x 2.6 cm with normal Doppler signal. Left ovary: 4.0 x 6.4 x 6.0 cm with normal Doppler signal. Free fluid: Small amount of complex fluid in the pelvis. No intrauterine or extrauterine gestation visualized. Complex free fluid may represent hemoperitoneum or recent cyst rupture. Recommend close clinical follow-up.        ASSESSMENT & PLAN:    Kelly Ann is a 34 y.o. female [id number] with lower abdominal pain with suspected ruptured ectopic pregnancy  - VSS with intermittent lower blood pressures  - BHCG 25,920 and no intrauterine or extrauterine gestation with complex free fluid in the pelvis that may represent hemoperitoneum or recent cyst rupture  - Hgb 13.4 on 5/2, now 11.1  - Leukocytosis of 16.1  - Patient requesting pain medication as she is \"very uncomfortable\"  - Vaginitis positive for BV  - GC/C pending  - Pelvic exam with tenderness over the uterus and voluntary guarding, but no peritoneal signs  - Hemodynamically stable, but with a high BHCG level and a negative pregnancy test on 5/2 and sudden onset abdominal pain, concern for ruptured ectopic pregnancy  - Spoke to radiology who reports the endometrium does not appear like a typical molar pregnancy. - Patient consented for exploratory laparoscopy, possible salpingectomy or salpingostomy possible oophorectomy and other necessary procedures     Patient Active Problem List    Diagnosis Date Noted    Late period 01/08/2016    Chronic abdominal pain 05/07/2015    IBS (irritable bowel syndrome) 05/07/2015    Amenorrhea, secondary 10/24/2014    Infertility, female, secondary 10/24/2014    Need for MMR vaccine 04/25/2014    Need for Tdap vaccination 04/25/2014    Physical exam, pre-employment 04/25/2014    Overweight (BMI 25.0-29.9) 02/14/2013    Contraception 02/14/2013       Plan discussed with Dr. Alyssa Robledo, who is agreeable.      Attending's Name: Dr. Haylee Berry DO  Ob/Gyn Resident  Pager: [phone]  6/22/2019, 12:04 AM

## 2019-06-22 NOTE — ANESTHESIA PRE PROCEDURE
(DEPO-PROVERA) 150 MG/ML injection Please Give IM Injection every 12 weeks. 1 mL 3       Allergies:  No Known Allergies    Problem List:    Patient Active Problem List   Diagnosis Code    Overweight (BMI 25.0-29. 9) E66.3    Contraception Z78.9    Need for MMR vaccine Z23    Need for Tdap vaccination Z23    Physical exam, pre-employment Z02.1    Amenorrhea, secondary N91.1    Infertility, female, secondary N97.9    Chronic abdominal pain R10.9, G89.29    IBS (irritable bowel syndrome) K58.9    Late period N92.6       Past Medical History:        Diagnosis Date    Chlamydia     IBS (irritable bowel syndrome) 5/7/2015    Infertility, female, secondary     Trichomonas vaginalis infection 2013       Past Surgical History:  History reviewed. No pertinent surgical history. Social History:    Social History     Tobacco Use    Smoking status: Current Every Day Smoker     Types: Cigars    Smokeless tobacco: Never Used   Substance Use Topics    Alcohol use: Yes     Comment: week end                                Ready to quit: Not Answered  Counseling given: Not Answered      Vital Signs (Current):   Vitals:    06/21/19 2302 06/21/19 2320 06/21/19 2331 06/22/19 0054   BP: (!) 98/51 (!) 94/32 (!) 94/52 (!) 113/59   Pulse:    77   Resp:    16   Temp:       TempSrc:       SpO2: 99% 99% 100% 100%   Weight:       Height:                                                  BP Readings from Last 3 Encounters:   06/22/19 (!) 113/59   06/14/19 127/69   05/02/19 (!) 140/88       NPO Status:  5 PM                                                                               BMI:   Wt Readings from Last 3 Encounters:   06/21/19 195 lb (88.5 kg)   06/14/19 199 lb 11.2 oz (90.6 kg)   05/02/19 196 lb 6.4 oz (89.1 kg)     Body mass index is 32.45 kg/m².     CBC:   Lab Results   Component Value Date    WBC 16.1 06/22/2019    RBC 4.13 06/22/2019    HGB 11.1 06/22/2019    HCT 36.9 06/22/2019    MCV 89.3 06/22/2019    RDW 13.9 06/22/2019     06/22/2019       CMP:   Lab Results   Component Value Date     05/02/2019    K 3.8 05/02/2019     05/02/2019    CO2 22 05/02/2019    BUN 9 05/02/2019    CREATININE 0.72 05/02/2019    GFRAA >60 05/02/2019    LABGLOM >60 05/02/2019    GLUCOSE 103 05/02/2019    PROT 7.0 05/02/2019    CALCIUM 9.1 05/02/2019    BILITOT 0.33 05/02/2019    ALKPHOS 74 05/02/2019    AST 17 05/02/2019    ALT 13 05/02/2019       POC Tests: No results for input(s): POCGLU, POCNA, POCK, POCCL, POCBUN, POCHEMO, POCHCT in the last 72 hours. Coags: No results found for: PROTIME, INR, APTT    HCG (If Applicable):   Lab Results   Component Value Date    PREGTESTUR positive 06/14/2019    HCG POSITIVE (A) 06/14/2019    HCGQUANT 25,920 (H) 06/21/2019        ABGs: No results found for: PHART, PO2ART, HGX8ZGG, PNC0UBP, BEART, K5DAEHXA     Type & Screen (If Applicable):  No results found for: LABABO, LABRH    Anesthesia Evaluation   no history of anesthetic complications:   Airway: Mallampati: II     Neck ROM: full   Dental:          Pulmonary:   (+) current smoker    (-) recent URI                           Cardiovascular:Negative CV ROS                      Neuro/Psych:               GI/Hepatic/Renal:   (+) GERD:,          ROS comment: IBS  Ectopic   vomiting. Endo/Other:        (-) diabetes mellitus               Abdominal:           Vascular:                                      Anesthesia Plan      general     ASA 2 - emergent     (Asa 2 E)  Induction: intravenous.                           Kimo Liu MD   6/22/2019

## 2019-06-22 NOTE — BRIEF OP NOTE
Brief Operative Note  Department of Obstetrics and Gynecology  [facility]     Patient: Matt Gann   : 1985  MRN: [id number]       Acct: [de-identified]   Date of Procedure: 19     Pre-operative Diagnosis: 34 y.o. female  with sudden onsent lower abdominal pain  Pregnancy of unknown location with BHCG of >25,000 with no intrauterine or extrauterine pregnancy identified on TVUS  Suspected ruptured ectopic pregnancy  Hx of  section x 1  Hx of laparoscopic cholecystectomy  Hx of TAB x1  Bacterial Vaginosis    Post-operative Diagnosis:   Right Cornual ectopic  Hemoperitoneum    Procedure: Exploratory laparoscopy converted to open laparotomy with wedge resection of cornual ectopic, right salpingectomy and evacuation of hemoperitoneum    Surgeon: Dr. Patrick Beyer     Assistant(s): Gianna Rodriguez DO, PGY3    Anesthesia: general via ET tube    Findings:  Hemoperitoneum (750mL in abdomen on entry), anteverted 10 week size uterus with right cornual ectopic pregnancy (approximately 3cm at its greatest dimension), normal left fallopian tube and normal distal end of the right fallopian tube, normal bilateral ovaries  Total IV fluids/Blood products:  1600 ml crystalloid  Urine Output:  275 ml    Estimated blood loss:  1000ml  Drains:  griffith catheter  Specimens:  Right fallopian tube, Right cornual ectopic  Instrument and Sponge Count: Correct  Complications:  none  Condition:  good, transferred to post anesthesia recovery    See full operative report for further details.       Gianna Rodriguez DO  Ob/Gyn Resident  Pager: [phone]  2019, 5:33 AM

## 2019-06-22 NOTE — ED PROVIDER NOTES
I performed a history and physical examination of the patient and discussed management with the resident. I reviewed the residents note and agree with the documented findings and plan of care. Any areas of disagreement are noted on the chart. I was personally present for the key portions of any procedures. I have documented in the chart those procedures where I was not present during the key portions. I have reviewed the emergency nurses triage note. I agree with the chief complaint, past medical history, past surgical history, allergies, medications, social and family history as documented unless otherwise noted below. Documentation of the HPI, Physical Exam and Medical Decision Making performed by medical students or scribes is based on my personal performance of the HPI, PE and MDM. For Phys Assistant/ Nurse Practitioner cases/documentation I have personally evaluated this patient and have completed at least one if not all key elements of the E/M (history, physical exam, and MDM). I find the patient's history and physical exam are consistent with the NP/PA documentation. I agree with the care provided, treatment rendered, disposition and followup plan. Additional findings are as noted. Clara Guzman. Tameka Franklin MD  Attending Emergency  Physician    C/O SUDDEN ONSET OF LOWER ABD PAIN TONIGHT WHILE WALKING. RECENTLY DISCOVERED PREGNANCY. NO TRAUMA, NAUSEA, VOMITING, VAG BLEEDING/DISCHARGE. NO FEVER, CHILLS, DYSURIA, HEMATURIA, FREQUENCY, URGENCY. , NO CONTRACEPTION. AWAKE, ALERT, COOP, RESP. LUNGS CLEAR CHITO. NO RALES, RHONCHI, WHEEZES, STRIDOR, RETRACTIONS. CARDIAC-S1S2, RRR, NO MRG. ABD SOFT, NONDISTENDED, MILD SUPRAPUBIC TENDERNESS. NORMAL BOWEL SOUNDS. NO GUARDING, REBOUND, ORGANOMEGALY, MASS, BRUIT. PELVIC EXAM PER FRANCISCO NAILS WHO REPORTS MARKED TENDERNESS WITH CERVICAL MOTION. NO UTERINE OR ADNEXAL MASSES. NO UTERINE TENDERNESS. AMN-[id number]. UA UNREMARKABLE. VAG PROBE POS FOR BV.   TVUS PENDING. IMP-PREGNANCY, POSS ECTOPIC, POSS PID. PLAN-AWAITING TVUS RESULT. Ethel Pritchett MD  06/21/19 9313  TVUS-NO IUP DETECTABLE. POS FREE FLUID. IMP-PROB ECTOPIC PREGNANCY. PLAN-OB CONSULT. Ethel Pritchett MD  06/21/19 4609  TVUS INCONCLUSIVE-?RUPTURED ECTOPIC, ? MOLAR PREGNANCY(HEIGHT OF BHCG COMPARED WITH DATES AND LACK OF FINDINGS ON TVUS)  OB RESIDENT HAS EVALUATED PATIENT AND IS IN DISCUSSION WITH OBGYN ATTENDING.      SIGNED OUT TO DR. Everette Walker MD  06/22/19 3427

## 2019-06-22 NOTE — OP NOTE
Operative Note  Department of Obstetrics and Gynecology  [facility]       Patient: Cinthya Wray   : 1985  MRN: [id number]       Acct: [de-identified]   PCP: No primary care provider on file. Date of Procedure: 19    Pre-operative Diagnosis: 34 y.o. female  with sudden onsent lower abdominal pain  Pregnancy of unknown location with BHCG of >25,000 with no intrauterine or extrauterine pregnancy identified on TVUS  Suspected ruptured ectopic pregnancy  Hx of  section x 1  Hx of laparoscopic cholecystectomy  Hx of TAB x1  Bacterial Vaginosis     Post-operative Diagnosis:   Right Cornual ectopic  Hemoperitoneum     Procedure: Exploratory laparoscopy converted to open laparotomy with wedge resection of cornual ectopic, right salpingectomy and evacuation of hemoperitoneum     Surgeon: Dr. Peralta Main     Assistants: Tatum Jaimes DO PGY3    Anesthesia:  general via ET tube    Indications: Cinthya Wray is a 34 y.o.  at unknown gestational age who found out she was pregnant on 19 (no BHCG quant or TVUS done at that time). She presented with sudden onset abdominal pain with a BHCG of 25,920 and a TVUS that revealed no intrauterine or extrauterine gestation. Complex free fluid was present however, that may represent hemoperitoneum or recent cyst rupture. With worsening abdominal pain, decreased Hgb count and intermittent hypotension and suspicion for ruptured ectopic pregnancy, decision was made to proceed to the OR for surgical management. All patient questions and concerns were discussed with the patient and her mother and informed consent was obtained. Procedure Details   The patient was seen in the pre-op room. The risks, benefits, complications, treatment options, and expected outcomes were discussed with the patient. The patient concurred with the proposed plan, giving informed consent.  The patient was taken to the Operating Room and identified as Suze Xiao and the procedure was verified. A Time Out was held and the above information confirmed. After administration of general anesthesia, the patient was placed in the dorsolithotomy position with yellofin stirrups and examination under general anesthesia was performed with findings as noted below. A griffith catheter was inserted into the bladder. The patient was prepped and draped in the usual sterile fashion. A sponge stick was placed in the vagina to help with visualization during the case. Attention was then turned to the abdominal portion of the case. An approximately 10 mm infraumbilical incision was made and using blunt dissection was carried out down to the fascia with kocher clamps. While carefully tenting up the fascia, sharp dissection was carried out to the fascia and peritoneum. A 10 mm trocar was then inserted and the laparoscope was used to confirm intraperitoneal placement. CO2 gas was then used to create a pneumoperitoneum. The patient was then placed in trendelenberg position. Survey of the pelvis was then performed, revealing hemoperitoneum with old and fresh blood. A 5 mm port was then placed in the RLQ and LLQ under direct visualization. Laparoscopic grasper instrument was utilized to manipulate the clots, and evacuating the clots revealed an enlarged 10 week anteverted uterus with a right cornual ectopic pregnancy, (approximately 3cm at its greatest dimension), normal left fallopian tube and normal distal end of the right fallopian tube, normal bilateral ovaries. Further survey of the pelvis was not possible secondary to the amount of hemoperitoneum. Laparoscopy at this point was abandoned and decision was made to proceed with an open laparotomy to perform a wedge resection of the cornual ectopic. All instruments were then removed. Pneumoperitoneum was evacuated. Fascia was closed with 0-PDS.       A Pfannenstiel incision was made and carried down through the subcutaneous tissue to the fascia using scalpel. Fascial incision was made and extended transversely using bovie electrocautery for sharp dissection. The fascia was  from the underlying rectus tissue superiorly and inferiorly using blunt dissection and bovie electrocautery. The peritoneum was identified, lifted with hemostats and entered bluntly. Entry was confirmed with intraperitoneal blood extravasating from abdominal cavity. Blunt dissection was used to take the peritoneum down sharply. The uterus was elevated through the incision, bowel was packed with two lap sponges and the medial aspect of the cornual ectopic was grasped with Roly clamp. The distal end of the right fallopian tube (with evidence of damage medially by the cornua) was grasped with roma clamp and elevated off the ovary. The utero-ovarian ligament was ligated with the handheld Ligasure device. Hemostasis was noted. A roly clamp was then placed just medial to the previous Roly clamp and transected with scalpel, releasing the cornual ectopic and attached right fallopian tube. Specimen was sent to pathology. Multiple figure-of-eight sutures were oriented across the wedge resection edges achieving suture ligation with good hemostasis noted across the resection edges. The remaining uterine outline appeared normal. Throughout the procedure, multiple small blood clots were extracted with manipulation of the pelvic viscera. The wedge resection was again inspected and found to be oozy on the right medial aspect of the broad ligament where the fallopian tube was ligated from the mesosalpinx. Hemostasis was achieved by using the Ligasure. Hemostasis was observed. An imbricating layer was not deemed necessary secondary to excellent hemostasis. Endometrial cavity was not entered. The two sponges packing the bowel back were removed from the abdomen. The abdomen was thoroughly irrigated and all further identified clots were extracted.  Wedge resection was further inspected with excellent hemostasis. Cricket was then placed overlying the entire repair. The uterus was reintroduced into its normal anatomic position. Left tube and bilateral ovaries were visualized and appeared normal. Peritoneum was reapproximated with running suture of 2-0 Vicryl. Rectus muscles were inspected and found to be hemostatic. The fascia was then reapproximated with running sutures of 0 Vicryl running from either apex overlying in the midline. The subcuticular space was irrigated copiously. The subcuticular space was closed using a 2-0 plain gut suture in a running fashion. Incisions were clean, dried and dressed in sterile fashion. Skin at the laparoscopic port sites were closed with 4-0 Monocryl in standard fashion. The pfannenstiel skin incision was reapproximated with a 4-0 Monocryl. The skin was then cleansed and dressed with bandages in sterile fashion. Instrument, sponge, and needle counts were correct prior the abdominal closure and at the conclusion of the case. The urine remained clear throughout the case. Ancef was given for antibiotic prophylaxis at decision to proceed with exporatory laparotomy. SCDs for DVT prophylaxis remain in place for the post operative period. Sponge stick was then removed from the vagina. Griffith catheter was kept in place for the postoperative period. Dr. Marika Goode was present for the entire operation.     Findings:  Hemoperitoneum (750mL in abdomen on entry), anteverted 10 week size uterus with right cornual ectopic pregnancy (approximately 3cm at its greatest dimension), normal left fallopian tube and normal distal end of the right fallopian tube, normal bilateral ovaries  Total IV fluids/Blood products:  1600 ml crystalloid  Urine Output:  275 ml    Estimated blood loss:  1000ml  Drains:  griffith catheter  Specimens:  Right fallopian tube, Right cornual ectopic  Instrument and Sponge Count: Correct  Complications: none  Condition:  good, transferred to post anesthesia recovery        Arpita Fenton DO  Ob/Gyn Resident  6/22/2019, 10:23 AM          Attending Physician Statement  I have discussed the care of Jer Caller, including pertinent history and exam findings,  with the resident. I have seen and examined the patient and the key elements of all parts of the encounter have been performed by me. I agree with the assessment, plan and orders as documented by the resident. (GC Modifier)    I was present for and scrubbed into the entire case.     Freddy Freitas DO

## 2019-06-22 NOTE — PROGRESS NOTES
Progress Note    Date: 2019  Time: 5:51 PM    Ramya Mcwilliams 34 y.o. female , POD # 0    Patient seen and examined. She complained of mild incisional pain that improves with pain medication. Pain is controlled. Patient is  tolerating oral intake. Goldman in place draining clear dark yellow urine. She denies any vaginal bleeding. She has not yet been out of bed. She is not passing flatus. She denies Fever/Chills, Chest Pain, SOB, N/V, Calf Pain    Vitals:  Vitals:    19 0545 19 0700 19 1200 19 1708   BP: (!) 123/52 130/72 121/63 123/62   Pulse: 69 97 92 85   Resp: 20 16 15 17   Temp: 97.8 °F (36.6 °C) 98.5 °F (36.9 °C) 98.6 °F (37 °C) 98.7 °F (37.1 °C)   TempSrc: Temporal      SpO2: 100% 100% 99% 100%   Weight:       Height:             Intake/Output:   Last Shift: I/O last 3 completed shifts: In: 7253 [I.V.:1650]  Out: 8983 [Urine:350; Blood:1000]  Current Shift: No intake/output data recorded.     Date 19 0000 - 19 235   Shift 9769-4358 3074-4952 0999-1414 24 Hour Total   INTAKE   I.V.(mL/kg) 1650(18.7)   1650(18.7)   Shift Total(mL/kg) 7133(07.9)   1650(18.7)   OUTPUT   Urine(mL/kg/hr) 350(0.5)   350   Blood(mL/kg) 1000(11.3)   1000(11.3)   Shift Total(mL/kg) 1350(15.3)   1350(15.3)   Weight (kg) 88.5 88.5 88.5 88.5       Physical Exam:  General:  no apparent distress, alert and cooperative  Neurologic:  alert, oriented, normal speech, no focal findings or movement disorder noted  Lungs:  No increased work of breathing, good air exchange, clear to auscultation bilaterally, no crackles or wheezing  Heart:  regular rate and rhythm and no murmur    Abdomen: soft, non-distended, appropriate tenderness, no CVA tenderness  Incision: clean, dry and intact  Extremities:  no calf tenderness, non edematous    Lab:  Complete Blood Count:   Recent Labs     19  0034 19  1223   WBC 16.1* 14.6*   HGB 11.1* 9.2*   HCT 36.9 30.8*    235        PT/INR:    No results found for: PROTIME, INR  PTT:    No results found for: APTT, PTT    Metabolic Profile:   Recent Labs     06/22/19  1223      K 4.4      CO2 19*   BUN 8   CREATININE 0.63   GLUCOSE 143*   CALCIUM 7.7*   PROT 5.5*   LABALBU 3.1*   BILITOT 0.30   ALKPHOS 40   AST 11   ALT 9        Assessment/Plan:  Cedric Washington 34 y.o. female [id number], POD #0 s/p Exploratory laparoscopy converted to open laparotomy with wedge resection of cornual ectopic, right salpingectomy and evacuation of hemoperitoneum   - Doing well, vitals stable    - Encourage ambulation and use of incentive spirometry   - Griffith in place; ok to discontinue griffith, UOP 40ml/hour for last 10 hours   - IV fluids: discontinued   - Pain control: Tylenol/Motrin/Percocet PRN   - DVT prophylaxis: SCDs   - Diet: General diet   - Labs: CBC in AM   - Path: pending    Anemia   - Hgb dropped from 11.1 to 9.2   - Pt currently asymptomatic   - VSS      Fannie Kawasaki, DO  Ob/Gyn Resident  Pager: [phone]  6/22/2019, 5:51 PM

## 2019-06-23 PROBLEM — Z87.59 S/P ECTOPIC PREGNANCY: Status: ACTIVE | Noted: 2019-06-23

## 2019-06-23 LAB
HCT VFR BLD CALC: 23.5 % (ref 36.3–47.1)
HEMOGLOBIN: 7.2 G/DL (ref 11.9–15.1)
MCH RBC QN AUTO: 27.4 PG (ref 25.2–33.5)
MCHC RBC AUTO-ENTMCNC: 30.6 G/DL (ref 28.4–34.8)
MCV RBC AUTO: 89.4 FL (ref 82.6–102.9)
NRBC AUTOMATED: 0 PER 100 WBC
PDW BLD-RTO: 14.1 % (ref 11.8–14.4)
PLATELET # BLD: 204 K/UL (ref 138–453)
PMV BLD AUTO: 10.4 FL (ref 8.1–13.5)
RBC # BLD: 2.63 M/UL (ref 3.95–5.11)
WBC # BLD: 11.9 K/UL (ref 3.5–11.3)

## 2019-06-23 PROCEDURE — 6370000000 HC RX 637 (ALT 250 FOR IP): Performed by: STUDENT IN AN ORGANIZED HEALTH CARE EDUCATION/TRAINING PROGRAM

## 2019-06-23 PROCEDURE — 2580000003 HC RX 258: Performed by: STUDENT IN AN ORGANIZED HEALTH CARE EDUCATION/TRAINING PROGRAM

## 2019-06-23 PROCEDURE — 36415 COLL VENOUS BLD VENIPUNCTURE: CPT

## 2019-06-23 PROCEDURE — 86900 BLOOD TYPING SEROLOGIC ABO: CPT

## 2019-06-23 PROCEDURE — P9016 RBC LEUKOCYTES REDUCED: HCPCS

## 2019-06-23 PROCEDURE — 36430 TRANSFUSION BLD/BLD COMPNT: CPT

## 2019-06-23 PROCEDURE — 85027 COMPLETE CBC AUTOMATED: CPT

## 2019-06-23 PROCEDURE — 1200000000 HC SEMI PRIVATE

## 2019-06-23 RX ORDER — IBUPROFEN 800 MG/1
800 TABLET ORAL EVERY 8 HOURS PRN
Qty: 30 TABLET | Refills: 0 | Status: SHIPPED | OUTPATIENT
Start: 2019-06-23 | End: 2020-03-18 | Stop reason: ALTCHOICE

## 2019-06-23 RX ORDER — OXYCODONE HYDROCHLORIDE AND ACETAMINOPHEN 5; 325 MG/1; MG/1
1 TABLET ORAL EVERY 6 HOURS PRN
Qty: 21 TABLET | Refills: 0 | Status: SHIPPED | OUTPATIENT
Start: 2019-06-23 | End: 2019-06-30

## 2019-06-23 RX ORDER — DOCUSATE SODIUM 100 MG/1
100 CAPSULE, LIQUID FILLED ORAL DAILY PRN
Qty: 60 CAPSULE | Refills: 0 | Status: SHIPPED | OUTPATIENT
Start: 2019-06-23 | End: 2021-07-23

## 2019-06-23 RX ORDER — 0.9 % SODIUM CHLORIDE 0.9 %
250 INTRAVENOUS SOLUTION INTRAVENOUS ONCE
Status: COMPLETED | OUTPATIENT
Start: 2019-06-23 | End: 2019-06-24

## 2019-06-23 RX ADMIN — METRONIDAZOLE 500 MG: 500 TABLET, FILM COATED ORAL at 09:17

## 2019-06-23 RX ADMIN — SODIUM CHLORIDE 250 ML: 9 INJECTION, SOLUTION INTRAVENOUS at 14:00

## 2019-06-23 RX ADMIN — OXYCODONE HYDROCHLORIDE AND ACETAMINOPHEN 1 TABLET: 5; 325 TABLET ORAL at 01:09

## 2019-06-23 RX ADMIN — METRONIDAZOLE 500 MG: 500 TABLET, FILM COATED ORAL at 21:07

## 2019-06-23 RX ADMIN — IBUPROFEN 800 MG: 800 TABLET, FILM COATED ORAL at 09:17

## 2019-06-23 RX ADMIN — OXYCODONE HYDROCHLORIDE AND ACETAMINOPHEN 1 TABLET: 5; 325 TABLET ORAL at 13:17

## 2019-06-23 RX ADMIN — IBUPROFEN 800 MG: 800 TABLET, FILM COATED ORAL at 21:07

## 2019-06-23 RX ADMIN — OXYCODONE HYDROCHLORIDE AND ACETAMINOPHEN 1 TABLET: 5; 325 TABLET ORAL at 06:42

## 2019-06-23 RX ADMIN — DOCUSATE SODIUM 100 MG: 100 CAPSULE, LIQUID FILLED ORAL at 09:17

## 2019-06-23 RX ADMIN — IBUPROFEN 800 MG: 800 TABLET, FILM COATED ORAL at 02:19

## 2019-06-23 ASSESSMENT — PAIN DESCRIPTION - PAIN TYPE: TYPE: SURGICAL PAIN

## 2019-06-23 ASSESSMENT — PAIN SCALES - GENERAL
PAINLEVEL_OUTOF10: 7
PAINLEVEL_OUTOF10: 7
PAINLEVEL_OUTOF10: 5
PAINLEVEL_OUTOF10: 8
PAINLEVEL_OUTOF10: 7
PAINLEVEL_OUTOF10: 8
PAINLEVEL_OUTOF10: 7
PAINLEVEL_OUTOF10: 5

## 2019-06-23 ASSESSMENT — PAIN DESCRIPTION - DESCRIPTORS: DESCRIPTORS: ACHING

## 2019-06-23 ASSESSMENT — PAIN DESCRIPTION - ORIENTATION: ORIENTATION: LOWER

## 2019-06-23 ASSESSMENT — PAIN DESCRIPTION - LOCATION: LOCATION: ABDOMEN

## 2019-06-23 NOTE — PROGRESS NOTES
Obstetric/Gynecology Resident Interval Note    Patient seen and examined with Dr. Lubna Romo. Patient is sitting in bed resting comfortably. She reports that she is feeling tired today but that she is lighteaded and dizzy like she is going to pass out. She was ambulating yesterday but hasn't today secondary to her symptoms. She is occasionally hypotensive. Vitals:    06/23/19 1217 06/23/19 1402 06/23/19 1422 06/23/19 1530   BP: (!) 87/50 (!) 110/55 (!) 118/58 (!) 115/58   Pulse: 65 95 90 89   Resp: 16 16 14 16   Temp: 99.1 °F (37.3 °C) 98.7 °F (37.1 °C) 98.9 °F (37.2 °C) 98.9 °F (37.2 °C)   TempSrc: Oral Oral Oral Oral   SpO2: 96% 97% 100% 100%   Weight:       Height:           Discussed that her Hgb had dropped down to 7.2 from 11.1 on admission, and with her being symptomatic, we recommend a blood transfusion. Patient is amenable to blood transfusion. Abdomen soft and non-tender on physical exam. Incision is clean, dry and intact. Will plan to transfuse two units and if patient is feeling better this evening, she is to ambulate with assistance. Plan for CBC in the morning. If improved, anticipate patient can be discharged home tomorrow.      Cassandra Estrada DO   OB/GYN Resident, PGY3  [facility] [place]  6/23/2019, 1:18 PM

## 2019-06-23 NOTE — FLOWSHEET NOTE
Assessment: Patient is a 34 y.o. female who was admitted to the hospital due to \"abdominal pain. \" Patient was resting in hospital bed, at time of 's visit. Patient woke upon  knocking on door and entering room. Patient was coping well and indicated no needs at time of 's visit. Per chart, patient's emergency contact is her parent, Josefa Pérez ([phone]). Intervention:  visited patient per initial rounding visits.  introduced herself to patient and provided support to her at bedside.  encouraged patient to reach out for further support throughout her hospitalization. Outcomes: Patient was receptive and thanked  for visit. Recommendation: Chaplains can make follow-up visit, per request. Debbie Hills can be reached 24/7 via [employer]. Anh Bustillo     06/22/19 2014   Encounter Summary   Services provided to: Patient   Referral/Consult From: Rounding   Support System Parent   Continue Visiting   (6/22/2019)   Complexity of Encounter Low   Length of Encounter 15 minutes   Routine   Type Initial   Spiritual/Amish   Type Spiritual support   Assessment Calm; Approachable; Hopeful;Coping   Intervention Active listening;Explored feelings, thoughts, concerns;Nurtured hope;Assisted with financial resources   Outcome Comfort;Expressed gratitude;Receptive

## 2019-06-23 NOTE — PROGRESS NOTES
Progress Note    Date: 2019  Time: 2:44 AM    Artur Anthony 34 y.o. female , POD # 1    Patient seen and examined. Pain is somewhat controlled. Patient is  tolerating oral intake. She is urinating well. She denies any vaginal bleeding. She is  ambulating without difficulty. She is not passing flatus. She denies Fever/Chills, Chest Pain, SOB, N/V, Calf Pain    Vitals:  Vitals:    19 1200 19 1708 19 2020 19 2354   BP: 121/63 123/62 111/62 (!) 106/58   Pulse: 92 85 88 80   Resp: 15 17 16 18   Temp: 98.6 °F (37 °C) 98.7 °F (37.1 °C) 99.2 °F (37.3 °C) 99.3 °F (37.4 °C)   TempSrc:   Oral Oral   SpO2: 99% 100% 100% 100%   Weight:       Height:             Intake/Output:   Last Shift: I/O last 3 completed shifts:   In: 3207 [I.V.:3207]  Out:  [Urine:1050; Blood:1000]  Current Shift: I/O this shift:  In: -   Out: 200 [Urine:200]        Physical Exam:  General:  no apparent distress, alert and cooperative  Neurologic:  alert, oriented, normal speech, no focal findings or movement disorder noted  Lungs:  No increased work of breathing, good air exchange, clear to auscultation bilaterally, no crackles or wheezing  Heart:  regular rate and rhythm and no murmur    Abdomen: soft, non-distended, appropriate tenderness, no CVA tenderness; hypoactive bowel sounds  Incision: clean, dry and intact; bandage removed  Extremities:  no calf tenderness, non edematous        Lab:  Complete Blood Count:   Recent Labs     19  0034 19  1223   WBC 16.1* 14.6*   HGB 11.1* 9.2*   HCT 36.9 30.8*    235        PT/INR:    No results found for: PROTIME, INR  PTT:    No results found for: APTT, PTT    Metabolic Profile:   Recent Labs     19  1223      K 4.4      CO2 19*   BUN 8   CREATININE 0.63   GLUCOSE 143*   CALCIUM 7.7*   PROT 5.5*   LABALBU 3.1*   BILITOT 0.30   ALKPHOS 40   AST 11   ALT 9        Assessment/Plan:  Artur Anthony 34 y.o. female , POD #1 s/p Exploratory laparoscopy converted to open laparotomy with wedge resection of cornual ectopic, right salpingectomy and evacuation of hemoperitoneum              - Doing well, vitals stable               - Encourage ambulation and use of incentive spirometry              - UOP adequate              - IV fluids: discontinued              - Pain control: Tylenol/Motrin/Percocet PRN              - DVT prophylaxis: SCDs              - Diet: General diet              - Labs: CBC in AM              - Path: pending     Anemia              - Hgb dropped from 11.1 to 9.2              - Pt currently asymptomatic        Hugo Zeng DO  Ob/Gyn Resident   6/23/2019, 2:44 AM            Attending Physician Statement  I have discussed the care of Isidro Tom, including pertinent history and exam findings,  with the resident. I have seen and examined the patient and the key elements of all parts of the encounter have been performed by me. I agree with the assessment, plan and orders as documented by the resident. ([address] [facility])    Patient seen and examined. Agree with resident note. Pt doing well, pain controlled. She is ambulating, but she is reporting lightheadedness and dizziness when she is walking to the bathroom. Patient's Hgb this morning was 7.2 and her most recent blood pressure was 87/50. Pt did have ~1000mL of blood in her abdomen from the ectopic pregnancy rupturing. Because of the blood loss, her current Hgb and her being symptomatic, I offered the pt a blood transfusion, and she is willing to accept this. Will give her 2 units pRBC and see if her lightheadedness/dizziness improves. Continue current management, will keep pt overnight again to see how she responds to the transfusion, possible discharge home tomorrow.     Narayan Delarosa DO

## 2019-06-24 VITALS
SYSTOLIC BLOOD PRESSURE: 106 MMHG | WEIGHT: 195 LBS | BODY MASS INDEX: 32.49 KG/M2 | HEIGHT: 65 IN | OXYGEN SATURATION: 99 % | TEMPERATURE: 98.7 F | HEART RATE: 72 BPM | DIASTOLIC BLOOD PRESSURE: 60 MMHG | RESPIRATION RATE: 14 BRPM

## 2019-06-24 LAB
ABO/RH: NORMAL
ABSOLUTE EOS #: 0.03 K/UL (ref 0–0.44)
ABSOLUTE IMMATURE GRANULOCYTE: 0.1 K/UL (ref 0–0.3)
ABSOLUTE LYMPH #: 2.82 K/UL (ref 1.1–3.7)
ABSOLUTE MONO #: 0.91 K/UL (ref 0.1–1.2)
ANTIBODY SCREEN: NEGATIVE
ARM BAND NUMBER: NORMAL
BASOPHILS # BLD: 1 % (ref 0–2)
BASOPHILS ABSOLUTE: 0.05 K/UL (ref 0–0.2)
BLD PROD TYP BPU: NORMAL
BLD PROD TYP BPU: NORMAL
C TRACH DNA GENITAL QL NAA+PROBE: NEGATIVE
CROSSMATCH RESULT: NORMAL
CROSSMATCH RESULT: NORMAL
DIFFERENTIAL TYPE: ABNORMAL
DISPENSE STATUS BLOOD BANK: NORMAL
DISPENSE STATUS BLOOD BANK: NORMAL
EOSINOPHILS RELATIVE PERCENT: 0 % (ref 1–4)
EXPIRATION DATE: NORMAL
HCT VFR BLD CALC: 28.4 % (ref 36.3–47.1)
HEMOGLOBIN: 8.8 G/DL (ref 11.9–15.1)
IMMATURE GRANULOCYTES: 1 %
LYMPHOCYTES # BLD: 27 % (ref 24–43)
MCH RBC QN AUTO: 28.1 PG (ref 25.2–33.5)
MCHC RBC AUTO-ENTMCNC: 31 G/DL (ref 28.4–34.8)
MCV RBC AUTO: 90.7 FL (ref 82.6–102.9)
MONOCYTES # BLD: 9 % (ref 3–12)
N. GONORRHOEAE DNA: NEGATIVE
NRBC AUTOMATED: 0 PER 100 WBC
PDW BLD-RTO: 14.4 % (ref 11.8–14.4)
PLATELET # BLD: 193 K/UL (ref 138–453)
PLATELET ESTIMATE: ABNORMAL
PMV BLD AUTO: 10.2 FL (ref 8.1–13.5)
RBC # BLD: 3.13 M/UL (ref 3.95–5.11)
RBC # BLD: ABNORMAL 10*6/UL
SEG NEUTROPHILS: 63 % (ref 36–65)
SEGMENTED NEUTROPHILS ABSOLUTE COUNT: 6.65 K/UL (ref 1.5–8.1)
SPECIMEN DESCRIPTION: NORMAL
TRANSFUSION STATUS: NORMAL
TRANSFUSION STATUS: NORMAL
UNIT DIVISION: 0
UNIT DIVISION: 0
UNIT NUMBER: NORMAL
UNIT NUMBER: NORMAL
WBC # BLD: 10.6 K/UL (ref 3.5–11.3)
WBC # BLD: ABNORMAL 10*3/UL

## 2019-06-24 PROCEDURE — 6370000000 HC RX 637 (ALT 250 FOR IP): Performed by: STUDENT IN AN ORGANIZED HEALTH CARE EDUCATION/TRAINING PROGRAM

## 2019-06-24 PROCEDURE — 6360000002 HC RX W HCPCS: Performed by: STUDENT IN AN ORGANIZED HEALTH CARE EDUCATION/TRAINING PROGRAM

## 2019-06-24 PROCEDURE — 85025 COMPLETE CBC W/AUTO DIFF WBC: CPT

## 2019-06-24 PROCEDURE — 36415 COLL VENOUS BLD VENIPUNCTURE: CPT

## 2019-06-24 RX ORDER — MEDROXYPROGESTERONE ACETATE 150 MG/ML
150 INJECTION, SUSPENSION INTRAMUSCULAR ONCE
Status: COMPLETED | OUTPATIENT
Start: 2019-06-24 | End: 2019-06-24

## 2019-06-24 RX ORDER — MEDROXYPROGESTERONE ACETATE 150 MG/ML
150 INJECTION, SUSPENSION INTRAMUSCULAR
Status: DISCONTINUED | OUTPATIENT
Start: 2019-06-24 | End: 2019-06-24

## 2019-06-24 RX ORDER — LANOLIN ALCOHOL/MO/W.PET/CERES
325 CREAM (GRAM) TOPICAL 2 TIMES DAILY
Qty: 90 TABLET | Refills: 3 | Status: SHIPPED | OUTPATIENT
Start: 2019-06-24 | End: 2021-07-23

## 2019-06-24 RX ADMIN — OXYCODONE HYDROCHLORIDE AND ACETAMINOPHEN 1 TABLET: 5; 325 TABLET ORAL at 14:16

## 2019-06-24 RX ADMIN — IBUPROFEN 800 MG: 800 TABLET, FILM COATED ORAL at 09:57

## 2019-06-24 RX ADMIN — MEDROXYPROGESTERONE ACETATE 150 MG: 150 INJECTION, SUSPENSION INTRAMUSCULAR at 17:33

## 2019-06-24 RX ADMIN — DOCUSATE SODIUM 100 MG: 100 CAPSULE, LIQUID FILLED ORAL at 09:57

## 2019-06-24 RX ADMIN — IBUPROFEN 800 MG: 800 TABLET, FILM COATED ORAL at 17:33

## 2019-06-24 RX ADMIN — METRONIDAZOLE 500 MG: 500 TABLET, FILM COATED ORAL at 09:57

## 2019-06-24 RX ADMIN — OXYCODONE HYDROCHLORIDE AND ACETAMINOPHEN 1 TABLET: 5; 325 TABLET ORAL at 00:45

## 2019-06-24 RX ADMIN — IBUPROFEN 800 MG: 800 TABLET, FILM COATED ORAL at 02:28

## 2019-06-24 ASSESSMENT — PAIN SCALES - GENERAL
PAINLEVEL_OUTOF10: 6
PAINLEVEL_OUTOF10: 7
PAINLEVEL_OUTOF10: 5
PAINLEVEL_OUTOF10: 8
PAINLEVEL_OUTOF10: 7

## 2019-06-24 NOTE — PROGRESS NOTES
Gyn Attending:    Patient seen and examined. Agree with resident notes. Pt doing well. Pain is controlled, she is ambulating and tolerating PO.  +flatus, +void, no BM. Denies F/C/HA/CP/SOB/N/V/Calf pain. Patient reports the dizziness is greatly improved from yesterday. After her 2 units of pRBC her hgb went from 7.2 to 8.8. I believe the patient will be ok for discharge this evening. I strongly encouraged her, just as Dr. Felipe Adams did, to refrain from becoming pregnant for at least 10 months. Pt plans on using depo provera. Encouraged her to follow up at the [facility] next week for a post op appt. I reviewed discharge instructions, follow up and home care. I reviewed the signs and symptoms of infection and bleeding and encouraged her to return to the hospital immediately if these occur. Pt voiced her understanding.     Alyx Zavala DO

## 2019-06-24 NOTE — PROGRESS NOTES
Gynecology Progress Note      Favian Patton is a 34 y.o. female , POD # 2    Patient seen and examined. She is resting comfortably in bed. Pain is controlled. Patient is  tolerating oral intake. She is urinating without difficulty. She denies any vaginal bleeding. She is  ambulating with minimal dizziness since her blood transfusions. She is  passing flatus. She denies Fever/Chills, Chest Pain, SOB, N/V, Calf Pain.     Vitals:  Vitals:    19 2019 19 2345 19 0114 19 0431   BP: 108/66 (!) 91/56 (!) 99/50 113/67   Pulse: 93 102  83   Resp:    Temp: 99 °F (37.2 °C) 100.2 °F (37.9 °C) 99.4 °F (37.4 °C) 98.4 °F (36.9 °C)   TempSrc: Oral Oral     SpO2: 100% 97%  97%   Weight:       Height:           Physical Exam:  General:  no apparent distress, alert and cooperative  Neurologic:  alert, oriented, normal speech, no focal findings or movement disorder noted  Lungs:  No increased work of breathing, good air exchange, clear to auscultation bilaterally, no crackles or wheezing  Heart:  regular rate and rhythm and no murmur    Abdomen: soft, non-distended, appropriate tenderness, no CVA tenderness, normal bowel sounds  Incision: clean, dry and intact  Extremities:  no calf tenderness, non edematous    Lab:  Recent Results (from the past 24 hour(s))   CBC    Collection Time: 19  5:47 AM   Result Value Ref Range    WBC 11.9 (H) 3.5 - 11.3 k/uL    RBC 2.63 (L) 3.95 - 5.11 m/uL    Hemoglobin 7.2 (L) 11.9 - 15.1 g/dL    Hematocrit 23.5 (L) 36.3 - 47.1 %    MCV 89.4 82.6 - 102.9 fL    MCH 27.4 25.2 - 33.5 pg    MCHC 30.6 28.4 - 34.8 g/dL    RDW 14.1 11.8 - 14.4 %    Platelets 003 372 - 757 k/uL    MPV 10.4 8.1 - 13.5 fL    NRBC Automated 0.0 0.0 per 100 WBC         Assessment/Plan:  Favian Patton 34 y.o. female [id number], POD #2 s/p Exploratory laparoscopy converted to open laparotomy with wedge resection of cornual ectopic, right salpingectomy and evacuation of hemoperitoneum   - Doing well, vitals stable   - Encourage ambulation and use of incentive spirometer   - Pain control: Motrin/Percocet   - Labs: CBC pending this AM   - DVT Proph: SCDs   - Abx: N/A   - Diet: General   - IVF: NS @ 125 ml/hr   - Path: pending    Anemia of Acute Blood Loss   - Hgb on admisson 11.1   - Hgb on POD#1 dropped to 7.2 with symptoms   - s/p 2U PRBCs   - Repeat CBC pending this AM    Bacterial Vaginosis   - Flagyl 500 mg BID x 7 days    Active Problems:    Irregular menses    Pregnancy of unknown anatomic location    Abdominal pain affecting pregnancy    Lower abdominal pain    Elevated serum hCG    Pregnancy, ectopic, cornual    Laparoscopic converted to open wedge resection of right cornual ectopic, right salpingectomy and evacuation of hemoperitoneum 19    S/P ectopic pregnancy  Resolved Problems:    * No resolved hospital problems. *      Please page the resident named below with questions and concerns. Gio Brito DO  OBGYRIGO Resident  2019  5:20 AM       Date: 2019  Time: 10:51 AM      Patient Name: Sheridan Landeros  Patient : 1985  Room/Bed: 0949/5315-65  Admission Date/Time: 2019  8:23 PM        Attending Physician Statement  I have personally seen, evaluated and discussed the care of Sheridan Landeros, including pertinent history and exam findings with the resident. I have reviewed and edited their note in the electronic medical record. The key elements of all parts of the encounter have been performed/reviewed by me. I agree with the assessment, plan and orders as documented by the resident. The level of care submitted represents to the best of my ability the care documented in the medical record today. GC Modifier. This service has been performed in part by a resident under the direction of a teaching physician. Attending's Name:  Kailyn Silva MD        Patient reports some continued dizziness with position changes.  She is s/p 2 units PRBCs with improved hemoglobin to 8.8. We discussed the cornual ectopic and the wedge resection and that she should delay 9-10 months for an 18 month window before having a baby and laboring against the uterine scar. She verbalized her understanding of this recommendation. She is otherwise doing well. Will obtain orthostatic evaluation of blood pressure and pulse and have her ambulate before discharge this PM if stable. Plan for depo prior to discharge.

## 2019-06-24 NOTE — PLAN OF CARE
Problem: Pain:  Goal: Pain level will decrease  Description  Pain level will decrease  6/24/2019 0341 by Penny Mak RN  Outcome: Ongoing  6/23/2019 1659 by Brian Alejandro RN  Outcome: Ongoing  Goal: Control of acute pain  Description  Control of acute pain  6/24/2019 0341 by Penny Mak RN  Outcome: Ongoing  6/23/2019 1659 by Brian Alejandro RN  Outcome: Ongoing  Pt pain was able to be controlled this shift with oral pain medications. Pt educated on nonpharmacologic pain interventions as well.    Goal: Control of chronic pain  Description  Control of chronic pain  6/24/2019 0341 by Penny Mak RN  Outcome: Ongoing  6/23/2019 1659 by Brian Alejandro RN  Outcome: Ongoing

## 2019-06-24 NOTE — PROGRESS NOTES
Writer informed physician that patient has a BP of 99/50 pt is asymptomatic. Will continue to monitor for any changes.   Devika Rico  1:26 AM

## 2019-06-25 LAB — SURGICAL PATHOLOGY REPORT: NORMAL

## 2019-07-01 ASSESSMENT — ENCOUNTER SYMPTOMS
VOMITING: 0
ABDOMINAL PAIN: 1
NAUSEA: 0
BACK PAIN: 0

## 2019-07-02 ENCOUNTER — OFFICE VISIT (OUTPATIENT)
Dept: OBGYN | Age: 34
End: 2019-07-02
Payer: COMMERCIAL

## 2019-07-02 VITALS
BODY MASS INDEX: 32.15 KG/M2 | DIASTOLIC BLOOD PRESSURE: 74 MMHG | SYSTOLIC BLOOD PRESSURE: 116 MMHG | HEIGHT: 65 IN | WEIGHT: 193 LBS | HEART RATE: 84 BPM

## 2019-07-02 DIAGNOSIS — Z09 POSTOP CHECK: Primary | ICD-10-CM

## 2019-07-02 DIAGNOSIS — O00.80 PREGNANCY, ECTOPIC, CORNUAL OR CERVICAL: ICD-10-CM

## 2019-07-02 DIAGNOSIS — N93.9 ABNORMAL UTERINE BLEEDING (AUB): ICD-10-CM

## 2019-07-02 PROCEDURE — 99024 POSTOP FOLLOW-UP VISIT: CPT | Performed by: OBSTETRICS & GYNECOLOGY

## 2019-07-02 RX ORDER — MEDROXYPROGESTERONE ACETATE 150 MG/ML
150 INJECTION, SUSPENSION INTRAMUSCULAR
Qty: 1 ML | Refills: 4 | Status: SHIPPED | OUTPATIENT
Start: 2019-09-09 | End: 2020-03-18 | Stop reason: ALTCHOICE

## 2019-07-02 NOTE — PROGRESS NOTES
specimens from patients with symptoms of vaginitis/vaginosis. Urine Culture   Result Value Ref Range    Specimen Description . CLEAN CATCH URINE     Special Requests NOT REPORTED     Culture NO SIGNIFICANT GROWTH    Urinalysis Reflex to Culture   Result Value Ref Range    Color, UA YELLOW YELLOW    Turbidity UA TURBID (A) CLEAR    Glucose, Ur NEGATIVE NEGATIVE    Bilirubin Urine NEGATIVE NEGATIVE    Ketones, Urine NEGATIVE NEGATIVE    Specific Gravity, UA 1.028 1.005 - 1.030    Urine Hgb NEGATIVE NEGATIVE    pH, UA 6.0 5.0 - 8.0    Protein, UA 1+ (A) NEGATIVE    Urobilinogen, Urine Normal Normal    Nitrite, Urine NEGATIVE NEGATIVE    Leukocyte Esterase, Urine TRACE (A) NEGATIVE    Urinalysis Comments Culture ordered based on defined criteria. HCG, Quantitative, Pregnancy   Result Value Ref Range    hCG Quant 25,920 (H) <5 IU/L   Microscopic Urinalysis   Result Value Ref Range    -          WBC, UA 2 TO 5 0 - 5 /HPF    RBC, UA 0 TO 2 0 - 2 /HPF    Casts UA NOT REPORTED 0 - 2 /LPF    Crystals UA NOT REPORTED None /HPF    Epithelial Cells UA 10 TO 20 0 - 5 /HPF    Renal Epithelial, Urine NOT REPORTED 0 /HPF    Bacteria, UA FEW (A) None    Mucus, UA NOT REPORTED None    Trichomonas, UA NOT REPORTED None    Amorphous, UA NOT REPORTED None    Other Observations UA NOT REPORTED NOT REQ.     Yeast, UA NOT REPORTED None   CBC Auto Differential   Result Value Ref Range    WBC 16.1 (H) 3.5 - 11.3 k/uL    RBC 4.13 3.95 - 5.11 m/uL    Hemoglobin 11.1 (L) 11.9 - 15.1 g/dL    Hematocrit 36.9 36.3 - 47.1 %    MCV 89.3 82.6 - 102.9 fL    MCH 26.9 25.2 - 33.5 pg    MCHC 30.1 28.4 - 34.8 g/dL    RDW 13.9 11.8 - 14.4 %    Platelets 959 326 - 032 k/uL    MPV 9.9 8.1 - 13.5 fL    NRBC Automated 0.0 0.0 per 100 WBC    Differential Type NOT REPORTED     Seg Neutrophils 90 (H) 36 - 65 %    Lymphocytes 6 (L) 24 - 43 %    Monocytes 2 (L) 3 - 12 %    Eosinophils % 1 1 - 4 %    Basophils 0 0 - 2 %    Immature Granulocytes 1 (H) 0 %    Segs Laparoscopic converted to open wedge resection of right cornual ectopic, right salpingectomy and evacuation of hemoperitoneum 6/22/19 06/22/2019     Patient counseled extensively on the need to not get pregnant for at least 18 months.  Lower abdominal pain     Elevated serum hCG     Pregnancy, ectopic, cornual     Late period 01/08/2016    Chronic abdominal pain 05/07/2015    IBS (irritable bowel syndrome) 05/07/2015    Amenorrhea, secondary 10/24/2014    Infertility, female, secondary 10/24/2014    Need for MMR vaccine 04/25/2014    Need for Tdap vaccination 04/25/2014    Physical exam, pre-employment 04/25/2014    Overweight (BMI 25.0-29.9) 02/14/2013    Contraception 02/14/2013          POD# 10   Procedure: Exploratory laparoscopy converted to open laparotomy with wedge resection of cornual ectopic, right salpingectomy and evacuation of hemoperitoneum       Stable   Pathology reviewed and found to be benign. Yes    Plan:   Return Between Sept 9-16, 2019, for next depo injection and annual exam.  Pt received depo provera at hospital on 6/24/19 - pt desires to continue using depo. She was educated again on not conceiving for at least a year in order to give her uterus time to heal from the surgery for the cornual ectopic pregnancy.       Rx sent to our pharmacy for Depo    Patient's last pap was 10/2017 and was negative cytology - next pap due [id number] [address]

## 2019-08-05 ENCOUNTER — TELEPHONE (OUTPATIENT)
Dept: OBGYN | Age: 34
End: 2019-08-05

## 2019-08-06 NOTE — TELEPHONE ENCOUNTER
Writer informed pt that her letter for work has been completed and is ready for her to . Pt states she will be on her way to pick letter up.  Letter placed in patient  binder

## 2019-09-11 ENCOUNTER — HOSPITAL ENCOUNTER (OUTPATIENT)
Age: 34
Setting detail: SPECIMEN
Discharge: HOME | End: 2019-09-11
Payer: COMMERCIAL

## 2019-09-11 ENCOUNTER — OFFICE VISIT (OUTPATIENT)
Dept: OBGYN | Age: 34
End: 2019-09-11
Payer: COMMERCIAL

## 2019-09-11 VITALS
DIASTOLIC BLOOD PRESSURE: 80 MMHG | HEIGHT: 65 IN | HEART RATE: 78 BPM | WEIGHT: 193.4 LBS | BODY MASS INDEX: 32.22 KG/M2 | SYSTOLIC BLOOD PRESSURE: 123 MMHG

## 2019-09-11 DIAGNOSIS — Z01.419 WELL WOMAN EXAM WITH ROUTINE GYNECOLOGICAL EXAM: Primary | ICD-10-CM

## 2019-09-11 DIAGNOSIS — Z30.09 FAMILY PLANNING: ICD-10-CM

## 2019-09-11 PROCEDURE — 99211 OFF/OP EST MAY X REQ PHY/QHP: CPT | Performed by: OBSTETRICS & GYNECOLOGY

## 2019-09-11 PROCEDURE — 96372 THER/PROPH/DIAG INJ SC/IM: CPT | Performed by: OBSTETRICS & GYNECOLOGY

## 2019-09-11 PROCEDURE — 99395 PREV VISIT EST AGE 18-39: CPT | Performed by: OBSTETRICS & GYNECOLOGY

## 2019-09-11 RX ORDER — MEDROXYPROGESTERONE ACETATE 150 MG/ML
150 INJECTION, SUSPENSION INTRAMUSCULAR ONCE
Status: COMPLETED | OUTPATIENT
Start: 2019-09-11 | End: 2019-09-11

## 2019-09-11 RX ADMIN — MEDROXYPROGESTERONE ACETATE 150 MG: 150 INJECTION, SUSPENSION INTRAMUSCULAR at 12:02

## 2019-09-11 NOTE — PROGRESS NOTES
Varicella Vaccine (1 of 2 - 13+ 2-dose series) 07/19/2000    Flu vaccine (1) 09/01/2019       Date of Last Pap Smear: 2017 - negative cytology (HPV not ordered)  Abnormal Pap Smear History: denies  Colposcopy History: denies  Date of Last Mammogram: n/a  Date of Last Colonoscopy: n/a  Date of Last Bone Density: n/a      ________________________________________________________________________        REVIEW OF SYSTEMS:       A minimum of an eleven point review of systems was completed. Review Of Systems (11 point):  Constitutional: No fever, chills or malaise; No weight change or fatigue  Head and Eyes: No vision, Headache, Dizziness or trauma in last 12 months  ENT ROS: No hearing, Tinnitis, sinus or taste problems  Hematological and Lymphatic ROS:No Lymphoma, Von Willebrand's, Hemophillia or Bleeding History  Psych ROS: No Depression, Homicidal thoughts,suicidal thoughts, or anxiety  Breast ROS: No prior breast abnormalities or lumps  Respiratory ROS: No SOB, Pneumoniae,Cough, or Pulmonary Embolism History  Cardiovascular ROS: No Chest Pain with Exertion, Palpitations, Syncope, Edema, Arrhythmia  Gastrointestinal ROS: No Indigestion, Heartburn, Nausea, vomiting, Diarrhea, Constipation,or Bowel Changes; No Bloody Stools or melena  Genito-Urinary ROS: No Dysuria, Hematuria or Nocturia.  No Urinary Incontinence or Vaginal Discharge  Musculoskeletal ROS: No Arthralgia, Arthritis,Gout,Osteoporosis or Rheumatism  Neurological ROS: No CVA, Migraines, Epilepsy, Seizure Hx, or Limb Weakness  Dermatological ROS: No Rash, Itching, Hives, Mole Changes or Cancer                                                                                                                                                                                                                                  PHYSICAL Exam:     Constitutional:  Vitals:    09/11/19 1115   BP: 123/80   Site: Left Upper Arm   Position: Sitting   Cuff Size: Medium Adult Inspection negative, No nipple retraction or dimpling, No nipple discharge or bleeding, No axillary or supraclavicular adenopathy, Normal to palpation without dominant masses, Taught monthly breast self examination  Self breast exams were reviewed in detail. Literature was given. Pelvic Exam:  External genitalia: normal general appearance  Urinary system: urethral meatus normal  Vaginal: normal mucosa without prolapse or lesions, normal rugae and vaginitis probe obtained  Cervix: normal appearance, GC prep obtained and nabothian cyst noted at the 10 o'clock position  Adnexa: normal bimanual exam  Uterus: normal single, nontender, anteverted and mid-position  Negative bladder sweep, no lymphadenopathy, negative CMT, no pain with deeper palpation appreciated today. Musculosk:  Normal Gait and station was noted. Digits were evaluated without abnormal findings. Range of motion, stability and strength were evaluated and found to be appropriate for the patients age. OMM Structural Component:  The patient did not complain of a Chief complaint requiring OMM. Chief Complaint:none    Structural Exam: No Interest                ASSESSMENT:      30 y.o. Annual   Diagnosis Orders   1. Well woman exam with routine gynecological exam  Chlamydia Trachomatis & Neisseria gonorrhoeae (GC) by amplified detection    Vaginitis DNA Probe   2.  Family planning  medroxyPROGESTERone (DEPO-PROVERA) injection 150 mg          Chief Complaint   Patient presents with    Gynecologic Exam          Past Medical History:   Diagnosis Date    Chlamydia     IBS (irritable bowel syndrome) 5/7/2015    Infertility, female, secondary     Trichomonas vaginalis infection 2013         Patient Active Problem List    Diagnosis Date Noted    S/P ectopic pregnancy 06/23/2019    Irregular menses 06/22/2019    Pregnancy of unknown anatomic location 06/22/2019    Abdominal pain affecting pregnancy 06/22/2019    Laparoscopic converted to were reviewed. PLAN:  Depo provera - pt to get next injection today  Vaginal cultures collected - will treat if appropriate  Next pap due 2020  If dyspareunia continues, and cultures negative - will consider pelvic ultrasound  Return in about 1 year (around 9/11/2020) for Annual Exam.  Repeat Annual every 1 year  Cervical Cytology Evaluation begins at 24years old. If Negative Cytology, Follow-up screening per current guidelines. Mammograms every 1 year. If 37 yo and last mammogram was negative. Calcium and Vitamin D dosing reviewed. Colonoscopy screening reviewed as well as onset for bone density testing. Birth control and barrier recommendations discussed. STD counseling and prevention reviewed. Gardisil counseling completed for all patients 10-37 yo. Routine health maintenance per patients PCP.     Janis Fruits DO

## 2019-09-12 LAB
C TRACH DNA GENITAL QL NAA+PROBE: NEGATIVE
DIRECT EXAM: NORMAL
Lab: NORMAL
N. GONORRHOEAE DNA: NEGATIVE
SPECIMEN DESCRIPTION: NORMAL
SPECIMEN DESCRIPTION: NORMAL

## 2020-03-14 ENCOUNTER — HOSPITAL ENCOUNTER (EMERGENCY)
Age: 35
Discharge: HOME | End: 2020-03-14
Attending: EMERGENCY MEDICINE
Payer: COMMERCIAL

## 2020-03-14 ENCOUNTER — APPOINTMENT (OUTPATIENT)
Dept: ULTRASOUND IMAGING | Age: 35
End: 2020-03-14
Payer: COMMERCIAL

## 2020-03-14 VITALS
HEART RATE: 83 BPM | TEMPERATURE: 98.6 F | DIASTOLIC BLOOD PRESSURE: 71 MMHG | SYSTOLIC BLOOD PRESSURE: 114 MMHG | HEIGHT: 65 IN | BODY MASS INDEX: 29.99 KG/M2 | RESPIRATION RATE: 16 BRPM | WEIGHT: 180 LBS | OXYGEN SATURATION: 100 %

## 2020-03-14 LAB
ABSOLUTE EOS #: 0.18 K/UL (ref 0–0.44)
ABSOLUTE IMMATURE GRANULOCYTE: 0.05 K/UL (ref 0–0.3)
ABSOLUTE LYMPH #: 3.41 K/UL (ref 1.1–3.7)
ABSOLUTE MONO #: 0.82 K/UL (ref 0.1–1.2)
ALBUMIN SERPL-MCNC: 3.9 G/DL (ref 3.5–5.2)
ALBUMIN/GLOBULIN RATIO: ABNORMAL (ref 1–2.5)
ALP BLD-CCNC: 72 U/L (ref 35–104)
ALT SERPL-CCNC: 16 U/L (ref 5–33)
ANION GAP SERPL CALCULATED.3IONS-SCNC: 12 MMOL/L (ref 9–17)
AST SERPL-CCNC: 17 U/L
BASOPHILS # BLD: 1 % (ref 0–2)
BASOPHILS ABSOLUTE: 0.06 K/UL (ref 0–0.2)
BILIRUB SERPL-MCNC: 0.15 MG/DL (ref 0.3–1.2)
BILIRUBIN DIRECT: <0.08 MG/DL
BILIRUBIN, INDIRECT: ABNORMAL MG/DL (ref 0–1)
BUN BLDV-MCNC: 9 MG/DL (ref 6–20)
BUN/CREAT BLD: 12 (ref 9–20)
CALCIUM SERPL-MCNC: 8.8 MG/DL (ref 8.6–10.4)
CHLORIDE BLD-SCNC: 104 MMOL/L (ref 98–107)
CHP ED QC CHECK: YES
CO2: 20 MMOL/L (ref 20–31)
CREAT SERPL-MCNC: 0.74 MG/DL (ref 0.5–0.9)
DIFFERENTIAL TYPE: NORMAL
EOSINOPHILS RELATIVE PERCENT: 2 % (ref 1–4)
GFR AFRICAN AMERICAN: >60 ML/MIN
GFR NON-AFRICAN AMERICAN: >60 ML/MIN
GFR SERPL CREATININE-BSD FRML MDRD: NORMAL ML/MIN/{1.73_M2}
GFR SERPL CREATININE-BSD FRML MDRD: NORMAL ML/MIN/{1.73_M2}
GLOBULIN: ABNORMAL G/DL (ref 1.5–3.8)
GLUCOSE BLD-MCNC: 84 MG/DL (ref 70–99)
HCG QUANTITATIVE: 328 IU/L
HCT VFR BLD CALC: 40.9 % (ref 36.3–47.1)
HEMOGLOBIN: 12.8 G/DL (ref 11.9–15.1)
IMMATURE GRANULOCYTES: 0 %
LIPASE: 21 U/L (ref 13–60)
LYMPHOCYTES # BLD: 30 % (ref 24–43)
MCH RBC QN AUTO: 27.2 PG (ref 25.2–33.5)
MCHC RBC AUTO-ENTMCNC: 31.3 G/DL (ref 28.4–34.8)
MCV RBC AUTO: 87 FL (ref 82.6–102.9)
MONOCYTES # BLD: 7 % (ref 3–12)
NRBC AUTOMATED: 0 PER 100 WBC
PDW BLD-RTO: 13.2 % (ref 11.8–14.4)
PLATELET # BLD: 262 K/UL (ref 138–453)
PLATELET ESTIMATE: NORMAL
PMV BLD AUTO: 9.7 FL (ref 8.1–13.5)
POTASSIUM SERPL-SCNC: 4.4 MMOL/L (ref 3.7–5.3)
PREGNANCY TEST URINE, POC: POSITIVE
RBC # BLD: 4.7 M/UL (ref 3.95–5.11)
RBC # BLD: NORMAL 10*6/UL
SEG NEUTROPHILS: 60 % (ref 36–65)
SEGMENTED NEUTROPHILS ABSOLUTE COUNT: 6.72 K/UL (ref 1.5–8.1)
SODIUM BLD-SCNC: 136 MMOL/L (ref 135–144)
TOTAL PROTEIN: 6.8 G/DL (ref 6.4–8.3)
WBC # BLD: 11.2 K/UL (ref 3.5–11.3)
WBC # BLD: NORMAL 10*3/UL

## 2020-03-14 PROCEDURE — 76817 TRANSVAGINAL US OBSTETRIC: CPT

## 2020-03-14 PROCEDURE — 81025 URINE PREGNANCY TEST: CPT

## 2020-03-14 PROCEDURE — 83690 ASSAY OF LIPASE: CPT

## 2020-03-14 PROCEDURE — 99284 EMERGENCY DEPT VISIT MOD MDM: CPT

## 2020-03-14 PROCEDURE — 85025 COMPLETE CBC W/AUTO DIFF WBC: CPT

## 2020-03-14 PROCEDURE — 81003 URINALYSIS AUTO W/O SCOPE: CPT

## 2020-03-14 PROCEDURE — 80076 HEPATIC FUNCTION PANEL: CPT

## 2020-03-14 PROCEDURE — 84702 CHORIONIC GONADOTROPIN TEST: CPT

## 2020-03-14 PROCEDURE — 76801 OB US < 14 WKS SINGLE FETUS: CPT

## 2020-03-14 PROCEDURE — 80048 BASIC METABOLIC PNL TOTAL CA: CPT

## 2020-03-14 ASSESSMENT — ENCOUNTER SYMPTOMS
NAUSEA: 0
DIARRHEA: 0
BACK PAIN: 0
ABDOMINAL PAIN: 1
COUGH: 0
CONSTIPATION: 0
VOMITING: 0
SHORTNESS OF BREATH: 0

## 2020-03-14 ASSESSMENT — PAIN DESCRIPTION - DESCRIPTORS: DESCRIPTORS: CRAMPING

## 2020-03-14 ASSESSMENT — PAIN SCALES - GENERAL: PAINLEVEL_OUTOF10: 5

## 2020-03-14 ASSESSMENT — PAIN DESCRIPTION - LOCATION: LOCATION: ABDOMEN

## 2020-03-14 NOTE — ED NOTES
Pt back to room for c/o abdominal pain x 2 weeks. Pt states that she has not had any emesis or diarrhea and that it hurts the most in the LLQ. Pt states that she has been having BMs regularly and that her last one was yesterday at 1800, was soft and large. Pt states that she has not had her period since the end of January. Pt is a & o x 4 and is able to ambulate with no difficulties. Pt's HR is strong, RR are even and unlabored. Pt's skin is warm, dry, intact and appropriate for ethnicity. Pt's abdomen is soft, rounded and tender to the touch on the left lower quadrant.      Roxana Acuna RN  03/14/20 1944

## 2020-03-14 NOTE — ED PROVIDER NOTES
[address] [place] [address] ED  EMERGENCY DEPARTMENT ENCOUNTER      Pt Name: Clinton Park  MRN: [id number]  [place] 1985  Date of evaluation: 3/14/20  CHIEF COMPLAINT       Chief Complaint   Patient presents with    Abdominal Cramping     2 weeks, more with activity last period 1/31     HISTORY OF PRESENT ILLNESS   Presents to the emergency room via private auto with abdominal pain. She has a history of chronic abdominal pain since having surgery for tubal ligation a year ago. She states that her pain is generally at the previous incision site which she rates at a 5 and describes as numb. Particular episode started 2 weeks ago. In addition to her chronic abdominal pain she is concerned for pregnancy. Her LMP was 1/31, 1/2 months ago. She states that her periods have been irregular in the past.  She has not taken a home pregnancy test.  No vaginal discharge, urinary symptoms, back pain or fevers. REVIEW OF SYSTEMS     Review of Systems   Constitutional: Negative for activity change and fever. Respiratory: Negative for cough and shortness of breath. Cardiovascular: Negative for chest pain and palpitations. Gastrointestinal: Positive for abdominal pain. Negative for constipation, diarrhea, nausea and vomiting. Genitourinary: Positive for pelvic pain. Negative for dysuria, flank pain, frequency and vaginal bleeding. Musculoskeletal: Negative for back pain and myalgias. Neurological: Negative for dizziness and headaches.      PASTMEDICAL HISTORY     Past Medical History:   Diagnosis Date    Chlamydia     IBS (irritable bowel syndrome) 5/7/2015    Infertility, female, secondary     Trichomonas vaginalis infection 2013     SURGICAL HISTORY       Past Surgical History:   Procedure Laterality Date    CHOLECYSTECTOMY      LAPAROSCOPY N/A 6/22/2019    LAPAROSCOPY EXPLORATORY CONVERTED TO OPEN, RIGHT SALPINGECTOMY performed by Otis Valenzuela DO at [facility] regular rhythm. Pulmonary:      Effort: Pulmonary effort is normal.      Breath sounds: Normal breath sounds. Abdominal:      General: Bowel sounds are normal.      Palpations: Abdomen is soft. Tenderness: There is no abdominal tenderness. There is no guarding. Musculoskeletal: Normal range of motion. Right lower leg: No edema. Left lower leg: No edema. Skin:     General: Skin is warm and dry. Neurological:      General: No focal deficit present. Mental Status: She is alert and oriented to person, place, and time. Psychiatric:         Mood and Affect: Mood normal.         Thought Content: Thought content normal.         DIAGNOSTIC RESULTS     RADIOLOGY:All plain film, CT, MRI, and formal ultrasound images (except ED bedside ultrasound) are read by the radiologist, see reports below, unless otherwise noted in MDM or here. Interpretation per the Radiologist below, if available at the time of this note:    US OB LESS THAN 14 [address]   Final Result   Simple left ovarian cyst measuring up to 3.3 cm. Otherwise unremarkable   pelvic ultrasound with no evidence of either an intrauterine or extrauterine   pregnancy. Follow-up serial beta hCGs and if clinically indicated, follow-up   ultrasound would be helpful. US OB TRANSVAGINAL    (Results Pending)       LABS:  Labs Reviewed   HEPATIC FUNCTION PANEL - Abnormal; Notable for the following components:       Result Value    Total Bilirubin 0.15 (*)     All other components within normal limits   HCG, QUANTITATIVE, PREGNANCY - Abnormal; Notable for the following components:    hCG Quant 328 (*)     All other components within normal limits   POCT URINE PREGNANCY - Normal   CBC WITH AUTO DIFFERENTIAL   BASIC METABOLIC PANEL   LIPASE       All other labs were within normal range or not returned as of this dictation.     EMERGENCY DEPARTMENT COURSE and DIFFERENTIAL DIAGNOSIS/MDM:   Vitals:    Vitals:    03/14/20 1652 20 1654   BP:  114/71   Pulse:  83   Resp:  16   Temp: 98.7 °F (37.1 °C) 98.6 °F (37 °C)   TempSrc: Oral    SpO2:  100%   Weight: 180 lb (81.6 kg)    Height: 5' 5\" (1.651 m)        Medical Decision Makin-year-old female who is afebrile does not appear ill. She does have some mild pelvic tenderness. Blood work is unremarkable. Urine is positive for pregnancy. Quantitative hCG is only 328. There is concern for ectopic and ultrasound was performed which shows a small cyst but no evidence of intrauterine or extrauterine pregnancy. Patient was advised of these findings. She was educated on the importance of calling her PCP or OB/GYN for [facility] Monday morning to schedule a follow-up appointment. She was given a prescription for outpatient repeat hCG to be performed on Monday. She was advised no alcohol, smoking or drug use. She will use only Tylenol for pain if needed. ED Course as of Mar 14 2022   Sat Mar 14, 2020   1804 Blood work is unremarkable. Urine pregnancy is positive. Quantitative hCG has been added on. Previous blood work shows blood type of O+.    [EL]      ED Course User Index  [EL] Hortensia A Lump, APRN - CNP         The patient was given the following meds in the ED:  No orders of the defined types were placed in this encounter. FINAL IMPRESSION      1. Positive pregnancy test    2. Pelvic pain          DISPOSITION/PLAN   DISPOSITION Decision To Discharge 2020 08:15:57 PM      PATIENT REFERRED TO:   [facility]      Call Monday morning to schedule a follow-up appointment. You also need to have a repeat quantitative hCG performed on Monday.   You have been given a prescription today for that lab work    [facility] ED  [address]  [phone]    If symptoms worsen      DISCHARGE MEDICATIONS:     New Prescriptions    No medications on file       CRITICAL CARE TIME       Please note that portions of this note were completed with a voice recognition program.      Tressie Shone, APRN - [facility] - [facility]ER[facility]  03/14/20 2022

## 2020-03-16 ENCOUNTER — HOSPITAL ENCOUNTER (OUTPATIENT)
Age: 35
Discharge: HOME | End: 2020-03-16
Payer: COMMERCIAL

## 2020-03-16 LAB
HCG QUALITATIVE: POSITIVE
HCG, PREGNANCY URINE (POC): POSITIVE

## 2020-03-16 PROCEDURE — 36415 COLL VENOUS BLD VENIPUNCTURE: CPT

## 2020-03-16 PROCEDURE — 84703 CHORIONIC GONADOTROPIN ASSAY: CPT

## 2020-03-17 ENCOUNTER — TELEPHONE (OUTPATIENT)
Dept: OBGYN | Age: 35
End: 2020-03-17

## 2020-03-18 ENCOUNTER — HOSPITAL ENCOUNTER (OUTPATIENT)
Age: 35
Setting detail: SPECIMEN
Discharge: HOME | End: 2020-03-18
Payer: COMMERCIAL

## 2020-03-18 ENCOUNTER — OFFICE VISIT (OUTPATIENT)
Dept: OBGYN | Age: 35
End: 2020-03-18
Payer: COMMERCIAL

## 2020-03-18 VITALS
HEIGHT: 65 IN | DIASTOLIC BLOOD PRESSURE: 81 MMHG | HEART RATE: 78 BPM | BODY MASS INDEX: 35.62 KG/M2 | WEIGHT: 213.8 LBS | SYSTOLIC BLOOD PRESSURE: 121 MMHG

## 2020-03-18 LAB — HCG QUANTITATIVE: 163 IU/L

## 2020-03-18 PROCEDURE — 4004F PT TOBACCO SCREEN RCVD TLK: CPT | Performed by: STUDENT IN AN ORGANIZED HEALTH CARE EDUCATION/TRAINING PROGRAM

## 2020-03-18 PROCEDURE — G8417 CALC BMI ABV UP PARAM F/U: HCPCS | Performed by: STUDENT IN AN ORGANIZED HEALTH CARE EDUCATION/TRAINING PROGRAM

## 2020-03-18 PROCEDURE — G8427 DOCREV CUR MEDS BY ELIG CLIN: HCPCS | Performed by: STUDENT IN AN ORGANIZED HEALTH CARE EDUCATION/TRAINING PROGRAM

## 2020-03-18 PROCEDURE — 99211 OFF/OP EST MAY X REQ PHY/QHP: CPT | Performed by: STUDENT IN AN ORGANIZED HEALTH CARE EDUCATION/TRAINING PROGRAM

## 2020-03-18 PROCEDURE — G8484 FLU IMMUNIZE NO ADMIN: HCPCS | Performed by: STUDENT IN AN ORGANIZED HEALTH CARE EDUCATION/TRAINING PROGRAM

## 2020-03-18 PROCEDURE — 99213 OFFICE O/P EST LOW 20 MIN: CPT | Performed by: STUDENT IN AN ORGANIZED HEALTH CARE EDUCATION/TRAINING PROGRAM

## 2020-03-18 ASSESSMENT — PATIENT HEALTH QUESTIONNAIRE - PHQ9
SUM OF ALL RESPONSES TO PHQ9 QUESTIONS 1 & 2: 0
1. LITTLE INTEREST OR PLEASURE IN DOING THINGS: 0
SUM OF ALL RESPONSES TO PHQ QUESTIONS 1-9: 0
SUM OF ALL RESPONSES TO PHQ QUESTIONS 1-9: 0
2. FEELING DOWN, DEPRESSED OR HOPELESS: 0

## 2020-03-18 NOTE — PATIENT INSTRUCTIONS
[id number], January 2018           Long-Acting Reversible Contraception: Intrauterine Device and Implant  What are long-acting reversible contraception methods? How effective are long-acting reversible contraception methods? Do long-acting reversible contraception methods protect against sexually transmitted infections? What is the intrauterine device? How does the intrauterine device work? What are the benefits of the intrauterine device? How is the intrauterine device placed? Will I feel anything when the intrauterine device is placed? How is the intrauterine device removed? What are possible side effects of using the intrauterine device? What are possible risks of using the intrauterine device? What is the birth control implant? How does the birth control implant work? What are the benefits of the birth control implant? How is the birth control implant inserted? How is the birth control implant removed? What are possible side effects of using the birth control implant? What are possible risks of using the birth control implant? Glossary    What are long-acting reversible contraception methods? The intrauterine device (IUD) and the birth control implant are long-acting reversible contraception methods. Both are highly effective in preventing pregnancy. They last for several years and are easy to use. Both methods are reversible--if you want to get pregnant or if you want to stop using them, you can have them removed at any time. How effective are long-acting reversible contraception methods? The IUD and the implant are the most effective forms of reversible birth control available. During the first year of use, fewer than 1 in 100 women using an IUD or implant will get pregnant. Over time, LARC methods are 20 times more effective than birth control pills, the patch, or the ring.   Do long-acting reversible contraception methods protect against sexually transmitted infections? The IUD and the implant do not protect against sexually transmitted infections (STIs), including human immunodeficiency virus (HIV). A male or female condom also should be used to provide STI protection if you are at risk of getting an STI. You are at risk of getting an STI if you  have more than one sexual partner   have a partner who has or has had more than one sexual partner   have sex with someone who has an STI   have a history of STIs   use intravenous drugs (injected into a vein) or have a partner who uses intravenous drugs    What is the intrauterine device? The IUD is a small, T-shaped, plastic device that is inserted into and left inside the uterus. There are two types of IUDs:  1. The hormonal IUD releases the hormone progestin into the uterus. There are different brands of hormonal IUDs that last for different lengths of time. Depending on the brand, they are approved for up to 3-5 years of use. 2. The copper IUD releases copper into the uterus. This IUD does not contain hormones. It is approved for up to 10 years of use. How does the intrauterine device work? The IUD works mainly by preventing fertilization of an egg by sperm. The progestin in the hormonal IUD thickens mucus found in the cervix. Thicker mucus makes it harder for sperm to enter the uterus and reach an egg. Progestin also thins the lining of the uterus. The copper in the copper IUD interferes with sperms ability to move. When sperm stop acting normally, it is harder for them to enter the uterus and reach an egg. What are the benefits of the intrauterine device? The IUD has the following benefits: It is easy to use. Once it is in place, you do not have to do anything else to prevent pregnancy. No one can tell that you are using birth control. It does not interfere with sex or daily activities. You can use a tampon with it.    It can be inserted immediately after an , a miscarriage, or childbirth and may be used for pain and bleeding. Hormonal IUDs may cause frequent spotting, more days of bleeding, and heavier bleeding in the first months of use. Over time, the amount of menstrual bleeding and the length of your menstrual period usually decrease. Menstrual pain also usually decreases. For some women using a hormonal IUD, menstrual bleeding stops completely. Some women also may experience other side effects, including headaches, nausea, breast tenderness, and mood changes. What are possible risks of using the intrauterine device? Serious complications from IUDs are rare. However, some women do have problems. These problems usually happen during or soon after insertion:  In a small number of women, the IUD may come out of the uterus. The risk is higher in teenagers, women with heavy menstrual bleeding, and women who have an IUD inserted immediately after childbirth. If the IUD comes out, it is no longer effective. You may be able to have a new IUD placed. The IUD can go through the wall of the uterus during placement. This usually does not cause any major health problems, but the IUD will need to be removed. It is rare and occurs in only about 1 out of every 1,000 placements. Pelvic inflammatory disease (PID) after IUD insertion happens very rarely. Using an IUD does not by itself increase the risk of PID. Women with an undiagnosed STI at the time of IUD insertion are more likely to develop PID than women without an STI. If you are at risk of STIs, you may be screened before you get an IUD. Rarely, pregnancy may occur while a woman is using an IUD. If pregnancy occurs, and you wish to continue the pregnancy, the IUD should be removed if your ob-gyn or other health care professional can see the IUD in the cervix or if the strings are visible. If the IUD remains in place during pregnancy, there are increased risks of miscarriage and infection.    In the rare case that a pregnancy occurs with the IUD in numbed with a local anesthetic. One small incision is made. The implant is removed through the small incision. The procedure usually takes only a few minutes. What are possible side effects of using the birth control implant? Like IUDs, the implant can cause changes in menstrual bleeding. The most common change is unpredictable bleeding. Menstrual periods may be less frequent and may stop completely. But in some women, periods are more frequent and last longer. Other side effects may include digestive difficulties, headaches, breast pain, weight gain, and acne. What are possible risks of using the birth control implant? Possible risks with use of the implant include the following:  Problems with insertion or removal of the implant. These problems are rare. Although rare, if a woman gets pregnant while the implant is inserted, there is a slightly increased risk of ectopic pregnancy. The implant should be removed if pregnancy occurs. Glossary  Birth Control Implant: A small, single obed that is inserted under the skin in the upper arm by a health care professional. It releases a hormone and protects against pregnancy. Cervix: The lower, narrow end of uterus at the top of the vagina. Ectopic Pregnancy: A pregnancy in which the fertilized egg begins to grow in a place other than inside the uterus, usually in one of the fallopian tubes. Egg: The female reproductive cell produced in and released from the ovaries; also called the ovum. Emergency Contraception: Methods that are used to prevent pregnancy after a woman has had sex without birth control, after the method she used has failed, or if a woman is raped. Fertilization: Joining of the egg and sperm. Human Immunodeficiency Virus (HIV): A virus that attacks certain cells of the bodys immune system and causes acquired immunodeficiency syndrome (AIDS).   Intrauterine Device (IUD): A small device that is inserted and left inside the uterus to prevent

## 2020-03-18 NOTE — PROGRESS NOTES
bhcg    Guerda Garza  3/18/2020      Guerda Garza is a 30 y.o. female [id number]       The patient was seen today. She was here to follow-up regarding her labs and diagnostics ordered at her last visit for the diagnosis of:    ICD-10-CM    1. Pregnancy of unknown anatomic location O36.80X0      She had a BHCG of 328 on 3/14/2020 at [facility]. Repeat BHCG was ordered, but was Qualitative BHCG not Quantitative BHCG. Patient has a significant Hx of an Exploratory laparoscopy converted to open laparotomy with wedge resection of cornual ectopic, right salpingectomy and evacuation of hemoperitoneum on 6/22/2019    Endometrial cavity was not entered at that time. She does not have any specific chief complaint today. She does report feeling occasionally nauseous as well as reports breast tenderness. She denies any abdominal pain or cramping. She denies any vaginal bleeding. Her bowels are regular and she is voiding without difficulty. Reviewed strict precautions for return to the ED for any signs or symptoms of ectopic pregnancy that she had a corneal ectopic in June 2019. Patient verbalized understanding that she is to return to the office if she has any of the signs or symptoms above or to the ER if in pain. She is obtaining her repeat beta hCG quant at this time as lab slip was handed to the patient. She knows she is to have close follow-up as she has a pregnancy of unknown location with a significant past uterine surgical history.        Past Medical History:   Diagnosis Date    Chlamydia     IBS (irritable bowel syndrome) 5/7/2015    Infertility, female, secondary     Trichomonas vaginalis infection 2013         Past Surgical History:   Procedure Laterality Date    CHOLECYSTECTOMY      LAPAROSCOPY N/A 6/22/2019    LAPAROSCOPY EXPLORATORY CONVERTED TO OPEN, RIGHT SALPINGECTOMY performed by Ronald Piper DO at [facility]  06/22/2019 x 2.6 cm x 3.1 cm. Free fluid: None     Simple left ovarian cyst measuring up to 3.3 cm. Otherwise unremarkable pelvic ultrasound with no evidence of either an intrauterine or extrauterine pregnancy. Follow-up serial beta hCGs and if clinically indicated, follow-up ultrasound would be helpful. Lab Results:  Results for orders placed or performed during the hospital encounter of 03/16/20   HCG Qualitative, Serum   Result Value Ref Range    hCG Qual POSITIVE (A) NEGATIVE           Assessment:   Diagnosis Orders   1. Pregnancy of unknown anatomic location  HCG, Quantitative, Pregnancy     Chief Complaint   Patient presents with    Follow-up     ER fu regarding [facility]         Patient Active Problem List    Diagnosis Date Noted    Pregnancy of unknown anatomic location 06/22/2019     Priority: High    Pregnancy, ectopic, cornual      Priority: High    S/P ectopic pregnancy 06/23/2019    Irregular menses 06/22/2019    Abdominal pain affecting pregnancy 06/22/2019    Laparoscopic converted to open wedge resection of right cornual ectopic, right salpingectomy and evacuation of hemoperitoneum 6/22/19 06/22/2019     Patient counseled extensively on the need to not get pregnant for at least 18 months.  Lower abdominal pain     Elevated serum hCG     Late period 01/08/2016    Chronic abdominal pain 05/07/2015    IBS (irritable bowel syndrome) 05/07/2015    Amenorrhea, secondary 10/24/2014    Infertility, female, secondary 10/24/2014    Need for MMR vaccine 04/25/2014    Need for Tdap vaccination 04/25/2014    Physical exam, pre-employment 04/25/2014    Overweight (BMI 25.0-29.9) 02/14/2013    Contraception 02/14/2013       PLAN:  Return in about 15 days (around 4/2/2020) for Follow up appointment.   Repeat BHCG ordered for today  Follow up scheduled on 4/2/2020  Patient in understands that she is to refrain from taking prenatal vitamins at this time until pregnancy is determined to be

## 2020-03-20 ENCOUNTER — HOSPITAL ENCOUNTER (OUTPATIENT)
Age: 35
Setting detail: SPECIMEN
Discharge: HOME | End: 2020-03-20
Payer: COMMERCIAL

## 2020-03-20 LAB — HCG QUANTITATIVE: 53 IU/L

## 2020-03-23 ENCOUNTER — HOSPITAL ENCOUNTER (OUTPATIENT)
Age: 35
Setting detail: SPECIMEN
Discharge: HOME | End: 2020-03-23
Payer: COMMERCIAL

## 2020-03-23 LAB — HCG QUALITATIVE: POSITIVE

## 2020-03-31 ENCOUNTER — TELEPHONE (OUTPATIENT)
Dept: OBGYN | Age: 35
End: 2020-03-31

## 2020-03-31 NOTE — TELEPHONE ENCOUNTER
----- Message from Erlinda Coelho DO sent at 3/30/2020  3:57 PM EDT -----  Regarding: Patient needs to obtain BHCG QUANT for Pregnancy of unk location  Patient needs to obtain BHCG QUANT for Pregnancy of unk location. Last quant 48. Discussed result with patient over the phone. Verbalized understanding to obtain BHCG <5 to be considered negative. Please have patient complete blood work and schedule for appointment for IUD insertion and discussion of next steps.        Thank you

## 2020-04-01 ENCOUNTER — HOSPITAL ENCOUNTER (OUTPATIENT)
Age: 35
Setting detail: SPECIMEN
Discharge: HOME | End: 2020-04-01
Payer: COMMERCIAL

## 2020-04-01 LAB — HCG QUANTITATIVE: <1 IU/L

## 2020-04-03 ENCOUNTER — TELEPHONE (OUTPATIENT)
Dept: OBGYN | Age: 35
End: 2020-04-03

## 2020-07-14 ENCOUNTER — OFFICE VISIT (OUTPATIENT)
Dept: INTERNAL MEDICINE | Age: 35
End: 2020-07-14
Payer: COMMERCIAL

## 2020-07-14 VITALS
SYSTOLIC BLOOD PRESSURE: 136 MMHG | TEMPERATURE: 97.7 F | HEIGHT: 65 IN | HEART RATE: 79 BPM | WEIGHT: 205.8 LBS | BODY MASS INDEX: 34.29 KG/M2 | DIASTOLIC BLOOD PRESSURE: 82 MMHG

## 2020-07-14 PROCEDURE — G8417 CALC BMI ABV UP PARAM F/U: HCPCS | Performed by: STUDENT IN AN ORGANIZED HEALTH CARE EDUCATION/TRAINING PROGRAM

## 2020-07-14 PROCEDURE — 99212 OFFICE O/P EST SF 10 MIN: CPT | Performed by: STUDENT IN AN ORGANIZED HEALTH CARE EDUCATION/TRAINING PROGRAM

## 2020-07-14 PROCEDURE — 4004F PT TOBACCO SCREEN RCVD TLK: CPT | Performed by: STUDENT IN AN ORGANIZED HEALTH CARE EDUCATION/TRAINING PROGRAM

## 2020-07-14 PROCEDURE — G8427 DOCREV CUR MEDS BY ELIG CLIN: HCPCS | Performed by: STUDENT IN AN ORGANIZED HEALTH CARE EDUCATION/TRAINING PROGRAM

## 2020-07-14 PROCEDURE — 99211 OFF/OP EST MAY X REQ PHY/QHP: CPT | Performed by: STUDENT IN AN ORGANIZED HEALTH CARE EDUCATION/TRAINING PROGRAM

## 2020-07-14 RX ORDER — NAPROXEN 500 MG/1
500 TABLET ORAL 2 TIMES DAILY WITH MEALS
COMMUNITY
End: 2020-12-22 | Stop reason: ALTCHOICE

## 2020-07-14 RX ORDER — CYCLOBENZAPRINE HCL 10 MG
10 TABLET ORAL 2 TIMES DAILY PRN
COMMUNITY
End: 2020-07-14 | Stop reason: SDUPTHER

## 2020-07-14 RX ORDER — CYCLOBENZAPRINE HCL 10 MG
10 TABLET ORAL NIGHTLY PRN
Qty: 10 TABLET | Refills: 0 | Status: SHIPPED | OUTPATIENT
Start: 2020-07-14 | End: 2021-03-16

## 2020-07-14 RX ORDER — NAPROXEN 500 MG/1
500 TABLET ORAL 2 TIMES DAILY WITH MEALS
Qty: 14 TABLET | Refills: 1 | Status: CANCELLED | OUTPATIENT
Start: 2020-07-14 | End: 2020-07-28

## 2020-07-14 RX ORDER — ACETAMINOPHEN 500 MG
500 TABLET ORAL EVERY 6 HOURS PRN
Qty: 30 TABLET | Refills: 3 | Status: SHIPPED | OUTPATIENT
Start: 2020-07-14 | End: 2021-03-16

## 2020-07-14 NOTE — PATIENT INSTRUCTIONS
Return To Clinic 7/22/2020 for transfer of care. After Visit Summary given and reviewed. The medication list included in this document is our record of what you are currently taking, including any changes that were made at today's visit. If you find any differences when compared to your medications at home, or have any questions that were not answered at your visit, please contact the office. It is very important for your care that you keep your appointment. If for some reason you are unable to keep your appointment, it is equally important that you call our office at [phone] to cancel your appointment and reschedule. Failure to do so may result in your termination from our practice.      Return to Work Letter printed, signed, and given to patient.     -DENISE Smalls
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
## 2020-07-14 NOTE — LETTER
[address]  [address] [facility] [address] [address]  Phone: [phone]  Fax: [phone]    Marquis Demarco MD        July 14, 2020     Patient: Anna Kearney   YOB: 1985   Date of Visit: 7/14/2020       To Whom it May Concern:    Agata Alcala was seen in my clinic on 7/14/2020. She may return to work on 7/21/2020. If you have any questions or concerns, please don't hesitate to call.     Sincerely,         Marquis Demarco MD

## 2020-07-14 NOTE — PROGRESS NOTES
Visit Information    Have you changed or started any medications since your last visit including any over-the-counter medicines, vitamins, or herbal medicines? no   Have you stopped taking any of your medications? Is so, why? -  no  Are you having any side effects from any of your medications? - no    Have you seen any other physician or provider since your last visit?  no   Have you had any other diagnostic tests since your last visit? yes - Labs   Have you been seen in the emergency room and/or had an admission in a hospital since we last saw you?  no   Have you had your routine dental cleaning in the past 6 months?  yes      Do you have an active MyChart account? If no, what is the barrier?   Yes    Patient Care Team:  POST [facility] as PCP - General    Medical History Review  Past Medical, Family, and Social History reviewed and does not contribute to the patient presenting condition    Health Maintenance   Topic Date Due    Varicella vaccine (1 of 2 - 2-dose childhood series) 07/19/1988    Flu vaccine (1) 09/01/2020    Cervical cancer screen  10/13/2020    DTaP/Tdap/Td vaccine (2 - Td) 04/25/2024    Pneumococcal 0-64 years Vaccine  Completed    HIV screen  Completed    Hepatitis A vaccine  Aged Out    Hepatitis B vaccine  Aged Out    Hib vaccine  Aged Out    Meningococcal (ACWY) vaccine  Aged Out
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
## 2020-07-22 ENCOUNTER — OFFICE VISIT (OUTPATIENT)
Dept: INTERNAL MEDICINE | Age: 35
End: 2020-07-22
Payer: COMMERCIAL

## 2020-07-22 VITALS
DIASTOLIC BLOOD PRESSURE: 83 MMHG | SYSTOLIC BLOOD PRESSURE: 136 MMHG | TEMPERATURE: 98.4 F | WEIGHT: 206 LBS | BODY MASS INDEX: 34.32 KG/M2 | HEIGHT: 65 IN | HEART RATE: 90 BPM

## 2020-07-22 PROCEDURE — 4004F PT TOBACCO SCREEN RCVD TLK: CPT | Performed by: INTERNAL MEDICINE

## 2020-07-22 PROCEDURE — G8427 DOCREV CUR MEDS BY ELIG CLIN: HCPCS | Performed by: INTERNAL MEDICINE

## 2020-07-22 PROCEDURE — G8417 CALC BMI ABV UP PARAM F/U: HCPCS | Performed by: INTERNAL MEDICINE

## 2020-07-22 PROCEDURE — 99213 OFFICE O/P EST LOW 20 MIN: CPT | Performed by: INTERNAL MEDICINE

## 2020-07-22 NOTE — PROGRESS NOTES
Visit Information    Have you changed or started any medications since your last visit including any over-the-counter medicines, vitamins, or herbal medicines? no   Have you stopped taking any of your medications? Is so, why? -  no  Are you having any side effects from any of your medications? - no    Have you seen any other physician or provider since your last visit?  no   Have you had any other diagnostic tests since your last visit?  no   Have you been seen in the emergency room and/or had an admission in a hospital since we last saw you?  no   Have you had your routine dental cleaning in the past 6 months?  yes -      Do you have an active MyChart account? If no, what is the barrier?   Yes    Patient Care Team:  Vida Hickey MD as PCP - General (Internal Medicine)    Medical History Review  Past Medical, Family, and Social History reviewed and does contribute to the patient presenting condition    Health Maintenance   Topic Date Due    Flu vaccine (1) 09/01/2020    Cervical cancer screen  10/13/2020    DTaP/Tdap/Td vaccine (2 - Td) 04/25/2024    Pneumococcal 0-64 years Vaccine  Completed    HIV screen  Completed    Hepatitis A vaccine  Aged Out    Hepatitis B vaccine  Aged Out    Hib vaccine  Aged Out    Meningococcal (ACWY) vaccine  Aged Out    Varicella vaccine  Discontinued

## 2020-07-23 ASSESSMENT — ENCOUNTER SYMPTOMS
RESPIRATORY NEGATIVE: 1
GASTROINTESTINAL NEGATIVE: 1
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
## 2020-08-09 ENCOUNTER — APPOINTMENT (OUTPATIENT)
Dept: GENERAL RADIOLOGY | Age: 35
End: 2020-08-09
Payer: COMMERCIAL

## 2020-08-09 ENCOUNTER — HOSPITAL ENCOUNTER (EMERGENCY)
Age: 35
Discharge: HOME | End: 2020-08-10
Attending: EMERGENCY MEDICINE
Payer: COMMERCIAL

## 2020-08-09 VITALS
HEART RATE: 63 BPM | BODY MASS INDEX: 34.16 KG/M2 | TEMPERATURE: 99.1 F | HEIGHT: 65 IN | RESPIRATION RATE: 20 BRPM | SYSTOLIC BLOOD PRESSURE: 139 MMHG | OXYGEN SATURATION: 99 % | WEIGHT: 205 LBS | DIASTOLIC BLOOD PRESSURE: 77 MMHG

## 2020-08-09 LAB
-: ABNORMAL
ABSOLUTE EOS #: 0.21 K/UL (ref 0–0.44)
ABSOLUTE IMMATURE GRANULOCYTE: 0.02 K/UL (ref 0–0.3)
ABSOLUTE LYMPH #: 2.96 K/UL (ref 1.1–3.7)
ABSOLUTE MONO #: 0.64 K/UL (ref 0.1–1.2)
ALBUMIN SERPL-MCNC: 3.9 G/DL (ref 3.5–5.2)
ALBUMIN/GLOBULIN RATIO: ABNORMAL (ref 1–2.5)
ALP BLD-CCNC: 85 U/L (ref 35–104)
ALT SERPL-CCNC: 21 U/L (ref 5–33)
AMORPHOUS: ABNORMAL
ANION GAP SERPL CALCULATED.3IONS-SCNC: 13 MMOL/L (ref 9–17)
AST SERPL-CCNC: 19 U/L
BACTERIA: ABNORMAL
BASOPHILS # BLD: 0 % (ref 0–2)
BASOPHILS ABSOLUTE: 0.03 K/UL (ref 0–0.2)
BILIRUB SERPL-MCNC: 0.23 MG/DL (ref 0.3–1.2)
BILIRUBIN URINE: NEGATIVE
BUN BLDV-MCNC: 8 MG/DL (ref 6–20)
BUN/CREAT BLD: 9 (ref 9–20)
CALCIUM SERPL-MCNC: 8.8 MG/DL (ref 8.6–10.4)
CASTS UA: ABNORMAL /LPF
CHLORIDE BLD-SCNC: 104 MMOL/L (ref 98–107)
CO2: 21 MMOL/L (ref 20–31)
COLOR: ABNORMAL
COMMENT UA: ABNORMAL
CREAT SERPL-MCNC: 0.92 MG/DL (ref 0.5–0.9)
CRYSTALS, UA: ABNORMAL /HPF
DIFFERENTIAL TYPE: NORMAL
EOSINOPHILS RELATIVE PERCENT: 3 % (ref 1–4)
EPITHELIAL CELLS UA: ABNORMAL /HPF (ref 0–5)
GFR AFRICAN AMERICAN: >60 ML/MIN
GFR NON-AFRICAN AMERICAN: >60 ML/MIN
GFR SERPL CREATININE-BSD FRML MDRD: ABNORMAL ML/MIN/{1.73_M2}
GFR SERPL CREATININE-BSD FRML MDRD: ABNORMAL ML/MIN/{1.73_M2}
GLUCOSE BLD-MCNC: 111 MG/DL (ref 70–99)
GLUCOSE URINE: NEGATIVE
HCT VFR BLD CALC: 42.7 % (ref 36.3–47.1)
HEMOGLOBIN: 13.2 G/DL (ref 11.9–15.1)
IMMATURE GRANULOCYTES: 0 %
KETONES, URINE: NEGATIVE
LEUKOCYTE ESTERASE, URINE: ABNORMAL
LYMPHOCYTES # BLD: 37 % (ref 24–43)
MCH RBC QN AUTO: 26.3 PG (ref 25.2–33.5)
MCHC RBC AUTO-ENTMCNC: 30.9 G/DL (ref 28.4–34.8)
MCV RBC AUTO: 85.1 FL (ref 82.6–102.9)
MONOCYTES # BLD: 8 % (ref 3–12)
MUCUS: ABNORMAL
NITRITE, URINE: NEGATIVE
NRBC AUTOMATED: 0 PER 100 WBC
OTHER OBSERVATIONS UA: ABNORMAL
PDW BLD-RTO: 13.6 % (ref 11.8–14.4)
PH UA: 6 (ref 5–8)
PLATELET # BLD: 260 K/UL (ref 138–453)
PLATELET ESTIMATE: NORMAL
PMV BLD AUTO: 10.3 FL (ref 8.1–13.5)
POTASSIUM SERPL-SCNC: 3.8 MMOL/L (ref 3.7–5.3)
PROTEIN UA: ABNORMAL
RBC # BLD: 5.02 M/UL (ref 3.95–5.11)
RBC # BLD: NORMAL 10*6/UL
RBC UA: ABNORMAL /HPF (ref 0–2)
RENAL EPITHELIAL, UA: ABNORMAL /HPF
SEG NEUTROPHILS: 52 % (ref 36–65)
SEGMENTED NEUTROPHILS ABSOLUTE COUNT: 4.12 K/UL (ref 1.5–8.1)
SODIUM BLD-SCNC: 138 MMOL/L (ref 135–144)
SPECIFIC GRAVITY UA: 1.02 (ref 1–1.03)
TOTAL PROTEIN: 6.6 G/DL (ref 6.4–8.3)
TRICHOMONAS: ABNORMAL
TURBIDITY: ABNORMAL
URINE HGB: ABNORMAL
UROBILINOGEN, URINE: NORMAL
WBC # BLD: 8 K/UL (ref 3.5–11.3)
WBC # BLD: NORMAL 10*3/UL
WBC UA: ABNORMAL /HPF (ref 0–5)
YEAST: ABNORMAL

## 2020-08-09 PROCEDURE — 2580000003 HC RX 258: Performed by: EMERGENCY MEDICINE

## 2020-08-09 PROCEDURE — 81001 URINALYSIS AUTO W/SCOPE: CPT

## 2020-08-09 PROCEDURE — 80053 COMPREHEN METABOLIC PANEL: CPT

## 2020-08-09 PROCEDURE — 85025 COMPLETE CBC W/AUTO DIFF WBC: CPT

## 2020-08-09 PROCEDURE — 71045 X-RAY EXAM CHEST 1 VIEW: CPT

## 2020-08-09 PROCEDURE — 6370000000 HC RX 637 (ALT 250 FOR IP): Performed by: EMERGENCY MEDICINE

## 2020-08-09 PROCEDURE — 36415 COLL VENOUS BLD VENIPUNCTURE: CPT

## 2020-08-09 PROCEDURE — 99284 EMERGENCY DEPT VISIT MOD MDM: CPT

## 2020-08-09 RX ORDER — MECLIZINE HCL 12.5 MG/1
25 TABLET ORAL ONCE
Status: COMPLETED | OUTPATIENT
Start: 2020-08-09 | End: 2020-08-09

## 2020-08-09 RX ORDER — 0.9 % SODIUM CHLORIDE 0.9 %
1000 INTRAVENOUS SOLUTION INTRAVENOUS ONCE
Status: COMPLETED | OUTPATIENT
Start: 2020-08-09 | End: 2020-08-09

## 2020-08-09 RX ADMIN — MECLIZINE 25 MG: 12.5 TABLET ORAL at 22:25

## 2020-08-09 RX ADMIN — SODIUM CHLORIDE 1000 ML: 9 INJECTION, SOLUTION INTRAVENOUS at 22:45

## 2020-08-10 RX ORDER — MECLIZINE HYDROCHLORIDE 25 MG/1
25 TABLET ORAL 3 TIMES DAILY PRN
Qty: 15 TABLET | Refills: 0 | Status: SHIPPED | OUTPATIENT
Start: 2020-08-10 | End: 2020-08-20

## 2020-08-10 NOTE — ED PROVIDER NOTES
EMERGENCY DEPARTMENT ENCOUNTER    Pt Name: Adrianne Prado  MRN: [id number]  [place] 1985  Date of evaluation: 8/9/20  CHIEF COMPLAINT     No chief complaint on file. HISTORY OF PRESENT ILLNESS   Patient is a 82-year-old female with PMH of IBS who presents to the ED complaining of \"dizziness \". Symptoms started this morning. She describes feeling lightheaded, world spinning dizziness. No fevers, nasal congestion, ear pain. No nausea or vomiting. Patient reports normal p.o. intake. Symptoms are intermittent, typically present when looking right or left. No recent illness. No chest pain, shortness of breath, abdominal pain, nausea, vomiting, changes in urine or stool. REVIEW OF SYSTEMS     Review of Systems   All other systems reviewed and are negative.     PASTMEDICAL HISTORY     Past Medical History:   Diagnosis Date    Chlamydia     IBS (irritable bowel syndrome) 5/7/2015    Infertility, female, secondary     Trichomonas vaginalis infection 2013     SURGICAL HISTORY       Past Surgical History:   Procedure Laterality Date    CHOLECYSTECTOMY      LAPAROSCOPY N/A 6/22/2019    LAPAROSCOPY EXPLORATORY CONVERTED TO OPEN, RIGHT SALPINGECTOMY performed by Phani Ferrera DO at [facility] [place]  06/22/2019    LAPAROSCOPY EXPLORATORY CONVERTED TO OPEN, RIGHT SALPINGECTOMY     SALPINGECTOMY Right 06/22/2019     CURRENT MEDICATIONS       Previous Medications    ACETAMINOPHEN (APAP EXTRA STRENGTH) 500 MG TABLET    Take 1 tablet by mouth every 6 hours as needed for Pain    CYCLOBENZAPRINE (FLEXERIL) 10 MG TABLET    Take 1 tablet by mouth nightly as needed for Muscle spasms    DICYCLOMINE (BENTYL) 10 MG CAPSULE    Take 1 capsule by mouth every 6 hours as needed (cramps)    DOCUSATE SODIUM (COLACE) 100 MG CAPSULE    Take 1 capsule by mouth daily as needed for Constipation    FERROUS SULFATE (FE TABS) 325 (65 FE) MG EC TABLET    Take 1 tablet by mouth 2 times daily    NAPROXEN UA, meclizine, reassess. DIAGNOSTIC RESULTS   EKG:All EKG's are interpreted by the Emergency Department Physician who either signs or Co-signs this chart in the absence of a cardiologist.        RADIOLOGY:All plain film, CT, MRI, and formal ultrasound images (except ED bedside ultrasound) are read by the radiologist, see reports below, unless otherwisenoted in MDM or here. XR CHEST PORTABLE   Final Result   No acute cardiopulmonary abnormality. LABS: All lab results were reviewed by myself, and all abnormals are listed below. Labs Reviewed   COMPREHENSIVE METABOLIC PANEL W/ REFLEX TO MG FOR LOW K - Abnormal; Notable for the following components:       Result Value    Glucose 111 (*)     CREATININE 0.92 (*)     Total Bilirubin 0.23 (*)     All other components within normal limits   URINE RT REFLEX TO CULTURE - Abnormal; Notable for the following components:    Color, UA NAILA (*)     Turbidity UA CLOUDY (*)     Urine Hgb 3+ (*)     Protein, UA 1+ (*)     Leukocyte Esterase, Urine TRACE (*)     All other components within normal limits   MICROSCOPIC URINALYSIS - Abnormal; Notable for the following components:    Bacteria, UA MANY (*)     All other components within normal limits   CBC WITH AUTO DIFFERENTIAL       EMERGENCY DEPARTMENTCOURSE:   Patient did well in the ED. Labs unremarkable. Chest x-ray negative. UA with trace leukocytes however patient does not have symptoms and will not be treated for UTI. Meclizine improved symptoms. Likely BPPV. No further work-up indicated at this time. Nursing notes reviewed. At this time this is what I find, the patient appears well and does not appear sick or toxic. I gave my usual and customary discussion of the risks and benefits of discharge versus admission. I answered the patient's questions. I gave the patient strict return precautions. Patient expressed understanding of the discharge instructions.     Dictated but not reviewed. Vitals:    Vitals:    08/09/20 2149 08/09/20 2151   BP: 139/77    Pulse: 63    Resp: 20    Temp: 99.1 °F (37.3 °C)    TempSrc: Oral    SpO2: 99%    Weight:  205 lb (93 kg)   Height:  5' 5\" (1.651 m)       The patient was given the following medications while in the emergency department:  Orders Placed This Encounter   Medications    0.9 % sodium chloride bolus    meclizine (ANTIVERT) tablet 25 mg    meclizine (ANTIVERT) 25 MG tablet     Sig: Take 1 tablet by mouth 3 times daily as needed for Dizziness     Dispense:  15 tablet     Refill:  0     CONSULTS:  None    FINAL IMPRESSION      1.  Benign paroxysmal positional vertigo, unspecified laterality          DISPOSITION/PLAN   DISPOSITION Decision To Discharge 08/10/2020 12:11:51 AM      PATIENT REFERRED TO:  MD Nomi Rodriguez [facility]. [address] [phone]    In 2 days      DISCHARGE MEDICATIONS:  New Prescriptions    MECLIZINE (ANTIVERT) 25 MG TABLET    Take 1 tablet by mouth 3 times daily as needed for Dizziness     Genevieve Snow MD  Attending Emergency Physician                    Silas Payton MD  08/10/20 9927

## 2020-08-13 ENCOUNTER — HOSPITAL ENCOUNTER (EMERGENCY)
Age: 35
Discharge: HOME | End: 2020-08-13
Attending: EMERGENCY MEDICINE
Payer: COMMERCIAL

## 2020-08-13 VITALS
HEIGHT: 65 IN | TEMPERATURE: 99.2 F | SYSTOLIC BLOOD PRESSURE: 151 MMHG | DIASTOLIC BLOOD PRESSURE: 85 MMHG | HEART RATE: 66 BPM | OXYGEN SATURATION: 99 % | BODY MASS INDEX: 34.16 KG/M2 | WEIGHT: 205 LBS | RESPIRATION RATE: 14 BRPM

## 2020-08-13 LAB
-: ABNORMAL
ABSOLUTE EOS #: 0.27 K/UL (ref 0–0.44)
ABSOLUTE IMMATURE GRANULOCYTE: 0.04 K/UL (ref 0–0.3)
ABSOLUTE LYMPH #: 2.47 K/UL (ref 1.1–3.7)
ABSOLUTE MONO #: 0.73 K/UL (ref 0.1–1.2)
AMORPHOUS: ABNORMAL
ANION GAP SERPL CALCULATED.3IONS-SCNC: 12 MMOL/L (ref 9–17)
BACTERIA: ABNORMAL
BASOPHILS # BLD: 1 % (ref 0–2)
BASOPHILS ABSOLUTE: 0.07 K/UL (ref 0–0.2)
BILIRUBIN URINE: NEGATIVE
BUN BLDV-MCNC: 10 MG/DL (ref 6–20)
BUN/CREAT BLD: 11 (ref 9–20)
CALCIUM SERPL-MCNC: 8.9 MG/DL (ref 8.6–10.4)
CASTS UA: ABNORMAL /LPF
CHLORIDE BLD-SCNC: 106 MMOL/L (ref 98–107)
CO2: 22 MMOL/L (ref 20–31)
COLOR: ABNORMAL
COMMENT UA: ABNORMAL
CREAT SERPL-MCNC: 0.87 MG/DL (ref 0.5–0.9)
CRYSTALS, UA: ABNORMAL /HPF
DIFFERENTIAL TYPE: NORMAL
EOSINOPHILS RELATIVE PERCENT: 3 % (ref 1–4)
EPITHELIAL CELLS UA: ABNORMAL /HPF (ref 0–5)
GFR AFRICAN AMERICAN: >60 ML/MIN
GFR NON-AFRICAN AMERICAN: >60 ML/MIN
GFR SERPL CREATININE-BSD FRML MDRD: NORMAL ML/MIN/{1.73_M2}
GFR SERPL CREATININE-BSD FRML MDRD: NORMAL ML/MIN/{1.73_M2}
GLUCOSE BLD-MCNC: 91 MG/DL (ref 70–99)
GLUCOSE URINE: NEGATIVE
HCG QUANTITATIVE: <1 IU/L
HCT VFR BLD CALC: 40 % (ref 36.3–47.1)
HEMOGLOBIN: 12.3 G/DL (ref 11.9–15.1)
IMMATURE GRANULOCYTES: 0 %
KETONES, URINE: ABNORMAL
LEUKOCYTE ESTERASE, URINE: NEGATIVE
LYMPHOCYTES # BLD: 25 % (ref 24–43)
MCH RBC QN AUTO: 26.7 PG (ref 25.2–33.5)
MCHC RBC AUTO-ENTMCNC: 30.8 G/DL (ref 28.4–34.8)
MCV RBC AUTO: 86.8 FL (ref 82.6–102.9)
MONOCYTES # BLD: 7 % (ref 3–12)
MUCUS: ABNORMAL
NITRITE, URINE: NEGATIVE
NRBC AUTOMATED: 0 PER 100 WBC
OTHER OBSERVATIONS UA: ABNORMAL
PDW BLD-RTO: 13.7 % (ref 11.8–14.4)
PH UA: 7 (ref 5–8)
PLATELET # BLD: 257 K/UL (ref 138–453)
PLATELET ESTIMATE: NORMAL
PMV BLD AUTO: 10.7 FL (ref 8.1–13.5)
POTASSIUM SERPL-SCNC: 4.1 MMOL/L (ref 3.7–5.3)
PROTEIN UA: ABNORMAL
RBC # BLD: 4.61 M/UL (ref 3.95–5.11)
RBC # BLD: NORMAL 10*6/UL
RBC UA: ABNORMAL /HPF (ref 0–2)
RENAL EPITHELIAL, UA: ABNORMAL /HPF
SEG NEUTROPHILS: 64 % (ref 36–65)
SEGMENTED NEUTROPHILS ABSOLUTE COUNT: 6.25 K/UL (ref 1.5–8.1)
SODIUM BLD-SCNC: 140 MMOL/L (ref 135–144)
SPECIFIC GRAVITY UA: 1.02 (ref 1–1.03)
TRICHOMONAS: ABNORMAL
TURBIDITY: ABNORMAL
URINE HGB: ABNORMAL
UROBILINOGEN, URINE: NORMAL
WBC # BLD: 9.8 K/UL (ref 3.5–11.3)
WBC # BLD: NORMAL 10*3/UL
WBC UA: ABNORMAL /HPF (ref 0–5)
YEAST: ABNORMAL

## 2020-08-13 PROCEDURE — 84702 CHORIONIC GONADOTROPIN TEST: CPT

## 2020-08-13 PROCEDURE — 80048 BASIC METABOLIC PNL TOTAL CA: CPT

## 2020-08-13 PROCEDURE — 99284 EMERGENCY DEPT VISIT MOD MDM: CPT

## 2020-08-13 PROCEDURE — 81001 URINALYSIS AUTO W/SCOPE: CPT

## 2020-08-13 PROCEDURE — 85025 COMPLETE CBC W/AUTO DIFF WBC: CPT

## 2020-08-13 NOTE — ED PROVIDER NOTES
EMERGENCY DEPARTMENT ENCOUNTER    Pt Name: Clarence Lujan  MRN: [id number]  [place] 1985  Date of evaluation: 8/13/20  CHIEF COMPLAINT       Chief Complaint   Patient presents with    Vaginal Bleeding     HISTORY OF PRESENT ILLNESS   31-year-old female presents the emergency room for irregular vaginal bleeding. Patient states that she has been having abnormal bleeding for the last 3 weeks. Patient did have a miscarriage back in March of this year. She states she was very early in the pregnancy when the miscarriage occurred. She did get 1 normal menstrual cycle in June following the miscarriage. Then when she started bleeding a few weeks ago the bleeding never stopped. She has been trying to get a hold of her OB/GYN without success. REVIEW OF SYSTEMS     Review of Systems   All other systems reviewed and are negative. PASTMEDICAL HISTORY     Past Medical History:   Diagnosis Date    Chlamydia     IBS (irritable bowel syndrome) 5/7/2015    Infertility, female, secondary     Trichomonas vaginalis infection 2013     Past Problem List  Patient Active Problem List   Diagnosis Code    Overweight (BMI 25.0-29. 9) E66.3    Contraception [id number]    Need for MMR vaccine Z23    Need for Tdap vaccination Z23    Physical exam, pre-employment Z02.1    Amenorrhea, secondary N91.1    Infertility, female, secondary N97.9    Chronic abdominal pain R10.9, G89.29    IBS (irritable bowel syndrome) K58.9    Late period N92.6    Irregular menses N92.6    Pregnancy of unknown anatomic location O36.80X0    Abdominal pain affecting pregnancy O26.899, R10.9    Lower abdominal pain R10.30    Elevated serum hCG E34.9    Pregnancy, ectopic, cornual O00.80    Laparoscopic converted to open wedge resection of right cornual ectopic, right salpingectomy and evacuation of hemoperitoneum 6/22/19 Z98.890    S/P ectopic pregnancy Z87.59     SURGICAL HISTORY       Past Surgical History:   Procedure Laterality Date    CHOLECYSTECTOMY      LAPAROSCOPY N/A 6/22/2019    LAPAROSCOPY EXPLORATORY CONVERTED TO OPEN, RIGHT SALPINGECTOMY performed by Ana Paula Lei DO at [facility] [place]  06/22/2019    LAPAROSCOPY EXPLORATORY CONVERTED TO OPEN, RIGHT SALPINGECTOMY     SALPINGECTOMY Right 06/22/2019     CURRENT MEDICATIONS       Previous Medications    ACETAMINOPHEN (APAP EXTRA STRENGTH) 500 MG TABLET    Take 1 tablet by mouth every 6 hours as needed for Pain    CYCLOBENZAPRINE (FLEXERIL) 10 MG TABLET    Take 1 tablet by mouth nightly as needed for Muscle spasms    DICYCLOMINE (BENTYL) 10 MG CAPSULE    Take 1 capsule by mouth every 6 hours as needed (cramps)    DOCUSATE SODIUM (COLACE) 100 MG CAPSULE    Take 1 capsule by mouth daily as needed for Constipation    FERROUS SULFATE (FE TABS) 325 (65 FE) MG EC TABLET    Take 1 tablet by mouth 2 times daily    MECLIZINE (ANTIVERT) 25 MG TABLET    Take 1 tablet by mouth 3 times daily as needed for Dizziness    NAPROXEN (NAPROSYN) 500 MG TABLET    Take 500 mg by mouth 2 times daily (with meals)    OMEPRAZOLE (PRILOSEC) 40 MG DELAYED RELEASE CAPSULE    Take 1 capsule by mouth daily     ALLERGIES     has No Known Allergies. FAMILY HISTORY     has no family status information on file. SOCIAL HISTORY       Social History     Tobacco Use    Smoking status: Current Every Day Smoker     Types: Cigars    Smokeless tobacco: Never Used    Tobacco comment: black and milds, 1-2 a day   Substance Use Topics    Alcohol use: Not Currently     Comment: week end    Drug use: No     PHYSICAL EXAM     INITIAL VITALS: BP (!) 151/85   Pulse 66   Temp 99.2 °F (37.3 °C) (Oral)   Resp 14   Ht 5' 5\" (1.651 m)   Wt 205 lb (93 kg)   LMP 07/31/2020 (Approximate)   SpO2 99%   BMI 34.11 kg/m²    Physical Exam  Constitutional:       General: She is not in acute distress. Appearance: She is well-developed. HENT:      Head: Normocephalic.    Eyes: Pupils: Pupils are equal, round, and reactive to light. Cardiovascular:      Rate and Rhythm: Normal rate and regular rhythm. Heart sounds: Normal heart sounds. Pulmonary:      Effort: Pulmonary effort is normal. No respiratory distress. Breath sounds: Normal breath sounds. Abdominal:      General: Bowel sounds are normal.      Palpations: Abdomen is soft. Tenderness: There is no abdominal tenderness. Genitourinary:     Comments: Moderate amount of blood in the vaginal vault with some active bleeding. The cervix is closed  Musculoskeletal: Normal range of motion. Skin:     General: Skin is warm and dry. Neurological:      Mental Status: She is alert and oriented to person, place, and time. MEDICAL DECISION MAKING:     Well-appearing 37-year-old female coming into the emergency room with 3 weeks of irregular bleeding. Patient is not currently pregnant. Does not have any dizziness or fatigue. Her hemoglobin is within normal limits. Her pelvic exam did not show any significant hemorrhage although she did have some active bleeding. Plan is for outpatient transvaginal ultrasound and follow-up with her OB. CRITICAL CARE:       PROCEDURES:    Procedures    DIAGNOSTIC RESULTS   EKG:All EKG's are interpreted by the Emergency Department Physician who either signs or Co-signs this chart in the absence of a cardiologist.        RADIOLOGY:All plain film, CT, MRI, and formal ultrasound images (except ED bedside ultrasound) are read by the radiologist, see reports below, unless otherwisenoted in MDM or here. US NON OB TRANSVAGINAL    (Results Pending)     LABS: All lab results were reviewed by myself, and all abnormals are listed below.   Labs Reviewed   URINE RT REFLEX TO CULTURE - Abnormal; Notable for the following components:       Result Value    Color, UA RED (*)     Turbidity UA CLOUDY (*)     Ketones, Urine TRACE (*)     Urine Hgb 3+ (*)     Protein, UA 2+ (*)     All other

## 2020-08-14 ENCOUNTER — HOSPITAL ENCOUNTER (OUTPATIENT)
Dept: ULTRASOUND IMAGING | Age: 35
Discharge: HOME | End: 2020-08-16
Payer: COMMERCIAL

## 2020-08-14 PROCEDURE — 76830 TRANSVAGINAL US NON-OB: CPT

## 2020-09-02 ENCOUNTER — OFFICE VISIT (OUTPATIENT)
Dept: OBGYN | Age: 35
End: 2020-09-02
Payer: COMMERCIAL

## 2020-09-02 ENCOUNTER — HOSPITAL ENCOUNTER (OUTPATIENT)
Age: 35
Setting detail: SPECIMEN
Discharge: HOME | End: 2020-09-02
Payer: COMMERCIAL

## 2020-09-02 VITALS
DIASTOLIC BLOOD PRESSURE: 85 MMHG | SYSTOLIC BLOOD PRESSURE: 131 MMHG | BODY MASS INDEX: 34.45 KG/M2 | WEIGHT: 206.8 LBS | HEIGHT: 65 IN | HEART RATE: 70 BPM

## 2020-09-02 LAB — TSH SERPL DL<=0.05 MIU/L-ACNC: 2.48 MIU/L (ref 0.3–5)

## 2020-09-02 PROCEDURE — 99213 OFFICE O/P EST LOW 20 MIN: CPT | Performed by: OBSTETRICS & GYNECOLOGY

## 2020-09-02 PROCEDURE — 99212 OFFICE O/P EST SF 10 MIN: CPT | Performed by: OBSTETRICS & GYNECOLOGY

## 2020-09-02 PROCEDURE — G8427 DOCREV CUR MEDS BY ELIG CLIN: HCPCS | Performed by: OBSTETRICS & GYNECOLOGY

## 2020-09-02 PROCEDURE — 81025 URINE PREGNANCY TEST: CPT | Performed by: OBSTETRICS & GYNECOLOGY

## 2020-09-02 PROCEDURE — 4004F PT TOBACCO SCREEN RCVD TLK: CPT | Performed by: OBSTETRICS & GYNECOLOGY

## 2020-09-02 PROCEDURE — G8417 CALC BMI ABV UP PARAM F/U: HCPCS | Performed by: OBSTETRICS & GYNECOLOGY

## 2020-09-02 RX ORDER — MEDROXYPROGESTERONE ACETATE 150 MG/ML
150 INJECTION, SUSPENSION INTRAMUSCULAR
Qty: 1 ML | Refills: 3 | Status: SHIPPED | OUTPATIENT
Start: 2020-09-02 | End: 2021-03-16

## 2020-09-02 NOTE — PROGRESS NOTES
OB/GYN Problem Visit    Sandeep Mitchell  2020                       Primary Care Physician: Mary Harden MD    CC:   Chief Complaint   Patient presents with   [address] from Hospital     ER fu from 20 for AUB and pelvic pain, ultrasound done on 20         HPI: Sandeep Mitchell is a 37 y.o. female [id number] No LMP recorded. The patient was seen and examined. She is here for AUB. She states that since the end of July she has had constant vaginal bleeding. She took 4 pills from her sister's pack and she states she was feeling nausea and stopped the pills and immediately started having vaginal bleeding again. She has been seen in the ED multiple times and seen at [facility] and stated that both times she was told everything  was normal.  Had miscarriage in March and menstrual period has been irregular since then. Her bowel habits are regular. She denies any bloating. She denies dysuria. She denies urinary leaking. She complains of vaginal discharge- bleeding. She is sexually active with single partner, contraception - none. She uses no method for contraception and is not desiring pregnancy at this time.     REVIEW OF SYSTEMS:  Constitutional: negative fever, negative chills  HEENT: negative visual disturbances, negative headaches  Respiratory: negative dyspnea, negative cough  Cardiovascular: negative chest pain,  negative palpitations  Gastrointestinal: negative abdominal pain, negative RUQ pain, negative N/V, negative diarrhea, negative constipation  Genitourinary: negative dysuria, positive vaginal discharge- blood  Dermatological: negative rash  Hematologic: negative bruising  Immunologic/Lymphatic: negative recent illness, negative recent sick contact  Musculoskeletal: negative back pain, negative myalgias, negative arthralgias  Neurological:  negative dizziness, negative weakness  Behavior/Psych: negative depression, negative anxiety    OBSTETRICAL HISTORY:  OB History    Para Term  AB Living   4 1 1   2 1   SAB TAB Ectopic Molar Multiple Live Births     1 1     1      # Outcome Date GA Lbr Gael/2nd Weight Sex Delivery Anes PTL Lv   4             3 Ectopic 19              Birth Comments: Right cornual ectopic,wedge resection with R salpingectomy, evac hemoperitoneum   2 Term 09 40w0d  9 lb 10 oz (4.366 kg) M CS-Unspec Spinal  FLORENCIO   1 TAB               Obstetric Comments   G1: C/S \"pelvis wouldn't open up\"   G2: TAB   G3: Exploratory (pfannenstiel) laparoscopy converted to open laparotomy with wedge resection of cornual ectopic, right salpingectomy and evacuation of hemoperitoneum on 2019. Endometrial cavity was not entered.    G4: current        PAST MEDICAL HISTORY:      Diagnosis Date    Chlamydia     IBS (irritable bowel syndrome) 2015    Infertility, female, secondary     Trichomonas vaginalis infection        PAST SURGICAL HISTORY:                                                                    Procedure Laterality Date    CHOLECYSTECTOMY      LAPAROSCOPY N/A 2019    LAPAROSCOPY EXPLORATORY CONVERTED TO OPEN, RIGHT SALPINGECTOMY performed by Ana Paula Lei DO at [facility]  2019    LAPAROSCOPY EXPLORATORY CONVERTED TO OPEN, RIGHT SALPINGECTOMY     SALPINGECTOMY Right 2019       MEDICATIONS:  Current Outpatient Medications   Medication Sig Dispense Refill    naproxen (NAPROSYN) 500 MG tablet Take 500 mg by mouth 2 times daily (with meals)      cyclobenzaprine (FLEXERIL) 10 MG tablet Take 1 tablet by mouth nightly as needed for Muscle spasms (Patient not taking: Reported on 2020) 10 tablet 0    acetaminophen (APAP EXTRA STRENGTH) 500 MG tablet Take 1 tablet by mouth every 6 hours as needed for Pain (Patient not taking: Reported on 2020) 30 tablet 3    ferrous sulfate (FE TABS) 325 (65 Fe) MG EC tablet Take 1 tablet by mouth 2 times daily (Patient not taking: Reported on 3/18/2020) 90 tablet 3    docusate sodium (COLACE) 100 MG capsule Take 1 capsule by mouth daily as needed for Constipation (Patient not taking: Reported on 3/18/2020) 60 capsule 0    omeprazole (PRILOSEC) 40 MG delayed release capsule Take 1 capsule by mouth daily (Patient not taking: Reported on 3/18/2020) 30 capsule 0    dicyclomine (BENTYL) 10 MG capsule Take 1 capsule by mouth every 6 hours as needed (cramps) (Patient not taking: Reported on 3/18/2020) 20 capsule 0     No current facility-administered medications for this visit. ALLERGIES:   Allergies as of 09/02/2020    (No Known Allergies)                                   VITALS:  Vitals:    09/02/20 1002   BP: 131/85   Site: Left Upper Arm   Position: Sitting   Cuff Size: Medium Adult   Pulse: 70   Weight: 206 lb 12.8 oz (93.8 kg)   Height: 5' 5\" (1.651 m)                                                                                                                                                                         PHYSICAL EXAM:   General Appearance: Appears healthy. Alert; in no acute distress. Pleasant. Abdomen: soft, non-tender, non-distended, no right upper quadrant tenderness and no CVA tenderness  Pelvic Exam: Patient refused  Extremities: non-tender BLE and non-edematous  Musculoskeletal: no gross abnormalities  Psych: Alert and oriented, appropriate affect. ASSESSMENT & PLAN:    Germania Rodriguez is a 37 y.o. female [id number] No LMP recorded. here for [address] in ED WNL    - CBC WNL   - TSH ordered   - Needs annual exam and pelvic with pap smear and GC/C. Patient refused exam today. - Counseled on medical, surgical, expectant management is requesting medical management with Depo-provera. - Patient to return and have UPT and Depo-provera injection tomorrow as she needs to leave to  her child. - Return in 4 weeks for annual exam and evaluation of AUB.      Patient Active Problem List    Diagnosis Date Noted   

## 2020-09-03 ENCOUNTER — NURSE ONLY (OUTPATIENT)
Dept: OBGYN | Age: 35
End: 2020-09-03
Payer: COMMERCIAL

## 2020-09-03 VITALS
WEIGHT: 208 LBS | DIASTOLIC BLOOD PRESSURE: 83 MMHG | HEART RATE: 75 BPM | SYSTOLIC BLOOD PRESSURE: 123 MMHG | BODY MASS INDEX: 34.61 KG/M2

## 2020-09-03 LAB
CONTROL: PRESENT
PREGNANCY TEST URINE, POC: NEGATIVE

## 2020-09-03 PROCEDURE — 96372 THER/PROPH/DIAG INJ SC/IM: CPT

## 2020-09-03 PROCEDURE — 99211 OFF/OP EST MAY X REQ PHY/QHP: CPT | Performed by: OBSTETRICS & GYNECOLOGY

## 2020-09-03 PROCEDURE — 99211 OFF/OP EST MAY X REQ PHY/QHP: CPT

## 2020-09-03 RX ORDER — MEDROXYPROGESTERONE ACETATE 150 MG/ML
150 INJECTION, SUSPENSION INTRAMUSCULAR ONCE
Status: COMPLETED | OUTPATIENT
Start: 2020-09-03 | End: 2020-09-03

## 2020-09-03 RX ADMIN — MEDROXYPROGESTERONE ACETATE 150 MG: 150 INJECTION, SUSPENSION INTRAMUSCULAR at 09:29

## 2020-10-19 ENCOUNTER — HOSPITAL ENCOUNTER (OUTPATIENT)
Age: 35
Setting detail: SPECIMEN
Discharge: HOME | End: 2020-10-19
Payer: COMMERCIAL

## 2020-10-19 ENCOUNTER — OFFICE VISIT (OUTPATIENT)
Dept: OBGYN | Age: 35
End: 2020-10-19
Payer: COMMERCIAL

## 2020-10-19 VITALS
SYSTOLIC BLOOD PRESSURE: 133 MMHG | HEART RATE: 91 BPM | WEIGHT: 200.6 LBS | DIASTOLIC BLOOD PRESSURE: 87 MMHG | BODY MASS INDEX: 33.42 KG/M2 | HEIGHT: 65 IN

## 2020-10-19 PROCEDURE — 99211 OFF/OP EST MAY X REQ PHY/QHP: CPT | Performed by: STUDENT IN AN ORGANIZED HEALTH CARE EDUCATION/TRAINING PROGRAM

## 2020-10-19 PROCEDURE — 90651 9VHPV VACCINE 2/3 DOSE IM: CPT | Performed by: OBSTETRICS & GYNECOLOGY

## 2020-10-19 PROCEDURE — 99395 PREV VISIT EST AGE 18-39: CPT | Performed by: STUDENT IN AN ORGANIZED HEALTH CARE EDUCATION/TRAINING PROGRAM

## 2020-10-19 PROCEDURE — G8417 CALC BMI ABV UP PARAM F/U: HCPCS | Performed by: STUDENT IN AN ORGANIZED HEALTH CARE EDUCATION/TRAINING PROGRAM

## 2020-10-19 PROCEDURE — G8427 DOCREV CUR MEDS BY ELIG CLIN: HCPCS | Performed by: STUDENT IN AN ORGANIZED HEALTH CARE EDUCATION/TRAINING PROGRAM

## 2020-10-19 PROCEDURE — G8484 FLU IMMUNIZE NO ADMIN: HCPCS | Performed by: STUDENT IN AN ORGANIZED HEALTH CARE EDUCATION/TRAINING PROGRAM

## 2020-10-19 PROCEDURE — 4004F PT TOBACCO SCREEN RCVD TLK: CPT | Performed by: STUDENT IN AN ORGANIZED HEALTH CARE EDUCATION/TRAINING PROGRAM

## 2020-10-19 NOTE — PROGRESS NOTES
Attending Physician Statement  I have discussed the care of Buzzy Hoof, including pertinent history and exam findings,  with the resident. I have reviewed the key elements of all parts of the encounter with the resident. I agree with the assessment, plan and orders as documented by the resident.   (GE Modifier)      Adela Brush DO

## 2020-10-19 NOTE — PROGRESS NOTES
After obtaining consent, and per orders of Dr. Destiny Blackburn, injection of Gardasil 9 given in Right deltoid by Olegario White. Patient instructed to remain in clinic for 20 minutes afterwards, and to report any adverse reaction to me immediately.

## 2020-10-20 LAB
DIRECT EXAM: NORMAL
Lab: NORMAL
SPECIMEN DESCRIPTION: NORMAL

## 2020-10-23 LAB
C TRACH DNA GENITAL QL NAA+PROBE: NEGATIVE
N. GONORRHOEAE DNA: NEGATIVE
SPECIMEN DESCRIPTION: NORMAL

## 2020-10-27 LAB — CYTOLOGY REPORT: NORMAL

## 2020-11-26 ENCOUNTER — HOSPITAL ENCOUNTER (EMERGENCY)
Age: 35
Discharge: HOME | End: 2020-11-27
Attending: EMERGENCY MEDICINE
Payer: COMMERCIAL

## 2020-11-26 VITALS
WEIGHT: 195.8 LBS | OXYGEN SATURATION: 100 % | HEIGHT: 65 IN | TEMPERATURE: 98.5 F | RESPIRATION RATE: 16 BRPM | BODY MASS INDEX: 32.62 KG/M2 | SYSTOLIC BLOOD PRESSURE: 135 MMHG | DIASTOLIC BLOOD PRESSURE: 80 MMHG | HEART RATE: 72 BPM

## 2020-11-26 PROCEDURE — 99283 EMERGENCY DEPT VISIT LOW MDM: CPT

## 2020-11-26 PROCEDURE — 81025 URINE PREGNANCY TEST: CPT

## 2020-11-26 PROCEDURE — 87510 GARDNER VAG DNA DIR PROBE: CPT

## 2020-11-26 PROCEDURE — 87660 TRICHOMONAS VAGIN DIR PROBE: CPT

## 2020-11-26 PROCEDURE — 81001 URINALYSIS AUTO W/SCOPE: CPT

## 2020-11-26 PROCEDURE — 87480 CANDIDA DNA DIR PROBE: CPT

## 2020-11-26 PROCEDURE — 87591 N.GONORRHOEAE DNA AMP PROB: CPT

## 2020-11-26 PROCEDURE — 87491 CHLMYD TRACH DNA AMP PROBE: CPT

## 2020-11-26 ASSESSMENT — PAIN SCALES - GENERAL: PAINLEVEL_OUTOF10: 5

## 2020-11-26 ASSESSMENT — PAIN DESCRIPTION - LOCATION: LOCATION: ABDOMEN

## 2020-11-26 ASSESSMENT — PAIN DESCRIPTION - ORIENTATION: ORIENTATION: LOWER

## 2020-11-26 ASSESSMENT — PAIN DESCRIPTION - PAIN TYPE: TYPE: ACUTE PAIN

## 2020-11-27 LAB
-: ABNORMAL
AMORPHOUS: ABNORMAL
BACTERIA: ABNORMAL
BILIRUBIN URINE: NEGATIVE
C. TRACHOMATIS DNA ,URINE: NEGATIVE
CASTS UA: ABNORMAL /LPF
CHP ED QC CHECK: NORMAL
COLOR: YELLOW
COMMENT UA: ABNORMAL
CRYSTALS, UA: ABNORMAL /HPF
DIRECT EXAM: ABNORMAL
EPITHELIAL CELLS UA: ABNORMAL /HPF (ref 0–5)
GLUCOSE URINE: NEGATIVE
KETONES, URINE: NEGATIVE
LEUKOCYTE ESTERASE, URINE: NEGATIVE
Lab: ABNORMAL
MUCUS: ABNORMAL
N. GONORRHOEAE DNA, URINE: NEGATIVE
NITRITE, URINE: NEGATIVE
OTHER OBSERVATIONS UA: ABNORMAL
PH UA: 6.5 (ref 5–8)
PREGNANCY TEST URINE, POC: NEGATIVE
PROTEIN UA: NEGATIVE
RBC UA: ABNORMAL /HPF (ref 0–2)
RENAL EPITHELIAL, UA: ABNORMAL /HPF
SPECIFIC GRAVITY UA: 1.02 (ref 1–1.03)
SPECIMEN DESCRIPTION: ABNORMAL
SPECIMEN DESCRIPTION: NORMAL
TRICHOMONAS: ABNORMAL
TURBIDITY: ABNORMAL
URINE HGB: NEGATIVE
UROBILINOGEN, URINE: NORMAL
WBC UA: ABNORMAL /HPF (ref 0–5)
YEAST: ABNORMAL

## 2020-11-27 RX ORDER — METRONIDAZOLE 500 MG/1
500 TABLET ORAL 2 TIMES DAILY
Qty: 14 TABLET | Refills: 0 | Status: SHIPPED | OUTPATIENT
Start: 2020-11-27 | End: 2020-12-04

## 2020-11-27 NOTE — ED PROVIDER NOTES
EMERGENCY DEPARTMENT ENCOUNTER    Pt Name: Cralos Gonzales  MRN: [id number]  [place] 1985  Date of evaluation: 11/27/20  CHIEF COMPLAINT       Chief Complaint   Patient presents with    Vaginal Discharge     smell x1 week     HISTORY OF PRESENT ILLNESS   Patient is a 28-year-old female who presents to the ED for evaluation of vaginal discharge. Discharge started 2 weeks ago however has recently become malodorous. She has 1 sexual partner and does not believe this is an STI. Her LMP was last month with mild spotting. She began OCPs a few months back and her periods are still not regular. No fever, rash. No abdominal pain, hematuria, dysuria. Also, no cough, shortness breath, chest pain. REVIEW OF SYSTEMS     Review of Systems   All other systems reviewed and are negative.     PASTMEDICAL HISTORY     Past Medical History:   Diagnosis Date    Chlamydia     IBS (irritable bowel syndrome) 5/7/2015    Infertility, female, secondary     Trichomonas vaginalis infection 2013     SURGICAL HISTORY       Past Surgical History:   Procedure Laterality Date    CHOLECYSTECTOMY      LAPAROSCOPY N/A 6/22/2019    LAPAROSCOPY EXPLORATORY CONVERTED TO OPEN, RIGHT SALPINGECTOMY performed by Lei Antunez DO at [facility]  06/22/2019    LAPAROSCOPY EXPLORATORY CONVERTED TO OPEN, RIGHT SALPINGECTOMY     SALPINGECTOMY Right 06/22/2019     CURRENT MEDICATIONS       Previous Medications    ACETAMINOPHEN (APAP EXTRA STRENGTH) 500 MG TABLET    Take 1 tablet by mouth every 6 hours as needed for Pain    CYCLOBENZAPRINE (FLEXERIL) 10 MG TABLET    Take 1 tablet by mouth nightly as needed for Muscle spasms    DICYCLOMINE (BENTYL) 10 MG CAPSULE    Take 1 capsule by mouth every 6 hours as needed (cramps)    DOCUSATE SODIUM (COLACE) 100 MG CAPSULE    Take 1 capsule by mouth daily as needed for Constipation    FERROUS SULFATE (FE TABS) 325 (65 FE) MG EC TABLET    Take 1 tablet by mouth 2 times daily    MEDROXYPROGESTERONE (DEPO-PROVERA) 150 MG/ML INJECTION    Inject 1 mL into the muscle every 3 months    NAPROXEN (NAPROSYN) 500 MG TABLET    Take 500 mg by mouth 2 times daily (with meals)    OMEPRAZOLE (PRILOSEC) 40 MG DELAYED RELEASE CAPSULE    Take 1 capsule by mouth daily     ALLERGIES     has No Known Allergies. FAMILY HISTORY     has no family status information on file. SOCIAL HISTORY       Social History     Tobacco Use    Smoking status: Current Every Day Smoker     Types: Cigars    Smokeless tobacco: Never Used    Tobacco comment: black and milds, 1-2 a day   Substance Use Topics    Alcohol use: Not Currently     Comment: week end    Drug use: No     PHYSICAL EXAM     INITIAL VITALS: /80   Pulse 72   Temp 98.5 °F (36.9 °C) (Oral)   Resp 16   Ht 5' 5\" (1.651 m)   Wt 195 lb 12.8 oz (88.8 kg)   SpO2 100%   BMI 32.58 kg/m²    Physical Exam  HENT:      Head: Normocephalic. Right Ear: External ear normal.      Left Ear: External ear normal.      Nose: Nose normal.   Eyes:      Conjunctiva/sclera: Conjunctivae normal.   Cardiovascular:      Rate and Rhythm: Normal rate. Pulmonary:      Effort: Pulmonary effort is normal.   Abdominal:      General: Abdomen is flat. Genitourinary:     Comments: Deferred  Skin:     General: Skin is dry. Neurological:      Mental Status: She is alert. Mental status is at baseline. Psychiatric:         Mood and Affect: Mood normal.         Behavior: Behavior normal.      Comments:     Limited exam secondary to Covid-19 pandemic. MEDICAL DECISION MAKING:   The patient is hemodynamically stable, afebrile, nontoxic-appearing. Physical exam unremarkable. Based on history and exam differential includes UTI, pregnancy, BV, STIs. ED plan for UA, UPT, GC chlamydia, BV probe, reassess.        DIAGNOSTIC RESULTS   EKG:All EKG's are interpreted by the Emergency Department Physician who either signs or Co-signs this chart in the absence of a cardiologist.        RADIOLOGY:All plain film, CT, MRI, and formal ultrasound images (except ED bedside ultrasound) are read by the radiologist, see reports below, unless otherwisenoted in MDM or here. No orders to display     LABS: All lab results were reviewed by myself, and all abnormals are listed below. Labs Reviewed   VAGINITIS DNA PROBE - Abnormal; Notable for the following components:       Result Value    Direct Exam POSITIVE for Gardnerella vaginalis. (*)     All other components within normal limits   POCT URINE PREGNANCY - Normal   C.TRACHOMATIS N.GONORRHOEAE DNA, URINE   URINE RT REFLEX TO CULTURE       EMERGENCY DEPARTMENTCOURSE:   Patient did well in the ED. Labs remarkable for positive BV. Given Rx for Flagyl. No further work-up indicated this time. Nursing notes reviewed. At this time this is what I find, the patient appears well and does not appear sick or toxic. I gave my usual and customary discussion of the risks and benefits of discharge versus admission. I answered the patient's questions. I gave the patient strict return precautions. Patient expressed understanding of the discharge instructions. Dictated but not reviewed. Vitals:    Vitals:    11/26/20 2326   BP: 135/80   Pulse: 72   Resp: 16   Temp: 98.5 °F (36.9 °C)   TempSrc: Oral   SpO2: 100%   Weight: 195 lb 12.8 oz (88.8 kg)   Height: 5' 5\" (1.651 m)       The patient was given the following medications while in the emergency department:  Orders Placed This Encounter   Medications    metroNIDAZOLE (FLAGYL) 500 MG tablet     Sig: Take 1 tablet by mouth 2 times daily for 7 days     Dispense:  14 tablet     Refill:  0     CONSULTS:  None    FINAL IMPRESSION      1.  Bacterial vaginitis          DISPOSITION/PLAN   DISPOSITION Decision To Discharge 11/27/2020 12:43:00 AM      PATIENT REFERRED TO:  MD Nomi Kerns [facility] [address] [facility] [address] [facility] [address]  [phone]    In 2 days      DISCHARGE MEDICATIONS:  New Prescriptions    METRONIDAZOLE (FLAGYL) 500 MG TABLET    Take 1 tablet by mouth 2 times daily for 7 days     Eliseo Bain MD  Attending Emergency Physician                   Nila Bell MD  11/27/20 3789

## 2020-11-30 LAB — HCG, PREGNANCY URINE (POC): NEGATIVE

## 2020-12-22 ENCOUNTER — APPOINTMENT (OUTPATIENT)
Dept: CT IMAGING | Age: 35
End: 2020-12-22
Payer: COMMERCIAL

## 2020-12-22 ENCOUNTER — HOSPITAL ENCOUNTER (EMERGENCY)
Age: 35
Discharge: HOME | End: 2020-12-22
Attending: EMERGENCY MEDICINE
Payer: COMMERCIAL

## 2020-12-22 VITALS
TEMPERATURE: 98.4 F | WEIGHT: 197.2 LBS | SYSTOLIC BLOOD PRESSURE: 144 MMHG | DIASTOLIC BLOOD PRESSURE: 87 MMHG | RESPIRATION RATE: 14 BRPM | BODY MASS INDEX: 32.82 KG/M2 | HEART RATE: 83 BPM | OXYGEN SATURATION: 100 %

## 2020-12-22 LAB
-: ABNORMAL
ABSOLUTE EOS #: 0.21 K/UL (ref 0–0.44)
ABSOLUTE IMMATURE GRANULOCYTE: 0.06 K/UL (ref 0–0.3)
ABSOLUTE LYMPH #: 2.81 K/UL (ref 1.1–3.7)
ABSOLUTE MONO #: 0.62 K/UL (ref 0.1–1.2)
ALBUMIN SERPL-MCNC: 4 G/DL (ref 3.5–5.2)
ALBUMIN/GLOBULIN RATIO: ABNORMAL (ref 1–2.5)
ALP BLD-CCNC: 76 U/L (ref 35–104)
ALT SERPL-CCNC: 13 U/L (ref 5–33)
AMORPHOUS: ABNORMAL
ANION GAP SERPL CALCULATED.3IONS-SCNC: 10 MMOL/L (ref 9–17)
AST SERPL-CCNC: 14 U/L
BACTERIA: ABNORMAL
BASOPHILS # BLD: 1 % (ref 0–2)
BASOPHILS ABSOLUTE: 0.07 K/UL (ref 0–0.2)
BILIRUB SERPL-MCNC: 0.25 MG/DL (ref 0.3–1.2)
BILIRUBIN URINE: NEGATIVE
BUN BLDV-MCNC: 9 MG/DL (ref 6–20)
BUN/CREAT BLD: 9 (ref 9–20)
CALCIUM SERPL-MCNC: 9.2 MG/DL (ref 8.6–10.4)
CASTS UA: ABNORMAL /LPF
CHLORIDE BLD-SCNC: 107 MMOL/L (ref 98–107)
CO2: 21 MMOL/L (ref 20–31)
COLOR: YELLOW
COMMENT UA: ABNORMAL
CREAT SERPL-MCNC: 1 MG/DL (ref 0.5–0.9)
CRYSTALS, UA: ABNORMAL /HPF
DIFFERENTIAL TYPE: ABNORMAL
EOSINOPHILS RELATIVE PERCENT: 2 % (ref 1–4)
EPITHELIAL CELLS UA: ABNORMAL /HPF (ref 0–5)
GFR AFRICAN AMERICAN: >60 ML/MIN
GFR NON-AFRICAN AMERICAN: >60 ML/MIN
GFR SERPL CREATININE-BSD FRML MDRD: ABNORMAL ML/MIN/{1.73_M2}
GFR SERPL CREATININE-BSD FRML MDRD: ABNORMAL ML/MIN/{1.73_M2}
GLUCOSE BLD-MCNC: 117 MG/DL (ref 70–99)
GLUCOSE URINE: NEGATIVE
HCG QUALITATIVE: NEGATIVE
HCT VFR BLD CALC: 38.2 % (ref 36.3–47.1)
HEMOGLOBIN: 11.3 G/DL (ref 11.9–15.1)
IMMATURE GRANULOCYTES: 1 %
KETONES, URINE: NEGATIVE
LEUKOCYTE ESTERASE, URINE: NEGATIVE
LIPASE: 19 U/L (ref 13–60)
LYMPHOCYTES # BLD: 23 % (ref 24–43)
MCH RBC QN AUTO: 23 PG (ref 25.2–33.5)
MCHC RBC AUTO-ENTMCNC: 29.6 G/DL (ref 28.4–34.8)
MCV RBC AUTO: 77.8 FL (ref 82.6–102.9)
MONOCYTES # BLD: 5 % (ref 3–12)
MUCUS: ABNORMAL
NITRITE, URINE: NEGATIVE
NRBC AUTOMATED: 0 PER 100 WBC
OTHER OBSERVATIONS UA: ABNORMAL
PDW BLD-RTO: 17.3 % (ref 11.8–14.4)
PH UA: 6.5 (ref 5–8)
PLATELET # BLD: 298 K/UL (ref 138–453)
PLATELET ESTIMATE: ABNORMAL
PMV BLD AUTO: 10.2 FL (ref 8.1–13.5)
POTASSIUM SERPL-SCNC: 3.5 MMOL/L (ref 3.7–5.3)
PROTEIN UA: NEGATIVE
RBC # BLD: 4.91 M/UL (ref 3.95–5.11)
RBC # BLD: ABNORMAL 10*6/UL
RBC UA: ABNORMAL /HPF (ref 0–2)
RENAL EPITHELIAL, UA: ABNORMAL /HPF
SEG NEUTROPHILS: 68 % (ref 36–65)
SEGMENTED NEUTROPHILS ABSOLUTE COUNT: 8.28 K/UL (ref 1.5–8.1)
SODIUM BLD-SCNC: 138 MMOL/L (ref 135–144)
SPECIFIC GRAVITY UA: 1.02 (ref 1–1.03)
TOTAL PROTEIN: 6.9 G/DL (ref 6.4–8.3)
TRICHOMONAS: ABNORMAL
TURBIDITY: CLEAR
URINE HGB: ABNORMAL
UROBILINOGEN, URINE: NORMAL
WBC # BLD: 12.1 K/UL (ref 3.5–11.3)
WBC # BLD: ABNORMAL 10*3/UL
WBC UA: ABNORMAL /HPF (ref 0–5)
YEAST: ABNORMAL

## 2020-12-22 PROCEDURE — 83690 ASSAY OF LIPASE: CPT

## 2020-12-22 PROCEDURE — 2580000003 HC RX 258: Performed by: PHYSICIAN ASSISTANT

## 2020-12-22 PROCEDURE — 74176 CT ABD & PELVIS W/O CONTRAST: CPT

## 2020-12-22 PROCEDURE — 96374 THER/PROPH/DIAG INJ IV PUSH: CPT

## 2020-12-22 PROCEDURE — 6360000002 HC RX W HCPCS: Performed by: PHYSICIAN ASSISTANT

## 2020-12-22 PROCEDURE — 81001 URINALYSIS AUTO W/SCOPE: CPT

## 2020-12-22 PROCEDURE — 80053 COMPREHEN METABOLIC PANEL: CPT

## 2020-12-22 PROCEDURE — 85025 COMPLETE CBC W/AUTO DIFF WBC: CPT

## 2020-12-22 PROCEDURE — 99282 EMERGENCY DEPT VISIT SF MDM: CPT

## 2020-12-22 PROCEDURE — 96375 TX/PRO/DX INJ NEW DRUG ADDON: CPT

## 2020-12-22 PROCEDURE — 84703 CHORIONIC GONADOTROPIN ASSAY: CPT

## 2020-12-22 RX ORDER — NAPROXEN 500 MG/1
500 TABLET ORAL 2 TIMES DAILY WITH MEALS
Qty: 20 TABLET | Refills: 0 | Status: SHIPPED | OUTPATIENT
Start: 2020-12-22 | End: 2021-03-16

## 2020-12-22 RX ORDER — 0.9 % SODIUM CHLORIDE 0.9 %
1000 INTRAVENOUS SOLUTION INTRAVENOUS ONCE
Status: COMPLETED | OUTPATIENT
Start: 2020-12-22 | End: 2020-12-22

## 2020-12-22 RX ORDER — KETOROLAC TROMETHAMINE 30 MG/ML
30 INJECTION, SOLUTION INTRAMUSCULAR; INTRAVENOUS ONCE
Status: COMPLETED | OUTPATIENT
Start: 2020-12-22 | End: 2020-12-22

## 2020-12-22 RX ORDER — ONDANSETRON 2 MG/ML
4 INJECTION INTRAMUSCULAR; INTRAVENOUS ONCE
Status: COMPLETED | OUTPATIENT
Start: 2020-12-22 | End: 2020-12-22

## 2020-12-22 RX ORDER — ONDANSETRON 4 MG/1
4 TABLET, ORALLY DISINTEGRATING ORAL EVERY 8 HOURS PRN
Qty: 20 TABLET | Refills: 0 | Status: SHIPPED | OUTPATIENT
Start: 2020-12-22 | End: 2021-03-16

## 2020-12-22 RX ORDER — HYDROCODONE BITARTRATE AND ACETAMINOPHEN 5; 325 MG/1; MG/1
1-2 TABLET ORAL EVERY 8 HOURS PRN
Qty: 12 TABLET | Refills: 0 | Status: SHIPPED | OUTPATIENT
Start: 2020-12-22 | End: 2020-12-25

## 2020-12-22 RX ORDER — MORPHINE SULFATE 4 MG/ML
4 INJECTION, SOLUTION INTRAMUSCULAR; INTRAVENOUS ONCE
Status: COMPLETED | OUTPATIENT
Start: 2020-12-22 | End: 2020-12-22

## 2020-12-22 RX ADMIN — ONDANSETRON 4 MG: 2 INJECTION INTRAMUSCULAR; INTRAVENOUS at 16:55

## 2020-12-22 RX ADMIN — KETOROLAC TROMETHAMINE 30 MG: 30 INJECTION, SOLUTION INTRAMUSCULAR at 18:11

## 2020-12-22 RX ADMIN — SODIUM CHLORIDE 1000 ML: 9 INJECTION, SOLUTION INTRAVENOUS at 16:55

## 2020-12-22 RX ADMIN — MORPHINE SULFATE 4 MG: 4 INJECTION, SOLUTION INTRAMUSCULAR; INTRAVENOUS at 16:55

## 2020-12-22 ASSESSMENT — PAIN SCALES - GENERAL
PAINLEVEL_OUTOF10: 7
PAINLEVEL_OUTOF10: 7
PAINLEVEL_OUTOF10: 9

## 2020-12-22 NOTE — ED PROVIDER NOTES
eMERGENCY dEPARTMENT eNCOUnter   Independent Attestation     Pt Name: Erik Valera  MRN: [id number]  [place] 1985  Date of evaluation: 12/22/20     Erik Valera is a 37 y.o. female with CC: Abdominal Pain and Urinary Frequency      Based on the medical record the care appears appropriate. I was personally available for consultation in the Emergency Department.     Hema Guillaume MD  Attending Emergency Physician                   Hema Guillaume MD  12/22/20 Apolinar Schreiber

## 2020-12-22 NOTE — ED PROVIDER NOTES
TO OPEN, RIGHT SALPINGECTOMY performed by Orlando Rodriguez DO at [facility]  06/22/2019    LAPAROSCOPY EXPLORATORY CONVERTED TO OPEN, RIGHT SALPINGECTOMY     SALPINGECTOMY Right 06/22/2019         FAMILY HISTORY     History reviewed. No pertinent family history. No family status information on file. SOCIAL HISTORY      reports that she has been smoking cigars. She has never used smokeless tobacco. She reports previous alcohol use. She reports that she does not use drugs. REVIEW OFSYSTEMS    (2-9 systems for level 4, 10 or more for level 5)   Review of Systems    Except as noted above the remainder of the review of systems was reviewed and negative. PHYSICAL EXAM    (up to 7 for level 4, 8 or more for level 5)     ED Triage Vitals   BP Temp Temp src Pulse Resp SpO2 Height Weight   12/22/20 1543 12/22/20 1543 -- 12/22/20 1543 12/22/20 1543 12/22/20 1543 -- 12/22/20 1541   (!) 144/87 98.4 °F (36.9 °C)  83 14 100 %  197 lb 3.2 oz (89.4 kg)      Physical Exam  Constitutional:       Appearance: She is well-developed. HENT:      Head: Normocephalic and atraumatic. Neck:      Musculoskeletal: Normal range of motion and neck supple. Cardiovascular:      Rate and Rhythm: Normal rate and regular rhythm. Pulmonary:      Effort: Pulmonary effort is normal.      Breath sounds: Normal breath sounds. Abdominal:      Palpations: Abdomen is soft. Tenderness: There is abdominal tenderness in the left lower quadrant. Musculoskeletal: Normal range of motion. Skin:     General: Skin is warm. Findings: No rash. Neurological:      Mental Status: She is alert and oriented to person, place, and time.    Psychiatric:         Behavior: Behavior normal.                 DIAGNOSTIC RESULTS     EKG: All EKG's are interpreted by the Emergency Department Physician who either signs or Co-signs this chart in the absence of a cardiologist.        RADIOLOGY:   Non-plain film images such as CT, Ultrasound and MRI are read by the radiologist. Plain radiographic images arevisualized and preliminarily interpreted by the emergency physician with the below findings:        Interpretation per the Radiologist below, if available at thetime of this note:          ED BEDSIDE ULTRASOUND:   Performed by ED Physician - none    LABS:  Labs Reviewed   CBC WITH AUTO DIFFERENTIAL - Abnormal; Notable for the following components:       Result Value    WBC 12.1 (*)     Hemoglobin 11.3 (*)     MCV 77.8 (*)     MCH 23.0 (*)     RDW 17.3 (*)     Seg Neutrophils 68 (*)     Lymphocytes 23 (*)     Immature Granulocytes 1 (*)     Segs Absolute 8.28 (*)     All other components within normal limits   COMPREHENSIVE METABOLIC PANEL - Abnormal; Notable for the following components:    Glucose 117 (*)     CREATININE 1.00 (*)     Potassium 3.5 (*)     Total Bilirubin 0.25 (*)     All other components within normal limits   HCG, SERUM, QUALITATIVE   LIPASE   URINE RT REFLEX TO CULTURE       All other labs were within normal range or not returned as of this dictation. EMERGENCY DEPARTMENT COURSE and DIFFERENTIAL DIAGNOSIS/MDM:   Vitals:    Vitals:    12/22/20 1541 12/22/20 1543   BP:  (!) 144/87   Pulse:  83   Resp:  14   Temp:  98.4 °F (36.9 °C)   SpO2:  100%   Weight: 197 lb 3.2 oz (89.4 kg)        5:59 PM EST  Signed out to dr Eli Foy with labs with labs pending. Patient will be signed out with urinalysis pending. Patient informed of kidney stone diagnosis. Toradol ordered. Pre-hypertension/Hypertension: The patient has been informed that they may have pre-hypertension or Hypertension based on a blood pressure reading in the emergency department. I recommend that the patient call the primary care provider listed on their discharge instructions or a physician of their choice this week to arrange follow up for further evaluation of possible pre-hypertension or Hypertension. Maxwell Gooden CONSULTS:  None    PROCEDURES:  Procedures        FINAL IMPRESSION      1. Epigastric pain    2. Elevated blood pressure reading          DISPOSITION/PLAN   DISPOSITION        PATIENTREFERRED TO:   No follow-up provider specified.     DISCHARGE MEDICATIONS:     New Prescriptions    No medications on file           (Please note that portions of this note were completed with a voice recognition program.  Efforts were made to edit thedictations but occasionally words are mis-transcribed.)    SOFIE Cantrell PA-C  12/22/20 1800

## 2020-12-23 ENCOUNTER — NURSE ONLY (OUTPATIENT)
Dept: OBGYN | Age: 35
End: 2020-12-23
Payer: COMMERCIAL

## 2020-12-23 PROCEDURE — 90651 9VHPV VACCINE 2/3 DOSE IM: CPT | Performed by: OBSTETRICS & GYNECOLOGY

## 2020-12-23 PROCEDURE — 96372 THER/PROPH/DIAG INJ SC/IM: CPT | Performed by: OBSTETRICS & GYNECOLOGY

## 2021-03-02 ENCOUNTER — OFFICE VISIT (OUTPATIENT)
Dept: INTERNAL MEDICINE | Age: 36
End: 2021-03-02
Payer: COMMERCIAL

## 2021-03-02 VITALS
WEIGHT: 201 LBS | DIASTOLIC BLOOD PRESSURE: 83 MMHG | HEART RATE: 89 BPM | BODY MASS INDEX: 33.49 KG/M2 | HEIGHT: 65 IN | SYSTOLIC BLOOD PRESSURE: 139 MMHG

## 2021-03-02 DIAGNOSIS — E03.8 OTHER SPECIFIED HYPOTHYROIDISM: ICD-10-CM

## 2021-03-02 DIAGNOSIS — I10 STAGE 1 HYPERTENSION: ICD-10-CM

## 2021-03-02 DIAGNOSIS — J35.1 ENLARGED TONSILS: ICD-10-CM

## 2021-03-02 DIAGNOSIS — K58.1 IRRITABLE BOWEL SYNDROME WITH CONSTIPATION: Primary | ICD-10-CM

## 2021-03-02 DIAGNOSIS — N13.30 HYDRONEPHROSIS, LEFT: ICD-10-CM

## 2021-03-02 PROCEDURE — 99213 OFFICE O/P EST LOW 20 MIN: CPT | Performed by: STUDENT IN AN ORGANIZED HEALTH CARE EDUCATION/TRAINING PROGRAM

## 2021-03-02 PROCEDURE — 99211 OFF/OP EST MAY X REQ PHY/QHP: CPT | Performed by: INTERNAL MEDICINE

## 2021-03-02 PROCEDURE — 4004F PT TOBACCO SCREEN RCVD TLK: CPT | Performed by: STUDENT IN AN ORGANIZED HEALTH CARE EDUCATION/TRAINING PROGRAM

## 2021-03-02 PROCEDURE — G8417 CALC BMI ABV UP PARAM F/U: HCPCS | Performed by: STUDENT IN AN ORGANIZED HEALTH CARE EDUCATION/TRAINING PROGRAM

## 2021-03-02 PROCEDURE — G8427 DOCREV CUR MEDS BY ELIG CLIN: HCPCS | Performed by: STUDENT IN AN ORGANIZED HEALTH CARE EDUCATION/TRAINING PROGRAM

## 2021-03-02 PROCEDURE — G8484 FLU IMMUNIZE NO ADMIN: HCPCS | Performed by: STUDENT IN AN ORGANIZED HEALTH CARE EDUCATION/TRAINING PROGRAM

## 2021-03-02 SDOH — ECONOMIC STABILITY: FOOD INSECURITY: WITHIN THE PAST 12 MONTHS, YOU WORRIED THAT YOUR FOOD WOULD RUN OUT BEFORE YOU GOT MONEY TO BUY MORE.: NEVER TRUE

## 2021-03-02 SDOH — ECONOMIC STABILITY: TRANSPORTATION INSECURITY
IN THE PAST 12 MONTHS, HAS LACK OF TRANSPORTATION KEPT YOU FROM MEETINGS, WORK, OR FROM GETTING THINGS NEEDED FOR DAILY LIVING?: NOT ASKED

## 2021-03-02 SDOH — ECONOMIC STABILITY: INCOME INSECURITY: HOW HARD IS IT FOR YOU TO PAY FOR THE VERY BASICS LIKE FOOD, HOUSING, MEDICAL CARE, AND HEATING?: SOMEWHAT HARD

## 2021-03-02 ASSESSMENT — PATIENT HEALTH QUESTIONNAIRE - PHQ9
1. LITTLE INTEREST OR PLEASURE IN DOING THINGS: 2
SUM OF ALL RESPONSES TO PHQ QUESTIONS 1-9: 2

## 2021-03-02 NOTE — PROGRESS NOTES
She also states that her dentist told her that she had enlarged adenoids need to be checked out. Patient does not complain of any recurrent URTI's. On examination adenoids were mildly enlarged though however with no acute erythema or fever or URTI or discharge. Patient advised that they can occur as result of multiple infection in the past.      Patient Active Problem List   Diagnosis    Overweight (BMI 25.0-29. 9)    Contraception    Need for MMR vaccine    Need for Tdap vaccination    Physical exam, pre-employment    Amenorrhea, secondary    Infertility, female, secondary    Chronic abdominal pain    IBS (irritable bowel syndrome)    Late period    Irregular menses    Pregnancy of unknown anatomic location    Abdominal pain affecting pregnancy    Lower abdominal pain    Elevated serum hCG    Pregnancy, ectopic, cornual    Laparoscopic converted to open wedge resection of right cornual ectopic, right salpingectomy and evacuation of hemoperitoneum 6/22/19    S/P ectopic pregnancy       Health Maintenance Due   Topic Date Due    Hepatitis B vaccine (2 of 3 - 3-dose primary series) 11/03/2000    Flu vaccine (1) 09/01/2020       No Known Allergies      Current Outpatient Medications   Medication Sig Dispense Refill    ondansetron (ZOFRAN ODT) 4 MG disintegrating tablet Take 1 tablet by mouth every 8 hours as needed for Nausea (Patient not taking: Reported on 3/2/2021) 20 tablet 0    naproxen (NAPROSYN) 500 MG tablet Take 1 tablet by mouth 2 times daily (with meals) (Patient not taking: Reported on 3/2/2021) 20 tablet 0    medroxyPROGESTERone (DEPO-PROVERA) 150 MG/ML injection Inject 1 mL into the muscle every 3 months (Patient not taking: Reported on 3/2/2021) 1 mL 3    cyclobenzaprine (FLEXERIL) 10 MG tablet Take 1 tablet by mouth nightly as needed for Muscle spasms (Patient not taking: Reported on 9/2/2020) 10 tablet 0  acetaminophen (APAP EXTRA STRENGTH) 500 MG tablet Take 1 tablet by mouth every 6 hours as needed for Pain (Patient not taking: Reported on 9/2/2020) 30 tablet 3    ferrous sulfate (FE TABS) 325 (65 Fe) MG EC tablet Take 1 tablet by mouth 2 times daily (Patient not taking: Reported on 3/18/2020) 90 tablet 3    docusate sodium (COLACE) 100 MG capsule Take 1 capsule by mouth daily as needed for Constipation (Patient not taking: Reported on 3/18/2020) 60 capsule 0    omeprazole (PRILOSEC) 40 MG delayed release capsule Take 1 capsule by mouth daily (Patient not taking: Reported on 3/18/2020) 30 capsule 0    dicyclomine (BENTYL) 10 MG capsule Take 1 capsule by mouth every 6 hours as needed (cramps) (Patient not taking: Reported on 3/18/2020) 20 capsule 0     No current facility-administered medications for this visit. Social History     Tobacco Use    Smoking status: Current Every Day Smoker     Years: 4.00     Types: Cigars     Start date: 3/2/2017    Smokeless tobacco: Never Used    Tobacco comment: black and milds, 1-2 a day   Substance Use Topics    Alcohol use: Not Currently     Comment: week end    Drug use: No       History reviewed. No pertinent family history.    ________________________________________________________________________  Review of Systems:  CONSTITUTIONAL: Generalized tiredness  PSYCH: Denies: anxiety, depression  ALLERGIES: Denies: urticaria  EYES: Denies: blurry vision, decreased vision, photophobia  ENT: Denies: sore throat, nasal congestion  CARDIOVASCULAR: Denies: chest pain, dyspnea on exertion  RESPIRATORY: Denies: cough, hemoptysis, shortness of breath  GI: Denies: Denies: abdominal pain, flank pain  : Denies: Denies: dysuria, frequency/urgency  NEURO: Denies: dizzy/vertigo, headache  MUSCULOSKELETAL: Denies: back pain, joint pain  SKIN: Denies: rash, itching  ________________________________________________________________________  Physical Exam:  Vitals: 03/02/21 1257 03/02/21 1304   BP: (!) 140/86 139/83   Site: Right Upper Arm Left Upper Arm   Position: Sitting Sitting   Cuff Size: Large Adult Large Adult   Pulse: 89    Weight: 201 lb (91.2 kg)    Height: 5' 5\" (1.651 m)      BP Readings from Last 3 Encounters:   03/02/21 139/83   12/22/20 (!) 144/87   11/26/20 135/80         General appearance - alert, well appearing, and in no distress  HEENT neck - supple, no significant adenopathy, mildly enlarged bilateral adenoids. Chest - clear to auscultation, no wheezes, rales or rhonchi, symmetric air entry  Heart - normal rate, regular rhythm, normal S1, S2, no murmurs, rubs, clicks or gallops  Abdomen - soft, nontender, nondistended, no masses or organomegaly  Back exam -nonsignificant. Neurological - alert, oriented, normal speech, no focal findings or movement disorder noted  Musculoskeletal - no joint tenderness, deformity or swelling  Extremities - peripheral pulses normal, no pedal edema, no clubbing or cyanosis       ________________________________________________________________________  Diagnostic findings:  CBC:  Lab Results   Component Value Date    WBC 12.1 12/22/2020    HGB 11.3 12/22/2020     12/22/2020       BMP:    Lab Results   Component Value Date     12/22/2020    K 3.5 12/22/2020     12/22/2020    CO2 21 12/22/2020    BUN 9 12/22/2020    CREATININE 1.00 12/22/2020    GLUCOSE 117 12/22/2020       HEMOGLOBIN A1C: No results found for: LABA1C    FASTING LIPID PANEL:  Lab Results   Component Value Date    CHOL 161 05/15/2014    HDL 46 05/15/2014    TRIG 89 05/15/2014     ________________________________________________________________________  Assessment and Plan:  Naheed Florence was seen today for employment physical and health maintenance. Diagnoses and all orders for this visit:    Irritable bowel syndrome with constipation:  Continue docusate. Other specified hypothyroidism:  Follow-up TSH and free T4/T3 levels. Enlarged tonsils: Currently nontoxic. We will continue to follow. Stage 1 hypertension: We will order secondary work-up for hypertension, renin/aldosterone levels, renal ultrasound, lipid panel, letter for weight loss and diet modification, advised to quit smoking. History of left-sided hydronephrosis: We will follow up with a renal ultrasound currently patient is asymptomatic.  ________________________________________________________________________  Follow up and instructions:  Return in about 4 weeks (around 3/30/2021) for Follow Up. · Nisha Guy received counseling on the following healthy behaviors: nutrition, exercise, medication adherence and tobacco cessation    · Discussed use, benefit, and side effects of prescribed medications. Barriers to medication compliance addressed. All patient questions answered. Pt voiced understanding. · Patient given educational materials - see patient instructions    Janina Linn MD      Department of Internal Medicine  [facility]         3/2/2021, 1:33 PM      This note is created with the assistance of a speech-recognition program. While intending to generate a document that actually reflects the content of the visit, the document can still have some mistakes which may not have been identified and corrected by editing. Attending Physician Statement  I have discussed the care of Sunshine Lee, including pertinent history and exam findings,  with the resident. I have reviewed the key elements of all parts of the encounter with the resident. I agree with the assessment, plan and orders as documented by the resident. (GE Modifier)    Workup for secondary hypertension initiated. Will obtain renal ultrasound to eval for resolution of hydronephrosis.

## 2021-03-02 NOTE — PATIENT INSTRUCTIONS
Medications e-scribe to pharmacy of pt's choice. Labs given to patient, they will have them done before their next visit. Your doctor has ordered fasting blood work. It can be done at [facility] or at the hospital. Gracy Hinds will need to fast for 12 hours and bring order with you to registration department. Order for US and VL faxed to [address] [place] [address] they will all pt for appt. Please call [phone] in not heard within 2 weeks. Return To Clinic 3/30/2021. After Visit Summary  given and reviewed. It is very important for your care that you keep your appointment. If for some reason you are unable to keep your appointment it is equally important that you call our office at [phone] to cancel your appointment and reschedule. Failure to do so may result in your termination from our practice. SL     Work physical form scanned into media and given to the patient.

## 2021-03-03 ENCOUNTER — HOSPITAL ENCOUNTER (OUTPATIENT)
Age: 36
Setting detail: SPECIMEN
Discharge: HOME | End: 2021-03-03
Payer: COMMERCIAL

## 2021-03-03 ENCOUNTER — HOSPITAL ENCOUNTER (OUTPATIENT)
Age: 36
Discharge: HOME | End: 2021-03-03
Payer: COMMERCIAL

## 2021-03-03 DIAGNOSIS — E03.8 OTHER SPECIFIED HYPOTHYROIDISM: ICD-10-CM

## 2021-03-03 DIAGNOSIS — I10 STAGE 1 HYPERTENSION: ICD-10-CM

## 2021-03-03 DIAGNOSIS — N13.30 HYDRONEPHROSIS, LEFT: ICD-10-CM

## 2021-03-03 LAB
CHOLESTEROL, FASTING: 150 MG/DL
CHOLESTEROL/HDL RATIO: 4.2
HDLC SERPL-MCNC: 36 MG/DL
LDL CHOLESTEROL: 84 MG/DL (ref 0–130)
THYROXINE, FREE: 1 NG/DL (ref 0.93–1.7)
TRIGLYCERIDE, FASTING: 150 MG/DL
TSH SERPL DL<=0.05 MIU/L-ACNC: 1.15 MIU/L (ref 0.3–5)
VLDLC SERPL CALC-MCNC: ABNORMAL MG/DL (ref 1–30)

## 2021-03-03 PROCEDURE — 82088 ASSAY OF ALDOSTERONE: CPT

## 2021-03-03 PROCEDURE — 80061 LIPID PANEL: CPT

## 2021-03-03 PROCEDURE — 36415 COLL VENOUS BLD VENIPUNCTURE: CPT

## 2021-03-03 PROCEDURE — 84439 ASSAY OF FREE THYROXINE: CPT

## 2021-03-03 PROCEDURE — 83835 ASSAY OF METANEPHRINES: CPT

## 2021-03-03 PROCEDURE — 84443 ASSAY THYROID STIM HORMONE: CPT

## 2021-03-03 PROCEDURE — 84244 ASSAY OF RENIN: CPT

## 2021-03-04 LAB
CHOLESTEROL/HDL RATIO: 4.2
CHOLESTEROL: 150 MG/DL
HDLC SERPL-MCNC: 36 MG/DL
LDL CHOLESTEROL: 84 MG/DL (ref 0–130)
THYROXINE, FREE: 1 NG/DL (ref 0.93–1.7)
TRIGL SERPL-MCNC: 150 MG/DL
TSH SERPL DL<=0.05 MIU/L-ACNC: 1.15 MIU/L (ref 0.3–5)
VLDLC SERPL CALC-MCNC: ABNORMAL MG/DL (ref 1–30)

## 2021-03-05 LAB
ALDOSTERONE COMMENT: NORMAL
ALDOSTERONE: 9.1 NG/DL

## 2021-03-06 LAB
METANEPH/PLASMA INTERP: NORMAL
METANEPHRINE: 0.28 NMOL/L (ref 0–0.49)
NORMETANEPHRINE PLASMA: 0.34 NMOL/L (ref 0–0.89)

## 2021-03-08 LAB
RENIN ACTIVITY: 0.3 NG/ML/HR
RENIN COMMENT: NORMAL

## 2021-03-16 ENCOUNTER — OFFICE VISIT (OUTPATIENT)
Dept: OBGYN | Age: 36
End: 2021-03-16
Payer: COMMERCIAL

## 2021-03-16 VITALS
SYSTOLIC BLOOD PRESSURE: 117 MMHG | DIASTOLIC BLOOD PRESSURE: 81 MMHG | HEART RATE: 90 BPM | BODY MASS INDEX: 32.38 KG/M2 | WEIGHT: 194.6 LBS

## 2021-03-16 DIAGNOSIS — N91.2 AMENORRHEA: Primary | ICD-10-CM

## 2021-03-16 LAB
CONTROL: PRESENT
PREGNANCY TEST URINE, POC: NEGATIVE

## 2021-03-16 PROCEDURE — 99213 OFFICE O/P EST LOW 20 MIN: CPT | Performed by: STUDENT IN AN ORGANIZED HEALTH CARE EDUCATION/TRAINING PROGRAM

## 2021-03-16 PROCEDURE — 81025 URINE PREGNANCY TEST: CPT | Performed by: STUDENT IN AN ORGANIZED HEALTH CARE EDUCATION/TRAINING PROGRAM

## 2021-03-16 PROCEDURE — 99211 OFF/OP EST MAY X REQ PHY/QHP: CPT | Performed by: STUDENT IN AN ORGANIZED HEALTH CARE EDUCATION/TRAINING PROGRAM

## 2021-03-16 NOTE — PROGRESS NOTES
OB/GYN Problem Visit    Dayne Grimes  3/16/2021                       Primary Care Physician: Kale Willoughby MD    CC:   Chief Complaint   Patient presents with    Follow-up     Patient has not head a period since stopping depo in December         HPI: Dayne Grimes is a 37 y.o. female [id number]    The patient was seen and examined. She is here for irregular menses. She is s/p Depo provera x6 months due to AUB. She stopped taking Depo in December and has not had a period. Offered the patient to start OCPs for menstrual regulation and she declined. UPT is negative today. Patient is planning to have a tummy tuck in [place]. She states it is \"cheaper there and they know what they are doing more than people in [place]". She had a phone consultation with a surgeon in [place] last week and was told to start taking Floradix OTC liquid Iron and herbal supplement and vitamin C. Her No LMP recorded. (Menstrual status: Irregular periods). Last period was prior to starting Depo 6 months ago.      REVIEW OF SYSTEMS:      Constitutional: negative fever, negative chills  HEENT: negative visual disturbances, negative headaches  Respiratory: negative dyspnea, negative cough  Cardiovascular: negative chest pain,  negative palpitations  Gastrointestinal: negative abdominal pain, negative RUQ pain, negative N/V, negative diarrhea, negative constipation  Genitourinary: negative dysuria, negative vaginal discharge  Dermatological: negative rash, negative wounds  Hematologic: negative bleeding/clotting disorder  Immunologic: negative recent illness, negative recent sick contact, negative allergic reactions  Lymphatic: negative lymph nodes  Musculoskeletal: negative back pain, negative myalgias, negative arthralgias  Neurological:  negative dizziness, negative weakness  Behavior/Psych: negative depression, negative anxiety    ________________________________________________________________________    GYNECOLOGICAL HISTORY: Sexually Active: not sexually active  STD History: no past history    Pap History: Last PAP was normal; 2020. Colposcopy History: denies    Permanent Sterilization: no  Reversible Birth Control: yes - Depo Provera  Hormone Replacement Exposure: no    OBSTETRICAL HISTORY:  OB History    Para Term  AB Living   4 1 1   2 1   SAB TAB Ectopic Molar Multiple Live Births     1 1     1      # Outcome Date GA Lbr Gael/2nd Weight Sex Delivery Anes PTL Lv   4 Ectopic 19              Birth Comments: Right cornual ectopic,wedge resection with R salpingectomy, evac hemoperitoneum   3 Term 09 40w0d  9 lb 10 oz (4.366 kg) M CS-Unspec Spinal  FLORENCIO   2             1 TAB               Obstetric Comments   G1: C/S \"pelvis wouldn't open up\"   G2: TAB   G3: Exploratory (pfannenstiel) laparoscopy converted to open laparotomy with wedge resection of cornual ectopic, right salpingectomy and evacuation of hemoperitoneum on 2019. Endometrial cavity was not entered.    G4:        PAST MEDICAL HISTORY:      Diagnosis Date    Chlamydia     IBS (irritable bowel syndrome) 2015    Infertility, female, secondary     Trichomonas vaginalis infection        PAST SURGICAL HISTORY:                                                                    Procedure Laterality Date    CHOLECYSTECTOMY      LAPAROSCOPY N/A 2019    LAPAROSCOPY EXPLORATORY CONVERTED TO OPEN, RIGHT SALPINGECTOMY performed by Nancy Rajan DO at [facility]  2019    LAPAROSCOPY EXPLORATORY CONVERTED TO OPEN, RIGHT SALPINGECTOMY     SALPINGECTOMY Right 2019       MEDICATIONS:  Current Outpatient Medications   Medication Sig Dispense Refill    ferrous sulfate (FE TABS) 325 (65 Fe) MG EC tablet Take 1 tablet by mouth 2 times daily (Patient not taking: Reported on 3/18/2020) 90 tablet 3    docusate sodium (COLACE) 100 MG capsule Take 1 capsule by mouth daily as needed for Constipation (Patient not taking: Reported on 3/18/2020) 60 capsule 0     No current facility-administered medications for this visit. ALLERGIES:  Allergies as of 03/16/2021    (No Known Allergies)                                   VITALS:  Vitals:    03/16/21 1605   BP: 117/81   Site: Left Upper Arm   Position: Sitting   Cuff Size: Large Adult   Pulse: 90   Weight: 194 lb 9.6 oz (88.3 kg)                                                                                                                                                                         PHYSICAL EXAM:     General Appearance: Appears healthy. Alert; in no acute distress. Pleasant. Skin: Normal  Lymphatic: No cervical, superclavicular, axillary, or inguinal adenopathy. HEENT: normocephalic and atraumatic, Thyroid normal to inspection and palpation  Respiratory: clear to auscultation, no wheezes, rales or rhonchi, symmetric air entry  Cardiovascular: normal, regular rate and rhythm, no murmurs, rubs, or gallops  Breast:  (Chest): not done   Abdomen: soft, non-tender, non-distended, no right upper quadrant tenderness and no CVA tenderness, no abdominal scars  Pelvic Exam: declined    Rectal Exam: deferred  Extremities: non-tender BLE and non-edematous  Musculoskeletal: no gross abnormalities  Psych: Alert and oriented, appropriate affect.       DATA:  Results for POC orders placed in visit on 03/16/21   POCT urine pregnancy   Result Value Ref Range    Preg Test, Ur negative     Control present        ASSESSMENT & PLAN:    Marco Ruggiero is a 37 y.o. female [id number] with amenorrhea    - Afebrile, VSS    - Last pap negative October 2020   - UPT negative today    - Likely secondary to Depo (last injection December 2020), discussed prolonged return to regular menses with Depo    - Declined OCPs for menstrual regulation    - Follow up in April for 3rd Gardasil dose    - Follow up in October for annual     Patient Active Problem List Diagnosis Date Noted    S/P ectopic pregnancy 06/23/2019    Irregular menses 06/22/2019    Pregnancy of unknown anatomic location 06/22/2019    Abdominal pain affecting pregnancy 06/22/2019    Laparoscopic converted to open wedge resection of right cornual ectopic, right salpingectomy and evacuation of hemoperitoneum 6/22/19 06/22/2019     Patient counseled extensively on the need to not get pregnant for at least 18 months.  Lower abdominal pain     Elevated serum hCG     Pregnancy, ectopic, cornual     Late period 01/08/2016    Chronic abdominal pain 05/07/2015    IBS (irritable bowel syndrome) 05/07/2015    Amenorrhea, secondary 10/24/2014    Infertility, female, secondary 10/24/2014    Need for MMR vaccine 04/25/2014    Need for Tdap vaccination 04/25/2014    Physical exam, pre-employment 04/25/2014    Overweight (BMI 25.0-29.9) 02/14/2013    Contraception 02/14/2013       Return in about 7 months (around 10/16/2021) for Annual Exam.    Counseling Completed:  discussed need for repeat pap every 5 years, if negative. discussed birth control and barrier recommendations. Discussed STD counseling and prevention. Discussed Gardisil counseling for all patients 7-33 yo. Hereditary Breast, Ovarian, Colon and Uterine Cancer screening discussed. Tobacco & Secondary smoke risks discussed; with recommendation for cessation and avoidance. Routine health maintenance per patients PCP discussed. Patient was seen with total face to face time of 15 minutes. More than 50% of this visit was on counseling and education regarding her diagnose(s) as listed below and options. She was also counseled on her preventative health maintenance recommendations and follow-up. Diagnosis Orders   1.  Amenorrhea  POCT urine pregnancy       Christiano Latham DO  Ob/Gyn Resident  [facility], [facility]  3/16/2021, 4:22 PM       Attending Physician Statement  I have personally discussed the care of Nohelia Juarez, including pertinent history and exam findings with the resident. I have reviewed and edited their note in the electronic medical record as indicated. The key elements of all parts of the encounter have been performed/reviewed by me. I agree with the assessment, plan and orders as documented by the resident.        Attending's Name:  Juan Tony MD

## 2021-03-19 ENCOUNTER — HOSPITAL ENCOUNTER (OUTPATIENT)
Dept: ULTRASOUND IMAGING | Age: 36
Discharge: HOME | End: 2021-03-21
Payer: COMMERCIAL

## 2021-03-19 ENCOUNTER — HOSPITAL ENCOUNTER (OUTPATIENT)
Dept: VASCULAR LAB | Age: 36
Discharge: HOME | End: 2021-03-19
Payer: COMMERCIAL

## 2021-03-19 DIAGNOSIS — I10 STAGE 1 HYPERTENSION: ICD-10-CM

## 2021-03-19 DIAGNOSIS — N13.30 HYDRONEPHROSIS, LEFT: ICD-10-CM

## 2021-03-19 PROCEDURE — 76775 US EXAM ABDO BACK WALL LIM: CPT

## 2021-03-19 PROCEDURE — 93975 VASCULAR STUDY: CPT

## 2021-03-30 ENCOUNTER — VIRTUAL VISIT (OUTPATIENT)
Dept: INTERNAL MEDICINE | Age: 36
End: 2021-03-30
Payer: COMMERCIAL

## 2021-03-30 DIAGNOSIS — I10 STAGE 1 HYPERTENSION: ICD-10-CM

## 2021-03-30 DIAGNOSIS — N13.30 HYDRONEPHROSIS, LEFT: Primary | ICD-10-CM

## 2021-03-30 PROCEDURE — 99442 PR PHYS/QHP TELEPHONE EVALUATION 11-20 MIN: CPT | Performed by: INTERNAL MEDICINE

## 2021-03-30 NOTE — PATIENT INSTRUCTIONS
An After Visit Summary was printed and given to the patient. Return in a year or when/as needed.    ALO

## 2021-03-30 NOTE — PROGRESS NOTES
3/30/2021    TELEHEALTH EVALUATION -- Audio(During [facility]- public health emergency)    Patient is evaluated via telephone on 2020. Consent:  The patient and/or health care decision maker is aware that that he may receive a bill for this telephone service, depending on his insurance coverage, and has provided verbal consent to proceed: Yes    HPI:    Doc Bates (:  1985) has requested an audio evaluation for the following concern(s):    To discuss labs; no abnormality noted . Review of Systems    Prior to Visit Medications    Medication Sig Taking? Authorizing Provider   ferrous sulfate (FE TABS) 325 (65 Fe) MG EC tablet Take 1 tablet by mouth 2 times daily  Patient not taking: Reported on 3/18/2020  Chris Fgaan DO   docusate sodium (COLACE) 100 MG capsule Take 1 capsule by mouth daily as needed for Constipation  Patient not taking: Reported on 3/18/2020  Twin Almanza DO       Social History     Tobacco Use    Smoking status: Current Every Day Smoker     Years: 4.00     Types: Cigars     Start date: 3/2/2017    Smokeless tobacco: Never Used    Tobacco comment: black and milds, 1-2 a day   Substance Use Topics    Alcohol use: Not Currently     Comment: week end    Drug use: No            RECORD REVIEW: Previous medical records were reviewed at today's visit.     PHYSICAL EXAMINATION:    Vital Signs: (As obtained by patient/caregiver at home)      Constitutional: [] Appears well-developed and well-nourished [] No apparent distress      [] Abnormal   Mental status  [] Alert and awake  [] Oriented to person/place/time []Able to follow commands        Pulmonary/Chest: [] Respiratory effort normal.  [] No visualized signs of difficulty breathing or respiratory distress        [] Abnormal              Psychiatric:       [] Normal [] Abnormal        [] No Hallucinations    Other pertinent observable physical exam findings:-            RECORD REVIEW: Previous medical records were reviewed at today's visit. The past family, medical and social histories were reviewed and unchanged with the exceptions of what is mentioned in this note. Due to this being a TeleHealth encounter, evaluation of the following organ systems is limited: Vitals/Constitutional/EENT/Resp/CV/GI//MS/Neuro/Skin/Heme-Lymph-Imm. ASSESSMENT/PLAN:  1. Hydronephrosis, left      2. Stage 1 hypertension    Plan :     Labs ordered in the last visit for the same day visit are all normal and were discussed in details with the patient   All questions were answered         Total Time spent 25 min     No follow-ups on file. An  electronic signature was used to authenticate this note. --Santy Rosales MD on 3/30/2021 at 4:25 PM    9}    Pursuant to the emergency declaration under the [facility] waiver authority and the [employer] and [employer] Act, this Virtual  Visit was conducted, with patient's consent, to reduce the patient's risk of exposure to COVID-19 and provide continuity of care for an established patient. Services were provided through a telephone discussion virtually to substitute for in-person clinic visit.

## 2021-04-28 ENCOUNTER — TELEPHONE (OUTPATIENT)
Dept: OBGYN | Age: 36
End: 2021-04-28

## 2021-05-05 ENCOUNTER — NURSE ONLY (OUTPATIENT)
Dept: OBGYN | Age: 36
End: 2021-05-05
Payer: COMMERCIAL

## 2021-05-05 VITALS — TEMPERATURE: 98.3 F | BODY MASS INDEX: 32.32 KG/M2 | HEIGHT: 65 IN | WEIGHT: 194 LBS

## 2021-05-05 DIAGNOSIS — Z23 NEED FOR VACCINATION: ICD-10-CM

## 2021-05-05 PROCEDURE — 90471 IMMUNIZATION ADMIN: CPT | Performed by: OBSTETRICS & GYNECOLOGY

## 2021-05-05 PROCEDURE — 90651 9VHPV VACCINE 2/3 DOSE IM: CPT

## 2021-05-05 PROCEDURE — 90649 4VHPV VACCINE 3 DOSE IM: CPT | Performed by: OBSTETRICS & GYNECOLOGY

## 2021-06-29 ENCOUNTER — HOSPITAL ENCOUNTER (EMERGENCY)
Age: 36
Discharge: HOME | End: 2021-06-30
Attending: EMERGENCY MEDICINE
Payer: COMMERCIAL

## 2021-06-29 VITALS
HEIGHT: 65 IN | RESPIRATION RATE: 14 BRPM | OXYGEN SATURATION: 98 % | BODY MASS INDEX: 33.14 KG/M2 | SYSTOLIC BLOOD PRESSURE: 151 MMHG | HEART RATE: 74 BPM | WEIGHT: 198.9 LBS | TEMPERATURE: 98.1 F | DIASTOLIC BLOOD PRESSURE: 99 MMHG

## 2021-06-29 DIAGNOSIS — R42 VERTIGO: Primary | ICD-10-CM

## 2021-06-29 LAB
CHP ED QC CHECK: YES
PREGNANCY TEST URINE, POC: NORMAL

## 2021-06-29 PROCEDURE — 93005 ELECTROCARDIOGRAM TRACING: CPT | Performed by: EMERGENCY MEDICINE

## 2021-06-29 PROCEDURE — 6370000000 HC RX 637 (ALT 250 FOR IP): Performed by: EMERGENCY MEDICINE

## 2021-06-29 PROCEDURE — 99283 EMERGENCY DEPT VISIT LOW MDM: CPT

## 2021-06-29 PROCEDURE — 81025 URINE PREGNANCY TEST: CPT

## 2021-06-29 RX ORDER — MECLIZINE HCL 12.5 MG/1
25 TABLET ORAL ONCE
Status: COMPLETED | OUTPATIENT
Start: 2021-06-29 | End: 2021-06-29

## 2021-06-29 RX ADMIN — MECLIZINE 25 MG: 12.5 TABLET ORAL at 23:31

## 2021-06-29 ASSESSMENT — ENCOUNTER SYMPTOMS
SHORTNESS OF BREATH: 0
TROUBLE SWALLOWING: 0
ABDOMINAL PAIN: 0

## 2021-06-30 LAB
EKG ATRIAL RATE: 66 BPM
EKG P AXIS: 53 DEGREES
EKG P-R INTERVAL: 138 MS
EKG Q-T INTERVAL: 402 MS
EKG QRS DURATION: 84 MS
EKG QTC CALCULATION (BAZETT): 421 MS
EKG R AXIS: 45 DEGREES
EKG T AXIS: 57 DEGREES
EKG VENTRICULAR RATE: 66 BPM
HCG, PREGNANCY URINE (POC): NEGATIVE

## 2021-06-30 PROCEDURE — 93010 ELECTROCARDIOGRAM REPORT: CPT | Performed by: INTERNAL MEDICINE

## 2021-06-30 RX ORDER — MECLIZINE HYDROCHLORIDE 25 MG/1
25 TABLET ORAL 3 TIMES DAILY PRN
Qty: 30 TABLET | Refills: 0 | Status: SHIPPED | OUTPATIENT
Start: 2021-06-30 | End: 2021-07-10

## 2021-06-30 NOTE — ED PROVIDER NOTES
EMERGENCY DEPARTMENT ENCOUNTER    Pt Name: Karla Snow  MRN: [id number]  [place] 1985  Date of evaluation: 6/29/21  CHIEF COMPLAINT       Chief Complaint   Patient presents with    Dizziness     onset last night. HISTORY OF PRESENT ILLNESS   Patient is a 72-year-old female here with dizziness. She states it started last night. She states she mainly notices it when she stands up or changes head position. She states she has had this in the past but never formally diagnosed or evaluated for vertigo. Denies any other symptoms. No headache no blurry or double vision no trouble speech no focal weakness numbness tingling of the arms or legs. States she is walking straight with normal gait. Denies any neck stiffness chest pain shortness of breath fever nausea vomiting or abdominal pain diarrhea or constipation. States her last period was about a week ago. Denies any history of arrhythmia. REVIEW OF SYSTEMS     Review of Systems   Constitutional: Negative for chills and fever. HENT: Negative for trouble swallowing. Eyes: Negative for visual disturbance. Respiratory: Negative for shortness of breath. Cardiovascular: Negative for chest pain. Gastrointestinal: Negative for abdominal pain. Genitourinary: Negative for difficulty urinating. Musculoskeletal: Negative for neck pain. Skin: Negative for rash. Neurological: Positive for dizziness. Negative for weakness. Psychiatric/Behavioral: Negative for confusion.      PASTMEDICAL HISTORY     Past Medical History:   Diagnosis Date    Chlamydia     IBS (irritable bowel syndrome) 5/7/2015    Infertility, female, secondary     Trichomonas vaginalis infection 2013     SURGICAL HISTORY       Past Surgical History:   Procedure Laterality Date    CHOLECYSTECTOMY      LAPAROSCOPY N/A 6/22/2019    LAPAROSCOPY EXPLORATORY CONVERTED TO OPEN, RIGHT SALPINGECTOMY performed by Yehuda Stover DO at [address] [facility] LAPAROSCOPY  06/22/2019    LAPAROSCOPY EXPLORATORY CONVERTED TO OPEN, RIGHT SALPINGECTOMY     SALPINGECTOMY Right 06/22/2019     CURRENT MEDICATIONS       Previous Medications    DOCUSATE SODIUM (COLACE) 100 MG CAPSULE    Take 1 capsule by mouth daily as needed for Constipation    FERROUS SULFATE (FE TABS) 325 (65 FE) MG EC TABLET    Take 1 tablet by mouth 2 times daily     ALLERGIES     has No Known Allergies. FAMILY HISTORY     has no family status information on file. SOCIAL HISTORY       Social History     Tobacco Use    Smoking status: Current Every Day Smoker     Years: 4.00     Types: Cigars     Start date: 3/2/2017    Smokeless tobacco: Never Used    Tobacco comment: black and milds, 1-2 a day   Vaping Use    Vaping Use: Never used   Substance Use Topics    Alcohol use: Not Currently     Comment: week end    Drug use: No     PHYSICAL EXAM     INITIAL VITALS: BP (!) 151/99   Pulse 74   Temp 98.1 °F (36.7 °C) (Oral)   Resp 14   Ht 5' 5\" (1.651 m)   Wt 198 lb 14.4 oz (90.2 kg)   LMP  (Within Weeks) Comment: a week ago  SpO2 98%   BMI 33.10 kg/m²    Physical Exam  Vitals and nursing note reviewed. Constitutional:       General: She is not in acute distress. Appearance: She is not ill-appearing, toxic-appearing or diaphoretic. HENT:      Head: Normocephalic and atraumatic. Right Ear: Tympanic membrane and ear canal normal.      Left Ear: Tympanic membrane and ear canal normal.      Mouth/Throat:      Mouth: Mucous membranes are moist.      Pharynx: Oropharynx is clear. Eyes:      General: No scleral icterus. Extraocular Movements: Extraocular movements intact. Pupils: Pupils are equal, round, and reactive to light. Cardiovascular:      Rate and Rhythm: Normal rate and regular rhythm. Pulses: Normal pulses. Heart sounds: Normal heart sounds. Pulmonary:      Effort: Pulmonary effort is normal. No respiratory distress.    Abdominal:      Palpations: Abdomen is soft. Tenderness: There is no abdominal tenderness. Musculoskeletal:         General: No deformity. Normal range of motion. Cervical back: Normal range of motion and neck supple. No rigidity. Skin:     General: Skin is warm and dry. Capillary Refill: Capillary refill takes less than 2 seconds. Neurological:      General: No focal deficit present. Mental Status: She is alert and oriented to person, place, and time. GCS: GCS eye subscore is 4. GCS verbal subscore is 5. GCS motor subscore is 6. Cranial Nerves: No cranial nerve deficit, dysarthria or facial asymmetry. Sensory: Sensation is intact. Motor: Motor function is intact. No pronator drift. Coordination: Finger-Nose-Finger Test normal.      Gait: Gait is intact. Comments: No nystagmus no ataxia   Psychiatric:         Thought Content: Thought content normal.         MEDICAL DECISION MAKING:          Please see ED Course below for MDM/ED course. DDx: Vertigo, ear infection, vertebrobasilar insufficiency, mass arrhythmia    All patient's question's and concerns were answered prior to disposition and patient and/or family expressed understanding and agreement of treatment plan. NIH STROKE SCALE:            PROCEDURES:    Procedures    DIAGNOSTIC RESULTS   EKG:All EKG's are interpreted by the Emergency Department Physician who either signs or Co-signs this chart in the absence of a cardiologist.    Normal sinus rhythm rate of 66 normal intervals normal axis no ST elevations or depressions nonspecific ST-T wave changes no Q waves, no epsilon wave, no HOCM no arrhythmogenic right no prolonged QT no Brugada    RADIOLOGY:All plain film, CT, MRI, and formal ultrasound images (except ED bedside ultrasound) are read by the radiologist, see reports below, unless otherwisenoted in MDM or here. No orders to display     LABS: All lab results were reviewed by myself, and all abnormals are listed below.   Labs Reviewed   POCT URINE PREGNANCY - Normal       EMERGENCY DEPARTMENTCOURSE:     Patient is a 42-year-old female here with dizziness. She has no neurologic deficits no nystagmus. Ear exam normal.  Heart sounds are regular. Will check a pregnancy test and an EKG, will give her Antivert and reassess. EKG no arrhythmia, pregnancy test negative. Patient reassessed she is up and ambulatory she states her symptoms have improved somewhat. She is tolerating p.o. intake. Appears well, she states she would like to go home. She stable to follow-up with her doctor. Strict return precautions given. Given a prescription for Antivert. Vitals:    Vitals:    06/29/21 2246   BP: (!) 151/99   Pulse: 74   Resp: 14   Temp: 98.1 °F (36.7 °C)   TempSrc: Oral   SpO2: 98%   Weight: 198 lb 14.4 oz (90.2 kg)   Height: 5' 5\" (1.651 m)       The patient was given the following medications while in the emergency department:  Orders Placed This Encounter   Medications    meclizine (ANTIVERT) tablet 25 mg    meclizine (ANTIVERT) 25 MG tablet     Sig: Take 1 tablet by mouth 3 times daily as needed for Dizziness     Dispense:  30 tablet     Refill:  0     CONSULTS:  None    FINAL IMPRESSION      1.  Vertigo          DISPOSITION/PLAN   DISPOSITION decision to discharge      PATIENT REFERRED TO:  MD Nomi [address] [place] [address]. [address] [id number] [phone]    Schedule an appointment as soon as possible for a visit       [facility] [place] ED  [address] [facility]  [phone]    If symptoms worsen    DISCHARGE MEDICATIONS:  New Prescriptions    MECLIZINE (ANTIVERT) 25 MG TABLET    Take 1 tablet by mouth 3 times daily as needed for Dizziness     Hema Guillaume MD  Attending Emergency Physician    This note was created with the assistance of a speech-recognition program. While intending to generate a document that actually reflects the content of the visit, no guarantees can be provided that every mistake has been identified and corrected by editing.                     Guido Dyson MD  06/30/21 1899

## 2021-07-23 ENCOUNTER — HOSPITAL ENCOUNTER (EMERGENCY)
Age: 36
Discharge: HOME | End: 2021-07-23
Attending: EMERGENCY MEDICINE
Payer: COMMERCIAL

## 2021-07-23 VITALS
WEIGHT: 196.4 LBS | DIASTOLIC BLOOD PRESSURE: 89 MMHG | SYSTOLIC BLOOD PRESSURE: 128 MMHG | HEART RATE: 86 BPM | OXYGEN SATURATION: 96 % | RESPIRATION RATE: 20 BRPM | TEMPERATURE: 99.3 F | BODY MASS INDEX: 32.72 KG/M2 | HEIGHT: 65 IN

## 2021-07-23 DIAGNOSIS — B34.9 VIRAL SYNDROME: Primary | ICD-10-CM

## 2021-07-23 LAB
-: ABNORMAL
ABSOLUTE EOS #: <0.03 K/UL (ref 0–0.44)
ABSOLUTE IMMATURE GRANULOCYTE: 0.02 K/UL (ref 0–0.3)
ABSOLUTE LYMPH #: 1.06 K/UL (ref 1.1–3.7)
ABSOLUTE MONO #: 0.7 K/UL (ref 0.1–1.2)
AMORPHOUS: ABNORMAL
ANION GAP SERPL CALCULATED.3IONS-SCNC: 12 MMOL/L (ref 9–17)
BACTERIA: ABNORMAL
BASOPHILS # BLD: 1 % (ref 0–2)
BASOPHILS ABSOLUTE: 0.04 K/UL (ref 0–0.2)
BILIRUBIN URINE: NEGATIVE
BUN BLDV-MCNC: 5 MG/DL (ref 6–20)
BUN/CREAT BLD: 6 (ref 9–20)
CALCIUM SERPL-MCNC: 9.2 MG/DL (ref 8.6–10.4)
CASTS UA: ABNORMAL /LPF
CHLORIDE BLD-SCNC: 103 MMOL/L (ref 98–107)
CO2: 23 MMOL/L (ref 20–31)
COLOR: YELLOW
COMMENT UA: ABNORMAL
CREAT SERPL-MCNC: 0.81 MG/DL (ref 0.5–0.9)
CRYSTALS, UA: ABNORMAL /HPF
DIFFERENTIAL TYPE: ABNORMAL
EOSINOPHILS RELATIVE PERCENT: 0 % (ref 1–4)
EPITHELIAL CELLS UA: ABNORMAL /HPF (ref 0–5)
GFR AFRICAN AMERICAN: >60 ML/MIN
GFR NON-AFRICAN AMERICAN: >60 ML/MIN
GFR SERPL CREATININE-BSD FRML MDRD: ABNORMAL ML/MIN/{1.73_M2}
GFR SERPL CREATININE-BSD FRML MDRD: ABNORMAL ML/MIN/{1.73_M2}
GLUCOSE BLD-MCNC: 87 MG/DL (ref 70–99)
GLUCOSE URINE: NEGATIVE
HCG(URINE) PREGNANCY TEST: NEGATIVE
HCT VFR BLD CALC: 47.6 % (ref 36.3–47.1)
HEMOGLOBIN: 14.3 G/DL (ref 11.9–15.1)
IMMATURE GRANULOCYTES: 0 %
KETONES, URINE: NEGATIVE
LEUKOCYTE ESTERASE, URINE: NEGATIVE
LYMPHOCYTES # BLD: 19 % (ref 24–43)
MAGNESIUM: 2 MG/DL (ref 1.6–2.6)
MCH RBC QN AUTO: 25.8 PG (ref 25.2–33.5)
MCHC RBC AUTO-ENTMCNC: 30 G/DL (ref 28.4–34.8)
MCV RBC AUTO: 85.9 FL (ref 82.6–102.9)
MONOCYTES # BLD: 13 % (ref 3–12)
MUCUS: ABNORMAL
MYOGLOBIN: <21 NG/ML (ref 25–58)
NITRITE, URINE: NEGATIVE
NRBC AUTOMATED: 0 PER 100 WBC
OTHER OBSERVATIONS UA: ABNORMAL
PDW BLD-RTO: 15.2 % (ref 11.8–14.4)
PH UA: 7 (ref 5–8)
PHOSPHORUS: 3 MG/DL (ref 2.6–4.5)
PLATELET # BLD: 258 K/UL (ref 138–453)
PLATELET ESTIMATE: ABNORMAL
PMV BLD AUTO: 10.2 FL (ref 8.1–13.5)
POTASSIUM SERPL-SCNC: 4.3 MMOL/L (ref 3.7–5.3)
PROTEIN UA: NEGATIVE
RBC # BLD: 5.54 M/UL (ref 3.95–5.11)
RBC # BLD: ABNORMAL 10*6/UL
RBC UA: ABNORMAL /HPF (ref 0–2)
RENAL EPITHELIAL, UA: ABNORMAL /HPF
SEG NEUTROPHILS: 67 % (ref 36–65)
SEGMENTED NEUTROPHILS ABSOLUTE COUNT: 3.71 K/UL (ref 1.5–8.1)
SODIUM BLD-SCNC: 138 MMOL/L (ref 135–144)
SPECIFIC GRAVITY UA: 1.02 (ref 1–1.03)
TOTAL CK: 97 U/L (ref 26–192)
TRICHOMONAS: ABNORMAL
TSH SERPL DL<=0.05 MIU/L-ACNC: 0.82 MIU/L (ref 0.3–5)
TURBIDITY: ABNORMAL
URINE HGB: NEGATIVE
UROBILINOGEN, URINE: NORMAL
WBC # BLD: 5.6 K/UL (ref 3.5–11.3)
WBC # BLD: ABNORMAL 10*3/UL
WBC UA: ABNORMAL /HPF (ref 0–5)
YEAST: ABNORMAL

## 2021-07-23 PROCEDURE — 85025 COMPLETE CBC W/AUTO DIFF WBC: CPT

## 2021-07-23 PROCEDURE — 84100 ASSAY OF PHOSPHORUS: CPT

## 2021-07-23 PROCEDURE — 83735 ASSAY OF MAGNESIUM: CPT

## 2021-07-23 PROCEDURE — 81001 URINALYSIS AUTO W/SCOPE: CPT

## 2021-07-23 PROCEDURE — 84443 ASSAY THYROID STIM HORMONE: CPT

## 2021-07-23 PROCEDURE — 81025 URINE PREGNANCY TEST: CPT

## 2021-07-23 PROCEDURE — 82550 ASSAY OF CK (CPK): CPT

## 2021-07-23 PROCEDURE — 83874 ASSAY OF MYOGLOBIN: CPT

## 2021-07-23 PROCEDURE — 80048 BASIC METABOLIC PNL TOTAL CA: CPT

## 2021-07-23 PROCEDURE — 99283 EMERGENCY DEPT VISIT LOW MDM: CPT

## 2021-07-23 PROCEDURE — 93005 ELECTROCARDIOGRAM TRACING: CPT | Performed by: EMERGENCY MEDICINE

## 2021-07-23 ASSESSMENT — ENCOUNTER SYMPTOMS
ABDOMINAL PAIN: 1
SORE THROAT: 0
RHINORRHEA: 0
SINUS PAIN: 0
SINUS PRESSURE: 0
NAUSEA: 0
DIARRHEA: 0
VOMITING: 0
BACK PAIN: 0
COUGH: 0

## 2021-07-23 NOTE — ED PROVIDER NOTES
[facility]  Emergency Department Encounter     Pt Name: Mike Card  MRN: [id number]  [place] 1985  Date of evaluation: 7/23/21  PCP:  Trini Gann MD    CHIEF COMPLAINT       Chief Complaint   Patient presents with    Generalized Body Aches    Dizziness       HISTORY OF PRESENT ILLNESS  (Location/Symptom, Timing/Onset, Context/Setting, Quality, Duration, Modifying Factors, Severity.)    Mike Card is a 31 y.o. female who presents with lower abdominal cramping that radiates down both of her legs and feels like a twitching sensation. She states that she has also felt lightheaded and dizzy whenever she goes to stand up too quickly. States this is been going on for the past few days. Has not been around anybody that is been sick. Denies any headache, vision changes, ringing or ears, congestion, sore throat, runny nose, cough, nausea vomiting or diarrhea, back pain. States that she got her period last month and that she feels like these cramps are similar to her menstrual cycle and she does have abnormal.  So she could very likely be getting another period. Denies any concern for pregnancy. Denies any concern for STDs. States that she does notice a strong smell to her urine but has not had any pain with urination or blood in her urination. PAST MEDICAL / SURGICAL / SOCIAL / FAMILY HISTORY    has a past medical history of Chlamydia, IBS (irritable bowel syndrome), Infertility, female, secondary, and Trichomonas vaginalis infection. has a past surgical history that includes pelvic laparoscopy (06/22/2019); salpingectomy (Right, 06/22/2019); Cholecystectomy; and laparoscopy (N/A, 6/22/2019).     Social History     Socioeconomic History    Marital status: Single     Spouse name: Not on file    Number of children: Not on file    Years of education: Not on file    Highest education level: Not on file   Occupational History    Not on file Tobacco Use    Smoking status: Current Every Day Smoker     Years: 4.00     Types: Cigars     Start date: 3/2/2017    Smokeless tobacco: Never Used    Tobacco comment: black and milds, 1-2 a day   Vaping Use    Vaping Use: Never used   Substance and Sexual Activity    Alcohol use: Not Currently     Comment: week end    Drug use: No    Sexual activity: Yes     Birth control/protection: Injection   Other Topics Concern    Not on file   Social History Narrative    Not on file     Social Determinants of Health     Financial Resource Strain: Medium Risk    Difficulty of Paying Living Expenses: Somewhat hard   Food Insecurity: No Food Insecurity    Worried About Running Out of Food in the Last Year: Never true    Arnoldo of Food in the Last Year: Never true   Transportation Needs:     Lack of Transportation (Medical):  Lack of Transportation (Non-Medical):    Physical Activity:     Days of Exercise per Week:     Minutes of Exercise per Session:    Stress:     Feeling of Stress :    Social Connections:     Frequency of Communication with Friends and Family:     Frequency of Social Gatherings with Friends and Family:     Attends [employer] Services:     Active Member of Clubs or Organizations:     Attends Club or Organization Meetings:     Marital Status:    Intimate Partner Violence:     Fear of Current or Ex-Partner:     Emotionally Abused:     Physically Abused:     Sexually Abused:        No family history on file. Allergies:    Patient has no known allergies. Home Medications:  Prior to Admission medications    Not on File       REVIEW OF SYSTEMS    (2-9 systems for level 4, 10 or more for level 5)    Review of Systems   Constitutional: Negative for chills, diaphoresis and fever. HENT: Negative for congestion, ear pain, rhinorrhea, sinus pressure, sinus pain and sore throat. Eyes: Negative for visual disturbance. Respiratory: Negative for cough.     Gastrointestinal: Positive for abdominal pain. Negative for diarrhea, nausea and vomiting. Endocrine: Negative for polyuria. Genitourinary: Positive for menstrual problem. Negative for decreased urine volume, difficulty urinating, dysuria, flank pain, frequency, hematuria, pelvic pain, urgency, vaginal bleeding and vaginal discharge. Musculoskeletal: Positive for myalgias. Negative for back pain. Neurological: Positive for dizziness and light-headedness. Negative for syncope, facial asymmetry, speech difficulty, weakness, numbness and headaches. Psychiatric/Behavioral: Negative for confusion. PHYSICAL EXAM   (up to 7 for level 4, 8 or more for level 5)    VITALS:   Vitals:    07/23/21 1412   BP: 128/89   Pulse: 86   Resp: 20   Temp: 99.3 °F (37.4 °C)   TempSrc: Oral   SpO2: 96%   Weight: 196 lb 6.4 oz (89.1 kg)   Height: 5' 5\" (1.651 m)       Physical Exam  Vitals and nursing note reviewed. Constitutional:       General: She is not in acute distress. Appearance: She is well-developed. She is not diaphoretic. HENT:      Head: Normocephalic and atraumatic. Right Ear: Tympanic membrane, ear canal and external ear normal.      Left Ear: Tympanic membrane, ear canal and external ear normal.      Nose: Nose normal.      Mouth/Throat:      Mouth: Mucous membranes are moist.   Eyes:      Extraocular Movements: Extraocular movements intact. Conjunctiva/sclera: Conjunctivae normal.      Pupils: Pupils are equal, round, and reactive to light. Cardiovascular:      Rate and Rhythm: Normal rate and regular rhythm. Heart sounds: Normal heart sounds. No murmur heard. Pulmonary:      Effort: Pulmonary effort is normal. No respiratory distress. Breath sounds: Normal breath sounds. No wheezing, rhonchi or rales. Abdominal:      General: There is no distension. Palpations: Abdomen is soft. Tenderness: There is no abdominal tenderness.  There is no right CVA tenderness, left CVA tenderness, guarding or rebound. Musculoskeletal:         General: Normal range of motion. Cervical back: Normal range of motion. Right lower leg: No edema. Left lower leg: No edema. Skin:     General: Skin is warm and dry. Coloration: Skin is not pale. Findings: No rash. Neurological:      General: No focal deficit present. Mental Status: She is alert. Motor: No tremor or abnormal muscle tone. Psychiatric:         Behavior: Behavior normal.         DIFFERENTIAL  DIAGNOSIS   PLAN (LABS / IMAGING / EKG):  Orders Placed This Encounter   Procedures    CBC with DIFF    Basic Metabolic Panel    Magnesium    PHOSPHORUS    TSH w/reflex to FT4    CK    Myoglobin    Urinalysis with Microscopic    Pregnancy, Urine    EKG 12 Lead    Insert peripheral IV       MEDICATIONS ORDERED:  No orders of the defined types were placed in this encounter. DIAGNOSTIC RESULTS / EMERGENCYDEPARTMENT COURSE / MDM   LABS:  Labs Reviewed   CBC WITH AUTO DIFFERENTIAL - Abnormal; Notable for the following components:       Result Value    RBC 5.54 (*)     Hematocrit 47.6 (*)     RDW 15.2 (*)     Seg Neutrophils 67 (*)     Lymphocytes 19 (*)     Monocytes 13 (*)     Eosinophils % 0 (*)     Absolute Lymph # 1.06 (*)     All other components within normal limits   BASIC METABOLIC PANEL - Abnormal; Notable for the following components:    BUN 5 (*)     Bun/Cre Ratio 6 (*)     All other components within normal limits   MYOGLOBIN, SERUM - Abnormal; Notable for the following components:    Myoglobin <21 (*)     All other components within normal limits   URINALYSIS WITH MICROSCOPIC - Abnormal; Notable for the following components:    Turbidity UA SLIGHTLY CLOUDY (*)     Crystals, UA NOT REPORTED NOT REPORTED (*)     Bacteria, UA MODERATE (*)     All other components within normal limits   MAGNESIUM   PHOSPHORUS   TSH WITH REFLEX   CK   PREGNANCY, URINE       RADIOLOGY:  No results found.     EKG    EKG Interpretation    Interpreted by emergency department physician    Rhythm: normal sinus   Rate: normal  Axis: normal  Ectopy: none  Conduction: normal  ST Segments: no acute change  T Waves: no acute change  Q Waves: none    Clinical Impression: non-specific EKG    All EKG's are interpreted by the Emergency Department Physician whoeither signs or Co-signs this chart in the absence of a cardiologist.    EMERGENCY DEPARTMENT COURSE:  ED Course as of Jul 23 1723 Fri Jul 23, 2021   1629 CBC with DIFF(!):    WBC 5.6   RBC 5.54(!)   Hemoglobin Quant 14.3   Hematocrit 47. 6(!)   MCV 85.9   MCH 25.8   MCHC 30.0   RDW 15.2(!)   Platelet Count 127   MPV 10.2   NRBC Automated 0.0   Differential Type NOT REPORTED   Seg Neutrophils 67(!)   Lymphocytes 19(!)   Monocytes 13(!)   Eosinophils % 0(!)   Basophils 1   Immature Granulocytes 0   Segs Absolute 3.71   Absolute Lymph # 1.06(!)   Absolute Mono # 0.70   Absolute Eos # <0.03   Basophils Absolute 0.04   Absolute Immatur. .. [AO]   1749 HCG(Urine) Pregnancy Test: NEGATIVE [AO]   0970 Basic Metabolic Panel(!):    Glucose 87   BUN 5(!)   Creatinine 0.81   Bun/Cre Ratio 6(!)   Calcium 9.2   Sodium 138   Potassium 4.3   Chloride 103   CO2 23   Anion Gap 12   GFR Non- >60   GFR  >60   GFR Comment        GFR Staging NOT REPORTED [AO]   1638 Total CK: 97 [AO]   1638 Magnesium: 2.0 [AO]   1638 Phosphorus: 3.0 [AO]   1642 Myoglobin(!): <21 [AO]   1710 Urinalysis with Microscopic(!):    Color, UA YELLOW   Turbidity UA SLIGHTLY CLOUDY(!)   Glucose, UA NEGATIVE   Bilirubin, Urine NEGATIVE   Ketones, Urine NEGATIVE   Specific Gravity, UA 1.020   Urine Hgb NEGATIVE   pH, UA 7.0   Protein, UA NEGATIVE   Urobilinogen, Urine Normal   Nitrite, Urine NEGATIVE   Leukocyte Esterase, Urine NEGATIVE   Urinalysis Comments NOT REPORTED   -        WBC, UA 0 TO 2   RBC, UA 0 TO 2   Casts UA NOT REPORTED   Crystals, UA NOT REPORTED NOT REPORTED(!)   Epithelial Cells, UA 50    . .. [AO]      ED Course User Index  [AO] Cash Bright 1721, DO       MDM  Number of Diagnoses or Management Options  Viral syndrome  Diagnosis management comments: Otherwise healthy 30-year-old female presents emergency department with lower abdominal pain and leg cramping, lightheadedness and dizziness when she stands up. Initial evaluation she is in no acute distress normal vital signs. Nonfocal neurological exam with no deficits. Muscle tone is normal with no tremors or spasms. Basic labs obtained which revealed no electrolyte abnormalities. No elevation in CK or myoglobin. Pregnancy negative. EKG unremarkable. Did complain of some strong smelling urine, however other symptoms related to urinary complaints were negative. Urine negative for infection. Believe that this is likely early viral syndrome. Discharge home. Can take Tylenol Motrin as needed for discomfort. Amount and/or Complexity of Data Reviewed  Clinical lab tests: ordered and reviewed  Review and summarize past medical records: yes  Independent visualization of images, tracings, or specimens: yes    Patient Progress  Patient progress: stable      PROCEDURES:  Procedures     CONSULTS:  None    CRITICAL CARE:  NONE    FINAL IMPRESSION     1. Viral syndrome          DISPOSITION / PLAN   DISPOSITION        Evaluation and treatment course in the ED, and plan of care upon discharge was discussed in length with the patient. Patient had no further questions prior to being discharged and was instructed to return to the ED for new or worsening symptoms. Any changes to existing medications or new prescriptions were reviewed with patient and they expressed understanding of how to correctly take their medications and the possible side effects.     PATIENT REFERRED TO:  MD Nomi [address] [facility] [address]  [address] [facility] [place] [address] [facility] ED  [address] [facility] [address]  [phone]    As needed, If symptoms worsen      DISCHARGE MEDICATIONS:  New Prescriptions    No medications on file       Lyla Gallegos DO  Emergency Medicine Physician    (Please note that portions of this note were completed with a voice recognition program.  Efforts were made to edit the dictations but occasionally words are mis-transcribed.)        Cash Bright 1721,   07/23/21 1720

## 2021-07-24 LAB
EKG ATRIAL RATE: 83 BPM
EKG P AXIS: 50 DEGREES
EKG P-R INTERVAL: 138 MS
EKG Q-T INTERVAL: 364 MS
EKG QRS DURATION: 68 MS
EKG QTC CALCULATION (BAZETT): 427 MS
EKG R AXIS: 32 DEGREES
EKG T AXIS: 51 DEGREES
EKG VENTRICULAR RATE: 83 BPM

## 2021-07-25 ENCOUNTER — HOSPITAL ENCOUNTER (EMERGENCY)
Age: 36
Discharge: HOME | End: 2021-07-25
Attending: EMERGENCY MEDICINE
Payer: COMMERCIAL

## 2021-07-25 VITALS
BODY MASS INDEX: 31.65 KG/M2 | DIASTOLIC BLOOD PRESSURE: 80 MMHG | HEIGHT: 65 IN | HEART RATE: 77 BPM | OXYGEN SATURATION: 98 % | TEMPERATURE: 98.4 F | WEIGHT: 190 LBS | SYSTOLIC BLOOD PRESSURE: 131 MMHG | RESPIRATION RATE: 16 BRPM

## 2021-07-25 DIAGNOSIS — B96.89 BACTERIAL VAGINOSIS: Primary | ICD-10-CM

## 2021-07-25 DIAGNOSIS — N76.0 BACTERIAL VAGINOSIS: Primary | ICD-10-CM

## 2021-07-25 LAB
-: ABNORMAL
ABSOLUTE EOS #: 0.1 K/UL (ref 0–0.4)
ABSOLUTE IMMATURE GRANULOCYTE: ABNORMAL K/UL (ref 0–0.3)
ABSOLUTE LYMPH #: 1.6 K/UL (ref 1–4.8)
ABSOLUTE MONO #: 0.4 K/UL (ref 0.1–1.3)
AMORPHOUS: ABNORMAL
ANION GAP SERPL CALCULATED.3IONS-SCNC: 10 MMOL/L (ref 9–17)
BACTERIA: ABNORMAL
BASOPHILS # BLD: 1 % (ref 0–2)
BASOPHILS ABSOLUTE: 0 K/UL (ref 0–0.2)
BILIRUBIN URINE: NEGATIVE
BUN BLDV-MCNC: 7 MG/DL (ref 6–20)
BUN/CREAT BLD: NORMAL (ref 9–20)
CALCIUM SERPL-MCNC: 8.8 MG/DL (ref 8.6–10.4)
CASTS UA: ABNORMAL /LPF
CHLORIDE BLD-SCNC: 103 MMOL/L (ref 98–107)
CO2: 23 MMOL/L (ref 20–31)
COLOR: YELLOW
COMMENT UA: ABNORMAL
CREAT SERPL-MCNC: 0.85 MG/DL (ref 0.5–0.9)
CRYSTALS, UA: ABNORMAL /HPF
DIFFERENTIAL TYPE: ABNORMAL
DIRECT EXAM: ABNORMAL
EOSINOPHILS RELATIVE PERCENT: 1 % (ref 0–4)
EPITHELIAL CELLS UA: ABNORMAL /HPF
GFR AFRICAN AMERICAN: >60 ML/MIN
GFR NON-AFRICAN AMERICAN: >60 ML/MIN
GFR SERPL CREATININE-BSD FRML MDRD: NORMAL ML/MIN/{1.73_M2}
GFR SERPL CREATININE-BSD FRML MDRD: NORMAL ML/MIN/{1.73_M2}
GLUCOSE BLD-MCNC: 92 MG/DL (ref 70–99)
GLUCOSE URINE: NEGATIVE
HCG(URINE) PREGNANCY TEST: NEGATIVE
HCT VFR BLD CALC: 40.8 % (ref 36–46)
HEMOGLOBIN: 13.3 G/DL (ref 12–16)
IMMATURE GRANULOCYTES: ABNORMAL %
KETONES, URINE: NEGATIVE
LEUKOCYTE ESTERASE, URINE: NEGATIVE
LYMPHOCYTES # BLD: 32 % (ref 24–44)
Lab: ABNORMAL
MCH RBC QN AUTO: 26.3 PG (ref 26–34)
MCHC RBC AUTO-ENTMCNC: 32.6 G/DL (ref 31–37)
MCV RBC AUTO: 80.5 FL (ref 80–100)
MONOCYTES # BLD: 9 % (ref 1–7)
MUCUS: ABNORMAL
NITRITE, URINE: NEGATIVE
NRBC AUTOMATED: ABNORMAL PER 100 WBC
OTHER OBSERVATIONS UA: ABNORMAL
PDW BLD-RTO: 15.5 % (ref 11.5–14.9)
PH UA: 6 (ref 5–8)
PLATELET # BLD: 220 K/UL (ref 150–450)
PLATELET ESTIMATE: ABNORMAL
PMV BLD AUTO: 8.3 FL (ref 6–12)
POTASSIUM SERPL-SCNC: 4.1 MMOL/L (ref 3.7–5.3)
PROTEIN UA: NEGATIVE
RBC # BLD: 5.07 M/UL (ref 4–5.2)
RBC # BLD: ABNORMAL 10*6/UL
RBC UA: ABNORMAL /HPF
RENAL EPITHELIAL, UA: ABNORMAL /HPF
SEG NEUTROPHILS: 57 % (ref 36–66)
SEGMENTED NEUTROPHILS ABSOLUTE COUNT: 3 K/UL (ref 1.3–9.1)
SODIUM BLD-SCNC: 136 MMOL/L (ref 135–144)
SPECIFIC GRAVITY UA: 1.02 (ref 1–1.03)
SPECIMEN DESCRIPTION: ABNORMAL
TRICHOMONAS: ABNORMAL
TURBIDITY: ABNORMAL
URINE HGB: NEGATIVE
UROBILINOGEN, URINE: NORMAL
WBC # BLD: 5.1 K/UL (ref 3.5–11)
WBC # BLD: ABNORMAL 10*3/UL
WBC UA: ABNORMAL /HPF
YEAST: ABNORMAL

## 2021-07-25 PROCEDURE — 85025 COMPLETE CBC W/AUTO DIFF WBC: CPT

## 2021-07-25 PROCEDURE — 81001 URINALYSIS AUTO W/SCOPE: CPT

## 2021-07-25 PROCEDURE — 96374 THER/PROPH/DIAG INJ IV PUSH: CPT

## 2021-07-25 PROCEDURE — 2580000003 HC RX 258: Performed by: STUDENT IN AN ORGANIZED HEALTH CARE EDUCATION/TRAINING PROGRAM

## 2021-07-25 PROCEDURE — 99283 EMERGENCY DEPT VISIT LOW MDM: CPT

## 2021-07-25 PROCEDURE — 80048 BASIC METABOLIC PNL TOTAL CA: CPT

## 2021-07-25 PROCEDURE — 6360000002 HC RX W HCPCS: Performed by: STUDENT IN AN ORGANIZED HEALTH CARE EDUCATION/TRAINING PROGRAM

## 2021-07-25 PROCEDURE — 36415 COLL VENOUS BLD VENIPUNCTURE: CPT

## 2021-07-25 PROCEDURE — 87591 N.GONORRHOEAE DNA AMP PROB: CPT

## 2021-07-25 PROCEDURE — 87480 CANDIDA DNA DIR PROBE: CPT

## 2021-07-25 PROCEDURE — 81025 URINE PREGNANCY TEST: CPT

## 2021-07-25 PROCEDURE — 87510 GARDNER VAG DNA DIR PROBE: CPT

## 2021-07-25 PROCEDURE — 87660 TRICHOMONAS VAGIN DIR PROBE: CPT

## 2021-07-25 PROCEDURE — 6370000000 HC RX 637 (ALT 250 FOR IP): Performed by: STUDENT IN AN ORGANIZED HEALTH CARE EDUCATION/TRAINING PROGRAM

## 2021-07-25 PROCEDURE — 87491 CHLMYD TRACH DNA AMP PROBE: CPT

## 2021-07-25 RX ORDER — ONDANSETRON 2 MG/ML
4 INJECTION INTRAMUSCULAR; INTRAVENOUS ONCE
Status: COMPLETED | OUTPATIENT
Start: 2021-07-25 | End: 2021-07-25

## 2021-07-25 RX ORDER — 0.9 % SODIUM CHLORIDE 0.9 %
1000 INTRAVENOUS SOLUTION INTRAVENOUS ONCE
Status: COMPLETED | OUTPATIENT
Start: 2021-07-25 | End: 2021-07-25

## 2021-07-25 RX ORDER — METRONIDAZOLE 500 MG/1
500 TABLET ORAL 2 TIMES DAILY
Qty: 14 TABLET | Refills: 0 | Status: SHIPPED | OUTPATIENT
Start: 2021-07-25 | End: 2021-08-01

## 2021-07-25 RX ORDER — ONDANSETRON 4 MG/1
4 TABLET, ORALLY DISINTEGRATING ORAL EVERY 8 HOURS PRN
Qty: 6 TABLET | Refills: 0 | Status: SHIPPED | OUTPATIENT
Start: 2021-07-25 | End: 2022-08-30 | Stop reason: ALTCHOICE

## 2021-07-25 RX ORDER — METRONIDAZOLE 500 MG/1
500 TABLET ORAL ONCE
Status: COMPLETED | OUTPATIENT
Start: 2021-07-25 | End: 2021-07-25

## 2021-07-25 RX ADMIN — ONDANSETRON 4 MG: 2 INJECTION INTRAMUSCULAR; INTRAVENOUS at 17:33

## 2021-07-25 RX ADMIN — METRONIDAZOLE 500 MG: 500 TABLET ORAL at 18:54

## 2021-07-25 RX ADMIN — SODIUM CHLORIDE 1000 ML: 9 INJECTION, SOLUTION INTRAVENOUS at 17:31

## 2021-07-25 NOTE — ED PROVIDER NOTES
Vaping Use: Never used   Substance and Sexual Activity    Alcohol use: Not Currently     Comment: week end    Drug use: No    Sexual activity: Yes     Birth control/protection: Injection   Other Topics Concern    Not on file   Social History Narrative    Not on file     Social Determinants of Health     Financial Resource Strain: Medium Risk    Difficulty of Paying Living Expenses: Somewhat hard   Food Insecurity: No Food Insecurity    Worried About Running Out of Food in the Last Year: Never true    Arnoldo of Food in the Last Year: Never true   Transportation Needs:     Lack of Transportation (Medical):  Lack of Transportation (Non-Medical):    Physical Activity:     Days of Exercise per Week:     Minutes of Exercise per Session:    Stress:     Feeling of Stress :    Social Connections:     Frequency of Communication with Friends and Family:     Frequency of Social Gatherings with Friends and Family:     Attends Congregational Services:     Active Member of Clubs or Organizations:     Attends Club or Organization Meetings:     Marital Status:    Intimate Partner Violence:     Fear of Current or Ex-Partner:     Emotionally Abused:     Physically Abused:     Sexually Abused:        History reviewed. No pertinent family history. Allergies:  Patient has no known allergies. Home Medications:  Prior to Admission medications    Medication Sig Start Date End Date Taking? Authorizing Provider   ondansetron (ZOFRAN ODT) 4 MG disintegrating tablet Take 1 tablet by mouth every 8 hours as needed for Nausea 7/25/21  Yes Krys Warren MD   metroNIDAZOLE (FLAGYL) 500 MG tablet Take 1 tablet by mouth 2 times daily for 7 days 7/25/21 8/1/21 Yes Anju Sharpe MD       REVIEW OFSYSTEMS    (2-9 systems for level 4, 10 or more for level 5)      Review of Systems   Constitutional: Negative for diaphoresis, fatigue and fever. Respiratory: Negative for cough and shortness of breath. Cardiovascular: Negative for chest pain and leg swelling. Gastrointestinal: Positive for diarrhea and vomiting. Genitourinary: Negative for flank pain, frequency, urgency, vaginal bleeding and vaginal discharge. Neurological: Positive for dizziness and light-headedness. PHYSICAL EXAM   (up to 7 for level 4, 8 or more forlevel 5)      INITIAL VITALS:   ED Triage Vitals [07/25/21 1635]   BP Temp Temp Source Pulse Resp SpO2 Height Weight   131/80 98.4 °F (36.9 °C) Oral 77 16 98 % 5' 5\" (1.651 m) 190 lb (86.2 kg)       Physical Exam  Constitutional:       Appearance: Normal appearance. She is normal weight. HENT:      Head: Normocephalic and atraumatic. Nose: Nose normal.      Mouth/Throat:      Mouth: Mucous membranes are moist.   Eyes:      Extraocular Movements: Extraocular movements intact. Pupils: Pupils are equal, round, and reactive to light. Cardiovascular:      Rate and Rhythm: Normal rate and regular rhythm. Pulmonary:      Effort: Pulmonary effort is normal. No respiratory distress. Abdominal:      General: Abdomen is flat. There is no distension. Palpations: Abdomen is soft. Tenderness: There is no abdominal tenderness. Musculoskeletal:         General: No swelling or tenderness. Cervical back: No rigidity or tenderness. Skin:     Capillary Refill: Capillary refill takes less than 2 seconds. Neurological:      General: No focal deficit present. Mental Status: She is alert and oriented to person, place, and time.    Psychiatric:         Behavior: Behavior normal.         DIFFERENTIAL  DIAGNOSIS     PLAN (LABS / IMAGING / EKG):  Orders Placed This Encounter   Procedures    Vaginitis DNA Probe    C.trachomatis N.gonorrhoeae DNA    Urinalysis with Microscopic    Pregnancy, Urine    CBC Auto Differential    Basic Metabolic Panel w/ Reflex to MG       MEDICATIONS ORDERED:  Orders Placed This Encounter   Medications    0.9 % sodium chloride bolus    ondansetron (ZOFRAN) injection 4 mg    metroNIDAZOLE (FLAGYL) tablet 500 mg     Order Specific Question:   Antimicrobial Indications     Answer:   OB/Gyn Infection    ondansetron (ZOFRAN ODT) 4 MG disintegrating tablet     Sig: Take 1 tablet by mouth every 8 hours as needed for Nausea     Dispense:  6 tablet     Refill:  0    metroNIDAZOLE (FLAGYL) 500 MG tablet     Sig: Take 1 tablet by mouth 2 times daily for 7 days     Dispense:  14 tablet     Refill:  0       Initial MDM/Plan: 31 y.o. female who presents with plaints of lightheadedness, dizziness, fatigue. She is nontoxic in appearance, stable vitals, no abdominal tenderness. Although patient had a thorough work-up at [facility] 3 days ago, will still obtain electrolytes, CBC. Will also send out vaginitis swabs, gonorrhea and chlamydia. Check urine for urinalysis. Patient will be given IV fluids, Zofran. Plan to reassess patient. DIAGNOSTIC RESULTS / EMERGENCYDEPARTMENT COURSE / MDM     LABS:  Labs Reviewed   VAGINITIS DNA PROBE - Abnormal; Notable for the following components:       Result Value    Direct Exam POSITIVE for Gardnerella vaginalis. (*)     All other components within normal limits   URINALYSIS WITH MICROSCOPIC - Abnormal; Notable for the following components:    Turbidity UA CLOUDY (*)     All other components within normal limits   CBC WITH AUTO DIFFERENTIAL - Abnormal; Notable for the following components:    RDW 15.5 (*)     Monocytes 9 (*)     All other components within normal limits   C.TRACHOMATIS N.GONORRHOEAE DNA   PREGNANCY, URINE   BASIC METABOLIC PANEL W/ REFLEX TO MG FOR LOW K         RADIOLOGY:  No results found. EKG      All EKG's are interpreted by the Emergency Department Physicianwho either signs or Co-signs this chart in the absence of a cardiologist.    EMERGENCY DEPARTMENT COURSE:  ED Course as of Jul 26 0105   Sun Jul 25, 2021   5596 Patient reassessed, states that she is feeling much better.   She is able to tolerate orals without any nausea or vomiting. She was given turkey sandwich and juice. She does had BV and thus will be treated with metronidazole. Urine pregnancy negative. Patient will be discharged with PCP follow-up. She will be given strict return precautions come back to the emergency room if she is unable to tolerate any orals, abdominal pain, vaginal bleeding or vaginal discharge. She expresses understanding.    [AN]      ED Course User Index  [AN] Christina Garcia MD          PROCEDURES:  None    CONSULTS:  None    CRITICAL CARE:      FINAL IMPRESSION      1.  Bacterial vaginosis          DISPOSITION / PLAN     DISPOSITION Decision To Discharge 07/25/2021 07:14:43 PM      PATIENT REFERRED TO:  [facility] ED  [facility] [address] [facility]  [phone]    If symptoms worsen    MD Nomi Duran [facility] [address] [address]  [phone]      As needed      DISCHARGE MEDICATIONS:  Discharge Medication List as of 7/25/2021  7:15 PM      START taking these medications    Details   ondansetron (ZOFRAN ODT) 4 MG disintegrating tablet Take 1 tablet by mouth every 8 hours as needed for Nausea, Disp-6 tablet, R-0Print      metroNIDAZOLE (FLAGYL) 500 MG tablet Take 1 tablet by mouth 2 times daily for 7 days, Disp-14 tablet, R-0Print             Christina Garcia MD  Emergency Medicine Resident    (Please note that portions of this note were completed with a voice recognition program.Efforts were made to edit the dictations but occasionally words are mis-transcribed.)        Christina Garcia MD  Resident  07/26/21 2006

## 2021-07-26 ASSESSMENT — ENCOUNTER SYMPTOMS
COUGH: 0
DIARRHEA: 1
VOMITING: 1
SHORTNESS OF BREATH: 0

## 2021-07-26 NOTE — ED PROVIDER NOTES
EMERGENCY DEPARTMENT ENCOUNTER   ATTENDING ATTESTATION     Pt Name: Marquez Hall  MRN: [id number]  [place] 1985  Date of evaluation: 7/26/21       Marquez Hall is a [address] y.o. female who presents with Dizziness (Lightheadedness et dizziness), Nausea, and Diarrhea      MDM: 60 [address] old female presents with dizziness, nausea, and diarrhea, on initial exam patient in no acute distress, vitals stable, will check labs, provide symptomatic treatment    Labs reviewed patient found to have BV, will treat, remaining labs unremarkable    Patient reevaluated reports feeling better and would like to go home, discussed results with patient discussed need for follow up with pcp and return precautions, patient voiced understanding is comfortable with plan and discharge home    Patient/Guardian was informed of their diagnosis and told to follow up with PCP in 1-3 days. Patient demonstrates understanding and agreement with the plan. They were given the opportunity to ask questions and those questions were answered to the best of our ability with the available information. Patient/Guardian told to return to the ED for any new, worsening, changing or persistent symptoms. This dictation was prepared using [employer] voice recognition software. Vitals:   Vitals:    07/25/21 1635   BP: 131/80   Pulse: 77   Resp: 16   Temp: 98.4 °F (36.9 °C)   TempSrc: Oral   SpO2: 98%   Weight: 190 lb (86.2 kg)   Height: 5' 5\" (1.651 m)         I personally evaluated and examined the patient in conjunction with the resident and agree with the assessment, treatment plan, and disposition of the patient as recorded by the resident. I performed a history and physical examination of the patient and discussed management with the resident. I reviewed the residents note and agree with the documented findings and plan of care. Any areas of disagreement are noted on the chart.  I was personally present for the key portions of any procedures. I have documented in the chart those procedures where I was not present during the key portions. I have personally reviewed all images and agree with the resident's interpretation. I have reviewed the emergency nurses triage note. I agree with the chief complaint, past medical history, past surgical history, allergies, medications, social and family history as documented unless otherwise noted.     No Shown, DO  Attending Emergency Physician          No Shown, DO  07/26/21 1034

## 2021-11-03 ENCOUNTER — HOSPITAL ENCOUNTER (EMERGENCY)
Age: 36
Discharge: LEFT BEFORE DISCHARGE | End: 2021-11-03
Payer: COMMERCIAL

## 2021-11-03 VITALS
WEIGHT: 190 LBS | SYSTOLIC BLOOD PRESSURE: 131 MMHG | HEIGHT: 65 IN | TEMPERATURE: 98.7 F | BODY MASS INDEX: 31.65 KG/M2 | DIASTOLIC BLOOD PRESSURE: 92 MMHG | RESPIRATION RATE: 16 BRPM | HEART RATE: 80 BPM | OXYGEN SATURATION: 99 %

## 2021-11-03 ASSESSMENT — PAIN DESCRIPTION - ORIENTATION: ORIENTATION: LEFT

## 2021-11-03 ASSESSMENT — PAIN DESCRIPTION - DESCRIPTORS: DESCRIPTORS: SHARP;CONSTANT

## 2021-11-03 ASSESSMENT — PAIN DESCRIPTION - FREQUENCY: FREQUENCY: CONTINUOUS

## 2021-11-03 ASSESSMENT — PAIN DESCRIPTION - LOCATION: LOCATION: PELVIS

## 2021-11-03 ASSESSMENT — PAIN SCALES - GENERAL: PAINLEVEL_OUTOF10: 7

## 2021-11-27 ENCOUNTER — APPOINTMENT (OUTPATIENT)
Dept: CT IMAGING | Age: 36
End: 2021-11-27
Payer: COMMERCIAL

## 2021-11-27 ENCOUNTER — HOSPITAL ENCOUNTER (EMERGENCY)
Age: 36
Discharge: HOME | End: 2021-11-27
Attending: EMERGENCY MEDICINE
Payer: COMMERCIAL

## 2021-11-27 VITALS
SYSTOLIC BLOOD PRESSURE: 130 MMHG | TEMPERATURE: 97.3 F | OXYGEN SATURATION: 98 % | BODY MASS INDEX: 29.99 KG/M2 | HEART RATE: 87 BPM | DIASTOLIC BLOOD PRESSURE: 91 MMHG | RESPIRATION RATE: 16 BRPM | HEIGHT: 65 IN | WEIGHT: 180 LBS

## 2021-11-27 DIAGNOSIS — R51.9 NONINTRACTABLE HEADACHE, UNSPECIFIED CHRONICITY PATTERN, UNSPECIFIED HEADACHE TYPE: ICD-10-CM

## 2021-11-27 DIAGNOSIS — K59.00 CONSTIPATION, UNSPECIFIED CONSTIPATION TYPE: Primary | ICD-10-CM

## 2021-11-27 LAB
-: ABNORMAL
ABSOLUTE EOS #: 0.19 K/UL (ref 0–0.44)
ABSOLUTE IMMATURE GRANULOCYTE: 0.03 K/UL (ref 0–0.3)
ABSOLUTE LYMPH #: 3.2 K/UL (ref 1.1–3.7)
ABSOLUTE MONO #: 0.72 K/UL (ref 0.1–1.2)
ALBUMIN SERPL-MCNC: 3.8 G/DL (ref 3.5–5.2)
ALBUMIN/GLOBULIN RATIO: ABNORMAL (ref 1–2.5)
ALP BLD-CCNC: 101 U/L (ref 35–104)
ALT SERPL-CCNC: 21 U/L (ref 5–33)
AMORPHOUS: ABNORMAL
ANION GAP SERPL CALCULATED.3IONS-SCNC: 15 MMOL/L (ref 9–17)
AST SERPL-CCNC: 19 U/L
BACTERIA: ABNORMAL
BASOPHILS # BLD: 1 % (ref 0–2)
BASOPHILS ABSOLUTE: 0.05 K/UL (ref 0–0.2)
BILIRUB SERPL-MCNC: 0.18 MG/DL (ref 0.3–1.2)
BILIRUBIN URINE: NEGATIVE
BUN BLDV-MCNC: 8 MG/DL (ref 6–20)
BUN/CREAT BLD: 10 (ref 9–20)
CALCIUM SERPL-MCNC: 8.7 MG/DL (ref 8.6–10.4)
CASTS UA: ABNORMAL /LPF
CHLORIDE BLD-SCNC: 104 MMOL/L (ref 98–107)
CHP ED QC CHECK: YES
CO2: 22 MMOL/L (ref 20–31)
COLOR: YELLOW
COMMENT UA: ABNORMAL
CREAT SERPL-MCNC: 0.77 MG/DL (ref 0.5–0.9)
CRYSTALS, UA: ABNORMAL /HPF
DIFFERENTIAL TYPE: NORMAL
EOSINOPHILS RELATIVE PERCENT: 2 % (ref 1–4)
EPITHELIAL CELLS UA: ABNORMAL /HPF (ref 0–5)
GFR AFRICAN AMERICAN: >60 ML/MIN
GFR NON-AFRICAN AMERICAN: >60 ML/MIN
GFR SERPL CREATININE-BSD FRML MDRD: ABNORMAL ML/MIN/{1.73_M2}
GFR SERPL CREATININE-BSD FRML MDRD: ABNORMAL ML/MIN/{1.73_M2}
GLUCOSE BLD-MCNC: 78 MG/DL (ref 70–99)
GLUCOSE URINE: NEGATIVE
HCT VFR BLD CALC: 40.1 % (ref 36.3–47.1)
HEMOGLOBIN: 12.5 G/DL (ref 11.9–15.1)
IMMATURE GRANULOCYTES: 0 %
KETONES, URINE: NEGATIVE
LEUKOCYTE ESTERASE, URINE: NEGATIVE
LYMPHOCYTES # BLD: 33 % (ref 24–43)
MCH RBC QN AUTO: 26.2 PG (ref 25.2–33.5)
MCHC RBC AUTO-ENTMCNC: 31.2 G/DL (ref 28.4–34.8)
MCV RBC AUTO: 83.9 FL (ref 82.6–102.9)
MONOCYTES # BLD: 7 % (ref 3–12)
MUCUS: ABNORMAL
NITRITE, URINE: NEGATIVE
NRBC AUTOMATED: 0 PER 100 WBC
OTHER OBSERVATIONS UA: ABNORMAL
PDW BLD-RTO: 14.2 % (ref 11.8–14.4)
PH UA: 6.5 (ref 5–8)
PLATELET # BLD: 272 K/UL (ref 138–453)
PLATELET ESTIMATE: NORMAL
PMV BLD AUTO: 10.2 FL (ref 8.1–13.5)
POTASSIUM SERPL-SCNC: 4 MMOL/L (ref 3.7–5.3)
PREGNANCY TEST URINE, POC: NEGATIVE
PROTEIN UA: NEGATIVE
RBC # BLD: 4.78 M/UL (ref 3.95–5.11)
RBC # BLD: NORMAL 10*6/UL
RBC UA: ABNORMAL /HPF (ref 0–2)
RENAL EPITHELIAL, UA: ABNORMAL /HPF
SEG NEUTROPHILS: 57 % (ref 36–65)
SEGMENTED NEUTROPHILS ABSOLUTE COUNT: 5.66 K/UL (ref 1.5–8.1)
SODIUM BLD-SCNC: 141 MMOL/L (ref 135–144)
SPECIFIC GRAVITY UA: 1.02 (ref 1–1.03)
TOTAL PROTEIN: 6.7 G/DL (ref 6.4–8.3)
TRICHOMONAS: ABNORMAL
TURBIDITY: ABNORMAL
URINE HGB: ABNORMAL
UROBILINOGEN, URINE: NORMAL
WBC # BLD: 9.9 K/UL (ref 3.5–11.3)
WBC # BLD: NORMAL 10*3/UL
WBC UA: ABNORMAL /HPF (ref 0–5)
YEAST: ABNORMAL

## 2021-11-27 PROCEDURE — 81025 URINE PREGNANCY TEST: CPT

## 2021-11-27 PROCEDURE — 85025 COMPLETE CBC W/AUTO DIFF WBC: CPT

## 2021-11-27 PROCEDURE — 96374 THER/PROPH/DIAG INJ IV PUSH: CPT

## 2021-11-27 PROCEDURE — 99282 EMERGENCY DEPT VISIT SF MDM: CPT

## 2021-11-27 PROCEDURE — 6360000004 HC RX CONTRAST MEDICATION: Performed by: NURSE PRACTITIONER

## 2021-11-27 PROCEDURE — 2580000003 HC RX 258: Performed by: NURSE PRACTITIONER

## 2021-11-27 PROCEDURE — 81001 URINALYSIS AUTO W/SCOPE: CPT

## 2021-11-27 PROCEDURE — 87086 URINE CULTURE/COLONY COUNT: CPT

## 2021-11-27 PROCEDURE — 6360000002 HC RX W HCPCS: Performed by: NURSE PRACTITIONER

## 2021-11-27 PROCEDURE — 80053 COMPREHEN METABOLIC PANEL: CPT

## 2021-11-27 PROCEDURE — 74177 CT ABD & PELVIS W/CONTRAST: CPT

## 2021-11-27 RX ORDER — SODIUM CHLORIDE 0.9 % (FLUSH) 0.9 %
10 SYRINGE (ML) INJECTION PRN
Status: DISCONTINUED | OUTPATIENT
Start: 2021-11-27 | End: 2021-11-27 | Stop reason: HOSPADM

## 2021-11-27 RX ORDER — 0.9 % SODIUM CHLORIDE 0.9 %
1000 INTRAVENOUS SOLUTION INTRAVENOUS ONCE
Status: COMPLETED | OUTPATIENT
Start: 2021-11-27 | End: 2021-11-27

## 2021-11-27 RX ORDER — DIPHENHYDRAMINE HYDROCHLORIDE 50 MG/ML
25 INJECTION INTRAMUSCULAR; INTRAVENOUS ONCE
Status: COMPLETED | OUTPATIENT
Start: 2021-11-27 | End: 2021-11-27

## 2021-11-27 RX ORDER — DOCUSATE SODIUM 100 MG/1
100 CAPSULE, LIQUID FILLED ORAL 2 TIMES DAILY
Qty: 6 CAPSULE | Refills: 0 | Status: SHIPPED | OUTPATIENT
Start: 2021-11-27 | End: 2021-11-30

## 2021-11-27 RX ORDER — IBUPROFEN 800 MG/1
800 TABLET ORAL EVERY 8 HOURS PRN
Qty: 20 TABLET | Refills: 0 | Status: SHIPPED | OUTPATIENT
Start: 2021-11-27 | End: 2022-08-30 | Stop reason: ALTCHOICE

## 2021-11-27 RX ORDER — 0.9 % SODIUM CHLORIDE 0.9 %
80 INTRAVENOUS SOLUTION INTRAVENOUS ONCE
Status: COMPLETED | OUTPATIENT
Start: 2021-11-27 | End: 2021-11-27

## 2021-11-27 RX ADMIN — DIPHENHYDRAMINE HYDROCHLORIDE 25 MG: 50 INJECTION INTRAMUSCULAR; INTRAVENOUS at 19:08

## 2021-11-27 RX ADMIN — SODIUM CHLORIDE 80 ML: 9 INJECTION, SOLUTION INTRAVENOUS at 20:50

## 2021-11-27 RX ADMIN — SODIUM CHLORIDE 1000 ML: 9 INJECTION, SOLUTION INTRAVENOUS at 19:08

## 2021-11-27 RX ADMIN — IOPAMIDOL 75 ML: 755 INJECTION, SOLUTION INTRAVENOUS at 20:50

## 2021-11-27 RX ADMIN — SODIUM CHLORIDE, PRESERVATIVE FREE 10 ML: 5 INJECTION INTRAVENOUS at 20:50

## 2021-11-27 ASSESSMENT — ENCOUNTER SYMPTOMS
ABDOMINAL PAIN: 1
NAUSEA: 0
CONSTIPATION: 0
SHORTNESS OF BREATH: 0
VOMITING: 0
DIARRHEA: 0
COUGH: 0

## 2021-11-27 ASSESSMENT — PAIN SCALES - GENERAL: PAINLEVEL_OUTOF10: 7

## 2021-11-27 NOTE — ED PROVIDER NOTES
[address] [place] [address] ED  EMERGENCY DEPARTMENT ENCOUNTER      Pt Name: Srikanth Martinez  MRN: [id number]  [place] 1985  Date of evaluation: 11/27/2021  Provider: RIO Mahajan CNP    CHIEF COMPLAINT       Chief Complaint   Patient presents with    Headache    Abdominal Pain     right sided         HISTORY OFPRESENT ILLNESS  (Location/Symptom, Timing/Onset, Context/Setting, Quality, Duration, Modifying Factors, Severity.)   Srikanth Martinez is a 31 y.o. female who presents to the emergency department by private auto for evaluation of right lower abdominal pain, headache, and dizziness for the past 2 days. Not the worst headache she's ever had. No sudden onset. She denies chest pain, cough, shortness of breath, nausea, vomiting, diarrhea, dysuria, vaginal discharge, fever or chills. Pain is a 4 of 10 described as an aching sensation. No aggravating or alleviating factors. Patient states she had a regular menstrual period on 10/12/2021 but has not had a regular menstrual period since. She did have some spotting a few weeks ago and then had another episode of bleeding yesterday which stopped last night. Nursing Notes were reviewed.     PASTMEDICAL HISTORY     Past Medical History:   Diagnosis Date    Chlamydia     IBS (irritable bowel syndrome) 5/7/2015    Infertility, female, secondary     Trichomonas vaginalis infection 2013         SURGICAL HISTORY       Past Surgical History:   Procedure Laterality Date    CHOLECYSTECTOMY      LAPAROSCOPY N/A 6/22/2019    LAPAROSCOPY EXPLORATORY CONVERTED TO OPEN, RIGHT SALPINGECTOMY performed by Shirin Borrego DO at [facility]  06/22/2019    LAPAROSCOPY EXPLORATORY CONVERTED TO OPEN, RIGHT SALPINGECTOMY     SALPINGECTOMY Right 06/22/2019         CURRENT MEDICATIONS     Discharge Medication List as of 11/27/2021  9:38 PM      CONTINUE these medications which have NOT CHANGED    Details   ondansetron (ZOFRAN ODT) 4 MG disintegrating tablet Take 1 tablet by mouth every 8 hours as needed for Nausea, Disp-6 tablet, R-0Print             ALLERGIES     Patient has no known allergies. FAMILY HISTORY     No family history on file. SOCIAL HISTORY       Social History     Socioeconomic History    Marital status: Single     Spouse name: Not on file    Number of children: Not on file    Years of education: Not on file    Highest education level: Not on file   Occupational History    Not on file   Tobacco Use    Smoking status: Current Every Day Smoker     Years: 6.00     Types: Cigars     Start date: 3/2/2017    Smokeless tobacco: Never Used    Tobacco comment: black and milds, 1-2 a day   Vaping Use    Vaping Use: Never used   Substance and Sexual Activity    Alcohol use: Not Currently     Comment: week end    Drug use: No    Sexual activity: Yes     Birth control/protection: Injection   Other Topics Concern    Not on file   Social History Narrative    Not on file     Social Determinants of Health     Financial Resource Strain: Medium Risk    Difficulty of Paying Living Expenses: Somewhat hard   Food Insecurity: No Food Insecurity    Worried About Running Out of Food in the Last Year: Never true    Arnoldo of Food in the Last Year: Never true   Transportation Needs:     Lack of Transportation (Medical): Not on file    Lack of Transportation (Non-Medical):  Not on file   Physical Activity:     Days of Exercise per Week: Not on file    Minutes of Exercise per Session: Not on file   Stress:     Feeling of Stress : Not on file   Social Connections:     Frequency of Communication with Friends and Family: Not on file    Frequency of Social Gatherings with Friends and Family: Not on file    Attends Islam Services: Not on file    Active Member of Clubs or Organizations: Not on file    Attends Club or Organization Meetings: Not on file    Marital Status: Not on file   Intimate Partner Violence:     Fear of Current or Ex-Partner: Not on file    Emotionally Abused: Not on file    Physically Abused: Not on file    Sexually Abused: Not on file   Housing Stability:     Unable to Pay for Housing in the Last Year: Not on file    Number of Places Lived in the Last Year: Not on file    Unstable Housing in the Last Year: Not on file         REVIEW OF SYSTEMS    (2-9 systems for level 4, 10 or more for level 5)     Review of Systems   Constitutional: Negative for activity change, appetite change, chills, fatigue and fever. Respiratory: Negative for cough and shortness of breath. Cardiovascular: Negative for chest pain. Gastrointestinal: Positive for abdominal pain. Negative for constipation, diarrhea, nausea and vomiting. Endocrine: Negative. Genitourinary: Negative for dysuria, flank pain, hematuria and vaginal discharge. Neurological: Positive for dizziness and headaches. Negative for facial asymmetry, light-headedness and numbness. All other systems reviewed and are negative. Except as noted above the remainder of the review of systems was reviewed and negative. PHYSICAL EXAM    (up to 7 for level 4, 8 or more for level 5)     ED Triage Vitals [11/27/21 1833]   BP Temp Temp src Pulse Resp SpO2 Height Weight   (!) 130/91 97.3 °F (36.3 °C) -- 87 16 98 % 5' 5\" (1.651 m) 180 lb (81.6 kg)       Physical Exam  Constitutional:       General: She is not in acute distress. Appearance: Normal appearance. She is well-developed and well-groomed. She is obese. HENT:      Head: Normocephalic. Right Ear: External ear normal.      Left Ear: External ear normal.      Nose: Nose normal.      Mouth/Throat:      Mouth: Mucous membranes are moist.   Eyes:      Extraocular Movements: Extraocular movements intact. Conjunctiva/sclera: Conjunctivae normal.      Pupils: Pupils are equal, round, and reactive to light. Cardiovascular:      Rate and Rhythm: Normal rate and regular rhythm.       Pulses: Normal pulses. Heart sounds: Normal heart sounds. Pulmonary:      Effort: Pulmonary effort is normal.      Breath sounds: Normal breath sounds. Abdominal:      General: Bowel sounds are normal.      Palpations: Abdomen is soft. Tenderness: There is abdominal tenderness in the right lower quadrant. There is no guarding or rebound. Hernia: No hernia is present. Comments: Abdomen is soft. Patient exhibits mild right lower quadrant tenderness palpation. No guarding or rebound. Musculoskeletal:         General: Normal range of motion. Cervical back: Normal range of motion and neck supple. Skin:     General: Skin is warm and dry. Capillary Refill: Capillary refill takes less than 2 seconds. Neurological:      Mental Status: She is alert and oriented to person, place, and time. Psychiatric:         Mood and Affect: Mood normal.         Behavior: Behavior normal.         Thought Content: Thought content normal.         Judgment: Judgment normal.           DIAGNOSTIC RESULTS     EKG:All EKG's are interpreted by the Emergency Department Physician who either signs or Co-signs this chart in the absence of a cardiologist.        RADIOLOGY:   Non-plain film images such as CT, Ultrasound and MRI are read by theradiologist. Plain radiographic images are visualized and preliminarily interpreted by the emergency physician with the below findings:    CT ABDOMEN PELVIS W IV CONTRAST Additional Contrast? None    Result Date: 11/27/2021  EXAMINATION: CT OF THE ABDOMEN AND PELVIS WITH CONTRAST 11/27/2021 8:40 pm TECHNIQUE: CT of the abdomen and pelvis was performed with the administration of intravenous contrast. Multiplanar reformatted images are provided for review. Dose modulation, iterative reconstruction, and/or weight based adjustment of the mA/kV was utilized to reduce the radiation dose to as low as reasonably achievable.  COMPARISON: 12/20/2020 HISTORY: ORDERING SYSTEM PROVIDED HISTORY: Right lower quadrant abdominal pain TECHNOLOGIST PROVIDED HISTORY: Right lower quadrant abdominal pain Decision Support Exception - unselect if not a suspected or confirmed emergency medical condition->Emergency Medical Condition (MA) Reason for Exam: Pt c/o right lower abdominal pain, headache, and dizziness for the past 2 days. Acuity: Acute Type of Exam: Initial 1 FINDINGS: Lower Chest: Subsegmental atelectatic changes are present within the lung bases. Organs: The liver, pancreas, and spleen are within normal limits. The gallbladder has been resected. No adrenal masses are identified and there is no hydronephrosis. GI/Bowel: There is no small bowel obstruction, free-air, or free-fluid. There is mild diverticulosis coli. Mild to moderate stool is present within the colon. The appendix is normal in caliber. The stomach and duodenum are unremarkable. Pelvis: Pelvic phleboliths are present. There appears to be a nabothian cyst within the cervix. Peritoneum/Retroperitoneum: No pathologically enlarged lymph nodes are identified. Bones/Soft Tissues: There is no acute osseous abnormality. No acute abdominopelvic abnormality visualized. Mild diverticulosis coli. Interpretation per the Radiologist below, if available at the time of this note:    CT ABDOMEN PELVIS W IV CONTRAST Additional Contrast? None   Final Result   No acute abdominopelvic abnormality visualized. Mild diverticulosis coli. EDBEDSIDE ULTRASOUND:   Performed by Guilherme Britt - bee    LABS:  Labs Reviewed   URINE RT REFLEX TO CULTURE - Abnormal; Notable for the following components:       Result Value    Turbidity UA SLIGHTLY CLOUDY (*)     Urine Hgb TRACE (*)     All other components within normal limits   COMPREHENSIVE METABOLIC PANEL W/ REFLEX TO MG FOR LOW K - Abnormal; Notable for the following components:     Total Bilirubin 0.18 (*)     All other components within normal limits   MICROSCOPIC URINALYSIS - Abnormal; Notable for the following components:    Bacteria, UA MODERATE (*)     Amorphous, UA 1+ (*)     All other components within normal limits   POCT URINE PREGNANCY - Normal   CULTURE, URINE   CBC WITH AUTO DIFFERENTIAL       All other labs were within normal range or not returned as of this dictation. EMERGENCY DEPARTMENT COURSE andDIFFERENTIAL DIAGNOSIS/MDM:   Patient evaluated in conjunction with attending physician. Labs reviewed. WBC 9.9. Hemoglobin 12.5.  UA does not show infection. CT abdomen pelvis no acute abnormality. Mild diverticulosis. Mild to moderate stool. She was given IV fluids and Benadryl for headache. Her headache is improved. She is afebrile. Nontoxic in appearance. I discussed test results, plan and follow-up care with the patient. Prescriptions were written for Colace and Motrin. Instructed to follow-up with her doctor. She states she has had irregular periods and will follow up with her OB/GYN. Urine pregnancy negative. Return precautions provided. Vitals:    Vitals:    11/27/21 1833   BP: (!) 130/91   Pulse: 87   Resp: 16   Temp: 97.3 °F (36.3 °C)   SpO2: 98%   Weight: 180 lb (81.6 kg)   Height: 5' 5\" (1.651 m)         CONSULTS:  None    RES:  Procedures    FINAL IMPRESSION      1. Constipation, unspecified constipation type    2.  Nonintractable headache, unspecified chronicity pattern, unspecified headache type          DISPOSITION/PLAN   DISPOSITION Decision To Discharge 11/27/2021 09:36:29 PM      PATIENT REFERRED TO:   [facility] [address] [facility] [address] [facility] [address]  [phone]    In 1 week      [facility] ED  [address] [facility] [address]  [phone]    If symptoms worsen      DISCHARGE MEDICATIONS:     Discharge Medication List as of 11/27/2021  9:38 PM      START taking these medications    Details   docusate sodium (COLACE) 100 MG capsule Take 1 capsule by mouth 2 times daily for 3 days, Disp-6 capsule, R-0Print      ibuprofen

## 2021-11-28 LAB
CULTURE: NORMAL
Lab: NORMAL
SPECIMEN DESCRIPTION: NORMAL

## 2021-11-28 NOTE — ED NOTES
Discharge instructions and follow up care reviewed with patient and all questions answered at this time. Patient stable and ambulatory at time of discharge.       Mike Hernandez RN  11/27/21 0646

## 2021-11-28 NOTE — ED PROVIDER NOTES
eMERGENCY dEPARTMENT eNCOUnter   Independent Attestation     Pt Name: Tiara Oreilly  MRN: [id number]  [place] 1985  Date of evaluation: 11/28/21     Tiara Oreilly is a 31 y.o. female with CC: Headache and Abdominal Pain (right sided)      Based on the medical record the care appears appropriate. I was personally available for consultation in the Emergency Department. The care is provided during an unprecedented national emergency due to the novel coronavirus, COVID 19.     Willard Frankel, DO  Attending Emergency Physician                    Vijay Santos DO  11/28/21 4767

## 2021-11-29 LAB — HCG, PREGNANCY URINE (POC): NEGATIVE

## 2021-12-29 VITALS
HEART RATE: 82 BPM | BODY MASS INDEX: 30.82 KG/M2 | HEIGHT: 65 IN | OXYGEN SATURATION: 100 % | RESPIRATION RATE: 18 BRPM | WEIGHT: 185 LBS | SYSTOLIC BLOOD PRESSURE: 129 MMHG | DIASTOLIC BLOOD PRESSURE: 80 MMHG | TEMPERATURE: 96.6 F

## 2021-12-29 PROCEDURE — 99284 EMERGENCY DEPT VISIT MOD MDM: CPT

## 2021-12-29 ASSESSMENT — PAIN DESCRIPTION - LOCATION: LOCATION: BACK;KNEE

## 2021-12-29 ASSESSMENT — PAIN DESCRIPTION - PAIN TYPE: TYPE: ACUTE PAIN

## 2021-12-29 ASSESSMENT — PAIN DESCRIPTION - ORIENTATION: ORIENTATION: MID;LOWER;LEFT

## 2021-12-29 ASSESSMENT — PAIN SCALES - GENERAL: PAINLEVEL_OUTOF10: 7

## 2021-12-30 ENCOUNTER — HOSPITAL ENCOUNTER (EMERGENCY)
Age: 36
Discharge: HOME | End: 2021-12-30
Attending: EMERGENCY MEDICINE
Payer: COMMERCIAL
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
## 2021-12-30 ASSESSMENT — ENCOUNTER SYMPTOMS
VOMITING: 0
NAUSEA: 0
SHORTNESS OF BREATH: 0
BACK PAIN: 1
ABDOMINAL PAIN: 0

## 2021-12-30 NOTE — ED PROVIDER NOTES
[address] [facility] ED     Emergency Department     Faculty Attestation        I performed a history and physical examination of the patient and discussed management with the resident. I reviewed the residents note and agree with the documented findings and plan of care. Any areas of disagreement are noted on the chart. I was personally present for the key portions of any procedures. I have documented in the chart those procedures where I was not present during the key portions. I have reviewed the emergency nurses triage note. I agree with the chief complaint, past medical history, past surgical history, allergies, medications, social and family history as documented unless otherwise noted below. Documentation of the HPI, Physical Exam and Medical Decision Making performed by medical students or scribes is based on my personal performance of the HPI, PE and MDM. For Physician Assistant/ Nurse Practitioner cases/documentation I have have had a face to face evaluation with this patient and have completed at least one if not all key elements of the E/M (history, physical exam, and MDM). Additional findings are as noted. Pertinent Comments       The care is provided during an unprecedented national emergency due to the novel coronavirus, COVID-19.     (Please note that portions of this note were completed with a voice recognition program.  Efforts were made to edit the dictations but occasionally words are mis-transcribed.)    Edward Guy DO  Attending Emergency Physician         Edward Guy DO  12/30/21 0028
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
## 2022-01-04 ENCOUNTER — OFFICE VISIT (OUTPATIENT)
Dept: OBGYN | Age: 37
End: 2022-01-04
Payer: COMMERCIAL

## 2022-01-04 ENCOUNTER — HOSPITAL ENCOUNTER (OUTPATIENT)
Age: 37
Setting detail: SPECIMEN
Discharge: HOME | End: 2022-01-04

## 2022-01-04 ENCOUNTER — HOSPITAL ENCOUNTER (OUTPATIENT)
Age: 37
Setting detail: SPECIMEN
Discharge: HOME | End: 2022-01-04
Payer: COMMERCIAL

## 2022-01-04 VITALS
HEART RATE: 79 BPM | BODY MASS INDEX: 35.11 KG/M2 | WEIGHT: 211 LBS | DIASTOLIC BLOOD PRESSURE: 79 MMHG | SYSTOLIC BLOOD PRESSURE: 139 MMHG

## 2022-01-04 DIAGNOSIS — Z01.419 WELL WOMAN EXAM: ICD-10-CM

## 2022-01-04 DIAGNOSIS — Z01.419 WELL WOMAN EXAM: Primary | ICD-10-CM

## 2022-01-04 LAB
HEPATITIS B SURFACE ANTIGEN: NONREACTIVE
HIV AG/AB: NONREACTIVE
T. PALLIDUM, IGG: NONREACTIVE

## 2022-01-04 PROCEDURE — 86780 TREPONEMA PALLIDUM: CPT

## 2022-01-04 PROCEDURE — 36415 COLL VENOUS BLD VENIPUNCTURE: CPT

## 2022-01-04 PROCEDURE — 99395 PREV VISIT EST AGE 18-39: CPT | Performed by: STUDENT IN AN ORGANIZED HEALTH CARE EDUCATION/TRAINING PROGRAM

## 2022-01-04 PROCEDURE — G8484 FLU IMMUNIZE NO ADMIN: HCPCS | Performed by: STUDENT IN AN ORGANIZED HEALTH CARE EDUCATION/TRAINING PROGRAM

## 2022-01-04 PROCEDURE — 87340 HEPATITIS B SURFACE AG IA: CPT

## 2022-01-04 PROCEDURE — 87389 HIV-1 AG W/HIV-1&-2 AB AG IA: CPT

## 2022-01-04 RX ORDER — PNV NO.95/FERROUS FUM/FOLIC AC 28MG-0.8MG
1 TABLET ORAL DAILY
Qty: 30 TABLET | Refills: 3 | Status: SHIPPED | OUTPATIENT
Start: 2022-01-04 | End: 2022-08-30 | Stop reason: SDUPTHER

## 2022-01-04 NOTE — PROGRESS NOTES
[facility] OB/GYN Annual Visit    Kendall Caal  1/4/2022                       Primary Care Physician: Heidy Alex MD    CC:   Chief Complaint   Patient presents with    Follow-up     irregular cycles, been trying to conceive for two years          HPI: Kendall Caal is a 31 y.o. female [id number]    The patient was seen and examined. She is here for an annual visit. She initially made her appointment because she had missed a period in November and was concerned about having irregular cycles, but then her period did return in December. She was seen in March for slow return to menses after Depo, but reports cycles returned to normal shortly after that. She is trying to conceive and has been tracking her cycles, she reports they are around 30 days w/ heavy bleeding for 7 days. She is trying to conceive with the same partner she has conceived w/ previously. Discussed cycle tracking and ovulation prediction. Her Patient's last menstrual period was 12/15/2021 (exact date). She describes them as heavy. Her bowel habits are regular. She denies any bloating. She denies dysuria. She denies urinary leaking. She denies vaginal discharge. She is sexually active with has sex with males. She uses no method for contraception and is  desiring pregnancy.     Depression Screen: Symptoms of decreased mood absent  Symptoms of anhedonia absent  **If either question is answered in a  positive fashion then complete the PHQ9 Scoring Evaluation and make the appropriate referral**    REVIEW OF SYSTEMS:  Constitutional: negative fever, negative chills  HEENT: negative visual disturbances, negative headaches  Respiratory: negative dyspnea, negative cough  Cardiovascular: negative chest pain,  negative palpitations  Gastrointestinal: negative abdominal pain, negative RUQ pain, negative N/V, negative diarrhea, negative constipation  Genitourinary: negative dysuria, negative vaginal discharge  Dermatological: negative rash  Hematologic: negative bruising  Immunologic/Lymphatic: negative recent illness, negative recent sick contact  Musculoskeletal: negative back pain, negative myalgias, negative arthralgias  Neurological:  negative dizziness, negative weakness  Behavior/Psych: negative depression, negative anxiety    GYNECOLOGICAL HISTORY:  Age of Menarche: 17  Age of Menopause: NA     Sexually Active: has sex with males  STD History: past history: chlamydia and trichomonas     Pap History: Last PAP was normal; 2020. HEALTH MAINTENANCE:  Immunization status: up to date and documented  Garidisil immunization: done      OBSTETRICAL HISTORY:  OB History    Para Term  AB Living   4 1 1 0 2 1   SAB IAB Ectopic Molar Multiple Live Births   0 1 1 0 0 1      # Outcome Date GA Lbr Gael/2nd Weight Sex Delivery Anes PTL Lv   4 Ectopic 19              Birth Comments: Right cornual ectopic,wedge resection with R salpingectomy, evac hemoperitoneum   3 Term 09 40w0d  9 lb 10 oz (4.366 kg) M CS-Unspec Spinal  FLORENCIO   2             1 IAB               Obstetric Comments   G1: C/S \"pelvis wouldn't open up\"   G2: TAB   G3: Exploratory (pfannenstiel) laparoscopy converted to open laparotomy with wedge resection of cornual ectopic, right salpingectomy and evacuation of hemoperitoneum on 2019. Endometrial cavity was not entered. G4:        PAST MEDICAL HISTORY:   has a past medical history of Chlamydia, IBS (irritable bowel syndrome), Infertility, female, secondary, and Trichomonas vaginalis infection. PAST SURGICAL HISTORY:   has a past surgical history that includes pelvic laparoscopy (2019); salpingectomy (Right, 2019); Cholecystectomy; and laparoscopy (N/A, 2019). ALLERGIES:  has No Known Allergies. MEDICATIONS:  Prior to Admission medications    Medication Sig Start Date End Date Taking?  Authorizing Provider   Prenatal Vit-Fe Fumarate-FA (PRENATAL VITAMIN) 27-0.8 MG TABS Take 1 tablet by mouth daily 1/4/22  Yes Richa Kohler DO   ibuprofen (ADVIL;MOTRIN) 800 MG tablet Take 1 tablet by mouth every 8 hours as needed for Pain  Patient not taking: Reported on 1/4/2022 11/27/21   RIO Saab CNP   ondansetron (ZOFRAN ODT) 4 MG disintegrating tablet Take 1 tablet by mouth every 8 hours as needed for Nausea  Patient not taking: Reported on 1/4/2022 7/25/21   Nancy Mary MD       FAMILY HISTORY:  Family History of Breast, Ovarian, Colon or Uterine Cancer: No   family history is not on file. SOCIAL HISTORY:   reports that she has been smoking cigars. She started smoking about 4 years ago. She has smoked for the past 6.00 years. She has never used smokeless tobacco. She reports previous alcohol use. She reports that she does not use drugs. VITALS:  Vitals:    01/04/22 1442   BP: 139/79   Pulse: 79   Weight: 211 lb (95.7 kg)                                                                                                                                                                           PHYSICAL EXAM:   General Appearance: Appears healthy. Alert; in no acute distress. Pleasant. HEENT: normocephalic and atraumatic, Thyroid normal to inspection and palpation  Respiratory: Normal expansion. Clear to auscultation. No rales, rhonchi, or wheezing.   Cardiovascular: normal rate, normal S1 and S2, no gallops, intact distal pulses and no carotid bruits  Abdomen: soft, non-tender, non-distended, no right upper quadrant tenderness and no CVA tenderness  Pelvic Exam:   Chaperone for Intimate Exam: Chaperone was present for entire exam, Chaperone Name: Maria Isabel Armijo  External genitalia: General appearance; normal, Hair distribution; normal, Lesions absent  Urinary system: urethral meatus normal  Vaginal: normal mucosa, no discharge  Cervix: normal appearing cervix without discharge or lesions  Adnexa: normal adnexa in size, nontender and no masses  Uterus: normal single, nontender  Rectal Exam: exam declined by patient  Musculoskeletal: no gross abnormalities  Extremities: non-tender BLE and non-edematous  Psych:  oriented to time, place and person, mood and affect are within normal limits         DATA:  No results found for this visit on 22. ASSESSMENT & PLAN:    Ivett Das is a 31 y.o. female [id number]   - She previously received the Garidisil immunization   - Pap up to date   - Annual exam completed today, GCC and vaginitis collectged   - Patient requesting STD blood testing, HIV, T pal and hepatitis ordered   - Trying to conceive, rx for prenatal vitamins given today   - Discussed cycle tracking and ovulation prediction   - She has had regular cycles for about 9 months now after coming off of depo, will schedule f/u in 3 months    Hx open wedge resection 2/ right cornual ectopic pregnancy   - Surgery 2.5 years ago   - Will require delivery by  section     Patient Active Problem List    Diagnosis Date Noted    S/P ectopic pregnancy 2019    Irregular menses 2019    Pregnancy of unknown anatomic location 2019    Abdominal pain affecting pregnancy 2019    Laparoscopic converted to open wedge resection of right cornual ectopic, right salpingectomy and evacuation of hemoperitoneum 2019     Patient counseled extensively on the need to not get pregnant for at least 18 months.  Lower abdominal pain     Elevated serum hCG     Pregnancy, ectopic, cornual     Late period 2016    Chronic abdominal pain 2015    IBS (irritable bowel syndrome) 2015    Amenorrhea, secondary 10/24/2014    Infertility, female, secondary 10/24/2014    Need for MMR vaccine 2014    Need for Tdap vaccination 2014    Physical exam, pre-employment 2014    Overweight (BMI 25.0-29.9) 2013    Contraception 2013       Return in about 3 months (around 2022) for Follow up.   Dismissed from [address]. Letters mailed on 6-6-17/sms      Counseling Completed:    discussed need for repeat pap as per American Society for Colposcopy and Cervical Pathology guidelines. discussed STD counseling and prevention. Tobacco & Secondary smoke risks discussed; with recommendation for cessation and avoidance. Routine health maintenance per patients PCP discussed. Patient was seen with total face to face time of 20 minutes. More than 50% of this visit was on counseling and education regarding the problems listed below and her options. She was also counseled on her preventative health maintenance recommendations and follow-up. Diagnosis Orders   1.  Well woman exam  Vaginitis DNA Probe    Chlamydia Trachomatis & Neisseria gonorrhoeae (GC) by amplified detection    HIV Screen    Hepatitis B Surface Antigen    T. Pallidum Ab        Jeanie Gonzales DO  Ob/Gyn Resident  [facility], [address]  1/4/2022, 3:46 PM

## 2022-01-05 LAB
C TRACH DNA GENITAL QL NAA+PROBE: NEGATIVE
CANDIDA SPECIES, DNA PROBE: NEGATIVE
GARDNERELLA VAGINALIS, DNA PROBE: NEGATIVE
N. GONORRHOEAE DNA: NEGATIVE
SOURCE: NORMAL
SPECIMEN DESCRIPTION: NORMAL
TRICHOMONAS VAGINALIS DNA: NEGATIVE

## 2022-01-07 NOTE — PROGRESS NOTES
Attending Physician Statement  I have discussed the care of Deb Fields, including pertinent history and exam findings,  with the resident. I have reviewed the key elements of all parts of the encounter with the resident. I agree with the assessment, plan and orders as documented by the resident.   (GE Modifier)    Electronically signed by Brea Rodriguez MD at 7:28 AM 1/7/22

## 2022-01-21 ENCOUNTER — HOSPITAL ENCOUNTER (EMERGENCY)
Age: 37
Discharge: HOME | End: 2022-01-21
Attending: EMERGENCY MEDICINE
Payer: COMMERCIAL

## 2022-01-21 VITALS
WEIGHT: 210 LBS | DIASTOLIC BLOOD PRESSURE: 82 MMHG | RESPIRATION RATE: 16 BRPM | HEART RATE: 85 BPM | TEMPERATURE: 98.4 F | HEIGHT: 65 IN | BODY MASS INDEX: 34.99 KG/M2 | SYSTOLIC BLOOD PRESSURE: 133 MMHG | OXYGEN SATURATION: 99 %
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
## 2022-01-21 DIAGNOSIS — S39.012A STRAIN OF LUMBAR REGION, INITIAL ENCOUNTER: Primary | ICD-10-CM

## 2022-01-21 LAB
-: ABNORMAL
AMORPHOUS: ABNORMAL
BACTERIA: ABNORMAL
BILIRUBIN URINE: NEGATIVE
CASTS UA: ABNORMAL /LPF
COLOR: YELLOW
COMMENT UA: ABNORMAL
CRYSTALS, UA: ABNORMAL /HPF
EPITHELIAL CELLS UA: ABNORMAL /HPF (ref 0–5)
GLUCOSE URINE: NEGATIVE
HCG(URINE) PREGNANCY TEST: NEGATIVE
KETONES, URINE: NEGATIVE
LEUKOCYTE ESTERASE, URINE: ABNORMAL
MUCUS: ABNORMAL
NITRITE, URINE: NEGATIVE
OTHER OBSERVATIONS UA: ABNORMAL
PH UA: 7 (ref 5–8)
PROTEIN UA: NEGATIVE
RBC UA: ABNORMAL /HPF (ref 0–2)
RENAL EPITHELIAL, UA: ABNORMAL /HPF
SPECIFIC GRAVITY UA: 1.02 (ref 1–1.03)
TRICHOMONAS: ABNORMAL
TURBIDITY: CLEAR
URINE HGB: NEGATIVE
UROBILINOGEN, URINE: NORMAL
WBC UA: ABNORMAL /HPF (ref 0–5)
YEAST: ABNORMAL

## 2022-01-21 PROCEDURE — 99283 EMERGENCY DEPT VISIT LOW MDM: CPT

## 2022-01-21 PROCEDURE — 81001 URINALYSIS AUTO W/SCOPE: CPT

## 2022-01-21 PROCEDURE — 96372 THER/PROPH/DIAG INJ SC/IM: CPT

## 2022-01-21 PROCEDURE — 6360000002 HC RX W HCPCS: Performed by: PHYSICIAN ASSISTANT

## 2022-01-21 PROCEDURE — 81025 URINE PREGNANCY TEST: CPT

## 2022-01-21 RX ORDER — METHOCARBAMOL 750 MG/1
750 TABLET, FILM COATED ORAL 4 TIMES DAILY
Qty: 40 TABLET | Refills: 0 | Status: SHIPPED | OUTPATIENT
Start: 2022-01-21 | End: 2022-01-31

## 2022-01-21 RX ORDER — KETOROLAC TROMETHAMINE 30 MG/ML
60 INJECTION, SOLUTION INTRAMUSCULAR; INTRAVENOUS ONCE
Status: COMPLETED | OUTPATIENT
Start: 2022-01-21 | End: 2022-01-21

## 2022-01-21 RX ORDER — NAPROXEN 500 MG/1
500 TABLET ORAL 2 TIMES DAILY WITH MEALS
Qty: 20 TABLET | Refills: 0 | Status: SHIPPED | OUTPATIENT
Start: 2022-01-21 | End: 2022-08-30

## 2022-01-21 RX ADMIN — KETOROLAC TROMETHAMINE 60 MG: 30 INJECTION, SOLUTION INTRAMUSCULAR at 15:20

## 2022-01-21 ASSESSMENT — PAIN DESCRIPTION - FREQUENCY: FREQUENCY: CONTINUOUS

## 2022-01-21 ASSESSMENT — PAIN SCALES - GENERAL
PAINLEVEL_OUTOF10: 5
PAINLEVEL_OUTOF10: 5

## 2022-01-21 ASSESSMENT — PAIN DESCRIPTION - DESCRIPTORS: DESCRIPTORS: ACHING;CONSTANT

## 2022-01-21 ASSESSMENT — PAIN DESCRIPTION - LOCATION: LOCATION: BACK;HEAD
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
## 2022-01-21 NOTE — Clinical Note
Radha Hensley was seen and treated in our emergency department on 1/21/2022. She may return to work on 01/24/2022. If you have any questions or concerns, please don't hesitate to call.       Peter Tiwari MD
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
## 2022-03-11 ENCOUNTER — OFFICE VISIT (OUTPATIENT)
Dept: INTERNAL MEDICINE | Age: 37
End: 2022-03-11
Payer: COMMERCIAL

## 2022-03-11 ENCOUNTER — HOSPITAL ENCOUNTER (OUTPATIENT)
Age: 37
Discharge: HOME | End: 2022-03-11
Payer: COMMERCIAL

## 2022-03-11 ENCOUNTER — HOSPITAL ENCOUNTER (OUTPATIENT)
Age: 37
Setting detail: SPECIMEN
Discharge: HOME | End: 2022-03-11

## 2022-03-11 ENCOUNTER — NURSE ONLY (OUTPATIENT)
Dept: PRIMARY CARE CLINIC | Age: 37
End: 2022-03-11

## 2022-03-11 VITALS
OXYGEN SATURATION: 99 % | WEIGHT: 210 LBS | HEART RATE: 78 BPM | SYSTOLIC BLOOD PRESSURE: 129 MMHG | DIASTOLIC BLOOD PRESSURE: 84 MMHG | BODY MASS INDEX: 34.99 KG/M2 | TEMPERATURE: 98.4 F | HEIGHT: 65 IN

## 2022-03-11 DIAGNOSIS — Z01.818 PRE-OP EVALUATION: Primary | ICD-10-CM

## 2022-03-11 DIAGNOSIS — Z20.822 SUSPECTED COVID-19 VIRUS INFECTION: Primary | ICD-10-CM

## 2022-03-11 DIAGNOSIS — Z01.818 PRE-OP EVALUATION: ICD-10-CM

## 2022-03-11 LAB
-: NORMAL
ABSOLUTE EOS #: 0.14 K/UL (ref 0–0.44)
ABSOLUTE IMMATURE GRANULOCYTE: 0.04 K/UL (ref 0–0.3)
ABSOLUTE LYMPH #: 2.62 K/UL (ref 1.1–3.7)
ABSOLUTE MONO #: 0.64 K/UL (ref 0.1–1.2)
ALBUMIN SERPL-MCNC: 4.4 G/DL (ref 3.5–5.2)
ALBUMIN/GLOBULIN RATIO: 1.9 (ref 1–2.5)
ALP BLD-CCNC: 82 U/L (ref 35–104)
ALT SERPL-CCNC: 22 U/L (ref 5–33)
ANION GAP SERPL CALCULATED.3IONS-SCNC: 11 MMOL/L (ref 9–17)
AST SERPL-CCNC: 17 U/L
BASOPHILS # BLD: 1 % (ref 0–2)
BASOPHILS ABSOLUTE: 0.05 K/UL (ref 0–0.2)
BILIRUB SERPL-MCNC: 0.32 MG/DL (ref 0.3–1.2)
BILIRUBIN URINE: NEGATIVE
BUN BLDV-MCNC: 8 MG/DL (ref 6–20)
CALCIUM SERPL-MCNC: 9.4 MG/DL (ref 8.6–10.4)
CASTS UA: NORMAL /LPF (ref 0–8)
CHLORIDE BLD-SCNC: 106 MMOL/L (ref 98–107)
CO2: 25 MMOL/L (ref 20–31)
COLOR: YELLOW
CREAT SERPL-MCNC: 0.77 MG/DL (ref 0.5–0.9)
EOSINOPHILS RELATIVE PERCENT: 2 % (ref 1–4)
EPITHELIAL CELLS UA: NORMAL /HPF (ref 0–5)
ESTIMATED AVERAGE GLUCOSE: 114 MG/DL
GFR AFRICAN AMERICAN: >60 ML/MIN
GFR NON-AFRICAN AMERICAN: >60 ML/MIN
GFR SERPL CREATININE-BSD FRML MDRD: NORMAL ML/MIN/{1.73_M2}
GLUCOSE BLD-MCNC: 81 MG/DL (ref 70–99)
GLUCOSE URINE: NEGATIVE
HBA1C MFR BLD: 5.6 % (ref 4–6)
HCG QUALITATIVE: NEGATIVE
HCT VFR BLD CALC: 46.1 % (ref 36.3–47.1)
HEMOGLOBIN: 14.1 G/DL (ref 11.9–15.1)
HIV AG/AB: NONREACTIVE
IMMATURE GRANULOCYTES: 1 %
INR BLD: 0.9
KETONES, URINE: NEGATIVE
LEUKOCYTE ESTERASE, URINE: NEGATIVE
LYMPHOCYTES # BLD: 30 % (ref 24–43)
MCH RBC QN AUTO: 26.7 PG (ref 25.2–33.5)
MCHC RBC AUTO-ENTMCNC: 30.6 G/DL (ref 28.4–34.8)
MCV RBC AUTO: 87.1 FL (ref 82.6–102.9)
MONOCYTES # BLD: 7 % (ref 3–12)
NITRITE, URINE: NEGATIVE
NRBC AUTOMATED: 0 PER 100 WBC
PDW BLD-RTO: 14 % (ref 11.8–14.4)
PH UA: 7 (ref 5–8)
PLATELET # BLD: 296 K/UL (ref 138–453)
PMV BLD AUTO: 9.8 FL (ref 8.1–13.5)
POTASSIUM SERPL-SCNC: 4.5 MMOL/L (ref 3.7–5.3)
PROTEIN UA: NEGATIVE
PROTHROMBIN TIME: 10.2 SEC (ref 9.1–12.3)
RBC # BLD: 5.29 M/UL (ref 3.95–5.11)
RBC UA: NORMAL /HPF (ref 0–4)
SEG NEUTROPHILS: 59 % (ref 36–65)
SEGMENTED NEUTROPHILS ABSOLUTE COUNT: 5.14 K/UL (ref 1.5–8.1)
SODIUM BLD-SCNC: 142 MMOL/L (ref 135–144)
SPECIFIC GRAVITY UA: 1.02 (ref 1–1.03)
TOTAL PROTEIN: 6.7 G/DL (ref 6.4–8.3)
TSH SERPL DL<=0.05 MIU/L-ACNC: 1.02 MIU/L (ref 0.3–5)
TURBIDITY: CLEAR
URINE HGB: ABNORMAL
UROBILINOGEN, URINE: NORMAL
WBC # BLD: 8.6 K/UL (ref 3.5–11.3)
WBC UA: NORMAL /HPF (ref 0–5)

## 2022-03-11 PROCEDURE — 84703 CHORIONIC GONADOTROPIN ASSAY: CPT

## 2022-03-11 PROCEDURE — 85610 PROTHROMBIN TIME: CPT

## 2022-03-11 PROCEDURE — G8417 CALC BMI ABV UP PARAM F/U: HCPCS | Performed by: INTERNAL MEDICINE

## 2022-03-11 PROCEDURE — 80053 COMPREHEN METABOLIC PANEL: CPT

## 2022-03-11 PROCEDURE — G8484 FLU IMMUNIZE NO ADMIN: HCPCS | Performed by: INTERNAL MEDICINE

## 2022-03-11 PROCEDURE — 81001 URINALYSIS AUTO W/SCOPE: CPT

## 2022-03-11 PROCEDURE — 87389 HIV-1 AG W/HIV-1&-2 AB AG IA: CPT

## 2022-03-11 PROCEDURE — 93005 ELECTROCARDIOGRAM TRACING: CPT | Performed by: INTERNAL MEDICINE

## 2022-03-11 PROCEDURE — 83036 HEMOGLOBIN GLYCOSYLATED A1C: CPT

## 2022-03-11 PROCEDURE — G8427 DOCREV CUR MEDS BY ELIG CLIN: HCPCS | Performed by: INTERNAL MEDICINE

## 2022-03-11 PROCEDURE — 36415 COLL VENOUS BLD VENIPUNCTURE: CPT

## 2022-03-11 PROCEDURE — 85025 COMPLETE CBC W/AUTO DIFF WBC: CPT

## 2022-03-11 PROCEDURE — 84443 ASSAY THYROID STIM HORMONE: CPT

## 2022-03-11 PROCEDURE — 99213 OFFICE O/P EST LOW 20 MIN: CPT | Performed by: INTERNAL MEDICINE

## 2022-03-11 PROCEDURE — 1036F TOBACCO NON-USER: CPT | Performed by: INTERNAL MEDICINE

## 2022-03-11 SDOH — ECONOMIC STABILITY: FOOD INSECURITY: WITHIN THE PAST 12 MONTHS, THE FOOD YOU BOUGHT JUST DIDN'T LAST AND YOU DIDN'T HAVE MONEY TO GET MORE.: NEVER TRUE

## 2022-03-11 SDOH — ECONOMIC STABILITY: FOOD INSECURITY: WITHIN THE PAST 12 MONTHS, YOU WORRIED THAT YOUR FOOD WOULD RUN OUT BEFORE YOU GOT MONEY TO BUY MORE.: NEVER TRUE

## 2022-03-11 ASSESSMENT — PATIENT HEALTH QUESTIONNAIRE - PHQ9
SUM OF ALL RESPONSES TO PHQ QUESTIONS 1-9: 2
10. IF YOU CHECKED OFF ANY PROBLEMS, HOW DIFFICULT HAVE THESE PROBLEMS MADE IT FOR YOU TO DO YOUR WORK, TAKE CARE OF THINGS AT HOME, OR GET ALONG WITH OTHER PEOPLE: 1
9. THOUGHTS THAT YOU WOULD BE BETTER OFF DEAD, OR OF HURTING YOURSELF: 0
SUM OF ALL RESPONSES TO PHQ QUESTIONS 1-9: 2
5. POOR APPETITE OR OVEREATING: 1
SUM OF ALL RESPONSES TO PHQ QUESTIONS 1-9: 2
7. TROUBLE CONCENTRATING ON THINGS, SUCH AS READING THE NEWSPAPER OR WATCHING TELEVISION: 0
6. FEELING BAD ABOUT YOURSELF - OR THAT YOU ARE A FAILURE OR HAVE LET YOURSELF OR YOUR FAMILY DOWN: 0
4. FEELING TIRED OR HAVING LITTLE ENERGY: 1
SUM OF ALL RESPONSES TO PHQ QUESTIONS 1-9: 2
3. TROUBLE FALLING OR STAYING ASLEEP: 0
SUM OF ALL RESPONSES TO PHQ9 QUESTIONS 1 & 2: 0
1. LITTLE INTEREST OR PLEASURE IN DOING THINGS: 0
2. FEELING DOWN, DEPRESSED OR HOPELESS: 0
8. MOVING OR SPEAKING SO SLOWLY THAT OTHER PEOPLE COULD HAVE NOTICED. OR THE OPPOSITE, BEING SO FIGETY OR RESTLESS THAT YOU HAVE BEEN MOVING AROUND A LOT MORE THAN USUAL: 0

## 2022-03-11 ASSESSMENT — SOCIAL DETERMINANTS OF HEALTH (SDOH): HOW HARD IS IT FOR YOU TO PAY FOR THE VERY BASICS LIKE FOOD, HOUSING, MEDICAL CARE, AND HEATING?: NOT HARD AT ALL
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
## 2022-03-11 NOTE — PATIENT INSTRUCTIONS
Script for lab given to pt, no fasting required. Pt will get labs done with in a week. EKG and COVID test given to pt, [facility]    Pt was added to wait list for 6 months. An After Visit Summary was printed and given to the patient.   ALO

## 2022-03-12 DIAGNOSIS — Z01.818 PRE-OP EVALUATION: ICD-10-CM

## 2022-03-12 LAB
EKG ATRIAL RATE: 65 BPM
EKG P AXIS: 53 DEGREES
EKG P-R INTERVAL: 144 MS
EKG Q-T INTERVAL: 400 MS
EKG QRS DURATION: 82 MS
EKG QTC CALCULATION (BAZETT): 416 MS
EKG R AXIS: 66 DEGREES
EKG T AXIS: 53 DEGREES
EKG VENTRICULAR RATE: 65 BPM
SARS-COV-2: NORMAL
SARS-COV-2: NOT DETECTED
SOURCE: NORMAL

## 2022-03-14 ENCOUNTER — PATIENT MESSAGE (OUTPATIENT)
Dept: INTERNAL MEDICINE | Age: 37
End: 2022-03-14

## 2022-03-14 DIAGNOSIS — Z01.818 PRE-OP EVALUATION: Primary | ICD-10-CM

## 2022-03-14 DIAGNOSIS — R31.1 BENIGN ESSENTIAL MICROSCOPIC HEMATURIA: ICD-10-CM

## 2022-03-14 NOTE — TELEPHONE ENCOUNTER
From: Juan Ventura  To: Dr. Rust Weiss: 3/14/2022 2:27 PM EDT  Subject: About test     So do I have to retake the test cause I was on my period?  If not did you fax them paper to my doctor out of town for my surgery

## 2022-03-15 ENCOUNTER — TELEPHONE (OUTPATIENT)
Dept: INTERNAL MEDICINE | Age: 37
End: 2022-03-15

## 2022-03-15 ENCOUNTER — HOSPITAL ENCOUNTER (OUTPATIENT)
Age: 37
Discharge: HOME | End: 2022-03-15
Payer: COMMERCIAL

## 2022-03-15 DIAGNOSIS — Z01.818 PRE-OP EVALUATION: ICD-10-CM

## 2022-03-15 DIAGNOSIS — R31.1 BENIGN ESSENTIAL MICROSCOPIC HEMATURIA: ICD-10-CM

## 2022-03-15 LAB
-: ABNORMAL
BILIRUBIN URINE: NEGATIVE
CASTS UA: ABNORMAL /LPF (ref 0–8)
COLOR: YELLOW
EPITHELIAL CELLS UA: ABNORMAL /HPF (ref 0–5)
GLUCOSE URINE: NEGATIVE
KETONES, URINE: NEGATIVE
LEUKOCYTE ESTERASE, URINE: NEGATIVE
NITRITE, URINE: NEGATIVE
PH UA: 6 (ref 5–8)
PROTEIN UA: NEGATIVE
RBC UA: ABNORMAL /HPF (ref 0–4)
SPECIFIC GRAVITY UA: 1.02 (ref 1–1.03)
TURBIDITY: CLEAR
URINE HGB: ABNORMAL
UROBILINOGEN, URINE: NORMAL
WBC UA: ABNORMAL /HPF (ref 0–5)

## 2022-03-15 PROCEDURE — 81001 URINALYSIS AUTO W/SCOPE: CPT

## 2022-03-18 NOTE — TELEPHONE ENCOUNTER
EKG results faxed. But unable to locate the other tests. Writer pended test, please review before signing.

## 2022-07-13 ENCOUNTER — HOSPITAL ENCOUNTER (EMERGENCY)
Age: 37
Discharge: HOME | End: 2022-07-13
Attending: EMERGENCY MEDICINE
Payer: COMMERCIAL

## 2022-07-13 VITALS
RESPIRATION RATE: 18 BRPM | WEIGHT: 190 LBS | DIASTOLIC BLOOD PRESSURE: 93 MMHG | OXYGEN SATURATION: 100 % | HEART RATE: 105 BPM | TEMPERATURE: 99.1 F | HEIGHT: 65 IN | SYSTOLIC BLOOD PRESSURE: 141 MMHG | BODY MASS INDEX: 31.65 KG/M2

## 2022-07-13 DIAGNOSIS — R10.9 ABDOMINAL PAIN, UNSPECIFIED ABDOMINAL LOCATION: Primary | ICD-10-CM

## 2022-07-13 DIAGNOSIS — Z34.90 PREGNANCY, UNSPECIFIED GESTATIONAL AGE: ICD-10-CM

## 2022-07-13 LAB
-: ABNORMAL
ABSOLUTE EOS #: 0.12 K/UL (ref 0–0.44)
ABSOLUTE IMMATURE GRANULOCYTE: 0.04 K/UL (ref 0–0.3)
ABSOLUTE LYMPH #: 3.13 K/UL (ref 1.1–3.7)
ABSOLUTE MONO #: 1 K/UL (ref 0.1–1.2)
ANION GAP SERPL CALCULATED.3IONS-SCNC: 11 MMOL/L (ref 9–17)
BACTERIA: ABNORMAL
BASOPHILS # BLD: 0 % (ref 0–2)
BASOPHILS ABSOLUTE: 0.04 K/UL (ref 0–0.2)
BILIRUBIN URINE: NEGATIVE
BUN BLDV-MCNC: 8 MG/DL (ref 6–20)
BUN/CREAT BLD: 9 (ref 9–20)
CALCIUM SERPL-MCNC: 9.4 MG/DL (ref 8.6–10.4)
CHLORIDE BLD-SCNC: 101 MMOL/L (ref 98–107)
CO2: 23 MMOL/L (ref 20–31)
COLOR: YELLOW
CREAT SERPL-MCNC: 0.88 MG/DL (ref 0.5–0.9)
EOSINOPHILS RELATIVE PERCENT: 1 % (ref 1–4)
EPITHELIAL CELLS UA: ABNORMAL /HPF (ref 0–5)
GFR AFRICAN AMERICAN: >60 ML/MIN
GFR NON-AFRICAN AMERICAN: >60 ML/MIN
GFR SERPL CREATININE-BSD FRML MDRD: NORMAL ML/MIN/{1.73_M2}
GLUCOSE BLD-MCNC: 96 MG/DL (ref 70–99)
GLUCOSE URINE: NEGATIVE
HCG QUANTITATIVE: 130 MIU/ML
HCG(URINE) PREGNANCY TEST: POSITIVE
HCT VFR BLD CALC: 44.7 % (ref 36.3–47.1)
HEMOGLOBIN: 13.2 G/DL (ref 11.9–15.1)
IMMATURE GRANULOCYTES: 0 %
KETONES, URINE: NEGATIVE
LEUKOCYTE ESTERASE, URINE: NEGATIVE
LYMPHOCYTES # BLD: 24 % (ref 24–43)
MCH RBC QN AUTO: 23.6 PG (ref 25.2–33.5)
MCHC RBC AUTO-ENTMCNC: 29.5 G/DL (ref 28.4–34.8)
MCV RBC AUTO: 79.8 FL (ref 82.6–102.9)
MONOCYTES # BLD: 8 % (ref 3–12)
NITRITE, URINE: NEGATIVE
NRBC AUTOMATED: 0 PER 100 WBC
PDW BLD-RTO: 19.3 % (ref 11.8–14.4)
PH UA: 6.5 (ref 5–8)
PLATELET # BLD: 275 K/UL (ref 138–453)
PMV BLD AUTO: 9.5 FL (ref 8.1–13.5)
POTASSIUM SERPL-SCNC: 4.4 MMOL/L (ref 3.7–5.3)
PROTEIN UA: NEGATIVE
RBC # BLD: 5.6 M/UL (ref 3.95–5.11)
RBC # BLD: ABNORMAL 10*6/UL
RBC UA: ABNORMAL /HPF (ref 0–2)
SEG NEUTROPHILS: 67 % (ref 36–65)
SEGMENTED NEUTROPHILS ABSOLUTE COUNT: 8.75 K/UL (ref 1.5–8.1)
SODIUM BLD-SCNC: 135 MMOL/L (ref 135–144)
SPECIFIC GRAVITY UA: 1.02 (ref 1–1.03)
TURBIDITY: ABNORMAL
URINE HGB: NEGATIVE
UROBILINOGEN, URINE: NORMAL
WBC # BLD: 13.1 K/UL (ref 3.5–11.3)
WBC UA: ABNORMAL /HPF (ref 0–5)

## 2022-07-13 PROCEDURE — 80048 BASIC METABOLIC PNL TOTAL CA: CPT

## 2022-07-13 PROCEDURE — 99283 EMERGENCY DEPT VISIT LOW MDM: CPT

## 2022-07-13 PROCEDURE — 81025 URINE PREGNANCY TEST: CPT

## 2022-07-13 PROCEDURE — 85025 COMPLETE CBC W/AUTO DIFF WBC: CPT

## 2022-07-13 PROCEDURE — 81001 URINALYSIS AUTO W/SCOPE: CPT

## 2022-07-13 PROCEDURE — 84702 CHORIONIC GONADOTROPIN TEST: CPT

## 2022-07-13 ASSESSMENT — ENCOUNTER SYMPTOMS
ABDOMINAL PAIN: 1
SHORTNESS OF BREATH: 0
BACK PAIN: 0
NAUSEA: 1
VOMITING: 0
DIARRHEA: 0
SORE THROAT: 0
COUGH: 0
COLOR CHANGE: 0

## 2022-07-13 ASSESSMENT — PAIN SCALES - GENERAL: PAINLEVEL_OUTOF10: 6

## 2022-07-13 ASSESSMENT — PAIN - FUNCTIONAL ASSESSMENT: PAIN_FUNCTIONAL_ASSESSMENT: 0-10

## 2022-07-14 NOTE — ED PROVIDER NOTES
eMERGENCY dEPARTMENT eNCOUnter   Independent Attestation     Pt Name: Anita Colon  MRN: [id number]  [place] 1985  Date of evaluation: 7/13/22     Anita Colon is a 31 y.o. female with CC: Abdominal Pain        This visit was performed by both a physician and an APC. I performed all aspects of the MDM as documented.       The care is provided during an unprecedented national emergency due to the novel coronavirus, Papi Arizmendi MD  Attending Emergency Physician           Andrei King MD  07/13/22 0455

## 2022-07-14 NOTE — DISCHARGE INSTR - COC
Continuity of Care Form    Patient Name: Miguel Mckeon   :  1985  MRN:  [id number]    Admit date:  2022  Discharge date:  ***    Code Status Order: Prior   Advance Directives:      Admitting Physician:  No admitting provider for patient encounter. PCP: Martell Maldonado MD    Discharging Nurse: [facility] Unit/Room#: STA  Discharging Unit Phone Number: ***    Emergency Contact:   Extended Emergency Contact Information  Primary Emergency Contact: Antoinette Arriaga  Address: [address] [place] [address] Phone: [phone]  Work Phone: [phone]  Mobile Phone: [phone]  Relation: Parent  Hearing or visual needs: None  Other needs: None  Preferred language: English   needed? No    Past Surgical History:  Past Surgical History:   Procedure Laterality Date    CHOLECYSTECTOMY      LAPAROSCOPY N/A 2019    LAPAROSCOPY EXPLORATORY CONVERTED TO OPEN, RIGHT SALPINGECTOMY performed by Chaparro Delatorre DO at [address] [place] [address]  2019    LAPAROSCOPY EXPLORATORY CONVERTED TO OPEN, RIGHT SALPINGECTOMY     SALPINGECTOMY Right 2019       Immunization History:   Immunization History   Administered Date(s) Administered    HPV 9-valent Barb Hush) 10/19/2020, 2020, 2021    Hepatitis B Ped/Adol (Engerix-B, Recombivax HB) 10/06/2000    Influenza Virus Vaccine 10/24/2014, 2016    Influenza, Quadv, IM, (6 mo and older Fluzone, Flulaval, Fluarix and 3 yrs and older Afluria) 2017    MMR 2014    Pneumococcal Polysaccharide (Zfftsxsmx66) 2016    Tdap (Boostrix, Adacel) 2014       Active Problems:  Patient Active Problem List   Diagnosis Code    Overweight (BMI 25.0-29. 9) E66.3    Contraception [id number]    Need for MMR vaccine Z23    Need for Tdap vaccination Z23    Physical exam, pre-employment Z02.1    Amenorrhea, secondary N91.1    Infertility, female, secondary N97.9    Chronic abdominal pain R10.9, G89.29    IBS (irritable bowel syndrome) K58.9    Late period N92.6    Irregular menses N92.6    Pregnancy of unknown anatomic location O36.80X0    Abdominal pain affecting pregnancy O26.899, R10.9    Lower abdominal pain R10.30    Elevated serum hCG E34.9    Pregnancy, ectopic, cornual O00.80    Laparoscopic converted to open wedge resection of right cornual ectopic, right salpingectomy and evacuation of hemoperitoneum 19 Z98.890    S/P ectopic pregnancy Z87.59       Isolation/Infection:   Isolation            No Isolation          Patient Infection Status       Infection Onset Added Last Indicated Last Indicated By Review Planned Expiration Resolved Resolved By    None active    Resolved    COVID-19 (Rule Out) 22 COVID-19 (Ordered)   22 Rule-Out Test Resulted            Nurse Assessment:  Last Vital Signs: BP (!) 141/93   Pulse (!) 105   Temp 99.1 °F (37.3 °C) (Oral)   Resp 18   Ht 5' 5\" (1.651 m)   Wt 190 lb (86.2 kg)   LMP 06/10/2022   SpO2 100%   BMI 31.62 kg/m²     Last documented pain score (0-10 scale): Pain Level: 6  Last Weight:   Wt Readings from Last 1 Encounters:   22 190 lb (86.2 kg)     Mental Status:  {IP PT MENTAL STATUS:}    IV Access:  { CHELSEA IV ACCESS:647953082}    Nursing Mobility/ADLs:  Walking   {[facility] DME IGL}  Transfer  {P DME ZVYE:237045541}  Bathing  {P DME AHTH:269186198}  Dressing  {P DME HELD:824926903}  Toileting  {P DME SSTT:756932444}  Feeding  {[facility] DME LAPX:813311676}  Med Admin  {[facility] DME GYNE:937290555}  Med Delivery   { CHELSEA MED Delivery:068471494}    Wound Care Documentation and Therapy:  Incision 19 Abdomen (Active)   Number of days: 1117        Elimination:  Continence:    Bowel: {YES / PE:08401}  Bladder: {YES / IF:79663}  Urinary Catheter: {Urinary Catheter:356906234}   Colostomy/Ileostomy/Ileal Conduit: {YES / JR:14021}       Date of Last BM: ***  No intake or output data in the 24 hours ending 22  No intake/output data recorded.     Safety Concerns:     50Cori Cifuentes [facility] Safety Concerns:259434890}    Impairments/Disabilities:      [address]if[address] [facility] Impairments/Disabilities:625381380}    Nutrition Therapy:  Current Nutrition Therapy:   Cori Cifuentes [facility] Diet List:299464233}    Routes of Feeding: {CHP DME Other Feedings:597337304}  Liquids: {Slp liquid thickness:42992}  Daily Fluid Restriction: {CHP DME Yes amt example:353008340}  Last Modified Barium Swallow with Video (Video Swallowing Test): {Done Not Done WSDC:291047495}    Treatments at the Time of Hospital Discharge:   Respiratory Treatments: ***  Oxygen Therapy:  {Therapy; copd oxygen:90652}  Ventilator:    { CC Vent DJLA:804587804}    Rehab Therapies: {THERAPEUTIC INTERVENTION:9083949520}  Weight Bearing Status/Restrictions: [address] [place] [address] Weight Bearin}  Other Medical Equipment (for information only, NOT a DME order):  {EQUIPMENT:458259419}  Other Treatments: ***    Patient's personal belongings (please select all that are sent with patient):  {CHP DME Belongings:844927320}    RN SIGNATURE:  {Esignature:290558165}    CASE MANAGEMENT/SOCIAL WORK SECTION    Inpatient Status Date: ***    Readmission Risk Assessment Score:  Readmission Risk              Risk of Unplanned Readmission:  0           Discharging to Facility/ Agency   Name:   Address:  Phone:  Fax:    Dialysis Facility (if applicable)   Name:  Address:  Dialysis Schedule:  Phone:  Fax:    / signature: {Esignature:719733386}    PHYSICIAN SECTION    Prognosis: {Prognosis:0353276291}    Condition at Discharge: Mazin Cifuentes Patient Condition:177590278}    Rehab Potential (if transferring to Rehab): {Prognosis:9564597633}    Recommended Labs or Other Treatments After Discharge: ***    Physician Certification: I certify the above information and transfer of Fiona Rabago  is necessary for the continuing treatment of the diagnosis listed and that she

## 2022-07-14 NOTE — ED NOTES
Pt presents to ED via private auto with c/o lower abdominal pain, onset three days ago. Pt denies emesis and diarrhea. Pt states she feels a little nauseous. Pt able to ambulate without assist. Pt afebrile, vitals stable.       Natalia Prado RN  07/13/22 8364

## 2022-07-14 NOTE — ED NOTES
Pt to ED c/o LLQ abd pain. Onset three days ago. Pt states intermittent, sharp pains. Pt denies V/D, issues voiding, back pain. Pt states she has had intermittent nausea since the pain began. Pt states normal bm.       Laverne Serna RN  07/13/22 2043

## 2022-07-14 NOTE — ED PROVIDER NOTES
Team [address] ED  eMERGENCY dEPARTMENT eNCOUnter      Pt Name: Shawanda Navarro  MRN: [id number]  [place] 1985  Date of evaluation: 7/13/2022  Provider: RIO Ireland CNP    CHIEF COMPLAINT       Chief Complaint   Patient presents with    Abdominal Pain         HISTORY OF PRESENT ILLNESS  (Location/Symptom, Timing/Onset, Context/Setting, Quality, Duration, Modifying Factors, Severity.)   Shawanda Navarro is a 31 y.o. female who presents to the emergency department via private auto for LLQ pain, nausea, dizziness. Onset was within the past few days. Denies fever, chills, emesis, diarrhea, injury, urinary sx. Her last BM was today. Rates her pain 6/10 at this time. Nursing Notes were reviewed. ALLERGIES     Patient has no known allergies. CURRENT MEDICATIONS       Previous Medications    IBUPROFEN (ADVIL;MOTRIN) 800 MG TABLET    Take 1 tablet by mouth every 8 hours as needed for Pain    NAPROXEN (NAPROSYN) 500 MG TABLET    Take 1 tablet by mouth 2 times daily (with meals)    ONDANSETRON (ZOFRAN ODT) 4 MG DISINTEGRATING TABLET    Take 1 tablet by mouth every 8 hours as needed for Nausea    PRENATAL VIT-FE FUMARATE-FA (PRENATAL VITAMIN) 27-0.8 MG TABS    Take 1 tablet by mouth daily       PAST MEDICAL HISTORY         Diagnosis Date    Chlamydia     IBS (irritable bowel syndrome) 5/7/2015    Infertility, female, secondary     Trichomonas vaginalis infection 2013       SURGICAL HISTORY           Procedure Laterality Date    CHOLECYSTECTOMY      LAPAROSCOPY N/A 6/22/2019    LAPAROSCOPY EXPLORATORY CONVERTED TO OPEN, RIGHT SALPINGECTOMY performed by Lise Neely DO at [facility]  06/22/2019    LAPAROSCOPY EXPLORATORY CONVERTED TO OPEN, RIGHT SALPINGECTOMY     SALPINGECTOMY Right 06/22/2019         FAMILY HISTORY     History reviewed. No pertinent family history. No family status information on file.         SOCIAL HISTORY      reports that she quit smoking about 5 months ago. Her smoking use included cigars. She started smoking about 5 years ago. She quit after 6.00 years of use. She has never used smokeless tobacco. She reports previous alcohol use. She reports that she does not use drugs. REVIEW OF SYSTEMS    (2-9 systems for level 4, 10 or more for level 5)     Review of Systems   Constitutional: Negative for chills, diaphoresis, fatigue and fever. HENT: Negative for congestion and sore throat. Respiratory: Negative for cough and shortness of breath. Cardiovascular: Negative for chest pain. Gastrointestinal: Positive for abdominal pain and nausea. Negative for diarrhea and vomiting. Genitourinary: Negative for dysuria, flank pain, frequency, hematuria and urgency. Musculoskeletal: Negative for back pain, myalgias and neck pain. Skin: Negative for color change, rash and wound. Neurological: Positive for dizziness. Negative for facial asymmetry, speech difficulty, weakness, light-headedness, numbness and headaches. Except as noted above the remainder of the review of systems was reviewed and negative. PHYSICAL EXAM    (up to 7 for level 4, 8 or more for level 5)     ED Triage Vitals [07/13/22 2015]   BP Temp Temp Source Heart Rate Resp SpO2 Height Weight   (!) 141/93 99.1 °F (37.3 °C) Oral (!) 105 18 100 % 5' 5\" (1.651 m) 190 lb (86.2 kg)     Physical Exam  Vitals reviewed. Constitutional:       General: She is not in acute distress. Appearance: She is well-developed. She is not diaphoretic. Comments: Pt was observed smiling, laughing with her partner. She appeared in no acute distress. Eyes:      General: No scleral icterus. Extraocular Movements: Extraocular movements intact. Conjunctiva/sclera: Conjunctivae normal.   Cardiovascular:      Rate and Rhythm: Normal rate. Pulmonary:      Effort: Pulmonary effort is normal. No respiratory distress. Breath sounds: No stridor.    Abdominal: General: There is no distension. Palpations: Abdomen is soft. Tenderness: There is no abdominal tenderness. There is no guarding. Musculoskeletal:      Cervical back: Neck supple. Comments: Moves extremities. Skin:     General: Skin is warm and dry. Findings: No rash. Neurological:      Mental Status: She is alert and oriented to person, place, and time. GCS: GCS eye subscore is 4. GCS verbal subscore is 5. GCS motor subscore is 6. Cranial Nerves: Cranial nerves are intact. Motor: Motor function is intact. Coordination: Coordination is intact. Gait: Gait is intact. Psychiatric:         Behavior: Behavior normal.           DIAGNOSTIC RESULTS     LABS:  Labs Reviewed   URINALYSIS WITH MICROSCOPIC - Abnormal; Notable for the following components:       Result Value    Turbidity UA SLIGHTLY CLOUDY (*)     Bacteria, UA FEW (*)     All other components within normal limits   PREGNANCY, URINE - Abnormal; Notable for the following components:    HCG(Urine) Pregnancy Test POSITIVE (*)     All other components within normal limits   CBC WITH AUTO DIFFERENTIAL - Abnormal; Notable for the following components:    WBC 13.1 (*)     RBC 5.60 (*)     MCV 79.8 (*)     MCH 23.6 (*)     RDW 19.3 (*)     Seg Neutrophils 67 (*)     Segs Absolute 8.75 (*)     All other components within normal limits   HCG, QUANTITATIVE, PREGNANCY - Abnormal; Notable for the following components:    hCG Quant 130 (*)     All other components within normal limits   BASIC METABOLIC PANEL       All other labs were within normal range or not returned as of this dictation.     EMERGENCY DEPARTMENT COURSE and DIFFERENTIAL DIAGNOSIS/MDM:   Vitals:    Vitals:    07/13/22 2015   BP: (!) 141/93   Pulse: (!) 105   Resp: 18   Temp: 99.1 °F (37.3 °C)   TempSrc: Oral   SpO2: 100%   Weight: 190 lb (86.2 kg)   Height: 5' 5\" (1.651 m)       CLINICAL DECISION MAKING:  The patient presented alert with a nontoxic appearance and was seen in conjunction with Dr. Gilda Malcolm. The patient's pregnancy test was positive. Her serum HCG level was 130. Her LNMP was 6/10/22. She is aware an ectopic pregnancy cannot be ruled out today. She has an OB/GYN for follow up. She has prenatal multivitamins at home. Follow up as directed. Evaluation and treatment course in the ED, and plan of care upon discharge was discussed in length with the patient. Patient had no further questions prior to being discharged and was instructed to return to the ED for new or worsening symptoms. Care was provided during an unprecedented national emergency due to the novel coronavirus, Covid-19. FINAL IMPRESSION      1. Abdominal pain, unspecified abdominal location    2. Pregnancy, unspecified gestational age            Problem List  Patient Active Problem List   Diagnosis Code    Overweight (BMI 25.0-29. 9) E66.3    Contraception [id number]    Need for MMR vaccine Z23    Need for Tdap vaccination Z23    Physical exam, pre-employment Z02.1    Amenorrhea, secondary N91.1    Infertility, female, secondary N97.9    Chronic abdominal pain R10.9, G89.29    IBS (irritable bowel syndrome) K58.9    Late period N92.6    Irregular menses N92.6    Pregnancy of unknown anatomic location O36.80X0    Abdominal pain affecting pregnancy O26.899, R10.9    Lower abdominal pain R10.30    Elevated serum hCG E34.9    Pregnancy, ectopic, cornual O00.80    Laparoscopic converted to open wedge resection of right cornual ectopic, right salpingectomy and evacuation of hemoperitoneum 6/22/19 Z98.890    S/P ectopic pregnancy Z87.59         DISPOSITION/PLAN   DISPOSITION  DISCHARGE      PATIENT REFERRED TO:   MD Nomi Wayne silviano 28. [address] [phone]          [address]  [phone]  Schedule an appointment as soon as possible for a visit         DISCHARGE MEDICATIONS: New Prescriptions    No medications on file           (Please note that portions of this note were completed with a voice recognition program.  Efforts were made to edit the dictations but occasionally words are mis-transcribed.)    RIO García - RIO Albarran CNP  07/13/22 [address], CNP  07/13/22 [id number]

## 2022-08-05 ENCOUNTER — ANCILLARY PROCEDURE (OUTPATIENT)
Dept: OBGYN | Age: 37
End: 2022-08-05
Payer: COMMERCIAL

## 2022-08-05 DIAGNOSIS — Z34.91 CURRENTLY PREGNANT IN FIRST TRIMESTER WITH UNKNOWN GESTATIONAL AGE: ICD-10-CM

## 2022-08-05 PROCEDURE — 76817 TRANSVAGINAL US OBSTETRIC: CPT | Performed by: RADIOLOGY

## 2022-08-08 ENCOUNTER — TELEPHONE (OUTPATIENT)
Dept: OBGYN | Age: 37
End: 2022-08-08

## 2022-08-08 DIAGNOSIS — O21.9 NAUSEA AND VOMITING DURING PREGNANCY: Primary | ICD-10-CM

## 2022-08-08 RX ORDER — PYRIDOXINE HCL (VITAMIN B6) 50 MG
25 TABLET ORAL 3 TIMES DAILY
Qty: 30 TABLET | Refills: 3 | Status: SHIPPED | OUTPATIENT
Start: 2022-08-08 | End: 2023-08-08

## 2022-08-08 NOTE — TELEPHONE ENCOUNTER
[facility]    Progress Note    Alberteen Shone Patient Status:  Inpatient    10/22/1966 MRN [id number]   [facility] 3NE-A Attending Colleen Lane MD   [facility] Day # 11 PCP Sindy Mae MD     Subjective:  Alberteen Shone is a(n) 48 year o Patient called to report constant nausea that comes every day, lasts all day; at times prevents her from going to work. Patient is requesting a call with recommendations for management. Patient was seen in our office for dating ultrasound on Friday, 8/5.  Please advise, home). Follow up with Dr. Denis Palma scheduled in 2 weeks.      Emily Velazco NP-C  Nurse Practitioner  [address]

## 2022-08-08 NOTE — TELEPHONE ENCOUNTER
Patient was called and informed of prescriptions transmitted to her pharmacy. Patient was educated about nausea and vomiting in early pregnancy, eating smaller more frequent meals. Patient verbalized understanding, denied further questions.

## 2022-08-30 ENCOUNTER — FOLLOWUP TELEPHONE ENCOUNTER (OUTPATIENT)
Dept: OBGYN | Age: 37
End: 2022-08-30

## 2022-08-30 ENCOUNTER — HOSPITAL ENCOUNTER (OUTPATIENT)
Age: 37
Discharge: HOME | End: 2022-08-30
Payer: COMMERCIAL

## 2022-08-30 ENCOUNTER — SCHEDULED TELEPHONE ENCOUNTER (OUTPATIENT)
Dept: OBGYN | Age: 37
End: 2022-08-30

## 2022-08-30 DIAGNOSIS — O09.90 HIGH RISK PREGNANCY, ANTEPARTUM: ICD-10-CM

## 2022-08-30 DIAGNOSIS — O09.90 HIGH RISK PREGNANCY, ANTEPARTUM: Primary | ICD-10-CM

## 2022-08-30 DIAGNOSIS — Z98.891 HISTORY OF CESAREAN SECTION: ICD-10-CM

## 2022-08-30 DIAGNOSIS — Z13.31 POSITIVE SCREENING FOR DEPRESSION ON 2-ITEM PATIENT HEALTH QUESTIONNAIRE (PHQ-2): ICD-10-CM

## 2022-08-30 DIAGNOSIS — Z28.310 UNVACCINATED FOR COVID-19: ICD-10-CM

## 2022-08-30 DIAGNOSIS — Z01.818 PRE-OP EVALUATION: ICD-10-CM

## 2022-08-30 DIAGNOSIS — E66.9 OBESITY (BMI 30-39.9): ICD-10-CM

## 2022-08-30 DIAGNOSIS — Z86.39 HISTORY OF HYPOTHYROIDISM: ICD-10-CM

## 2022-08-30 DIAGNOSIS — I10 CHRONIC HYPERTENSION: ICD-10-CM

## 2022-08-30 DIAGNOSIS — Z87.898 HISTORY OF VERTIGO: ICD-10-CM

## 2022-08-30 DIAGNOSIS — Z86.19 HISTORY OF CHLAMYDIA INFECTION: ICD-10-CM

## 2022-08-30 DIAGNOSIS — O09.529 ANTEPARTUM MULTIGRAVIDA OF ADVANCED MATERNAL AGE: ICD-10-CM

## 2022-08-30 PROBLEM — O36.80X0 PREGNANCY OF UNKNOWN ANATOMIC LOCATION: Status: RESOLVED | Noted: 2019-06-22 | Resolved: 2022-08-30

## 2022-08-30 PROBLEM — Z87.59 S/P ECTOPIC PREGNANCY: Status: RESOLVED | Noted: 2019-06-23 | Resolved: 2022-08-30

## 2022-08-30 PROBLEM — R10.9 ABDOMINAL PAIN AFFECTING PREGNANCY: Status: RESOLVED | Noted: 2019-06-22 | Resolved: 2022-08-30

## 2022-08-30 PROBLEM — Z87.891 FORMER SMOKER: Status: ACTIVE | Noted: 2022-08-30

## 2022-08-30 PROBLEM — O26.899 ABDOMINAL PAIN AFFECTING PREGNANCY: Status: RESOLVED | Noted: 2019-06-22 | Resolved: 2022-08-30

## 2022-08-30 LAB
ABO/RH: NORMAL
ABSOLUTE EOS #: 0.11 K/UL (ref 0–0.44)
ABSOLUTE IMMATURE GRANULOCYTE: 0.1 K/UL (ref 0–0.3)
ABSOLUTE LYMPH #: 2.34 K/UL (ref 1.1–3.7)
ABSOLUTE MONO #: 0.9 K/UL (ref 0.1–1.2)
ALBUMIN SERPL-MCNC: 3.4 G/DL (ref 3.5–5.2)
ALBUMIN/GLOBULIN RATIO: 1.1 (ref 1–2.5)
ALP BLD-CCNC: 61 U/L (ref 35–104)
ALT SERPL-CCNC: 18 U/L (ref 5–33)
AMPHETAMINE SCREEN URINE: NEGATIVE
ANION GAP SERPL CALCULATED.3IONS-SCNC: 12 MMOL/L (ref 9–17)
ANTIBODY SCREEN: NEGATIVE
AST SERPL-CCNC: 17 U/L
BACTERIA: ABNORMAL
BARBITURATE SCREEN URINE: NEGATIVE
BASOPHILS # BLD: 0 % (ref 0–2)
BASOPHILS ABSOLUTE: 0.05 K/UL (ref 0–0.2)
BENZODIAZEPINE SCREEN, URINE: NEGATIVE
BILIRUB SERPL-MCNC: <0.1 MG/DL (ref 0.3–1.2)
BUN BLDV-MCNC: 5 MG/DL (ref 6–20)
CALCIUM SERPL-MCNC: 8.8 MG/DL (ref 8.6–10.4)
CANNABINOID SCREEN URINE: NEGATIVE
CASTS UA: ABNORMAL /LPF (ref 0–8)
CHLORIDE BLD-SCNC: 103 MMOL/L (ref 98–107)
CO2: 18 MMOL/L (ref 20–31)
COCAINE METABOLITE, URINE: NEGATIVE
CREAT SERPL-MCNC: 0.58 MG/DL (ref 0.5–0.9)
CREATININE URINE: 122.4 MG/DL (ref 28–217)
EOSINOPHILS RELATIVE PERCENT: 1 % (ref 1–4)
EPITHELIAL CELLS UA: ABNORMAL /HPF (ref 0–5)
FENTANYL URINE: NEGATIVE
GFR AFRICAN AMERICAN: >60 ML/MIN
GFR NON-AFRICAN AMERICAN: >60 ML/MIN
GFR SERPL CREATININE-BSD FRML MDRD: ABNORMAL ML/MIN/{1.73_M2}
GLUCOSE BLD-MCNC: 138 MG/DL (ref 70–99)
HCT VFR BLD CALC: 38 % (ref 36.3–47.1)
HEMOGLOBIN: 12.5 G/DL (ref 11.9–15.1)
HEPATITIS B SURFACE ANTIGEN: NONREACTIVE
HEPATITIS C ANTIBODY: NONREACTIVE
HIV AG/AB: NONREACTIVE
IMMATURE GRANULOCYTES: 1 %
LYMPHOCYTES # BLD: 20 % (ref 24–43)
MCH RBC QN AUTO: 26.6 PG (ref 25.2–33.5)
MCHC RBC AUTO-ENTMCNC: 32.9 G/DL (ref 28.4–34.8)
MCV RBC AUTO: 80.9 FL (ref 82.6–102.9)
METHADONE SCREEN, URINE: NEGATIVE
MONOCYTES # BLD: 8 % (ref 3–12)
NRBC AUTOMATED: 0 PER 100 WBC
OPIATES, URINE: NEGATIVE
OXYCODONE SCREEN URINE: NEGATIVE
PARTIAL THROMBOPLASTIN TIME: 25 SEC (ref 20.5–30.5)
PDW BLD-RTO: 18.5 % (ref 11.8–14.4)
PHENCYCLIDINE, URINE: NEGATIVE
PLATELET # BLD: 299 K/UL (ref 138–453)
PMV BLD AUTO: 9.6 FL (ref 8.1–13.5)
POTASSIUM SERPL-SCNC: 4.2 MMOL/L (ref 3.7–5.3)
RBC # BLD: 4.7 M/UL (ref 3.95–5.11)
RBC # BLD: ABNORMAL 10*6/UL
RBC UA: ABNORMAL /HPF (ref 0–4)
RUBV IGG SER QL: 94.9 IU/ML
SEG NEUTROPHILS: 70 % (ref 36–65)
SEGMENTED NEUTROPHILS ABSOLUTE COUNT: 8.05 K/UL (ref 1.5–8.1)
SODIUM BLD-SCNC: 133 MMOL/L (ref 135–144)
T. PALLIDUM, IGG: NONREACTIVE
T3 FREE: 2.88 PG/ML (ref 2.02–4.43)
TEST INFORMATION: NORMAL
THYROXINE, FREE: 0.97 NG/DL (ref 0.93–1.7)
TOTAL PROTEIN, URINE: 10 MG/DL
TOTAL PROTEIN: 6.4 G/DL (ref 6.4–8.3)
URINE TOTAL PROTEIN CREATININE RATIO: 0.08 (ref 0–0.2)
WBC # BLD: 11.6 K/UL (ref 3.5–11.3)
WBC UA: ABNORMAL /HPF (ref 0–5)

## 2022-08-30 PROCEDURE — 80307 DRUG TEST PRSMV CHEM ANLYZR: CPT

## 2022-08-30 PROCEDURE — 86762 RUBELLA ANTIBODY: CPT

## 2022-08-30 PROCEDURE — 84439 ASSAY OF FREE THYROXINE: CPT

## 2022-08-30 PROCEDURE — 86900 BLOOD TYPING SEROLOGIC ABO: CPT

## 2022-08-30 PROCEDURE — 81220 CFTR GENE COM VARIANTS: CPT

## 2022-08-30 PROCEDURE — 86780 TREPONEMA PALLIDUM: CPT

## 2022-08-30 PROCEDURE — 86850 RBC ANTIBODY SCREEN: CPT

## 2022-08-30 PROCEDURE — 86803 HEPATITIS C AB TEST: CPT

## 2022-08-30 PROCEDURE — 84156 ASSAY OF PROTEIN URINE: CPT

## 2022-08-30 PROCEDURE — 82570 ASSAY OF URINE CREATININE: CPT

## 2022-08-30 PROCEDURE — 36415 COLL VENOUS BLD VENIPUNCTURE: CPT

## 2022-08-30 PROCEDURE — 80053 COMPREHEN METABOLIC PANEL: CPT

## 2022-08-30 PROCEDURE — 83020 HEMOGLOBIN ELECTROPHORESIS: CPT

## 2022-08-30 PROCEDURE — 86901 BLOOD TYPING SEROLOGIC RH(D): CPT

## 2022-08-30 PROCEDURE — 87086 URINE CULTURE/COLONY COUNT: CPT

## 2022-08-30 PROCEDURE — 87389 HIV-1 AG W/HIV-1&-2 AB AG IA: CPT

## 2022-08-30 PROCEDURE — 87340 HEPATITIS B SURFACE AG IA: CPT

## 2022-08-30 PROCEDURE — 84481 FREE ASSAY (FT-3): CPT

## 2022-08-30 PROCEDURE — 81015 MICROSCOPIC EXAM OF URINE: CPT

## 2022-08-30 PROCEDURE — 85730 THROMBOPLASTIN TIME PARTIAL: CPT

## 2022-08-30 PROCEDURE — 85025 COMPLETE CBC W/AUTO DIFF WBC: CPT

## 2022-08-30 RX ORDER — ASPIRIN 81 MG/1
81 TABLET ORAL DAILY
Qty: 30 TABLET | Refills: 5 | Status: SHIPPED | OUTPATIENT
Start: 2022-08-30 | End: 2022-10-10 | Stop reason: SDUPTHER

## 2022-08-30 RX ORDER — PNV NO.95/FERROUS FUM/FOLIC AC 28MG-0.8MG
1 TABLET ORAL DAILY
Qty: 30 TABLET | Refills: 12 | Status: SHIPPED | OUTPATIENT
Start: 2022-08-30

## 2022-08-30 ASSESSMENT — PATIENT HEALTH QUESTIONNAIRE - PHQ9
1. LITTLE INTEREST OR PLEASURE IN DOING THINGS: 1
SUM OF ALL RESPONSES TO PHQ QUESTIONS 1-9: 1
2. FEELING DOWN, DEPRESSED OR HOPELESS: 0
SUM OF ALL RESPONSES TO PHQ9 QUESTIONS 1 & 2: 1
SUM OF ALL RESPONSES TO PHQ QUESTIONS 1-9: 1

## 2022-08-30 NOTE — PROGRESS NOTES
Slavador Kilpatrick is a 30 y.o. female evaluated via telephone on 2022 for Other (OB intake)  . Documentation:  I communicated with the patient and/or health care decision maker about the OB intake. Details of this discussion including any medical advice provided: see notes    Total Time: minutes: 21-30 minutes    Salvador Kilpatrick was evaluated through a synchronous (real-time) audio encounter. Patient identification was verified at the start of the visit. She (or guardian if applicable) is aware that this is a billable service, which includes applicable co-pays. This visit was conducted with the patient's (and/or legal guardian's) verbal consent. She has not had a related appointment within my department in the past 7 days or scheduled within the next 24 hours. The patient was located at Home: [place] [address]. The provider was located at [facility] (Appt Dept): [address],  [address] [facility]. Note: not billable if this call serves to triage the patient into an appointment for the relevant concern    Destiny Bustos RN    First Trimester Plans/Education completed per ACOG Guidelines. Pt counseled and verbalizes understanding. Routine Prenatal Tests,  Risk Factors Identified By Prenatal History, Anticipated Course of Prenatal Care, Nutrition and Weight Gain Counseling, Toxoplasmosis Precautions ( cats/raw meat), Sexual Activity, Exercise, Influenza/Tdap Vaccine, Smoking Counseling, Environmental/Work Hazards, Travel, Alcohol, Illicit/Recreational Drugs, Use Of Any Medications, Indications for Ultrasound, Domestic Violence, Seat Belt Use, Dental Care , Childbirth Classes/Hospital Facilities.      Risk factors: New partner; chronic hypertension (no meds); advanced maternal age; H/O hypothyroidism; irritable bowel syndrome; obesity (pregravid BMI 31.62); H/O  x1; H/O vertigo; COVID unvaccinated    Patient occupation: Employed full time,   Patient lives

## 2022-08-30 NOTE — TELEPHONE ENCOUNTER
SW spoke with Pt for depression screen and [facility] initial assessment. Pt reported having a good support system. Reports no use of tobacco, alcohol and thc. SW completed lead screen with Pt. No risks detected  Pt scored 1 on PHQ-2. SW educated Pt on safe sleep, infant mortality, smoking cessation. No issues or concerns with MH symptoms, no depression or anxiety. No issues to discuss with SW at this time. Pt declined [facility]. Pt will connect with [facility]. Pt informed she will speak with Annette LEGGETT to complete the intake. SW will follow up as needed. Lead screening for pregnant and breast-feeding women. Do you live in or regularly visit a home built before 1978 that has had renovations, repair work, or remodeling in the past 12 months? No  Have you resided in or emigrated from areas were  contamination is high ([facility], [facility], [place])? No  Do you live near a manufacturing plant where lead is used e.g. battery manufacturing or recycling, ship building, or plastic manufacturing? No  Do you work with lead or live with someone who does? No  Do you cook with, store or serve food in [facility]? No  Do you eat none food substances that may be contaminated with lead such as soil or lead glazed ceramic pottery? No  Do you use alternative therapies, herbs or home remedies imported from [facility], [facility], [place], [place] or  countries? No  Do you use imported traditional cosmetics such as jose or surma that may be contaminated with lead? No  Do you or a family member engage in hobbies where lead is used e.g. stained glass or making pottery with lead glaze? No  Has your home been identified as having lead pipes or water source lines with lead? No  Have you ever been told that you have high levels of lead in your body? No  Do you live with someone identified as having elevated lead levels, child, close friend or relative?  No      Patients answering yes to any one of the above questions, offer blood lead level testing (LAB98), associate with diagnosis of Screening for Lead Poisoning, Z13.88.

## 2022-08-31 ENCOUNTER — HOSPITAL ENCOUNTER (OUTPATIENT)
Age: 37
Discharge: HOME | End: 2022-08-31
Payer: COMMERCIAL

## 2022-08-31 DIAGNOSIS — E66.9 OBESITY (BMI 30-39.9): ICD-10-CM

## 2022-08-31 DIAGNOSIS — O09.90 HIGH RISK PREGNANCY, ANTEPARTUM: ICD-10-CM

## 2022-08-31 LAB
CULTURE: NORMAL
GLUCOSE ADMINISTRATION: ABNORMAL
GLUCOSE TOLERANCE SCREEN 50G: 158 MG/DL (ref 70–135)
HGB ELECTROPHORESIS INTERP: NORMAL
PATHOLOGIST: NORMAL
SPECIMEN DESCRIPTION: NORMAL

## 2022-08-31 PROCEDURE — 36415 COLL VENOUS BLD VENIPUNCTURE: CPT

## 2022-08-31 PROCEDURE — 82950 GLUCOSE TEST: CPT

## 2022-09-07 DIAGNOSIS — R71.8 MICROCYTOSIS: Primary | ICD-10-CM

## 2022-09-07 DIAGNOSIS — O99.810 ABNORMAL GLUCOSE TOLERANCE TEST IN PREGNANCY: Primary | ICD-10-CM

## 2022-09-07 DIAGNOSIS — O99.810 PREGNANCY WITH ABNORMAL GLUCOSE TOLERANCE TEST: ICD-10-CM

## 2022-09-09 ENCOUNTER — INITIAL PRENATAL (OUTPATIENT)
Dept: OBGYN | Age: 37
End: 2022-09-09
Payer: COMMERCIAL

## 2022-09-09 ENCOUNTER — HOSPITAL ENCOUNTER (OUTPATIENT)
Age: 37
Setting detail: SPECIMEN
Discharge: HOME | End: 2022-09-09

## 2022-09-09 VITALS
SYSTOLIC BLOOD PRESSURE: 138 MMHG | DIASTOLIC BLOOD PRESSURE: 87 MMHG | HEART RATE: 97 BPM | WEIGHT: 215.4 LBS | BODY MASS INDEX: 35.84 KG/M2

## 2022-09-09 DIAGNOSIS — Z86.39 HISTORY OF HYPOTHYROIDISM: ICD-10-CM

## 2022-09-09 DIAGNOSIS — O99.810 ABNORMAL GLUCOSE TOLERANCE TEST IN PREGNANCY: ICD-10-CM

## 2022-09-09 DIAGNOSIS — O21.9 NAUSEA AND VOMITING DURING PREGNANCY: ICD-10-CM

## 2022-09-09 DIAGNOSIS — Z3A.11 11 WEEKS GESTATION OF PREGNANCY: Primary | ICD-10-CM

## 2022-09-09 DIAGNOSIS — O99.810 PREGNANCY WITH ABNORMAL GLUCOSE TOLERANCE TEST: ICD-10-CM

## 2022-09-09 DIAGNOSIS — O09.521 MULTIGRAVIDA OF ADVANCED MATERNAL AGE IN FIRST TRIMESTER: ICD-10-CM

## 2022-09-09 DIAGNOSIS — R71.8 MICROCYTOSIS: ICD-10-CM

## 2022-09-09 DIAGNOSIS — Z98.890 S/P LAPAROTOMY: ICD-10-CM

## 2022-09-09 DIAGNOSIS — I10 CHRONIC HYPERTENSION: ICD-10-CM

## 2022-09-09 DIAGNOSIS — Z13.31 POSITIVE SCREENING FOR DEPRESSION ON 2-ITEM PATIENT HEALTH QUESTIONNAIRE (PHQ-2): ICD-10-CM

## 2022-09-09 DIAGNOSIS — Z87.891 FORMER SMOKER: ICD-10-CM

## 2022-09-09 DIAGNOSIS — Z28.310 UNVACCINATED FOR COVID-19: ICD-10-CM

## 2022-09-09 DIAGNOSIS — Z3A.11 11 WEEKS GESTATION OF PREGNANCY: ICD-10-CM

## 2022-09-09 DIAGNOSIS — Z98.891 HISTORY OF CESAREAN SECTION: ICD-10-CM

## 2022-09-09 LAB
CYSTIC FIBROSIS: NORMAL
FERRITIN: 23 NG/ML (ref 13–150)
IRON SATURATION: 17 % (ref 20–55)
IRON: 78 UG/DL (ref 37–145)
TOTAL IRON BINDING CAPACITY: 456 UG/DL (ref 250–450)
UNSATURATED IRON BINDING CAPACITY: 378 UG/DL (ref 112–347)

## 2022-09-09 PROCEDURE — 99214 OFFICE O/P EST MOD 30 MIN: CPT

## 2022-09-09 PROCEDURE — G8417 CALC BMI ABV UP PARAM F/U: HCPCS

## 2022-09-09 PROCEDURE — G8427 DOCREV CUR MEDS BY ELIG CLIN: HCPCS

## 2022-09-09 PROCEDURE — 1036F TOBACCO NON-USER: CPT

## 2022-09-09 PROCEDURE — 99211 OFF/OP EST MAY X REQ PHY/QHP: CPT
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
## 2022-09-09 NOTE — PROGRESS NOTES
Attending Physician Statement  I have discussed the care of Ashleigh Edmond, including pertinent history and exam findings,  with the resident. I have seen and examined the patient and the key elements of all parts of the encounter have been performed by me. I agree with the assessment, plan and orders as documented by the resident.   (GC Modifier)

## 2022-09-10 LAB
AMOUNT GLUCOSE GIVEN: ABNORMAL G
CANDIDA SPECIES, DNA PROBE: NEGATIVE
GARDNERELLA VAGINALIS, DNA PROBE: POSITIVE
GLUCOSE FASTING: 91 MG/DL (ref 65–99)
GLUCOSE TOLERANCE TEST 1 HOUR: 191 MG/DL (ref 65–184)
GLUCOSE TOLERANCE TEST 2 HOUR: 164 MG/DL (ref 65–139)
GLUCOSE TOLERANCE TEST 3 HOUR: 199 MG/DL (ref 65–130)
SOURCE: ABNORMAL
TRICHOMONAS VAGINALIS DNA: NEGATIVE

## 2022-09-10 RX ORDER — METRONIDAZOLE 500 MG/1
500 TABLET ORAL 2 TIMES DAILY
Qty: 14 TABLET | Refills: 0 | Status: SHIPPED | OUTPATIENT
Start: 2022-09-10 | End: 2022-09-17

## 2022-09-12 DIAGNOSIS — E61.1 IRON DEFICIENCY: Primary | ICD-10-CM

## 2022-09-12 DIAGNOSIS — O24.111 TYPE 2 DIABETES MELLITUS AFFECTING PREGNANCY IN FIRST TRIMESTER, ANTEPARTUM: Primary | ICD-10-CM

## 2022-09-12 PROBLEM — O24.319 PREGESTATIONAL DIABETES MELLITUS, MODIFIED WHITE CLASS B: Status: ACTIVE | Noted: 2022-09-12

## 2022-09-12 RX ORDER — LANOLIN ALCOHOL/MO/W.PET/CERES
CREAM (GRAM) TOPICAL
Qty: 90 TABLET | Refills: 3 | Status: SHIPPED | OUTPATIENT
Start: 2022-09-12

## 2022-09-13 ENCOUNTER — TELEPHONE (OUTPATIENT)
Dept: PERINATAL CARE | Age: 37
End: 2022-09-13

## 2022-09-13 ENCOUNTER — HOSPITAL ENCOUNTER (EMERGENCY)
Age: 37
Discharge: HOME | End: 2022-09-14
Attending: EMERGENCY MEDICINE
Payer: COMMERCIAL

## 2022-09-13 VITALS
RESPIRATION RATE: 18 BRPM | WEIGHT: 215 LBS | DIASTOLIC BLOOD PRESSURE: 87 MMHG | HEIGHT: 65 IN | TEMPERATURE: 98.2 F | SYSTOLIC BLOOD PRESSURE: 159 MMHG | OXYGEN SATURATION: 100 % | HEART RATE: 104 BPM | BODY MASS INDEX: 35.82 KG/M2

## 2022-09-13 DIAGNOSIS — O99.810 ABNORMAL MATERNAL GLUCOSE TOLERANCE, ANTEPARTUM: Primary | ICD-10-CM

## 2022-09-13 DIAGNOSIS — O21.0 MILD HYPEREMESIS GRAVIDARUM, ANTEPARTUM: Primary | ICD-10-CM

## 2022-09-13 LAB
ABSOLUTE EOS #: 0.05 K/UL (ref 0–0.44)
ABSOLUTE IMMATURE GRANULOCYTE: 0.12 K/UL (ref 0–0.3)
ABSOLUTE LYMPH #: 2.02 K/UL (ref 1.1–3.7)
ABSOLUTE MONO #: 1.03 K/UL (ref 0.1–1.2)
ANION GAP SERPL CALCULATED.3IONS-SCNC: 12 MMOL/L (ref 9–17)
BASOPHILS # BLD: 0 % (ref 0–2)
BASOPHILS ABSOLUTE: 0.03 K/UL (ref 0–0.2)
BUN BLDV-MCNC: 6 MG/DL (ref 6–20)
BUN/CREAT BLD: 9 (ref 9–20)
CALCIUM SERPL-MCNC: 8.9 MG/DL (ref 8.6–10.4)
CHLORIDE BLD-SCNC: 102 MMOL/L (ref 98–107)
CO2: 21 MMOL/L (ref 20–31)
CREAT SERPL-MCNC: 0.64 MG/DL (ref 0.5–0.9)
EOSINOPHILS RELATIVE PERCENT: 0 % (ref 1–4)
GFR AFRICAN AMERICAN: >60 ML/MIN
GFR NON-AFRICAN AMERICAN: >60 ML/MIN
GFR SERPL CREATININE-BSD FRML MDRD: NORMAL ML/MIN/{1.73_M2}
GLUCOSE BLD-MCNC: 89 MG/DL (ref 70–99)
HCT VFR BLD CALC: 37 % (ref 36.3–47.1)
HEMOGLOBIN: 11.6 G/DL (ref 11.9–15.1)
IMMATURE GRANULOCYTES: 1 %
LYMPHOCYTES # BLD: 14 % (ref 24–43)
MCH RBC QN AUTO: 25.6 PG (ref 25.2–33.5)
MCHC RBC AUTO-ENTMCNC: 31.4 G/DL (ref 28.4–34.8)
MCV RBC AUTO: 81.5 FL (ref 82.6–102.9)
MONOCYTES # BLD: 7 % (ref 3–12)
NRBC AUTOMATED: 0 PER 100 WBC
PDW BLD-RTO: 16.2 % (ref 11.8–14.4)
PLATELET # BLD: 295 K/UL (ref 138–453)
PMV BLD AUTO: 9.5 FL (ref 8.1–13.5)
POTASSIUM SERPL-SCNC: 4.3 MMOL/L (ref 3.7–5.3)
RBC # BLD: 4.54 M/UL (ref 3.95–5.11)
RBC # BLD: ABNORMAL 10*6/UL
SEG NEUTROPHILS: 78 % (ref 36–65)
SEGMENTED NEUTROPHILS ABSOLUTE COUNT: 11.28 K/UL (ref 1.5–8.1)
SODIUM BLD-SCNC: 135 MMOL/L (ref 135–144)
WBC # BLD: 14.5 K/UL (ref 3.5–11.3)

## 2022-09-13 PROCEDURE — 6360000002 HC RX W HCPCS: Performed by: PHYSICIAN ASSISTANT

## 2022-09-13 PROCEDURE — 99284 EMERGENCY DEPT VISIT MOD MDM: CPT

## 2022-09-13 PROCEDURE — 85025 COMPLETE CBC W/AUTO DIFF WBC: CPT

## 2022-09-13 PROCEDURE — 87086 URINE CULTURE/COLONY COUNT: CPT

## 2022-09-13 PROCEDURE — 2580000003 HC RX 258: Performed by: PHYSICIAN ASSISTANT

## 2022-09-13 PROCEDURE — 80048 BASIC METABOLIC PNL TOTAL CA: CPT

## 2022-09-13 PROCEDURE — 81001 URINALYSIS AUTO W/SCOPE: CPT

## 2022-09-13 PROCEDURE — 96374 THER/PROPH/DIAG INJ IV PUSH: CPT

## 2022-09-13 RX ORDER — METOCLOPRAMIDE HYDROCHLORIDE 5 MG/ML
10 INJECTION INTRAMUSCULAR; INTRAVENOUS ONCE
Status: COMPLETED | OUTPATIENT
Start: 2022-09-13 | End: 2022-09-13

## 2022-09-13 RX ORDER — 0.9 % SODIUM CHLORIDE 0.9 %
1000 INTRAVENOUS SOLUTION INTRAVENOUS ONCE
Status: COMPLETED | OUTPATIENT
Start: 2022-09-13 | End: 2022-09-14

## 2022-09-13 RX ADMIN — SODIUM CHLORIDE 1000 ML: 9 INJECTION, SOLUTION INTRAVENOUS at 22:24

## 2022-09-13 RX ADMIN — METOCLOPRAMIDE 10 MG: 5 INJECTION, SOLUTION INTRAMUSCULAR; INTRAVENOUS at 22:23

## 2022-09-13 ASSESSMENT — PAIN DESCRIPTION - LOCATION: LOCATION: ABDOMEN

## 2022-09-13 ASSESSMENT — PAIN SCALES - GENERAL: PAINLEVEL_OUTOF10: 4

## 2022-09-13 ASSESSMENT — PAIN DESCRIPTION - DESCRIPTORS: DESCRIPTORS: CRAMPING

## 2022-09-13 ASSESSMENT — PAIN - FUNCTIONAL ASSESSMENT: PAIN_FUNCTIONAL_ASSESSMENT: NONE - DENIES PAIN

## 2022-09-13 NOTE — TELEPHONE ENCOUNTER
Rx called into the pharmacy to [facility] for a glucometer, and supplies pt is to check her sugars 4 x a day with a month supply and 6 refills. Rx called in by NAMITA WALKER

## 2022-09-14 LAB
BACTERIA: ABNORMAL
BILIRUBIN URINE: NEGATIVE
COLOR: YELLOW
EPITHELIAL CELLS UA: ABNORMAL /HPF (ref 0–5)
GLUCOSE URINE: NEGATIVE
KETONES, URINE: ABNORMAL
LEUKOCYTE ESTERASE, URINE: NEGATIVE
MUCUS: ABNORMAL
NITRITE, URINE: NEGATIVE
PH UA: 6 (ref 5–8)
PROTEIN UA: NEGATIVE
RBC UA: ABNORMAL /HPF (ref 0–2)
SPECIFIC GRAVITY UA: 1.04 (ref 1–1.03)
TURBIDITY: ABNORMAL
URINE HGB: NEGATIVE
UROBILINOGEN, URINE: NORMAL
WBC UA: ABNORMAL /HPF (ref 0–5)

## 2022-09-14 RX ORDER — METOCLOPRAMIDE 10 MG/1
10 TABLET ORAL 3 TIMES DAILY
Qty: 30 TABLET | Refills: 0 | Status: SHIPPED | OUTPATIENT
Start: 2022-09-14 | End: 2022-09-24

## 2022-09-14 NOTE — ED PROVIDER NOTES
eMERGENCY dEPARTMENT eNCOUnter   Independent Attestation     Pt Name: Elen Dyer  MRN: [id number]  [place] 1985  Date of evaluation: 9/14/22     Elen Dyer is a 30 y.o. female with CC: Morning Sickness (Pt states that she is 12  weeks pregnant and is unable to keep anything down )      This visit was performed by both a physician and an APC. I performed all aspects of the MDM as documented.     Diana Alford DO  Attending Emergency Physician                  [address],   09/14/22 0040

## 2022-09-14 NOTE — ED PROVIDER NOTES
[address] [place] [address] ED  eMERGENCY dEPARTMENTeNCOUnter      Pt Name: Albaro West  MRN: [id number]  [place] 1985  Date ofevaluation: 9/13/2022  Provider: Tiny Wood PA-C    CHIEF COMPLAINT       Chief Complaint   Patient presents with    Morning Sickness     Pt states that she is 12  weeks pregnant and is unable to keep anything down          HISTORY OF PRESENT ILLNESS  (Location/Symptom, Timing/Onset, Context/Setting, Quality, Duration, Modifying Factors, Severity.)   Albaro West is a 30 y.o. female who presents to the emergency department with morning sickness. No pain. No vaginal bleeding. B6 not helping. No pain. No other complaints or symptoms. Nursing Notes were reviewed. ALLERGIES     Patient has no known allergies. CURRENT MEDICATIONS       Previous Medications    ASPIRIN EC 81 MG EC TABLET    Take 1 tablet by mouth daily    DOXYLAMINE SUCCINATE (GNP SLEEP AID) 25 MG TABLET    1 tab at HS X 2 days. If symptoms persist 1/2 tab in AM and 1 tablet HS on day #3. If symptoms persist 1/2 tab in AM, 1/2 tab at noon, 1 tablet at HS on day # 4. DOXYLAMINE SUCCINATE (GNP SLEEP AID) 25 MG TABLET    Take 1 tablet by mouth nightly    FERROUS SULFATE (FE TABS) 325 (65 FE) MG EC TABLET    1 tablet by mouth in AM and PM every Monday, Wednesday and Friday. METRONIDAZOLE (FLAGYL) 500 MG TABLET    Take 1 tablet by mouth in the morning and at bedtime for 7 days    PRENATAL VIT-FE FUMARATE-FA (PRENATAL VITAMIN) 27-0.8 MG TABS    Take 1 tablet by mouth daily May substitute with any prenatal vitamin covered by the patient's insurance. PYRIDOXINE (RA VITAMIN B-6) 50 MG TABLET    Take 0.5 tablets by mouth in the morning and 0.5 tablets at noon and 0.5 tablets in the evening.        PAST MEDICAL HISTORY         Diagnosis Date    Chlamydia     Ectopic pregnancy     Right, salpingectomy    History of blood transfusion     6/2019 after lap    Hypertension     Hypothyroidism     Pt or more for level 5)     ED Triage Vitals [09/13/22 2037]   BP Temp Temp Source Heart Rate Resp SpO2 Height Weight   (!) 159/87 98.2 °F (36.8 °C) Oral (!) 104 18 100 % 5' 5\" (1.651 m) 215 lb (97.5 kg)      Physical Exam  Constitutional:       Appearance: She is well-developed. HENT:      Head: Normocephalic and atraumatic. Cardiovascular:      Rate and Rhythm: Normal rate and regular rhythm. Pulmonary:      Effort: Pulmonary effort is normal.      Breath sounds: Normal breath sounds. Abdominal:      Palpations: Abdomen is soft. Tenderness: no abdominal tenderness   Musculoskeletal:         General: Normal range of motion. Cervical back: Normal range of motion and neck supple. Skin:     General: Skin is warm. Findings: No rash. Neurological:      Mental Status: She is alert and oriented to person, place, and time.    Psychiatric:         Behavior: Behavior normal.               DIAGNOSTIC RESULTS     EKG: All EKG's are interpreted by the Emergency Department Physician who either signs or Co-signs this chart in the absence of a cardiologist.        RADIOLOGY:   Non-plain film images such as CT, Ultrasound and MRI are read by the radiologist. Plain radiographic images arevisualized and preliminarily interpreted by the emergency physician with the below findings:        Interpretation per the Radiologist below, if available at thetime of this note:          ED BEDSIDE ULTRASOUND:   Performed by ED Physician - none    LABS:  Labs Reviewed   URINALYSIS WITH MICROSCOPIC - Abnormal; Notable for the following components:       Result Value    Turbidity UA SLIGHTLY CLOUDY (*)     Ketones, Urine 3+ (*)     Specific Gravity, UA 1.038 (*)     Bacteria, UA FEW (*)     Mucus, UA 2+ (*)     All other components within normal limits   CBC WITH AUTO DIFFERENTIAL - Abnormal; Notable for the following components:    WBC 14.5 (*)     Hemoglobin 11.6 (*)     MCV 81.5 (*)     RDW 16.2 (*)     Seg Neutrophils 78 (*)     Lymphocytes 14 (*)     Eosinophils % 0 (*)     Immature Granulocytes 1 (*)     Segs Absolute 11.28 (*)     All other components within normal limits   CULTURE, URINE   BASIC METABOLIC PANEL       All other labs were within normal range or not returned as of this dictation. EMERGENCY DEPARTMENT COURSE and DIFFERENTIAL DIAGNOSIS/MDM:   Vitals:    Vitals:    09/13/22 2037   BP: (!) 159/87   Pulse: (!) 104   Resp: 18   Temp: 98.2 °F (36.8 °C)   TempSrc: Oral   SpO2: 100%   Weight: 215 lb (97.5 kg)   Height: 5' 5\" (1.651 m)       Will hydrate and DC home. N vaginal bleeding or pelvic/back pain. No abdominal pian    12:29 AM EDT  Feels better and dc home  CONSULTS:  None    PROCEDURES:  Procedures        FINAL IMPRESSION      1.  Mild hyperemesis gravidarum, antepartum          DISPOSITION/PLAN   DISPOSITION Decision To Discharge 09/14/2022 12:33:47 AM      PATIENTREFERRED TO:   Collette Hahn, MD  [facility] [address] [facility] [address] [facility] [address] [phone]    In 3 days      DISCHARGE MEDICATIONS:     New Prescriptions    METOCLOPRAMIDE (REGLAN) 10 MG TABLET    Take 1 tablet by mouth in the morning, at noon, and at bedtime for 10 days           (Please note that portions of this note were completed with a voice recognition program.  Efforts were made to edit thedictations but occasionally words are mis-transcribed.)    SOFIE Hawthorne PA-C  09/14/22 0034

## 2022-09-15 ENCOUNTER — NURSE TRIAGE (OUTPATIENT)
Dept: OTHER | Facility: CLINIC | Age: 37
End: 2022-09-15

## 2022-09-15 ENCOUNTER — OFFICE VISIT (OUTPATIENT)
Dept: INTERNAL MEDICINE | Age: 37
End: 2022-09-15
Payer: COMMERCIAL

## 2022-09-15 VITALS
DIASTOLIC BLOOD PRESSURE: 87 MMHG | HEART RATE: 95 BPM | BODY MASS INDEX: 35.49 KG/M2 | TEMPERATURE: 98.4 F | HEIGHT: 65 IN | WEIGHT: 213 LBS | SYSTOLIC BLOOD PRESSURE: 129 MMHG

## 2022-09-15 DIAGNOSIS — H11.32 SUBCONJUNCTIVAL HEMORRHAGE OF LEFT EYE: Primary | ICD-10-CM

## 2022-09-15 LAB
CULTURE: NORMAL
SPECIMEN DESCRIPTION: NORMAL

## 2022-09-15 PROCEDURE — 99211 OFF/OP EST MAY X REQ PHY/QHP: CPT | Performed by: INTERNAL MEDICINE

## 2022-09-15 PROCEDURE — G8417 CALC BMI ABV UP PARAM F/U: HCPCS | Performed by: STUDENT IN AN ORGANIZED HEALTH CARE EDUCATION/TRAINING PROGRAM

## 2022-09-15 PROCEDURE — 3078F DIAST BP <80 MM HG: CPT | Performed by: STUDENT IN AN ORGANIZED HEALTH CARE EDUCATION/TRAINING PROGRAM

## 2022-09-15 PROCEDURE — 3074F SYST BP LT 130 MM HG: CPT | Performed by: STUDENT IN AN ORGANIZED HEALTH CARE EDUCATION/TRAINING PROGRAM

## 2022-09-15 PROCEDURE — G8427 DOCREV CUR MEDS BY ELIG CLIN: HCPCS | Performed by: STUDENT IN AN ORGANIZED HEALTH CARE EDUCATION/TRAINING PROGRAM

## 2022-09-15 PROCEDURE — 99213 OFFICE O/P EST LOW 20 MIN: CPT | Performed by: STUDENT IN AN ORGANIZED HEALTH CARE EDUCATION/TRAINING PROGRAM

## 2022-09-15 PROCEDURE — 1036F TOBACCO NON-USER: CPT | Performed by: STUDENT IN AN ORGANIZED HEALTH CARE EDUCATION/TRAINING PROGRAM

## 2022-09-15 ASSESSMENT — PATIENT HEALTH QUESTIONNAIRE - PHQ9
SUM OF ALL RESPONSES TO PHQ QUESTIONS 1-9: 5
8. MOVING OR SPEAKING SO SLOWLY THAT OTHER PEOPLE COULD HAVE NOTICED. OR THE OPPOSITE, BEING SO FIGETY OR RESTLESS THAT YOU HAVE BEEN MOVING AROUND A LOT MORE THAN USUAL: 0
10. IF YOU CHECKED OFF ANY PROBLEMS, HOW DIFFICULT HAVE THESE PROBLEMS MADE IT FOR YOU TO DO YOUR WORK, TAKE CARE OF THINGS AT HOME, OR GET ALONG WITH OTHER PEOPLE: 1
5. POOR APPETITE OR OVEREATING: 0
6. FEELING BAD ABOUT YOURSELF - OR THAT YOU ARE A FAILURE OR HAVE LET YOURSELF OR YOUR FAMILY DOWN: 0
7. TROUBLE CONCENTRATING ON THINGS, SUCH AS READING THE NEWSPAPER OR WATCHING TELEVISION: 0
3. TROUBLE FALLING OR STAYING ASLEEP: 1
SUM OF ALL RESPONSES TO PHQ QUESTIONS 1-9: 5
SUM OF ALL RESPONSES TO PHQ9 QUESTIONS 1 & 2: 3
2. FEELING DOWN, DEPRESSED OR HOPELESS: 1
SUM OF ALL RESPONSES TO PHQ QUESTIONS 1-9: 5
9. THOUGHTS THAT YOU WOULD BE BETTER OFF DEAD, OR OF HURTING YOURSELF: 0
4. FEELING TIRED OR HAVING LITTLE ENERGY: 1
1. LITTLE INTEREST OR PLEASURE IN DOING THINGS: 2
SUM OF ALL RESPONSES TO PHQ QUESTIONS 1-9: 5

## 2022-09-15 ASSESSMENT — ENCOUNTER SYMPTOMS
RESPIRATORY NEGATIVE: 1
EYE REDNESS: 1
ALLERGIC/IMMUNOLOGIC NEGATIVE: 1

## 2022-09-15 NOTE — PROGRESS NOTES
[facility]  [facility] [address] [id number] [facility] [address]  Dept: [phone]  Dept Fax: [phone]    Office Progress/Follow Up Note  Date ofpatient's visit: 9/15/2022  Patient's Name:  Mely Patel YOB: 1985            Patient Care Team:  Jay Tong MD as PCP - General (Internal Medicine)  Jay Tong MD as PCP - [facility]  ================================================================    REASON FOR VISIT/CHIEF COMPLAINT:  Eye Problem (Left eye, blood shot, started yesterday, )    HISTORY OF PRESENTING ILLNESS:  History was obtained from: patient. Nuzhat Moy a 30 y.o. who is pregnant is here for a same day visit for left eye redness. Since yesterday patient noted to have redness in medial aspect of the conjunctiva in left eye. Patient denied any change in vision photophobia eye pain blurring of vision and any discharge. Patient recently visited ED for excessive nausea and vomiting. She was discharged with Reglan. She has been following OB/GYN for her current pregnancy was vitamin B6 and doxylamine. See mention of having multiple episode of vomiting the day before yesterday. On examination field of vision normal, no discharge noted redness on medial aspect of conjunctiva. PHQ score 5- will monitor for now. Patient Active Problem List   Diagnosis    Infertility, female, secondary    Chronic abdominal pain    IBS (irritable bowel syndrome)    H/O Irregular menses    Laparoscopic converted to open wedge resection of right cornual ectopic, right salpingectomy and evacuation of hemoperitoneum 19    Chronic hypertension    Positive screening for depression on 2-item Patient Health Questionnaire (PHQ-2)    Unvaccinated for covid-19    Obesity (BMI 30-39. 9)    Advanced maternal age in multigravida    H/O vertigo    H/O chlamydia infection    H/O hypothyroidism    H/O  section x1    High-risk pregnancy    Former smoker    Pregnancy with abnormal glucose tolerance test    Pregestational diabetes mellitus, modified White class B       Health Maintenance Due   Topic Date Due    COVID-19 Vaccine (1) Never done    Hepatitis B vaccine (2 of 3 - 3-dose primary series) 11/03/2000    Pneumococcal 0-64 years Vaccine (2 - PCV) 03/11/2017    Flu vaccine (1) 09/01/2022       No Known Allergies      Current Outpatient Medications   Medication Sig Dispense Refill    metoclopramide (REGLAN) 10 MG tablet Take 1 tablet by mouth in the morning, at noon, and at bedtime for 10 days 30 tablet 0    metroNIDAZOLE (FLAGYL) 500 MG tablet Take 1 tablet by mouth in the morning and at bedtime for 7 days 14 tablet 0    aspirin EC 81 MG EC tablet Take 1 tablet by mouth daily 30 tablet 5    ferrous sulfate (FE TABS) 325 (65 Fe) MG EC tablet 1 tablet by mouth in AM and PM every Monday, Wednesday and Friday. (Patient not taking: Reported on 9/15/2022) 90 tablet 3    doxyLAMINE succinate (GNP SLEEP AID) 25 MG tablet Take 1 tablet by mouth nightly (Patient not taking: Reported on 9/15/2022) 30 tablet 0    Prenatal Vit-Fe Fumarate-FA (PRENATAL VITAMIN) 27-0.8 MG TABS Take 1 tablet by mouth daily May substitute with any prenatal vitamin covered by the patient's insurance. (Patient not taking: Reported on 9/15/2022) 30 tablet 12    doxyLAMINE succinate (GNP SLEEP AID) 25 MG tablet 1 tab at HS X 2 days. If symptoms persist 1/2 tab in AM and 1 tablet HS on day #3. If symptoms persist 1/2 tab in AM, 1/2 tab at noon, 1 tablet at HS on day # 4. (Patient not taking: Reported on 9/15/2022) 30 tablet 0    pyridoxine (RA VITAMIN B-6) 50 MG tablet Take 0.5 tablets by mouth in the morning and 0.5 tablets at noon and 0.5 tablets in the evening. (Patient not taking: Reported on 9/15/2022) 30 tablet 3     No current facility-administered medications for this visit.        Social History     Tobacco Use    Smoking status: Former     Types: Cigars     Start date: 3/2/2017     Quit date: 2022     Years since quittin.1    Smokeless tobacco: Never    Tobacco comments:     black and milds, 1-2 a day          Patient counseled on maternal/fetal risk factors. Patient verbalizes understanding. Vaping Use    Vaping Use: Never used   Substance Use Topics    Alcohol use: Not Currently     Comment: Since well before her last period; only drank on holidays    Drug use: No       Family History   Problem Relation Age of Onset    No Known Problems Paternal Grandfather     No Known Problems Paternal Grandmother     Diabetes Maternal Grandmother     Hypertension Maternal Grandmother     No Known Problems Maternal Grandfather     No Known Problems Father     No Known Problems Mother     No Known Problems Brother     No Known Problems Sister         REVIEW OF SYSTEMS:  Review of Systems   Constitutional: Negative. HENT: Negative. Eyes:  Positive for redness. Respiratory: Negative. Endocrine: Negative. Genitourinary: Negative. Musculoskeletal: Negative. Allergic/Immunologic: Negative. Neurological: Negative. Hematological: Negative. Psychiatric/Behavioral: Negative. PHYSICAL EXAM:  Vitals:    09/15/22 1011   BP: 129/87   Pulse: 95   Temp: 98.4 °F (36.9 °C)   TempSrc: Infrared   Weight: 213 lb (96.6 kg)   Height: 5' 5\" (1.651 m)     BP Readings from Last 3 Encounters:   09/15/22 129/87   22 (!) 159/87   22 138/87        Physical Exam  Constitutional:       General: She is not in acute distress. Appearance: Normal appearance. She is not ill-appearing, toxic-appearing or diaphoretic. HENT:      Head: Normocephalic and atraumatic. Nose: Nose normal. No congestion or rhinorrhea. Mouth/Throat:      Mouth: Mucous membranes are moist.      Pharynx: Oropharynx is clear. Eyes:      General: No scleral icterus. Right eye: No discharge. Left eye: No discharge.       Comments: Left eye redness noted without change in vision, pupillary reflex normal.   Neck:      Vascular: No carotid bruit. Cardiovascular:      Rate and Rhythm: Normal rate and regular rhythm. Pulses: Normal pulses. Heart sounds: Normal heart sounds. No murmur heard. No friction rub. No gallop. Pulmonary:      Effort: Pulmonary effort is normal. No respiratory distress. Breath sounds: Normal breath sounds. No stridor. No wheezing, rhonchi or rales. Chest:      Chest wall: No tenderness. Abdominal:      General: Abdomen is flat. Bowel sounds are normal. There is no distension. Palpations: Abdomen is soft. There is no mass. Tenderness: There is no abdominal tenderness. There is no right CVA tenderness, left CVA tenderness, guarding or rebound. Hernia: No hernia is present. Musculoskeletal:         General: No swelling, tenderness, deformity or signs of injury. Normal range of motion. Cervical back: Normal range of motion and neck supple. No rigidity or tenderness. Right lower leg: No edema. Left lower leg: No edema. Lymphadenopathy:      Cervical: No cervical adenopathy. Skin:     General: Skin is warm and dry. Findings: No bruising, erythema, lesion or rash. Neurological:      General: No focal deficit present. Mental Status: She is alert and oriented to person, place, and time.    Psychiatric:         Mood and Affect: Mood normal.         Behavior: Behavior normal.         DIAGNOSTIC FINDINGS:  CBC:  Lab Results   Component Value Date/Time    WBC 14.5 09/13/2022 10:28 PM    HGB 11.6 09/13/2022 10:28 PM     09/13/2022 10:28 PM       BMP:    Lab Results   Component Value Date/Time     09/13/2022 10:28 PM    K 4.3 09/13/2022 10:28 PM     09/13/2022 10:28 PM    CO2 21 09/13/2022 10:28 PM    BUN 6 09/13/2022 10:28 PM    CREATININE 0.64 09/13/2022 10:28 PM    GLUCOSE 89 09/13/2022 10:28 PM       HEMOGLOBIN A1C:   Lab Results   Component Value Date/Time    LABA1C 5.6 03/11/2022 11:37 AM       FASTING LIPID PANEL:  Lab Results   Component Value Date    CHOL 150 03/03/2021    HDL 36 (L) 03/03/2021    TRIG 150 (H) 03/03/2021       ASSESSMENT AND PLAN:  Julito Rodgers was seen today for eye problem. Diagnoses and all orders for this visit:    Subconjunctival hemorrhage of left eye  - no active management, continue warm compression.  - Visit Ed for pain and change in vision noted. FOLLOW UP AND INSTRUCTIONS:  No follow-ups on file. Julito Rodgers received counseling on the following healthy behaviors: nutrition    Discussed use, benefit, and side effects of prescribed medications. Barriers to medication compliance addressed. All patient questions answered. Pt voiced understanding. Patient given educational materials - see patient instructions    Bernadette Norton MD PGY 3  Internal Medicine Resident  [place], [place]  9/15/2022 10:42 AM      This note is created with the assistance of a speech-recognition program. While intending to generate a document that actually reflects the content of thevisit, the document can still have some mistakes which may not have been identified and corrected by editing.

## 2022-09-15 NOTE — TELEPHONE ENCOUNTER
Received call from [facility] with [employer] Subjective: Caller states \"My eye is dark red inside. \"     Current Symptoms: Dark red in left eye    Onset: 1 day ago    Associated Symptoms: Denies discharge    Pain Severity: 0/10; N/A; none    Temperature: Denies    What has been tried: Checking blood sugar due to pregnancy    Pregnant: Yes    Recommended disposition: See in Office Today or Tomorrow    Care advice provided, patient verbalizes understanding; denies any other questions or concerns; instructed to call back for any new or worsening symptoms. Patient/Caller agrees with recommended disposition; writer provided warm transfer to [place] [facility] for appointment scheduling     Attention Provider: Thank you for allowing me to participate in the care of your patient. The patient was connected to triage in response to information provided to the ECC/PSC. Please do not respond through this encounter as the response is not directed to a shared pool.     Reason for Disposition   Bleeding on white of the eye    Protocols used: Eye - Red Without Pus-ADULT-[place]

## 2022-09-15 NOTE — PROGRESS NOTES
Attending Physician Statement  I have discussed the care of France Roberson, including pertinent history and exam findings with the resident. I have reviewed the key elements of all parts of the encounter with the resident. I agree with the assessment, and status of the problem list as documented. Diagnosis Orders   1. Subconjunctival hemorrhage of left eye        Conservative management, motrin, hot compress and lubricant eye drops as needed   The plan and orders should include No orders of the defined types were placed in this encounter. and this was also documented by the resident.      Dr Felipe Pryor MD, [facility]  Associate , Department of Internal Medicine  Resident Ambulatory Site Medical Director  [address] [facility] Internal Medicine  [facility]  Internal Medicine Clerkship - Patrick Mullins    9/15/2022, 10:47 AM

## 2022-09-19 ENCOUNTER — HOSPITAL ENCOUNTER (OUTPATIENT)
Age: 37
Discharge: HOME | End: 2022-09-19
Payer: COMMERCIAL

## 2022-09-19 ENCOUNTER — ROUTINE PRENATAL (OUTPATIENT)
Dept: PERINATAL CARE | Age: 37
End: 2022-09-19
Payer: COMMERCIAL

## 2022-09-19 VITALS
HEIGHT: 65 IN | SYSTOLIC BLOOD PRESSURE: 122 MMHG | HEART RATE: 92 BPM | WEIGHT: 216 LBS | BODY MASS INDEX: 35.99 KG/M2 | DIASTOLIC BLOOD PRESSURE: 84 MMHG | RESPIRATION RATE: 16 BRPM | TEMPERATURE: 98 F

## 2022-09-19 DIAGNOSIS — E03.9 HYPOTHYROIDISM AFFECTING PREGNANCY IN FIRST TRIMESTER: ICD-10-CM

## 2022-09-19 DIAGNOSIS — O24.119 PRE-EXISTING TYPE 2 DIABETES MELLITUS DURING PREGNANCY, ANTEPARTUM: ICD-10-CM

## 2022-09-19 DIAGNOSIS — O99.281 HYPOTHYROIDISM AFFECTING PREGNANCY IN FIRST TRIMESTER: ICD-10-CM

## 2022-09-19 DIAGNOSIS — Z36.9 FIRST TRIMESTER SCREENING: Primary | ICD-10-CM

## 2022-09-19 DIAGNOSIS — O09.521 MULTIGRAVIDA OF ADVANCED MATERNAL AGE IN FIRST TRIMESTER: ICD-10-CM

## 2022-09-19 DIAGNOSIS — O36.80X0 ENCOUNTER TO DETERMINE FETAL VIABILITY OF PREGNANCY, SINGLE OR UNSPECIFIED FETUS: ICD-10-CM

## 2022-09-19 DIAGNOSIS — Z3A.13 13 WEEKS GESTATION OF PREGNANCY: ICD-10-CM

## 2022-09-19 DIAGNOSIS — O99.211 OBESITY AFFECTING PREGNANCY IN FIRST TRIMESTER: ICD-10-CM

## 2022-09-19 DIAGNOSIS — O16.1 HYPERTENSION AFFECTING PREGNANCY IN FIRST TRIMESTER: ICD-10-CM

## 2022-09-19 LAB
CRL: NORMAL
SAC DIAMETER: NORMAL

## 2022-09-19 PROCEDURE — 81508 FTL CGEN ABNOR TWO PROTEINS: CPT

## 2022-09-19 PROCEDURE — 76813 OB US NUCHAL MEAS 1 GEST: CPT | Performed by: OBSTETRICS & GYNECOLOGY

## 2022-09-19 PROCEDURE — 76801 OB US < 14 WKS SINGLE FETUS: CPT | Performed by: OBSTETRICS & GYNECOLOGY

## 2022-09-19 PROCEDURE — 99999 PR OFFICE/OUTPT VISIT,PROCEDURE ONLY: CPT | Performed by: OBSTETRICS & GYNECOLOGY

## 2022-09-19 RX ORDER — DEXTROSE 4 G
TABLET,CHEWABLE ORAL
COMMUNITY
Start: 2022-09-13

## 2022-09-19 RX ORDER — BLOOD SUGAR DIAGNOSTIC
STRIP MISCELLANEOUS
COMMUNITY
Start: 2022-09-13

## 2022-09-19 RX ORDER — LANCETS 33 GAUGE
EACH MISCELLANEOUS
COMMUNITY
Start: 2022-09-13

## 2022-09-19 RX ORDER — BLOOD-GLUCOSE METER
EACH MISCELLANEOUS
COMMUNITY
Start: 2022-09-13

## 2022-09-20 LAB
AFP INTERPRETATION: NORMAL
CRL: 77.7 MM
CROWN RUMP LENGTH TWIN B: NORMAL MM
CROWN RUMP LENGTH: 77.7
DATE OF BIRTH: NORMAL
DONOR EGG?: NORMAL
GESTATIONAL AGE: NORMAL
HISTORY OF ANEUPLOIDY?: NORMAL
IN VITRO FERTILIZATION: NORMAL
MATERNAL AGE AT EDD: 35.7 YR
MATERNAL SCREEN, EER: NORMAL
MATERNAL WEIGHT: 216
MOM FOR NT, TWIN B: NORMAL
MOM FOR NT: 0.94
MOM FOR PAPP-A: 2.2
MONOCHORIONIC TWINS: NORMAL
MSHCG-MOM: 2.15
MSHCG: NORMAL IU/L
NUCHAL TRANSLUC (NT): 1.8
NUCHAL TRANSLUC (NT): 1.8 MM
NUCHAL TRANSLUCENCY TWIN B: NORMAL MM
NUMBER OF FETUSES: 1
NUMBER OF FETUSES: NORMAL
PAPP-A MOM: 2884.1 NG/ML
PATIENT WEIGHT UNITS: NORMAL
PATIENT WEIGHT: NORMAL
PREV TRISOMY PREG: NORMAL
RACE (MATERNAL): NORMAL
RACE: NORMAL
READING MD CERT NUM: NORMAL
READING MD NAME: NORMAL
REPEAT SPECIMEN?: NORMAL
SMOKING: NORMAL
SMOKING: NORMAL
SONOGRAPHER CERT NO: NORMAL
SONOGRAPHER CERT NO: NORMAL
SONOGRAPHER NAME: NORMAL
SONOGRAPHER NAME: NORMAL
SPECIMEN: NORMAL
ULTRASOUND DATE: NORMAL
ULTRASOUND DATE: NORMAL

## 2022-09-26 ENCOUNTER — HOSPITAL ENCOUNTER (OUTPATIENT)
Dept: DIABETES SERVICES | Age: 37
Setting detail: THERAPIES SERIES
Discharge: HOME | End: 2022-09-26
Payer: COMMERCIAL

## 2022-09-26 VITALS — BODY MASS INDEX: 35.94 KG/M2 | WEIGHT: 216 LBS

## 2022-09-26 PROCEDURE — G0108 DIAB MANAGE TRN  PER INDIV: HCPCS

## 2022-09-26 NOTE — PROGRESS NOTES
Diabetes Education Progress Note    Cyndi Lucero here for education about Diabetes and Pregnancy. Pt present independently           Teaching provided at this session included:   _X__ Definition of gestational diabetes    _X_ Risk factors   _X__ Effects on pregnancy       _ X_ Management   _X__ Self-monitoring of blood sugar (FBS and 2 hrs postprandial)   _X__ Blood sugar targets FBS 65-90 and <120 2 hrs postprandial)   _X__ Insulin   _x__ Pen   __x_ Vial --  Novolog and NPH role and action times   _x___Hyperglycemia  _x__ Hypoglycemia and treatment           Meal planning:   _X_  Avoid fruit juice and sugary beverages. _X_  Aim to eat small frequent meals and snacks. (ie., 3 + 3)                _X__  Eat balanced meals according to divided dinner plate sample    BK - sausage, eggs, toast, jelly & juice - willing to give up juice and start to read labels for carb/sugar content  LN - burger or chicken and fries - willing to add in non-starchy veggies  DN - stir vazquez - willing to try cauliflower rice or watch portion size of rice  SN - peaches or grapes - willing to work on portion size of fruit and add a protein             Planned follow-up:    _X_  Another appointment has been scheduled for RD visit on 10-7-22               __  Patient defers scheduling another appointment at this time               __   Follow up planned for prn.               _X_ Patient is to report blood glucose test results to physician as directed by  prescribing physician. Resource materials provided today include: RN class -Resource materials sent out : care booklet - \" Gestational Diabetes Mellitus ( GDM) toolkit form [place] gestational diabetes postpartum care learning collaborative 2018. \"Simple Guidelines for meal planning with gestational diabetes. SMBG sheets to fax back to M weekly. BD  healthy injection site selection and rotation with 6 mm insulin syringe and 4 mm pen needle.  Gestational diabetes handout from [facility] 2016, Did you have gestational diabetes when you were pregnant? Handout from [facility]  April 2014    [place] Gestational Diabetes Postpartum Care Learning Collaborative: GDM meal Toolkit, Blood Glucose monitoring sheets, Articles, \"Benefits of Breast feeding\" and [facility], \"Preventing Type 2 Diabetes,\" and Insulin Instruction Sheets. Set diabetes self management goals of SMBG- check fasting and 2 hours PP, eat 3 meals and 3 snacks/day, avoid sugary beverages. Patient has started to use her glucometer and wrote in notebook. Helped her transfer reading to suggested log forms. For the last 2 days patient has been close to and within targets. Weeks /days prior her blood sugar has been elevated at times - noted documentation of reading as high as 200. [place] states that she is willing to give up juice and pop. She is also willing to drink more water. She verbalized a willingness to check labels to ensure SF beverages. Patient practiced  use of vial and syringe and pen method for insulin self administration if ordered in the future.     SHAQUILLE ROSALES RN

## 2022-10-03 ENCOUNTER — TELEPHONE (OUTPATIENT)
Dept: OBGYN | Age: 37
End: 2022-10-03

## 2022-10-03 NOTE — TELEPHONE ENCOUNTER
Patient is pregnant and was requesting advice on OTC medications for sinus congestion and headache. She said she has had sinus congestion and headache and dizziness since yesterday. She has not had a covid test.      I gave her the list of over the counter medications that can be taken during pregnancy. I also advised her to get tested for covid. I will relay information to doctor and call her back with any additional orders. GI and surgery following   Awaiting colonoscopy  Acceptable cardiac risk to proceed

## 2022-10-04 NOTE — TELEPHONE ENCOUNTER
Shared advice from Dr. Italia Yoon with patient,  She said she is negative for covid. She states she is feeling a little better today and has no additional questions.

## 2022-10-07 ENCOUNTER — ROUTINE PRENATAL (OUTPATIENT)
Dept: OBGYN | Age: 37
End: 2022-10-07
Payer: COMMERCIAL

## 2022-10-07 ENCOUNTER — HOSPITAL ENCOUNTER (OUTPATIENT)
Dept: DIABETES SERVICES | Age: 37
Setting detail: THERAPIES SERIES
Discharge: HOME | End: 2022-10-07
Payer: COMMERCIAL

## 2022-10-07 VITALS
WEIGHT: 220.2 LBS | DIASTOLIC BLOOD PRESSURE: 80 MMHG | HEART RATE: 101 BPM | SYSTOLIC BLOOD PRESSURE: 138 MMHG | BODY MASS INDEX: 36.64 KG/M2

## 2022-10-07 DIAGNOSIS — O09.92 HIGH-RISK PREGNANCY IN SECOND TRIMESTER: Primary | ICD-10-CM

## 2022-10-07 DIAGNOSIS — O24.319 PREGESTATIONAL DIABETES MELLITUS, MODIFIED WHITE CLASS B: Primary | ICD-10-CM

## 2022-10-07 PROCEDURE — G8482 FLU IMMUNIZE ORDER/ADMIN: HCPCS

## 2022-10-07 PROCEDURE — G8427 DOCREV CUR MEDS BY ELIG CLIN: HCPCS

## 2022-10-07 PROCEDURE — 99211 OFF/OP EST MAY X REQ PHY/QHP: CPT

## 2022-10-07 PROCEDURE — G8417 CALC BMI ABV UP PARAM F/U: HCPCS

## 2022-10-07 PROCEDURE — 1036F TOBACCO NON-USER: CPT

## 2022-10-07 PROCEDURE — 99213 OFFICE O/P EST LOW 20 MIN: CPT

## 2022-10-07 PROCEDURE — G0108 DIAB MANAGE TRN  PER INDIV: HCPCS

## 2022-10-07 PROCEDURE — 90686 IIV4 VACC NO PRSV 0.5 ML IM: CPT | Performed by: OBSTETRICS & GYNECOLOGY

## 2022-10-07 RX ORDER — ASPIRIN 81 MG/1
81 TABLET ORAL DAILY
Qty: 90 TABLET | Refills: 1 | Status: SHIPPED | OUTPATIENT
Start: 2022-10-07 | End: 2022-10-07

## 2022-10-07 RX ORDER — GLUCOSAMINE HCL/CHONDROITIN SU 500-400 MG
CAPSULE ORAL
Qty: 50 STRIP | Refills: 2 | Status: SHIPPED | OUTPATIENT
Start: 2022-10-07 | End: 2022-10-07

## 2022-10-07 RX ORDER — ASPIRIN 81 MG/1
81 TABLET ORAL DAILY
Qty: 90 TABLET | Refills: 1 | Status: SHIPPED | OUTPATIENT
Start: 2022-10-07

## 2022-10-07 RX ORDER — GLUCOSAMINE HCL/CHONDROITIN SU 500-400 MG
CAPSULE ORAL
Qty: 50 STRIP | Refills: 2 | Status: SHIPPED | OUTPATIENT
Start: 2022-10-07

## 2022-10-07 NOTE — PROGRESS NOTES
Patient was given influenza vaccine in the Right Deltoid. Per doctor Alejandro Francis  NDC# 54399-459-09  LOT# [id number]  Exp date- 05-31-23  Patient tolerated well without difficulty   Patient unable to void at time of rooming.

## 2022-10-07 NOTE — PROGRESS NOTES
Prenatal Visit    Agusto Stroud is a 30 y.o. female [id number] at 15w6d    Subjective: The patient was seen and evaluated. She denies contractions, vaginal bleeding and leakage of fluid. Signs and symptoms of  labor as well as labor were reviewed. Dates were reviewed with the patient. Estimated Date of Delivery: 3/25/23. The patient requested the influenza vaccine this year. Received today. The patient declined the COVID-19 vaccine this year. The problem list reflects the active issues addressed during today's visit    VITALS:    BP: 138/80  Weight: 220 lb 3.2 oz (99.9 kg)  Heart Rate: (!) 101  Patient Position: Sitting     PRENATAL LAB RESULTS:   Blood Type/Rh:    ABO/Rh   Date Value Ref Range Status   2022 O POSITIVE  Final     Antibody Screen:    Antibody Screen   Date Value Ref Range Status   2022 NEGATIVE  Final     Hemoglobin:   Hemoglobin   Date Value Ref Range Status   2022 11.6 (L) 11.9 - 15.1 g/dL Final     Hematocrit:   Hematocrit   Date Value Ref Range Status   2022 37.0 36.3 - 47.1 % Final     Platelet Count:   Platelets   Date Value Ref Range Status   2022 295 138 - 453 k/uL Final     Rubella:    Rubella Antibody, IGG   Date Value Ref Range Status   2022 94.9 IU/mL Final     Comment:                 REFERENCE RANGE:  <5.0       NON-REACTIVE (non-immune)  5.0 TO 9.9 EQUIVOCAL  >=10.0     REACTIVE     (immune)             T. Pallidium, IGG:    T. pallidum, IgG   Date Value Ref Range Status   2022 NONREACTIVE NONREACTIVE Final     Comment:           T. pallidum antibodies are not detected. There is no serological evidence of infection with T. pallidum (early primary syphilis   cannot be excluded). Retest in 2-4 weeks if syphilis is clinically suspect.              Sickle Cell Screen: No results found for: SICKLE  Hepatitis B Surface Antigen:   Hepatitis B Surface Ag   Date Value Ref Range Status   2022 NONREACTIVE NONREACTIVE Final HIV:    HIV 1/2 Antibody   Date Value Ref Range Status   2012 NONREACTIVE NR Final     Comment:                   Interpretation:         The presence of antibody to HIV and its association with the potential   infectivity, transmission or diagnosis of AIDS has not been established. Furthermore, a 'Non-Reactive' test result does not exclude the possibility of   exposure to or infection with HIV.   If the above test result is 'Reactive', the Laboratory will order the   confirmatory test.  [employer] [address] [address] [phone]       Assessment & Plan:  Radha Ch is a 30 y.o. female [id number] at 14w8d   - Initial prenatal labs were reviewed   - An 18-22 week anatomy ultrasound has been ordered   - NT Screen/Quad Testing and MSAFP Single Marker/NIPT: results reviewed   - Influenza vaccination: R/B/A discussed and patient received today   - COVID-19 vaccination: R/B/A discussed with increased risk of both maternal and fetal morbidity and mortality in unvaccinated pregnant patients who contract COVID-19- patient declined today   - Aspirin indication: indicated due to High risk factors: none, Moderate risk factors: BMI >30- Rx given   - Indications for  section: repeat  section, declining TOLAC, ERAS protocols: not yet reviewed, will schedule for visit closer to delivery   - Patient failed 1 hr GTT and 3 hr GTT however is seeing [facility], has been diligently doing her blood sugar logs and will follow up for insulin recs with Dr. Liz Lopez      Patient Active Problem List    Diagnosis Date Noted    Advanced maternal age in multigravida 2022     Priority: Medium    H/O vertigo 2022     Priority: Medium    H/O chlamydia infection 2022     Priority: Medium    Pregestational diabetes mellitus, modified White class B 2022     Refer to MFM   22- MFM referral placed-SK      Pregnancy with abnormal glucose tolerance test 2022     3 hr GTT Chronic hypertension 2022: Diagnosed by Dr. Carmen Rodriguez per review of flowsheets. No meds at this time. ASA Rx sent to patient's pharmacy  Baseline PreE labs (CMP, protein-creatinine ratio ordered_      Positive screening for depression on 2-item Patient Health Questionnaire (PHQ-2) 2022: PHQ=1, loss of interest      Unvaccinated for covid-19 2022     Patient counseled on the the risks of not getting vaccinated in pregnancy against COVID-19. Pregnant women with symptomatic covid have a 70% increased risk of death. Covid-19 during pregnancy increases the risk for adverse outcomes, including  birth and admission of the baby to an intensive care unit. Obesity (BMI 30-39.9) 2022     Pregravid BMI 31.62    22: Early one-hour glucose tolerance test ordered per protocol       H/O hypothyroidism 2022: Patient was prescribed Levothyroxine in  but stopped due to too much weight loss. H/O  section x1 2022: Patient would like to discuss RCS versus TOLAC      High-risk pregnancy 2022     Risk factors: New partner; chronic hypertension (no meds); advanced maternal age; H/O hypothyroidism; irritable bowel syndrome; obesity; H/O  x1; H/O vertigo; COVID unvaccinated    Fetal testing indicated: Yes  Fetal testing indication: Advanced maternal age  Fetal testing initiation: Individualized  Fetal testing frequency: Individualized      Former smoker 2022: Patient reports quitting 2022      H/O Irregular menses 2019    Laparoscopic converted to open wedge resection of right cornual ectopic, right salpingectomy and evacuation of hemoperitoneum 2019     Patient counseled extensively on the need to not get pregnant for at least 18 months.        Chronic abdominal pain 2015    IBS (irritable bowel syndrome) 2015: Patient denies meds at this time      Infertility, female, secondary 10/24/2014     Return in about 4 weeks (around 11/4/2022) for Routine OB Visit.     Justo Cortés MD  Ob/Gyn Resident  [facility], [facility]  10/7/2022, 11:48 AM

## 2022-10-07 NOTE — PROGRESS NOTES
Diabetes Education Progress Note  10/7/22 CINTHYA MNT GDM pt present with s.o. who seemed very supportive. Pt got irritated w him at times. Tin Varmaay here for education about Diabetes and Pregnancy. Pt present independently           Teaching provided at this session included:10/7/22 CINTHYA Reviewed below. Bs elevated at all time points, mentioned she most likely will need insulin. Pt admits to being tired, not moving as much as usual. Hasn't started taking extra iron that has been prescribed and stopped prenatal chewies. Discussed how they may help her energy. _X__ Definition of gestational diabetes    _X_ Risk factors   _X__ Effects on pregnancy       _ X_ Management   _X__ Self-monitoring of blood sugar (FBS and 2 hrs postprandial)   _X__ Blood sugar targets FBS 65-90 and <120 2 hrs postprandial)   _X__ Insulin   _x__ Pen   __x_ Chen Julien --  Novolog and NPH role and action times   _x___Hyperglycemia  _x__ Hypoglycemia and treatment           Meal planning: 10/7/22 CINTHYA Has gained 30# since prepregnancy. Tends to eat many meals out. [employer] daily for breakfast. Has cut back on pop, juice and limiting portions since working with RN. Encouraged pt to continue to be mindful of portions. Denies nausea, vomiting or cravings except for sweets. Gave suggestions for simple meals and snacks. Provided literature re: 1800 calories, carbs 2 @Br, [email or web address] and Alejo and 1 @ each snack. Encouraged increasing protein and vege portions with less carbs. _X_  Avoid fruit juice and sugary beverages.               _X_  Aim to eat small frequent meals and snacks. (ie., 3 + 3)                _X__  Eat balanced meals according to divided dinner plate sample    BK - sausage, eggs, toast, jelly & juice - willing to give up juice and start to read labels for carb/sugar content  LN - burger or chicken and fries - willing to add in non-starchy veggies  DN - stir vazquez - willing to try cauliflower rice or watch portion size of rice  SN - peaches or grapes - willing to work on portion size of fruit and add a protein             Planned follow-up:    _X_  Another appointment has been scheduled for RD visit on 10-7-22. Encouraged pt/s.o. to call with questions/concerns. __  Patient defers scheduling another appointment at this time               __   Follow up planned for prn.               _X_ Patient is to report blood glucose test results to physician as directed by  prescribing physician. Resource materials provided today include: RN class -Resource materials sent out : care booklet - \" Gestational Diabetes Mellitus ( GDM) toolkit form [place] gestational diabetes postpartum care learning collaborative 2018. \"Simple Guidelines for meal planning with gestational diabetes. SMBG sheets to fax back to [facility] weekly. BD  healthy injection site selection and rotation with 6 mm insulin syringe and 4 mm pen needle. Gestational diabetes handout from [facility] 2016, Did you have gestational diabetes when you were pregnant? Handout from [facility]  April 2014    [place] Gestational Diabetes Postpartum Care Learning Collaborative: GDM meal Toolkit, Blood Glucose monitoring sheets, Articles, \"Benefits of Breast feeding\" and NDEP, \"Preventing Type 2 Diabetes,\" and Insulin Instruction Sheets. Set diabetes self management goals of SMBG- check fasting and 2 hours PP, eat 3 meals and 3 snacks/day, avoid sugary beverages. Patient has started to use her glucometer and wrote in notebook. Helped her transfer reading to suggested log forms. For the last 2 days patient has been close to and within targets. Weeks /days prior her blood sugar has been elevated at times - noted documentation of reading as high as 200. Donovan Good states that she is willing to give up juice and pop. She is also willing to drink more water. She verbalized a willingness to check labels to ensure SF beverages.      Patient practiced  use of vial and syringe and pen method for insulin self administration if ordered in the future.     Toby Robert RD, LD

## 2022-10-07 NOTE — PROGRESS NOTES
Attending Physician Statement  I have discussed the care of Diana Harsh, including pertinent history and exam findings,  with the resident. I have seen and examined the patient and the key elements of all parts of the encounter have been performed by me. I agree with the assessment, plan and orders as documented by the resident.   (GC Modifier)

## 2022-10-10 ENCOUNTER — TELEPHONE (OUTPATIENT)
Dept: PERINATAL CARE | Age: 37
End: 2022-10-10

## 2022-10-10 ENCOUNTER — ROUTINE PRENATAL (OUTPATIENT)
Dept: PERINATAL CARE | Age: 37
End: 2022-10-10
Payer: COMMERCIAL

## 2022-10-10 ENCOUNTER — HOSPITAL ENCOUNTER (OUTPATIENT)
Age: 37
Discharge: HOME | End: 2022-10-10
Payer: COMMERCIAL

## 2022-10-10 VITALS
BODY MASS INDEX: 36.49 KG/M2 | WEIGHT: 219 LBS | TEMPERATURE: 98.2 F | HEIGHT: 65 IN | SYSTOLIC BLOOD PRESSURE: 120 MMHG | DIASTOLIC BLOOD PRESSURE: 74 MMHG | RESPIRATION RATE: 16 BRPM | HEART RATE: 90 BPM

## 2022-10-10 DIAGNOSIS — Z3A.16 16 WEEKS GESTATION OF PREGNANCY: ICD-10-CM

## 2022-10-10 DIAGNOSIS — O24.119 PRE-EXISTING TYPE 2 DIABETES MELLITUS DURING PREGNANCY, ANTEPARTUM: ICD-10-CM

## 2022-10-10 DIAGNOSIS — E03.9 HYPOTHYROIDISM AFFECTING PREGNANCY IN SECOND TRIMESTER: ICD-10-CM

## 2022-10-10 DIAGNOSIS — O09.522 MULTIGRAVIDA OF ADVANCED MATERNAL AGE IN SECOND TRIMESTER: Primary | ICD-10-CM

## 2022-10-10 DIAGNOSIS — O16.2 HYPERTENSION AFFECTING PREGNANCY IN SECOND TRIMESTER: ICD-10-CM

## 2022-10-10 DIAGNOSIS — O34.219 PREVIOUS CESAREAN DELIVERY, ANTEPARTUM CONDITION OR COMPLICATION: ICD-10-CM

## 2022-10-10 DIAGNOSIS — O99.282 HYPOTHYROIDISM AFFECTING PREGNANCY IN SECOND TRIMESTER: ICD-10-CM

## 2022-10-10 LAB
ESTIMATED AVERAGE GLUCOSE: 114 MG/DL
HBA1C MFR BLD: 5.6 % (ref 4–6)
THYROXINE, FREE: 0.87 NG/DL (ref 0.93–1.7)
TSH SERPL DL<=0.05 MIU/L-ACNC: 2.48 UIU/ML (ref 0.3–5)

## 2022-10-10 PROCEDURE — 82105 ALPHA-FETOPROTEIN SERUM: CPT

## 2022-10-10 PROCEDURE — 83036 HEMOGLOBIN GLYCOSYLATED A1C: CPT

## 2022-10-10 PROCEDURE — G8482 FLU IMMUNIZE ORDER/ADMIN: HCPCS | Performed by: OBSTETRICS & GYNECOLOGY

## 2022-10-10 PROCEDURE — 84443 ASSAY THYROID STIM HORMONE: CPT

## 2022-10-10 PROCEDURE — 2022F DILAT RTA XM EVC RTNOPTHY: CPT | Performed by: OBSTETRICS & GYNECOLOGY

## 2022-10-10 PROCEDURE — 84439 ASSAY OF FREE THYROXINE: CPT

## 2022-10-10 PROCEDURE — 99244 OFF/OP CNSLTJ NEW/EST MOD 40: CPT | Performed by: OBSTETRICS & GYNECOLOGY

## 2022-10-10 PROCEDURE — G8417 CALC BMI ABV UP PARAM F/U: HCPCS | Performed by: OBSTETRICS & GYNECOLOGY

## 2022-10-10 PROCEDURE — 36415 COLL VENOUS BLD VENIPUNCTURE: CPT

## 2022-10-10 PROCEDURE — G8427 DOCREV CUR MEDS BY ELIG CLIN: HCPCS | Performed by: OBSTETRICS & GYNECOLOGY

## 2022-10-10 NOTE — TELEPHONE ENCOUNTER
Pt here in office for consult. Dr. Blanca Pang reviewed blood sugar log and ordered pt to begin Novolog insulin before  each meal and NPH insulin at bedtime. Pt agreed and asked to have order called into [address]  at [phone]. Writer called into above pharmacy, after three unsuccessful attempts to speak to pharmacist message left on voice mail. Novolog insulin, subq 6u before each meal, with needles, syringes and alcohol pads, 1 mos supply and 5 refills. NPH insulin, 12u subq at bedtime,  1mos supply, 5 refills, with needles, syringes and alcohol pads. Prior auth. sent for both medications.

## 2022-10-11 LAB
SEND OUT REPORT: NORMAL
TEST NAME: NORMAL

## 2022-10-12 LAB
AFP INTERPRETATION: NORMAL
AFP MOM: 1.66
AFP SPECIMEN: NORMAL
AFP: 52 NG/ML
DATE OF BIRTH: NORMAL
DATING METHOD: NORMAL
DETERMINED BY: NORMAL
DIABETIC: NO
DONOR EGG?: NORMAL
DUE DATE: NORMAL
ESTIMATED DUE DATE: NORMAL
FAMILY HISTORY NTD: NO
GESTATIONAL AGE: NORMAL
IN VITRO FERTILIZATION: NORMAL
INSULIN REQ DIABETES: NO
MATERNAL AGE AT EDD: 35.7 YR
MATERNAL WEIGHT: 219
MONOCHORIONIC TWINS: NORMAL
NUMBER OF FETUSES: NORMAL
PATIENT WEIGHT UNITS: NORMAL
PATIENT WEIGHT: NORMAL
RACE (MATERNAL): NORMAL
RACE: NORMAL
REPEAT SPECIMEN?: NO
SMOKING: NORMAL
SMOKING: NORMAL
VALPROIC/CARBAMAZEP: NORMAL
ZZ NTE CLEAN UP: HISTORY: NO

## 2022-11-02 PROBLEM — Z87.898 HISTORY OF VERTIGO: Status: RESOLVED | Noted: 2022-08-30 | Resolved: 2022-11-02

## 2022-11-02 PROBLEM — N92.6 IRREGULAR MENSES: Status: RESOLVED | Noted: 2019-06-22 | Resolved: 2022-11-02

## 2022-11-02 NOTE — PROGRESS NOTES
Prenatal Visit    Leola Faria is a 30 y.o. female [id number] at 19w4d    The patient was seen and evaluated. She has no complaints. She hasn't felt baby move yet. She denies contractions, vaginal bleeding and leakage of fluid. Signs and symptoms of labor and pre-eclampsia were reviewed with the patient in detail. She is to report any of these if they occur. She currently denies signs or symptoms of pre-eclampsia including headache, vision changes, RUQ pain. The patient already received the influenza vaccine this year. The patient declined the COVID-19 vaccine this year. The problem list reflects the active issues addressed during today's visit    Vitals:    BP: 134/86  Weight: 226 lb (102.5 kg)  Heart Rate: 95  Patient Position: Sitting  Fundal Height (cm): 19 cm  Fetal HR: 156     Labs:   The patient is Rh positive,  ABO/Rh   Date Value Ref Range Status   08/30/2022 O POSITIVE  Final     Assessment & Plan:  Leola Faria is a 30 y.o. female [id number] at 23w4d   - An 18-22 week anatomy ultrasound has been ordered   - The Nuchal Translucency testing was reviewed and found to be normal.    - NIPT was ordered and is low risk   - Influenza vaccination: R/B/A discussed and patient already recieved   - COVID-19 vaccination: R/B/A discussed with increased risk of both maternal and fetal morbidity and mortality in unvaccinated pregnant patients who contract COVID-19- patient declined today    Pre gestational DM   - Follows with MFM   - on insulin 6/6/6, nph 12   - Did not bring her sugars today, going to bring them on Monday    cHTN (no meds)   - Compliant with ASA   - BP normotensive   - Denies s/s of pre E    Patient Active Problem List    Diagnosis Date Noted    AMA 08/30/2022     Priority: Medium    Hx Chlamydia 08/30/2022     Priority: Medium    Pregestational DM 09/12/2022     Refer to MFM   9/12/22- MFM referral placed-SK    On insulin 6/6/6, 12 NPH      cHTN (no meds) 08/30/2022 8/30/22: Diagnosed by Dr. Deepa Whitney per review of flowsheets. No meds at this time. ASA Rx sent to patient's pharmacy  Baseline PreE labs (CMP, protein-creatinine ratio ordered_      Depression 2022: PHQ=1, loss of interest      Unvaccinated for covid-19 2022     Patient counseled on the the risks of not getting vaccinated in pregnancy against COVID-19. Pregnant women with symptomatic covid have a 70% increased risk of death. Covid-19 during pregnancy increases the risk for adverse outcomes, including  birth and admission of the baby to an intensive care unit. Obesity (BMI 30-39.9) 2022     Pregravid BMI 31.62    22: Early one-hour glucose tolerance test ordered per protocol       Hx hypothyroidism 2022: Patient was prescribed Levothyroxine in  but stopped due to too much weight loss. Hx C/S x 1 2022: Patient would like to discuss RCS versus TOLAC      High-risk pregnancy 2022     Risk factors: New partner; chronic hypertension (no meds); advanced maternal age; H/O hypothyroidism; irritable bowel syndrome; obesity; H/O  x1; H/O vertigo; COVID unvaccinated    Fetal testing indicated: Yes  Fetal testing indication: Advanced maternal age  Fetal testing initiation: Individualized  Fetal testing frequency: Individualized      Former smoker 2022: Patient reports quitting 2022      Laparoscopic converted to open wedge resection of right cornual ectopic, right salpingectomy and evacuation of hemoperitoneum 2019     Patient counseled extensively on the need to not get pregnant for at least 18 months. IBS 2015: Patient denies meds at this time       Return in about 4 weeks (around 2022) for Parva Domus 9038.     Maco Verduzco DO  Ob/Gyn Resident  [facility], [address]  2022, 11:25 AM

## 2022-11-03 ENCOUNTER — TELEPHONE (OUTPATIENT)
Dept: PERINATAL CARE | Age: 37
End: 2022-11-03

## 2022-11-03 NOTE — TELEPHONE ENCOUNTER
Patient is aware of her alpha thalassemia silent carrier result. Requests lab review appointment and partner kit for Joni Arayashanda PRECIADO 1982.

## 2022-11-04 ENCOUNTER — ROUTINE PRENATAL (OUTPATIENT)
Dept: OBGYN | Age: 37
End: 2022-11-04
Payer: COMMERCIAL

## 2022-11-04 VITALS
BODY MASS INDEX: 37.61 KG/M2 | SYSTOLIC BLOOD PRESSURE: 134 MMHG | HEART RATE: 95 BPM | WEIGHT: 226 LBS | DIASTOLIC BLOOD PRESSURE: 86 MMHG

## 2022-11-04 DIAGNOSIS — Z3A.19 19 WEEKS GESTATION OF PREGNANCY: Primary | ICD-10-CM

## 2022-11-04 DIAGNOSIS — O24.319 PREGESTATIONAL DIABETES MELLITUS, MODIFIED WHITE CLASS B: ICD-10-CM

## 2022-11-04 PROCEDURE — G8417 CALC BMI ABV UP PARAM F/U: HCPCS | Performed by: STUDENT IN AN ORGANIZED HEALTH CARE EDUCATION/TRAINING PROGRAM

## 2022-11-04 PROCEDURE — 3078F DIAST BP <80 MM HG: CPT | Performed by: STUDENT IN AN ORGANIZED HEALTH CARE EDUCATION/TRAINING PROGRAM

## 2022-11-04 PROCEDURE — G8427 DOCREV CUR MEDS BY ELIG CLIN: HCPCS | Performed by: STUDENT IN AN ORGANIZED HEALTH CARE EDUCATION/TRAINING PROGRAM

## 2022-11-04 PROCEDURE — 99213 OFFICE O/P EST LOW 20 MIN: CPT | Performed by: STUDENT IN AN ORGANIZED HEALTH CARE EDUCATION/TRAINING PROGRAM

## 2022-11-04 PROCEDURE — 3074F SYST BP LT 130 MM HG: CPT | Performed by: STUDENT IN AN ORGANIZED HEALTH CARE EDUCATION/TRAINING PROGRAM

## 2022-11-04 PROCEDURE — 3044F HG A1C LEVEL LT 7.0%: CPT | Performed by: STUDENT IN AN ORGANIZED HEALTH CARE EDUCATION/TRAINING PROGRAM

## 2022-11-04 PROCEDURE — 1036F TOBACCO NON-USER: CPT | Performed by: STUDENT IN AN ORGANIZED HEALTH CARE EDUCATION/TRAINING PROGRAM

## 2022-11-04 PROCEDURE — G8482 FLU IMMUNIZE ORDER/ADMIN: HCPCS | Performed by: STUDENT IN AN ORGANIZED HEALTH CARE EDUCATION/TRAINING PROGRAM

## 2022-11-04 PROCEDURE — 2022F DILAT RTA XM EVC RTNOPTHY: CPT | Performed by: STUDENT IN AN ORGANIZED HEALTH CARE EDUCATION/TRAINING PROGRAM

## 2022-11-07 ENCOUNTER — ROUTINE PRENATAL (OUTPATIENT)
Dept: PERINATAL CARE | Age: 37
End: 2022-11-07
Payer: COMMERCIAL

## 2022-11-07 VITALS
RESPIRATION RATE: 16 BRPM | BODY MASS INDEX: 37.49 KG/M2 | TEMPERATURE: 99 F | SYSTOLIC BLOOD PRESSURE: 131 MMHG | HEIGHT: 65 IN | WEIGHT: 225 LBS | DIASTOLIC BLOOD PRESSURE: 81 MMHG | HEART RATE: 92 BPM

## 2022-11-07 DIAGNOSIS — D56.3 THALASSEMIA ALPHA CARRIER: Primary | ICD-10-CM

## 2022-11-07 DIAGNOSIS — Z3A.20 20 WEEKS GESTATION OF PREGNANCY: ICD-10-CM

## 2022-11-07 DIAGNOSIS — O99.282 HYPOTHYROIDISM AFFECTING PREGNANCY IN SECOND TRIMESTER: ICD-10-CM

## 2022-11-07 DIAGNOSIS — E03.9 HYPOTHYROIDISM AFFECTING PREGNANCY IN SECOND TRIMESTER: ICD-10-CM

## 2022-11-07 DIAGNOSIS — O16.2 HYPERTENSION AFFECTING PREGNANCY IN SECOND TRIMESTER: ICD-10-CM

## 2022-11-07 DIAGNOSIS — O09.522 MULTIGRAVIDA OF ADVANCED MATERNAL AGE IN SECOND TRIMESTER: Primary | ICD-10-CM

## 2022-11-07 DIAGNOSIS — Z36.86 ENCOUNTER FOR SCREENING FOR RISK OF PRE-TERM LABOR: ICD-10-CM

## 2022-11-07 DIAGNOSIS — O34.219 PREVIOUS CESAREAN DELIVERY, ANTEPARTUM CONDITION OR COMPLICATION: ICD-10-CM

## 2022-11-07 DIAGNOSIS — O24.119 PRE-EXISTING TYPE 2 DIABETES MELLITUS DURING PREGNANCY, ANTEPARTUM: ICD-10-CM

## 2022-11-07 LAB
ABDOMINAL CIRCUMFERENCE: NORMAL
ABDOMINAL CIRCUMFERENCE: NORMAL
BIPARIETAL DIAMETER: NORMAL
BIPARIETAL DIAMETER: NORMAL
ESTIMATED FETAL WEIGHT: NORMAL
ESTIMATED FETAL WEIGHT: NORMAL
FEMORAL DIAMETER: NORMAL
FEMORAL DIAMETER: NORMAL
HC/AC: NORMAL
HC/AC: NORMAL
HEAD CIRCUMFERENCE: NORMAL
HEAD CIRCUMFERENCE: NORMAL

## 2022-11-07 PROCEDURE — 76811 OB US DETAILED SNGL FETUS: CPT | Performed by: OBSTETRICS & GYNECOLOGY

## 2022-11-07 PROCEDURE — 76817 TRANSVAGINAL US OBSTETRIC: CPT | Performed by: OBSTETRICS & GYNECOLOGY

## 2022-11-07 PROCEDURE — 99999 PR OFFICE/OUTPT VISIT,PROCEDURE ONLY: CPT | Performed by: OBSTETRICS & GYNECOLOGY

## 2022-11-07 RX ORDER — PEN NEEDLE, DIABETIC 31 GX3/16"
NEEDLE, DISPOSABLE MISCELLANEOUS
COMMUNITY
Start: 2022-10-11

## 2022-11-07 RX ORDER — INSULIN LISPRO 100 [IU]/ML
INJECTION, SOLUTION INTRAVENOUS; SUBCUTANEOUS
COMMUNITY
Start: 2022-10-11

## 2022-11-07 RX ORDER — INSULIN HUMAN 100 [IU]/ML
INJECTION, SUSPENSION SUBCUTANEOUS
COMMUNITY
Start: 2022-10-11

## 2022-11-07 NOTE — PROGRESS NOTES
Blood sugars reviewed (insulin regimen adjusted, as below). Insulin regimen increased to: NPH Insulin subcutaneously 20 units before bedtime  (consistently elevated fasting blood sugars above 90's). Insulin regimen continue: 6 units subcutaneous (SQ) Novolog before all meals. Please continue to send blood glucose monitoring information to [facility] office so that insulin and/or dietary changes can be made if necessary weekly. Diabetic education/nutrition counseling completed. Continue recommended ADA diet therapy for diabetes. Compliance with regular prenatal care and blood sugar monitoring strongly encouraged. Strongly advised patient to be compliant with blood sugar monitoring as previously advised to minimize the potential of adverse  outcomes (e.g. fetal demise/stillbirth, congenital birth/heart defects, polyhydramnios/oligohydramnios, fetal growth abnormalities, pregnancy loss, hypertensive disorders of pregnancy, indicated  delivery, etc.), potential maternal morbidities, etc.     Patient declined invasive prenatal diagnostic testing (including evaluating for fetal thalassemia status, fetal aneuploidy, fetal microdeletions, fetal single gene etiologies, fetal microarray, etc.) today. Please refer to non-invasive prenatal testing/NIPT results (with the [place] Complete test from [employer]). Please refer to completed maternal carrier testing results (with the test company test from [address] [place] [address]). I would advise continuation of daily oral baby aspirin 81 mg based on guidelines by the USPSTF/ACOG (for preeclampsia prevention for pregnant women at \"high-risk\"  for preeclampsia). Maternal-Fetal Medicine follow up complete consultation scheduled for the maternal/fetal medical/obstetrical complications of pregnancy (specifically for maternal alpha thalassemia silent carrier).

## 2022-11-14 ENCOUNTER — TELEPHONE (OUTPATIENT)
Dept: PERINATAL CARE | Age: 37
End: 2022-11-14

## 2022-11-14 NOTE — TELEPHONE ENCOUNTER
Phone call to pt after Dr. Josesito Morfin reviewed most recent blood sugar log. Dr. Josesito Morfin ordered changes in Novolog and NPH insulin. Pt notified and asked again to have called into [facility] on [address] called into above pharmacy, Humaira Kearney, explained most recent voice mail left, Novolog insulin, 8u before breakfast and dinner, 6u before lunch, with needles to adm. 1mos supply and 1 refill  NPH insulin 30u at bedtime, with needles, 1mos supply and 1 refill.

## 2022-11-28 ENCOUNTER — TELEPHONE (OUTPATIENT)
Dept: PERINATAL CARE | Age: 37
End: 2022-11-28

## 2022-11-28 DIAGNOSIS — O21.9 NAUSEA AND VOMITING DURING PREGNANCY: ICD-10-CM

## 2022-11-28 NOTE — TELEPHONE ENCOUNTER
Blood sugars reviewed by Dr. Charla Morrison ordered change in Novolog insulin dose before dinner and NPH at bedtime. Pt notified and states needs needs refill called into pharmacy. Explained to pt writer called in refill on Nov 14. Pt states was told by pharmacy unable to refill. Writer called into above pharmacy, after putting to voice mail 3x's message left - Novolog insulin 8u before breakfast, 6u before lunch and 12u before dinner, with needles, syringes and alcohol pads, 1mos suply, no refills. NPH insulin 36u at bedtime, with needles, syringes and alcohol pads, 1mos supplly with no refill.

## 2022-11-29 RX ORDER — DOXYLAMINE SUCCINATE 25 MG/1
TABLET ORAL
Qty: 30 TABLET | Refills: 0 | Status: SHIPPED | OUTPATIENT
Start: 2022-11-29

## 2022-11-30 ENCOUNTER — TELEPHONE (OUTPATIENT)
Dept: PERINATAL CARE | Age: 37
End: 2022-11-30

## 2022-12-02 ENCOUNTER — ROUTINE PRENATAL (OUTPATIENT)
Dept: OBGYN | Age: 37
End: 2022-12-02

## 2022-12-02 VITALS
DIASTOLIC BLOOD PRESSURE: 87 MMHG | HEART RATE: 94 BPM | BODY MASS INDEX: 38.77 KG/M2 | WEIGHT: 233 LBS | SYSTOLIC BLOOD PRESSURE: 155 MMHG

## 2022-12-02 DIAGNOSIS — O09.522 MULTIGRAVIDA OF ADVANCED MATERNAL AGE IN SECOND TRIMESTER: ICD-10-CM

## 2022-12-02 DIAGNOSIS — O10.919 CHRONIC HYPERTENSION DURING PREGNANCY: ICD-10-CM

## 2022-12-02 DIAGNOSIS — O09.92 HIGH-RISK PREGNANCY, SECOND TRIMESTER: Primary | ICD-10-CM

## 2022-12-02 RX ORDER — LABETALOL 100 MG/1
100 TABLET, FILM COATED ORAL 2 TIMES DAILY
Qty: 60 TABLET | Refills: 3 | Status: SHIPPED | OUTPATIENT
Start: 2022-12-02

## 2022-12-02 NOTE — PROGRESS NOTES
Prenatal Visit    Esa Mayfield is a 30 y.o. female [id number] at 23w6d    The patient was seen and evaluated. She complains of not being to obtain her NPH insulin from her pharmacy. She reports positive fetal movements. She denies contractions, vaginal bleeding and leakage of fluid. Signs and symptoms of labor and pre-eclampsia were reviewed with the patient in detail. She is to report any of these if they occur. She currently denies signs or symptoms of pre-eclampsia including headache, vision changes, RUQ pain. The patient already received the influenza vaccine this year. The problem list reflects the active issues addressed during today's visit    Vitals:  Vitals:    22 1057   BP: (!) 155/87   Pulse: 94   Weight: 233 lb (105.7 kg)        Labs: The patient is Rh +, Rhogam not indicated  ABO/Rh   Date Value Ref Range Status   2022 O POSITIVE  Final         UA C&S: negative on 22    Assessment & Plan:  Esa Mayfield is a 30 y.o. female  at 24w9d   - 28 week labs to be completed at next visit, including (CBC, 1 hr GTT, U/A C&S)   - An 18-22 week anatomy ultrasound has been completed   - NIPT and aneuploidy was ordered and was low risk/negative. However + screening for Alpha Thal 1 carrier. FOB to get genetic testing   - Influenza vaccination: R/B/A discussed and patient received   - Aspirin indication: indicated due to High risk factors: chronic hypertension, Moderate risk factors: age 30years or order- Rx given   - Indications for  section: repeat  section, declining TOLAC, ERAS protocols: not indicated at this time. Will review closer to delivery. Patient desires RRS.     cHTN (no meds)   - Patient had 1 elevated non severe range BP on arrival however repeat in 15 mins was wnl   - No meds sent for patient at this tikme   - Patient denies s/s of PreE at this time   - Return precautions to go to L&D were given and PreE sx were reviewed    Pregestational DM   - Patient follows with MFM   - Is on 12 novolog and 36 NPH   - Patient states she has been unable to obtain NPH from pharmacy   -  [address] sent   - Prince Bray to follow up with patient   - Pt next MFM appt on 12/15/22   - Patient did not have blood sugar log with her at this appointment    Patient Active Problem List    Diagnosis Date Noted    AMA 2022     Priority: Medium    Hx Chlamydia 2022     Priority: Medium    Pregestational DM 2022     Refer to MFM   22- MFM referral placed-SK    On insulin , 12 NPH      cHTN (no meds) 2022: Diagnosed by Dr. Radha Brito per review of flowsheets. No meds at this time. ASA Rx sent to patient's pharmacy  Baseline PreE labs (CMP, protein-creatinine ratio ordered_      Depression 2022: PHQ=1, loss of interest      Unvaccinated for covid-19 2022     Patient counseled on the the risks of not getting vaccinated in pregnancy against COVID-19. Pregnant women with symptomatic covid have a 70% increased risk of death. Covid-19 during pregnancy increases the risk for adverse outcomes, including  birth and admission of the baby to an intensive care unit. Obesity (BMI 30-39.9) 2022     Pregravid BMI 31.62    22: Early one-hour glucose tolerance test ordered per protocol       Hx hypothyroidism 2022: Patient was prescribed Levothyroxine in  but stopped due to too much weight loss.       Hx C/S x 1 2022: Patient would like to discuss RCS versus TOLAC      High-risk pregnancy 2022     Risk factors: New partner; chronic hypertension (no meds); advanced maternal age; H/O hypothyroidism; irritable bowel syndrome; obesity; H/O  x1; H/O vertigo; COVID unvaccinated    Fetal testing indicated: Yes  Fetal testing indication: Advanced maternal age  Fetal testing initiation: Individualized  Fetal testing frequency: Individualized      Former smoker 2022 8/300/22: Patient reports quitting 7/21/2022      Laparoscopic converted to open wedge resection of right cornual ectopic, right salpingectomy and evacuation of hemoperitoneum 6/22/19 06/22/2019     Patient counseled extensively on the need to not get pregnant for at least 18 months. IBS 05/07/2015 8/30/22: Patient denies meds at this time       Return in about 4 weeks (around 12/30/2022) for Routine OB Visit.     Hermilo Wolf MD  Ob/Gyn Resident  [facility], [address]  12/2/2022, 3:09 PM

## 2022-12-03 NOTE — PROGRESS NOTES
Attending Physician Statement  I have discussed the care of Darling Martins, including pertinent history and exam findings,  with the resident. I have seen and examined the patient and the key elements of all parts of the encounter have been performed by me. I agree with the assessment, plan and orders as documented by the resident except the patient will need to start antihypertensive meds. Rx transmitted to her pharmacy. Pt will be contacted regarding this change. Office follow up in 2 weeks for BP recheck.  .  ([facility])

## 2022-12-03 NOTE — PROGRESS NOTES
Addendum: after the patient left the office I spoke to her regarding elevated BP and recommendation to start low dose of labetalol. She reports BP was repeated at her visit and it was \" normal, 120 something over 70 or 80 something \" . She expressed reservation about starting BP med now. Recommend office visit next week to recheck BP. Pt agrees. Staff will call her to schedule follow up.

## 2022-12-05 ENCOUNTER — TELEPHONE (OUTPATIENT)
Dept: OBGYN | Age: 37
End: 2022-12-05

## 2022-12-05 NOTE — TELEPHONE ENCOUNTER
----- Message from Watson Boyce MD sent at 12/3/2022 11:11 AM EST -----  Please call the patient and schedule brief office visit to re-check BP 12/5 or 12/6.

## 2022-12-06 ENCOUNTER — ROUTINE PRENATAL (OUTPATIENT)
Dept: OBGYN | Age: 37
End: 2022-12-06

## 2022-12-06 VITALS
HEART RATE: 96 BPM | WEIGHT: 231 LBS | DIASTOLIC BLOOD PRESSURE: 80 MMHG | SYSTOLIC BLOOD PRESSURE: 134 MMHG | BODY MASS INDEX: 38.44 KG/M2

## 2022-12-06 DIAGNOSIS — O24.319 PREGESTATIONAL DIABETES MELLITUS, MODIFIED WHITE CLASS B: ICD-10-CM

## 2022-12-06 DIAGNOSIS — Z86.39 HISTORY OF HYPOTHYROIDISM: ICD-10-CM

## 2022-12-06 DIAGNOSIS — Z86.19 HISTORY OF CHLAMYDIA INFECTION: ICD-10-CM

## 2022-12-06 DIAGNOSIS — Z3A.24 24 WEEKS GESTATION OF PREGNANCY: Primary | ICD-10-CM

## 2022-12-06 NOTE — PROGRESS NOTES
Prenatal Visit    Radha Ch is a 30 y.o. female [id number] at 24w3d    The patient was seen and evaluated. She has no complaints today. She reports positive fetal movements. She denies contractions, vaginal bleeding and leakage of fluid. Signs and symptoms of labor and pre-eclampsia were reviewed with the patient in detail. She is to report any of these if they occur. She currently denies signs or symptoms of pre-eclampsia including headache, vision changes, RUQ pain. The patient already received the influenza vaccine this year. The patient declined the COVID-19 vaccine this year. The problem list reflects the active issues addressed during today's visit    Vitals:    BP: 134/80  Weight: 231 lb (104.8 kg)  Heart Rate: 96  Patient Position: Sitting  Albumin: Negative  Glucose: 1+  Fundal Height (cm): 24 cm  Fetal HR: 134  Movement: Present     Labs: The patient is Rh positive, Rhogam not indicated  ABO/Rh   Date Value Ref Range Status   08/30/2022 O POSITIVE  Final       1 hour GTT: N/A 2/2 pregestational DM    CBC: ordered     UA C&S: ordered    Assessment & Plan:  Radha Ch is a 30 y.o. female [id number] at 18w2d   - Initial prenatal labs completed   - 28 week labs ordered, including (CBC, U/A C&S), the patient is to complete this in the next 4 weeks. - MSAFP was negative   - The Nuchal Translucency testing was reviewed and found to be normal.    - An 18-22 week anatomy ultrasound has been completed, reviewed and normal    - NIPT low risk for aneuploidy    - Next [facility] appt 12/15/22.  Patient is aware   - Influenza vaccination: R/B/A discussed and patient already received 10/7/22   - COVID-19 vaccination: R/B/A discussed with increased risk of both maternal and fetal morbidity and mortality in unvaccinated pregnant patients who contract COVID-19- patient declined today   - Patient has prenatal vitamin   - Aspirin indication: indicated due to High risk factors: chronic hypertension and type 1 or 2 diabetes, Moderate risk factors: BMI >30 and age 30years or order- Rx given   - Indications for  section: Hx C/S x1, patient undecided about repeat C/S vs TOLAC    Pregestational DM   - Diagnosed from early 1 hr and 3 hr GTT   - Currently on Novolog 12, NPH 36   - Reminded patient importance of keeping blood sugar logs and providing to MFM   - Blood sugar logs scanned into media   - Next MFM appt 12/15/22    cHTN (no meds)   - BP normotensive today   - Denies s/s PreE   - Reminded patient importance of ASA 81 mg QD   - Next MFM appt 12/15/22    Patient Active Problem List    Diagnosis Date Noted    AMA 2022     Priority: Medium    Hx Chlamydia 2022     Priority: Medium     Negative 22      Pregestational DM 2022     Diagnosed in G5 pregnancy, early 1 hr and 3 hr GTT    Novolog 12, NPH 36      cHTN (no meds) 2022: Diagnosed by Dr. Cesar Alfaro per review of flowsheets. No meds at this time. ASA Rx sent to patient's pharmacy  Baseline PreE labs (CMP, protein-creatinine ratio ordered_      Depression 2022: PHQ=1, loss of interest      Hx hypothyroidism 2022: Patient was prescribed Levothyroxine in  but stopped due to too much weight loss.   TSH wnl 10/10/22      Hx C/S x 1 2022: Patient would like to discuss RCS versus TOLAC      High-risk pregnancy 2022     Risk factors: New partner; chronic hypertension (no meds); advanced maternal age; H/O hypothyroidism; irritable bowel syndrome; obesity; H/O  x1; H/O vertigo; COVID unvaccinated    Fetal testing indicated: Yes  Fetal testing indication: Advanced maternal age  Fetal testing initiation: Individualized  Fetal testing frequency: Individualized      Former smoker 2022: Patient reports quitting 2022      Laparoscopic converted to open wedge resection of right cornual ectopic, right salpingectomy and evacuation of hemoperitoneum 2019     Patient counseled extensively on the need to not get pregnant for at least 18 months. IBS 2015: Patient denies meds at this time       Return in about 4 weeks (around 1/3/2023) for Parva Sally 9038. Janiya Ferguson DO  Ob/Gyn Resident  [facility], [address]  2022, 12:05 PM     Date: 2022  Time: 11:13 AM      Patient Name: Mely Patel  Patient : 1985  Room/Bed: Room/bed info not found  Admission Date/Time: No admission date for patient encounter. Attending Physician Statement  I have discussed the care of Mely Patel, including pertinent history and exam findings with the resident. I have reviewed and edited their note in the electronic medical record. The key elements of all parts of the encounter have been performed/reviewed by me . I agree with the assessment, plan and orders as documented by the resident. The level of care submitted represents to the best of my ability the care documented in the medical record today. GC Modifier. This service has been performed in part by a resident under the direction of a teaching physician.         Attending's Name:  Edgar Alonzo DO

## 2022-12-07 ENCOUNTER — TELEPHONE (OUTPATIENT)
Dept: PERINATAL CARE | Age: 37
End: 2022-12-07

## 2022-12-07 NOTE — TELEPHONE ENCOUNTER
Patient was advised by phone that Dr. Roger Tate has reviewed her blood sugar log 11/27/2022 - 12/03/2022, his insulin recommendations are Novolog 12 units before breakfast, 6 units before lunch, and 16 units before dinner, NPH insulin 36 units at bedtime. Patient  understands Dr. Marylou John insulin recommendations.

## 2022-12-15 ENCOUNTER — HOSPITAL ENCOUNTER (OUTPATIENT)
Age: 37
Discharge: HOME | End: 2022-12-15
Payer: COMMERCIAL

## 2022-12-15 ENCOUNTER — ROUTINE PRENATAL (OUTPATIENT)
Dept: PERINATAL CARE | Age: 37
End: 2022-12-15
Payer: COMMERCIAL

## 2022-12-15 VITALS
TEMPERATURE: 98.2 F | DIASTOLIC BLOOD PRESSURE: 73 MMHG | HEART RATE: 80 BPM | WEIGHT: 232 LBS | SYSTOLIC BLOOD PRESSURE: 133 MMHG | RESPIRATION RATE: 16 BRPM | HEIGHT: 65 IN | BODY MASS INDEX: 38.65 KG/M2

## 2022-12-15 DIAGNOSIS — O09.522 MULTIGRAVIDA OF ADVANCED MATERNAL AGE IN SECOND TRIMESTER: ICD-10-CM

## 2022-12-15 DIAGNOSIS — Z3A.25 25 WEEKS GESTATION OF PREGNANCY: ICD-10-CM

## 2022-12-15 DIAGNOSIS — Z36.4 ULTRASOUND FOR ANTENATAL SCREENING FOR FETAL GROWTH RESTRICTION: ICD-10-CM

## 2022-12-15 DIAGNOSIS — O28.5 ABNORMAL GENETIC TEST DURING PREGNANCY: Primary | ICD-10-CM

## 2022-12-15 DIAGNOSIS — E03.9 HYPOTHYROIDISM AFFECTING PREGNANCY IN SECOND TRIMESTER: ICD-10-CM

## 2022-12-15 DIAGNOSIS — O99.282 HYPOTHYROIDISM AFFECTING PREGNANCY IN SECOND TRIMESTER: ICD-10-CM

## 2022-12-15 DIAGNOSIS — O24.119 PRE-EXISTING TYPE 2 DIABETES MELLITUS DURING PREGNANCY, ANTEPARTUM: ICD-10-CM

## 2022-12-15 DIAGNOSIS — O16.2 HYPERTENSION AFFECTING PREGNANCY IN SECOND TRIMESTER: ICD-10-CM

## 2022-12-15 LAB
ABDOMINAL CIRCUMFERENCE: NORMAL
BIPARIETAL DIAMETER: NORMAL
ESTIMATED FETAL WEIGHT: NORMAL
FEMORAL DIAMETER: NORMAL
HC/AC: NORMAL
HEAD CIRCUMFERENCE: NORMAL
THYROXINE, FREE: 0.8 NG/DL (ref 0.93–1.7)
TSH SERPL DL<=0.05 MIU/L-ACNC: 1.76 UIU/ML (ref 0.3–5)

## 2022-12-15 PROCEDURE — 84439 ASSAY OF FREE THYROXINE: CPT

## 2022-12-15 PROCEDURE — 76827 ECHO EXAM OF FETAL HEART: CPT | Performed by: OBSTETRICS & GYNECOLOGY

## 2022-12-15 PROCEDURE — 36415 COLL VENOUS BLD VENIPUNCTURE: CPT

## 2022-12-15 PROCEDURE — 76820 UMBILICAL ARTERY ECHO: CPT | Performed by: OBSTETRICS & GYNECOLOGY

## 2022-12-15 PROCEDURE — 76825 ECHO EXAM OF FETAL HEART: CPT | Performed by: OBSTETRICS & GYNECOLOGY

## 2022-12-15 PROCEDURE — 93325 DOPPLER ECHO COLOR FLOW MAPG: CPT | Performed by: OBSTETRICS & GYNECOLOGY

## 2022-12-15 PROCEDURE — 76816 OB US FOLLOW-UP PER FETUS: CPT | Performed by: OBSTETRICS & GYNECOLOGY

## 2022-12-15 PROCEDURE — 84443 ASSAY THYROID STIM HORMONE: CPT

## 2022-12-15 RX ORDER — INSULIN ASPART 100 [IU]/ML
INJECTION, SOLUTION INTRAVENOUS; SUBCUTANEOUS
COMMUNITY
Start: 2022-12-14

## 2022-12-27 ENCOUNTER — HOSPITAL ENCOUNTER (OUTPATIENT)
Age: 37
Setting detail: SPECIMEN
Discharge: HOME | End: 2022-12-27

## 2022-12-27 DIAGNOSIS — Z3A.24 24 WEEKS GESTATION OF PREGNANCY: ICD-10-CM

## 2022-12-27 LAB
ABSOLUTE EOS #: 0.06 K/UL (ref 0–0.44)
ABSOLUTE IMMATURE GRANULOCYTE: 0.24 K/UL (ref 0–0.3)
ABSOLUTE LYMPH #: 1.94 K/UL (ref 1.1–3.7)
ABSOLUTE MONO #: 0.95 K/UL (ref 0.1–1.2)
BASOPHILS # BLD: 1 % (ref 0–2)
BASOPHILS ABSOLUTE: 0.06 K/UL (ref 0–0.2)
EOSINOPHILS RELATIVE PERCENT: 1 % (ref 1–4)
HCT VFR BLD CALC: 39.7 % (ref 36.3–47.1)
HEMOGLOBIN: 12.5 G/DL (ref 11.9–15.1)
IMMATURE GRANULOCYTES: 2 %
LYMPHOCYTES # BLD: 16 % (ref 24–43)
MCH RBC QN AUTO: 27.5 PG (ref 25.2–33.5)
MCHC RBC AUTO-ENTMCNC: 31.5 G/DL (ref 28.4–34.8)
MCV RBC AUTO: 87.4 FL (ref 82.6–102.9)
MONOCYTES # BLD: 8 % (ref 3–12)
NRBC AUTOMATED: 0 PER 100 WBC
PDW BLD-RTO: 15.2 % (ref 11.8–14.4)
PLATELET # BLD: 289 K/UL (ref 138–453)
PMV BLD AUTO: 10.8 FL (ref 8.1–13.5)
RBC # BLD: 4.54 M/UL (ref 3.95–5.11)
RBC # BLD: ABNORMAL 10*6/UL
SEG NEUTROPHILS: 72 % (ref 36–65)
SEGMENTED NEUTROPHILS ABSOLUTE COUNT: 9.13 K/UL (ref 1.5–8.1)
WBC # BLD: 12.4 K/UL (ref 3.5–11.3)

## 2022-12-28 LAB
CULTURE: NORMAL
SPECIMEN DESCRIPTION: NORMAL

## 2022-12-30 ENCOUNTER — ROUTINE PRENATAL (OUTPATIENT)
Dept: OBGYN | Age: 37
End: 2022-12-30
Payer: COMMERCIAL

## 2022-12-30 VITALS
BODY MASS INDEX: 39.12 KG/M2 | WEIGHT: 235.1 LBS | HEART RATE: 85 BPM | DIASTOLIC BLOOD PRESSURE: 85 MMHG | SYSTOLIC BLOOD PRESSURE: 124 MMHG

## 2022-12-30 DIAGNOSIS — Z23 NEED FOR TDAP VACCINATION: Primary | ICD-10-CM

## 2022-12-30 DIAGNOSIS — O09.92 HIGH-RISK PREGNANCY IN SECOND TRIMESTER: ICD-10-CM

## 2022-12-30 PROCEDURE — 99212 OFFICE O/P EST SF 10 MIN: CPT

## 2022-12-30 PROCEDURE — 3074F SYST BP LT 130 MM HG: CPT

## 2022-12-30 PROCEDURE — 3078F DIAST BP <80 MM HG: CPT

## 2022-12-30 PROCEDURE — 1036F TOBACCO NON-USER: CPT

## 2022-12-30 PROCEDURE — G8417 CALC BMI ABV UP PARAM F/U: HCPCS

## 2022-12-30 PROCEDURE — G8482 FLU IMMUNIZE ORDER/ADMIN: HCPCS

## 2022-12-30 PROCEDURE — 90715 TDAP VACCINE 7 YRS/> IM: CPT | Performed by: OBSTETRICS & GYNECOLOGY

## 2022-12-30 PROCEDURE — G8427 DOCREV CUR MEDS BY ELIG CLIN: HCPCS

## 2022-12-30 PROCEDURE — 99211 OFF/OP EST MAY X REQ PHY/QHP: CPT

## 2022-12-30 NOTE — PROGRESS NOTES
Prenatal Visit    France Roberson is a 30 y.o. female [id number] at 31w5d    The patient was seen and evaluated. She has no complaints today. She reports positive fetal movements. She denies contractions, vaginal bleeding and leakage of fluid. Signs and symptoms of labor and pre-eclampsia were reviewed with the patient in detail. She is to report any of these if they occur. She currently denies any of these. The patient is Rh pos and Rhogam is not indicated in this pregnancy  The patient is requesting the T-Dap Vaccine (27-36 weeks) this pregnancy. The patient already received  the influenza vaccine this year. The problem list reflects the active issues addressed during today's visit    Vitals:  Vitals:    22 1001   BP: 124/85   Pulse: 85   Weight: 235 lb 1.6 oz (106.6 kg)       BP: 124/85  Weight: 235 lb 1.6 oz (106.6 kg)  Heart Rate: 85  Patient Position: Sitting  Albumin: Trace  Glucose: Negative  Fundal Height (cm): 27 cm  Fetal HR: 152  Movement: Present     28 week labs: .  1hr GTT: Not ordered, as patient is being treated for ore-gestation DM through [facility]   28 week CBC:   Lab Results   Component Value Date    WBC 12.4 (H) 2022    HGB 12.5 2022    HCT 39.7 2022    MCV 87.4 2022     2022     UA w/ Ur C&S: Negative     Assessment & Plan:  France Roberson is a 30 y.o. female  at 31w5d   - 29 week labs completed   - Tdap vaccination: received today   - Influenza vaccination: already received     - Rhogam: is not indicated in this pregnancy    - Continue to check blood sugars   - Follow with [facility] on  for DM management   -  testing indication starting 32 weeks GA: To be determined   -Indications for  section:  No indications , ERAS protocols   - Warning signs reviewed and recommendations when to call or present to the hospital if she experiences signs or symptoms of  labor and pre-eclampsia were reviewed.    - The patient was instructed on fetal kick counts. She was instructed that the baby should move at a minimum of ten times within one hour after a meal. The patient was instructed to lay down on her left side twenty minutes after eating and count movements for up to one hour with a target value of ten movements. She was instructed to notify the office if she did not make that target after two attempts or if after any attempt there was less than four movements. Patient Active Problem List    Diagnosis Date Noted    AMA 2022     Priority: Medium    Hx Chlamydia 2022     Priority: Medium     Negative 22      Pregestational DM 2022     Diagnosed in G5 pregnancy, early 1 hr and 3 hr GTT    Novolog 12, NPH 36      cHTN (no meds) 2022: Diagnosed by Dr. Geoff Godinez per review of flowsheets. No meds at this time. ASA Rx sent to patient's pharmacy  Baseline PreE labs (CMP, protein-creatinine ratio ordered_      Depression 2022: PHQ=1, loss of interest      Hx hypothyroidism 2022: Patient was prescribed Levothyroxine in  but stopped due to too much weight loss. TSH wnl 10/10/22      Hx C/S x 1 2022: Patient would like to discuss RCS versus TOLAC      High-risk pregnancy 2022     Risk factors: New partner; chronic hypertension (no meds); advanced maternal age; H/O hypothyroidism; irritable bowel syndrome; obesity; H/O  x1; H/O vertigo; COVID unvaccinated    Fetal testing indicated: Yes  Fetal testing indication: Advanced maternal age  Fetal testing initiation: Individualized  Fetal testing frequency: Individualized      Former smoker 2022: Patient reports quitting 2022      Laparoscopic converted to open wedge resection of right cornual ectopic, right salpingectomy and evacuation of hemoperitoneum 2019     Patient counseled extensively on the need to not get pregnant for at least 18 months. IBS 05/07/2015 8/30/22: Patient denies meds at this time       Return in about 2 weeks (around 1/13/2023) for Nadia Zavala 9038.     Lorena Sebastian MD  Ob/Gyn Resident  [facility] [place] January 12/30/2022, 10:44 AM

## 2023-01-05 ENCOUNTER — TELEPHONE (OUTPATIENT)
Dept: PERINATAL CARE | Age: 38
End: 2023-01-05

## 2023-01-05 NOTE — TELEPHONE ENCOUNTER
Pt sent in blood sugar log and Dr. James Rae reviewed. Dr. James Rae ordered pt to take Novolog insulin 14u before breakfast and lunch and 16u before dinner, NPH insulin to 36u at bedtime/    Writer made several attempts to talk to someone in pharmacy and after 3 attempts and >1hr left message on voice message. Novolog insulin 14u before breakfast and lunch, 16u before dinner. With needles, syringes and alcohol pads, 1mos supply and no refills. NPH insulin 36u at bedtime, daily, with needles, syringes and alcohol pads, 1mos supply no refills. Prior auth. sent in October and in December for both. Asked to have pharmacist call to confirm above.

## 2023-01-05 NOTE — TELEPHONE ENCOUNTER
Phone call to pt to give instructions on insulin dose adjustments made per Dr. Lubna Marrufo. Pt states did not start 36u of NPH insulin at hs d/t not being able to get filled in pharmacy on Dec. 14, 2023. Writer made several attempts to talk to pharmacist and put over to voice mail each time. Pt instructed writer called into [facility] pharmacy and left voice mail on Novolg insulin 14u before breakfast and lunch, 16u before dinner and NPH insulin 36u at bedtime. Pt states not starting 42u as ordered by doctor.  On 1/4/2023 d/t not starting 36u at hs on 12/13/2022

## 2023-01-11 NOTE — PROGRESS NOTES
Attending Physician Statement  I have discussed the care of Darling Martins, including pertinent history and exam findings,  with the resident. I have reviewed the key elements of all parts of the encounter with the resident. I agree with the assessment, plan and orders as documented by the resident.   (GE Modifier)    Nitza Dyer, DO

## 2023-01-12 ENCOUNTER — ROUTINE PRENATAL (OUTPATIENT)
Dept: PERINATAL CARE | Age: 38
End: 2023-01-12
Payer: COMMERCIAL

## 2023-01-12 VITALS
WEIGHT: 234 LBS | SYSTOLIC BLOOD PRESSURE: 130 MMHG | HEIGHT: 65 IN | HEART RATE: 88 BPM | RESPIRATION RATE: 16 BRPM | BODY MASS INDEX: 38.99 KG/M2 | DIASTOLIC BLOOD PRESSURE: 76 MMHG | TEMPERATURE: 97.8 F

## 2023-01-12 DIAGNOSIS — Z3A.29 29 WEEKS GESTATION OF PREGNANCY: ICD-10-CM

## 2023-01-12 DIAGNOSIS — O99.283 HYPOTHYROIDISM AFFECTING PREGNANCY IN THIRD TRIMESTER: ICD-10-CM

## 2023-01-12 DIAGNOSIS — O99.213 OBESITY AFFECTING PREGNANCY IN THIRD TRIMESTER: ICD-10-CM

## 2023-01-12 DIAGNOSIS — Z13.89 ENCOUNTER FOR ROUTINE SCREENING FOR MALFORMATION USING ULTRASONICS: ICD-10-CM

## 2023-01-12 DIAGNOSIS — Z36.4 ULTRASOUND FOR ANTENATAL SCREENING FOR FETAL GROWTH RESTRICTION: ICD-10-CM

## 2023-01-12 DIAGNOSIS — E03.9 HYPOTHYROIDISM AFFECTING PREGNANCY IN THIRD TRIMESTER: ICD-10-CM

## 2023-01-12 DIAGNOSIS — O24.119 PRE-EXISTING TYPE 2 DIABETES MELLITUS DURING PREGNANCY, ANTEPARTUM: ICD-10-CM

## 2023-01-12 DIAGNOSIS — O09.523 MULTIGRAVIDA OF ADVANCED MATERNAL AGE IN THIRD TRIMESTER: ICD-10-CM

## 2023-01-12 DIAGNOSIS — O16.3 HYPERTENSION AFFECTING PREGNANCY IN THIRD TRIMESTER: Primary | ICD-10-CM

## 2023-01-12 LAB
ABDOMINAL CIRCUMFERENCE: NORMAL
BIPARIETAL DIAMETER: NORMAL
ESTIMATED FETAL WEIGHT: NORMAL
FEMORAL DIAMETER: NORMAL
HC/AC: NORMAL
HEAD CIRCUMFERENCE: NORMAL

## 2023-01-12 PROCEDURE — 76819 FETAL BIOPHYS PROFIL W/O NST: CPT | Performed by: OBSTETRICS & GYNECOLOGY

## 2023-01-12 PROCEDURE — 76820 UMBILICAL ARTERY ECHO: CPT | Performed by: OBSTETRICS & GYNECOLOGY

## 2023-01-12 PROCEDURE — 76805 OB US >/= 14 WKS SNGL FETUS: CPT | Performed by: OBSTETRICS & GYNECOLOGY

## 2023-01-12 RX ORDER — INSULIN HUMAN 100 [IU]/ML
INJECTION, SUSPENSION SUBCUTANEOUS
COMMUNITY
Start: 2023-01-05

## 2023-01-12 NOTE — PROGRESS NOTES
Blood sugars reviewed (insulin regimen adjusted, as below). Insulin regimen increased to: NPH Insulin subcutaneously 40 units before bedtime  (consistently elevated fasting blood sugars above 90's). Insulin regimen adjusted to subcutaneous (SQ) Novolog 16 units before breakfast, 14 units before lunch and 20 units before dinner. Please continue to send blood glucose monitoring information to [facility] office so that insulin and/or dietary changes can be made if necessary weekly. Diabetic education/nutrition counseling completed. Continue recommended ADA diet therapy for diabetes. Compliance with regular prenatal care and blood sugar monitoring strongly encouraged.       Strongly advised patient to be compliant with blood sugar monitoring as previously advised to minimize the potential of adverse  outcomes (e.g. fetal demise/stillbirth, congenital birth/heart defects, polyhydramnios/oligohydramnios, fetal growth abnormalities, pregnancy loss, hypertensive disorders of pregnancy, indicated  delivery, etc.), potential maternal morbidities, etc.

## 2023-01-12 NOTE — PROGRESS NOTES
Please refer to attached ultrasound report for doctor's evaluation of the clinical information obtained by vital signs, ultrasound, and/or non-stress test along with management recommendation. electronic

## 2023-01-13 ENCOUNTER — ROUTINE PRENATAL (OUTPATIENT)
Dept: OBGYN | Age: 38
End: 2023-01-13
Payer: COMMERCIAL

## 2023-01-13 VITALS
HEART RATE: 97 BPM | WEIGHT: 234.9 LBS | DIASTOLIC BLOOD PRESSURE: 81 MMHG | SYSTOLIC BLOOD PRESSURE: 135 MMHG | BODY MASS INDEX: 39.09 KG/M2

## 2023-01-13 DIAGNOSIS — O09.93 HIGH-RISK PREGNANCY IN THIRD TRIMESTER: Primary | ICD-10-CM

## 2023-01-13 DIAGNOSIS — Z3A.29 29 WEEKS GESTATION OF PREGNANCY: ICD-10-CM

## 2023-01-13 PROCEDURE — G8482 FLU IMMUNIZE ORDER/ADMIN: HCPCS

## 2023-01-13 PROCEDURE — 3074F SYST BP LT 130 MM HG: CPT

## 2023-01-13 PROCEDURE — 3078F DIAST BP <80 MM HG: CPT

## 2023-01-13 PROCEDURE — 1036F TOBACCO NON-USER: CPT

## 2023-01-13 PROCEDURE — 99211 OFF/OP EST MAY X REQ PHY/QHP: CPT

## 2023-01-13 PROCEDURE — 99213 OFFICE O/P EST LOW 20 MIN: CPT

## 2023-01-13 PROCEDURE — G8417 CALC BMI ABV UP PARAM F/U: HCPCS

## 2023-01-13 PROCEDURE — G8427 DOCREV CUR MEDS BY ELIG CLIN: HCPCS

## 2023-01-13 RX ORDER — ASPIRIN 81 MG/1
81 TABLET, CHEWABLE ORAL DAILY
Qty: 30 TABLET | Refills: 3 | Status: SHIPPED | OUTPATIENT
Start: 2023-01-13

## 2023-01-13 ASSESSMENT — PATIENT HEALTH QUESTIONNAIRE - PHQ9
SUM OF ALL RESPONSES TO PHQ QUESTIONS 1-9: 0
SUM OF ALL RESPONSES TO PHQ QUESTIONS 1-9: 0
1. LITTLE INTEREST OR PLEASURE IN DOING THINGS: 0
SUM OF ALL RESPONSES TO PHQ QUESTIONS 1-9: 0
SUM OF ALL RESPONSES TO PHQ9 QUESTIONS 1 & 2: 0
2. FEELING DOWN, DEPRESSED OR HOPELESS: 0
SUM OF ALL RESPONSES TO PHQ QUESTIONS 1-9: 0

## 2023-01-13 NOTE — PROGRESS NOTES
Prenatal Visit    Quyen Estrada is a 30 y.o. female [id number] at 33w8d    The patient was seen and evaluated. She has no complaints today and presents for a routine OB appointment. She reports positive fetal movements. She denies contractions, vaginal bleeding and leakage of fluid. Signs and symptoms of labor and pre-eclampsia were reviewed with the patient in detail. She is to report any of these if they occur. She currently denies any of these. The patient is Rh pos and Rhogam is not indicated in this pregnancy and informed the patient, Rhogam is not indicated in this pregnancy. The patient already received  the T-Dap Vaccine (27-36 weeks) this pregnancy. The patient already received  the influenza vaccine this year. The problem list reflects the active issues addressed during today's visit    Vitals:    BP: 135/81  Weight: 234 lb 14.4 oz (106.5 kg)  Heart Rate: 97  Patient Position: Sitting  Albumin: Negative  Glucose: Negative  Fundal Height (cm): 31 cm  Fetal HR: 155  Movement: Present     28 week labs: .  1hr GTT: Patient is pre-gestational DM   28 week CBC:   Lab Results   Component Value Date    WBC 12.4 (H) 2022    HGB 12.5 2022    HCT 39.7 2022    MCV 87.4 2022     2022     UA w/ Ur C&S: Negative on 23     Assessment & Plan:  Quyen Estrada is a 30 y.o. female  at 33w8d   - 28 week labs completed   - Tdap vaccination: already received     - Influenza vaccination: already received     - Rhogam: is not indicated in this pregnancy    -  testing indication starting 32 weeks GA: Not currently indicated, patient is following with [facility]   - Indications for  section:  Not currently indicated   - ASA 81 QD, Rx refilled   - Patient being treated for pre-gestation DM, on Novolog/NPH.  Dose increased yesterday by MFM   - Patient compliant with checking and logging glucose level   - Warning signs reviewed and recommendations when to call or present to the hospital if she experiences signs or symptoms of  labor and pre-eclampsia were reviewed. - The patient was instructed on fetal kick counts. She was instructed that the baby should move at a minimum of ten times within one hour after a meal. The patient was instructed to lay down on her left side twenty minutes after eating and count movements for up to one hour with a target value of ten movements. She was instructed to notify the office if she did not make that target after two attempts or if after any attempt there was less than four movements. Patient Active Problem List    Diagnosis Date Noted    AMA 2022     Priority: Medium    Hx Chlamydia 2022     Priority: Medium     Negative 22      Pregestational DM 2022     Diagnosed in G5 pregnancy, early 1 hr and 3 hr GTT    Novolog 12, NPH 36      cHTN (no meds) 2022: Diagnosed by Dr. Eyad Valero per review of flowsheets. No meds at this time. ASA Rx sent to patient's pharmacy  Baseline PreE labs (CMP, protein-creatinine ratio ordered_      Depression 2022: PHQ=1, loss of interest      Hx hypothyroidism 2022: Patient was prescribed Levothyroxine in  but stopped due to too much weight loss.   TSH wnl 10/10/22      Hx C/S x 1 2022: Patient would like to discuss RCS versus TOLAC      High-risk pregnancy 2022     Risk factors: New partner; chronic hypertension (no meds); advanced maternal age; H/O hypothyroidism; irritable bowel syndrome; obesity; H/O  x1; H/O vertigo; COVID unvaccinated    Fetal testing indicated: Yes  Fetal testing indication: Advanced maternal age  Fetal testing initiation: Individualized  Fetal testing frequency: Individualized      Former smoker 2022: Patient reports quitting 2022      Laparoscopic converted to open wedge resection of right cornual ectopic, right salpingectomy and evacuation of hemoperitoneum 6/22/19 06/22/2019     Patient counseled extensively on the need to not get pregnant for at least 18 months. IBS 05/07/2015 8/30/22: Patient denies meds at this time       Return in about 2 weeks (around 1/27/2023) for Parva Yungus 9038. Librado Rushing MD  Ob/Gyn Resident  [facility] [address]  1/13/2023, 10:20 AM        Attending Physician Statement  I have discussed the care of Wesly Stephens, including pertinent history and exam findings,  with the resident. I have reviewed the key elements of all parts of the encounter with the resident. I agree with the assessment, plan and orders as documented by the resident.   (GE Modifier)    Yehuda Stover, DO

## 2023-01-17 ENCOUNTER — HOSPITAL ENCOUNTER (OUTPATIENT)
Age: 38
Discharge: HOME | End: 2023-01-17
Attending: OBSTETRICS & GYNECOLOGY | Admitting: OBSTETRICS & GYNECOLOGY
Payer: COMMERCIAL

## 2023-01-17 ENCOUNTER — TELEPHONE (OUTPATIENT)
Dept: OBGYN | Age: 38
End: 2023-01-17

## 2023-01-17 VITALS — DIASTOLIC BLOOD PRESSURE: 71 MMHG | HEART RATE: 92 BPM | RESPIRATION RATE: 16 BRPM | SYSTOLIC BLOOD PRESSURE: 132 MMHG

## 2023-01-17 PROBLEM — Z3A.30 30 WEEKS GESTATION OF PREGNANCY: Status: ACTIVE | Noted: 2023-01-17

## 2023-01-17 PROCEDURE — 99213 OFFICE O/P EST LOW 20 MIN: CPT

## 2023-01-17 NOTE — H&P
OBSTETRICAL HISTORY [facility]    Date: 2023       Time: 3:28 PM   Patient Name: Deloris Peguero     Patient : 1985  Room/Bed: [place] [address]    Admission Date/Time: 2023  3:08 PM      CC: Fall      HPI: Deloris Peguero is a 30 y.o.  at 30w3d who presents for fall from standing height. The patient reports that yesterday at 2023 at 6:00 PM she was walking [place] the mall and tripped and fell to her hands and knees. The patient denies any direct abdominal trauma. Patient denies LOC at the time of the fall. Patient reports she has been having mild and intermittent lower abdominal pain over the last 3 weeks. She reports that the lower abdominal discomfort she has at this time is consistent with the way she has felt for the last 2 weeks. . The patient reports fetal movement is present, denies contractions, denies loss of fluid, denies vaginal bleeding. Patient denies headache, vision changes, nausea, vomiting, fever, chills, shortness of breath, chest pain, RUQ pain, abdominal pain, diarrhea, change in color/amount/odor of vaginal discharge, dysuria or, hematuria. DATING:  LMP: Patient's last menstrual period was 06/10/2022.   Estimated Date of Delivery: 3/25/23   Based on: early ultrasound, at 6 6/7 weeks GA    PREGNANCY RISK FACTORS:  Patient Active Problem List   Diagnosis    IBS    Laparoscopic converted to open wedge resection of right cornual ectopic, right salpingectomy and evacuation of hemoperitoneum 19    cHTN (no meds)    Depression    AMA    Hx Chlamydia    Hx hypothyroidism    Hx C/S x 1    High-risk pregnancy    Former smoker    Pregestational DM    Rh+/RI/GBSunk        Steroids Given In This Pregnancy:  no     REVIEW OF SYSTEMS:   Constitutional: negative fever, negative chills, negative weight changes   HEENT: negative visual disturbances, negative headaches, negative dizziness, negative hearing loss  Breast: Negative breast abnormalities, negative breast lumps, negative nipple discharge  Respiratory: negative dyspnea, negative cough, negative SOB  Cardiovascular: negative chest pain,  negative palpitations, negative arrhythmia, negative syncope   Gastrointestinal: negative abdominal pain, negative RUQ pain, negative N/V, negative diarrhea, negative constipation, negative bowel changes, negative heartburn   Genitourinary: negative dysuria, negative hematuria, negative urinary incontinence, negative vaginal discharge, negative vaginal bleeding or spotting  Dermatological: negative rash, negative pruritis, negative mole or other skin changes  Hematologic: negative bruising  Immunologic/Lymphatic: negative recent illness, negative recent sick contact  Musculoskeletal: negative back pain, negative myalgias, negative arthralgias  Neurological:  negative dizziness, negative migraines, negative seizures, negative weakness  Behavior/Psych: negative depression, negative anxiety, negative SI, negative HI    OBSTETRIC HISTORY:   OB History    Para Term  AB Living   5 1 1 0 3 1   SAB IAB Ectopic Molar Multiple Live Births   1 1 1 0 0 1      # Outcome Date GA Lbr Gael/2nd Weight Sex Delivery Anes PTL Lv   5 Current            4 SAB 2020           3 Ectopic 19              Birth Comments: Right cornual ectopic,wedge resection with R salpingectomy, evac hemoperitoneum   2 IAB               Birth Comments: Patient unsure of year but knows many years after G1   1 Term 09 40w0d  9 lb 10 oz (4.366 kg) M CS-Unspec Spinal  FLORENCIO      Obstetric Comments   G1: FOB #1   G2: FOB #2   G3-G5: FOB #3       PAST MEDICAL HISTORY:   has a past medical history of Chlamydia, Ectopic pregnancy, History of blood transfusion, Hypertension, Hypothyroidism, IBS (irritable bowel syndrome), Infertility, female, secondary, Obesity (BMI 30-39.9), STD (sexually transmitted disease), Trauma, and Trichomonas vaginalis infection. PAST SURGICAL HISTORY:   has a past surgical history that includes pelvic laparoscopy (2019); salpingectomy (Right, 2019); Cholecystectomy; laparoscopy (N/A, 2019);  section; and Liposuction w/ fat injection (2020). ALLERGIES:  has No Known Allergies. MEDICATIONS:  Prior to Admission medications    Medication Sig Start Date End Date Taking? Authorizing Provider   aspirin (ASPIRIN CHILDRENS) 81 MG chewable tablet Take 1 tablet by mouth daily 23   Felton Pham MD   HUMULIN N 100 UNIT/ML injection vial inject 36 units subcutaneously once daily at bedtime 23   Historical Provider, MD   NOVOLOG FLEXPEN 100 UNIT/ML injection pen inject 14 units subcutaneously before breakfast and 10 units befo. ..  (REFER TO PRESCRIPTION NOTES). Patient not taking: No sig reported 22   Historical Provider, MD   labetalol (NORMODYNE) 100 MG tablet Take 1 tablet by mouth 2 times daily  Patient not taking: No sig reported 22   Cassandra Shaffer MD   [facility] SLEEP AID 25 MG tablet take 1 tablet by mouth every evening  Patient not taking: No sig reported 22   Cassandra Shaffer MD   HUMALOG KWIKPEN 100 UNIT/ML SOPN Inject 14 Units into the skin 3 times daily (before meals) 14u before breakfast and dinner and 16u before dinner 10/5/22   Historical Provider, MD   DROPLET PEN NEEDLES 31G X 5 MM MISC use four times a day 10/11/22   Historical Provider, MD   aspirin EC 81 MG EC tablet Take 1 tablet by mouth daily 10/7/22   Michelle Isaac MD   blood glucose monitor strips Test 4 times a day & as needed for symptoms of irregular blood glucose. Dispense sufficient amount for indicated testing frequency plus additional to accommodate PRN testing needs.  10/7/22   Michlele Isaac MD   Lancets ([address]) MISC use 1 LANCET to TEST BLOOD SUGAR four times a day 22   Historical Provider, MD   ONETOUCH ULTRA strip use 1 TEST STRIP to TEST BLOOD SUGAR four times a day 9/13/22   Historical Provider, MD   Blood Glucose Monitoring Suppl (ONE TOUCH ULTRA 2) w/Device KIT USE AS DIRECTED to CHECK BLOOD SUGAR four times a day 9/13/22   Historical Provider, MD SWANN Alcohol Swabs 70 % PADS USE TO CLEANSE AREA BEFORE INJECTING INSULIN AND BEFORE TESTING BLOOD SUGAR four times a day 9/13/22   Historical Provider, MD   ferrous sulfate (FE TABS) 325 (65 Fe) MG EC tablet 1 tablet by mouth in AM and PM every Monday, Wednesday and Friday. Patient not taking: No sig reported 9/12/22   Saadia Mcdermott MD   Prenatal Vit-Fe Fumarate-FA (PRENATAL VITAMIN) 27-0.8 MG TABS Take 1 tablet by mouth daily May substitute with any prenatal vitamin covered by the patient's insurance. 8/30/22   Saadia Mcdermott MD   pyridoxine ( VITAMIN B-6) 50 MG tablet Take 0.5 tablets by mouth in the morning and 0.5 tablets at noon and 0.5 tablets in the evening. Patient not taking: No sig reported 8/8/22 8/8/23  Saadia Mcdermott MD       FAMILY HISTORY:  family history includes Diabetes in her maternal grandmother; Hypertension in her maternal grandmother; No Known Problems in her brother, father, maternal grandfather, mother, paternal grandfather, paternal grandmother, and sister. SOCIAL HISTORY:   reports that she quit smoking about 5 months ago. Her smoking use included cigars. She started smoking about 5 years ago. She has never used smokeless tobacco. She reports that she does not currently use alcohol. She reports that she does not use drugs.     VITALS:  Vitals:    01/17/23 1523   BP: 132/71   Pulse: 92   Resp: 16       PHYSICAL EXAM:  Fetal Heart Monitor:  Baseline Heart Rate 150, moderate variability, present accelerations, absent decelerations  [place]: contractions, none    General appearance:  no apparent distress, alert and cooperative  HEENT: head atraumatic, normocephalic, moist mucous membranes, trachea midline  Neurologic:  alert, oriented, normal speech, no focal findings or movement disorder noted  Lungs:  No increased work of breathing, good air exchange, clear to auscultation bilaterally, no crackles or wheezing  Heart:  regular rate and rhythm and no murmur, rubs, gallops  Abdomen:  soft, gravid, non-tender, no rebound, guarding, or rigidity, no RUQ or epigastric tenderness, no signs or symptoms of abruption, no signs or symptoms of chorioamnionitis  Extremities:  no calf tenderness, non edematous, no varicosities, full range of motion in all four extremities  Musculoskeletal: Gross strength equal and intact throughout, no gross abnormalities  Psychiatric: Mood appropriate, normal affect     PRENATAL LAB RESULTS:   Blood Type/Rh: O pos  Antibody Screen: negative  Hemoglobin, Hematocrit, Platelets: 00.1/51.5/863  Rubella: immune  T. Pallidum, IgG: non-reactive  Hepatitis B Surface Antigen: non-reactive   Hepatitis C Antibody: non-reactive   HIV: NEGATIVE   Gonorrhea: negative  Chlamydia: negative  Urine culture: negative, date: 8/30/22 and 9/13/23    Early 1 hour Glucose Tolerance Test:  158  Early 3 hour Glucose Tolerance Test: Fasting 91/ 1hr 191/ 2 hr 164/ 3 hr 199    Group B Strep: unknown  Cystic Fibrosis Screen: negative  Sickle Cell Screen: not done  First Trimester Screen: not done  QUAD: not done  MSAFP: negative  Non-Invasive Prenatal Testing: low risk for aneuploidy  Anatomy US: anterior placenta, 3VC, female gender, normal anatomy    ASSESSMENT & PLAN:  Erik Valera is a 30 y.o. female [id number] at 32w2d    - GBS unknown / Rh positive / R immune   - Pen G for GBS prophylaxis if delivery     Fall from Standing Height    - Patient reports fall from standing height yesterday (1/16/23) at 6:00 PM patient denies LOC, direct abdominal trauma. Patient reports she has been having intermittent and nonsevere lower abdominal pain over the last 3 weeks. She reports that the pain she is experiencing now is unchanged from prior.   -Patient denies sharp/stabbing abdominal pain at this time.   Denies decreased fetal movement, contractions, vaginal discharge, vaginal bleeding.   - NST reactive    - cEFM/TOCO: category 1 FHT, no contractions    - No evidence of uterine rupture or placental abruption. History and physical exam support musculoskeletal etiology. Discussed supportive management including rest, heat pack, ice, Tylenol. Patient denies any headache, visual changes, difficulty breathing, RUQ pain, N/V, F/C, and pain/swelling in lower extremities. Denies any dysuria or vaginal discharge. Patient may be discharged to home. Patient counseled on  labor precautions, preE precautions, adequate PO hydration, and fetal kick counts. All questions answered and concerns addressed. Patient vocalized understanding.          cHTN (no meds)   - Normotensive on arrival     - Denies s/s of PreE    - Currently stable on no medications    - Baseline preE labs wnl and P/C 0.08 ON 22   - Compliant with ASA   - Continue to monitor closely       Pregestational DM   - Currently on Humalog and  and 40u NPH qHS    - Following with MFM       Hx C/S x 1   - G1    - 2/2 failed descent      Hx Cornual Ectopic Pregnancy   - Laparoscopic converted to open evacuation and R salpingectomy and evacuation of hemoperitoneum 19      AMA      Hx Chlamydia   - On chart review, negative GC results on 21, 22, 22      IBS   - Clinically asymptomatic       Depression  - Stable on no medications   - Denies s/s       Hx hypothyroidism   - Clinically asymptomatic    - TSH 1.76 on 12/15/22   - TSH 2.48 on 10/10/22   - Free T4 on 0.87 on 10/10/22   - Free T4 0.97 on 22    BMI 39     Patient Active Problem List    Diagnosis Date Noted    Rh+/RI/GBSunk 2023     Priority: Medium    AMA 2022     Priority: Medium    Hx Chlamydia 2022     Priority: Medium     Negative 22      Pregestational DM 2022     Diagnosed in G5 pregnancy, early 1 hr and 3 hr GTT    Novolog 12, NPH 36      cHTN (no meds) 2022: Diagnosed by Dr. Jake Francis per review of flowsheets. No meds at this time. ASA Rx sent to patient's pharmacy  Baseline PreE labs (CMP, protein-creatinine ratio ordered_      Depression 2022: PHQ=1, loss of interest      Hx hypothyroidism 2022: Patient was prescribed Levothyroxine in  but stopped due to too much weight loss. TSH wnl 10/10/22      Hx C/S x 1 2022: Patient would like to discuss RCS versus TOLAC      High-risk pregnancy 2022     Risk factors: New partner; chronic hypertension (no meds); advanced maternal age; H/O hypothyroidism; irritable bowel syndrome; obesity; H/O  x1; H/O vertigo; COVID unvaccinated    Fetal testing indicated: Yes  Fetal testing indication: Advanced maternal age  Fetal testing initiation: Individualized  Fetal testing frequency: Individualized      Former smoker 2022: Patient reports quitting 2022      Laparoscopic converted to open wedge resection of right cornual ectopic, right salpingectomy and evacuation of hemoperitoneum 2019     Patient counseled extensively on the need to not get pregnant for at least 18 months. IBS 2015: Patient denies meds at this time         Plan discussed with Dr. Sadie Montoya, who is agreeable. Steroids given this admission: No    Risks, benefits, alternatives and possible complications have been discussed in detail with the patient. Admission, and post admission procedures and expectations were discussed in detail. All questions were answered.     Attending's Name: Dr. Timbo Cowart MD  Ob/Gyn Resident  2023, 3:28 PM

## 2023-01-17 NOTE — TELEPHONE ENCOUNTER
Patient is pregnant Daphne Aponte and tripped and fell yesterday. She said she fell onto her knees and wrist.  She denies hitting her abdomen or head. She is complaining of a slight headache and back and lower abdominal pain. She feels fetal movement.   Please advise

## 2023-01-17 NOTE — FLOWSHEET NOTE
Patient to triage room 1, patient states that she fell on her hands and knees yesterday around 1800. No bleeding, leaking or ctx per patient, +fm. EFM applied with FHTs of 164.  Will notify residents of patients arrival.

## 2023-01-17 NOTE — FLOWSHEET NOTE
Patient discharged ambulatory to private vehicle. Discharge instructions given and discussed, no questions per patient.

## 2023-01-17 NOTE — TELEPHONE ENCOUNTER
Patient notified that she should go to [facility] for evaluation. Charge nurse in [facility] was called with report.

## 2023-01-24 ENCOUNTER — TELEPHONE (OUTPATIENT)
Dept: PERINATAL CARE | Age: 38
End: 2023-01-24

## 2023-01-24 NOTE — TELEPHONE ENCOUNTER
Dr. Bucky Rodrigues reviewed blood sugar log and ordered pt to increase insulin before each meal and at bedtime. Pt notiifed and verb. Understanding and asked to have called into [facility] on [address]. Pt called into above pharmacy, [phone], after 3 attempts and not able to talk to pharmacist message left on voice mail. NPH insulin, 45u at bedtime, with needles, syringes and alcohol pads, 1mos supply and 1 refill. Novolog insulin, 20u before breakfast, 18u before lunch, 26u before dinner, with needles, 1mos supply and 1 refill.

## 2023-01-27 ENCOUNTER — ROUTINE PRENATAL (OUTPATIENT)
Dept: OBGYN | Age: 38
End: 2023-01-27
Payer: COMMERCIAL

## 2023-01-27 VITALS
BODY MASS INDEX: 39.21 KG/M2 | DIASTOLIC BLOOD PRESSURE: 75 MMHG | SYSTOLIC BLOOD PRESSURE: 123 MMHG | WEIGHT: 235.6 LBS | HEART RATE: 95 BPM

## 2023-01-27 DIAGNOSIS — O09.93 HRP (HIGH RISK PREGNANCY), THIRD TRIMESTER: Primary | ICD-10-CM

## 2023-01-27 DIAGNOSIS — Z3A.31 31 WEEKS GESTATION OF PREGNANCY: ICD-10-CM

## 2023-01-27 PROCEDURE — G8417 CALC BMI ABV UP PARAM F/U: HCPCS | Performed by: OBSTETRICS & GYNECOLOGY

## 2023-01-27 PROCEDURE — 1036F TOBACCO NON-USER: CPT | Performed by: OBSTETRICS & GYNECOLOGY

## 2023-01-27 PROCEDURE — 3074F SYST BP LT 130 MM HG: CPT | Performed by: OBSTETRICS & GYNECOLOGY

## 2023-01-27 PROCEDURE — 3078F DIAST BP <80 MM HG: CPT | Performed by: OBSTETRICS & GYNECOLOGY

## 2023-01-27 PROCEDURE — G8427 DOCREV CUR MEDS BY ELIG CLIN: HCPCS | Performed by: OBSTETRICS & GYNECOLOGY

## 2023-01-27 PROCEDURE — G8482 FLU IMMUNIZE ORDER/ADMIN: HCPCS | Performed by: OBSTETRICS & GYNECOLOGY

## 2023-01-27 PROCEDURE — 99213 OFFICE O/P EST LOW 20 MIN: CPT | Performed by: OBSTETRICS & GYNECOLOGY

## 2023-01-27 NOTE — PROGRESS NOTES
Fiona Rabago is a 30 y.o. female 31w6d        OB History    Para Term  AB Living   5 1 1 0 3 1   SAB IAB Ectopic Molar Multiple Live Births   1 1 1 0 0 1      # Outcome Date GA Lbr Gael/2nd Weight Sex Delivery Anes PTL Lv   5 Current            4 SAB 2020           3 Ectopic 19              Birth Comments: Right cornual ectopic,wedge resection with R salpingectomy, evac hemoperitoneum   2 IAB               Birth Comments: Patient unsure of year but knows many years after G1   1 Term 09 40w0d  9 lb 10 oz (4.366 kg) M CS-Unspec Spinal  FLORENCIO      Obstetric Comments   G1: FOB #1   G2: FOB #2   G3-G5: FOB #3       Vitals  BP: 123/75  Weight: 235 lb 9.6 oz (106.9 kg)  Heart Rate: 95  Patient Position: Sitting  Albumin: Negative  Glucose: Negative  Fundal Height (cm): 32 cm  Fetal HR: 155  Movement: Present      The patient was seen and evaluated. There was positive fetal movements. No contractions or leakage of fluid. Signs and symptoms of  labor as well as labor were reviewed. The S/S of Pre-Eclampsia were reviewed with the patient in detail. She is to report any of these if they occur. She currently denies any of these. The patient had her 28 week labs completed. Hospital Outpatient Visit on 2022   Component Date Value Ref Range Status    Specimen Description 2022 . CLEAN CATCH URINE   Final    Culture 2022 NO SIGNIFICANT GROWTH   Final    WBC 2022 12.4 (A)  3.5 - 11.3 k/uL Final    RBC 2022 4.54  3.95 - 5.11 m/uL Final    Hemoglobin 2022 12.5  11.9 - 15.1 g/dL Final    Hematocrit 2022 39.7  36.3 - 47.1 % Final    MCV 2022 87.4  82.6 - 102.9 fL Final    MCH 2022 27.5  25.2 - 33.5 pg Final    MCHC 2022 31.5  28.4 - 34.8 g/dL Final    RDW 2022 15.2 (A)  11.8 - 14.4 % Final    Platelets  289  138 - 453 k/uL Final    MPV 2022 10.8  8.1 - 13.5 fL Final    NRBC Automated 2022 0.0  0.0 per 100 WBC Final    Seg Neutrophils 2022 72 (A)  36 - 65 % Final    Lymphocytes 2022 16 (A)  24 - 43 % Final    Monocytes 2022 8  3 - 12 % Final    Eosinophils % 2022 1  1 - 4 % Final    Basophils 2022 1  0 - 2 % Final    Immature Granulocytes 2022 2 (A)  0 % Final    Segs Absolute 2022 9.13 (A)  1.50 - 8.10 k/uL Final    Absolute Lymph # 2022 1.94  1.10 - 3.70 k/uL Final    Absolute Mono # 2022 0.95  0.10 - 1.20 k/uL Final    Absolute Eos # 2022 0.06  0.00 - 0.44 k/uL Final    Basophils Absolute 2022 0.06  0.00 - 0.20 k/uL Final    Absolute Immature Granulocyte 2022 0.24  0.00 - 0.30 k/uL Final    RBC Morphology 2022 ANISOCYTOSIS PRESENT   Final   ]    22: OB intake completed today --[facility]    Risk factors: New partner; chronic hypertension (no meds); advanced maternal age; former smoker (22); H/O hypothyroidism; irritable bowel syndrome; obesity (pregravid BMI 31.62); H/O  x1; H/O vertigo; COVID unvaccinated    Last pap 10/2020, WNL; cultures only at first visit    ASA Rx sent to patients pharmacy per protocol  PNV Rx sent to patients pharmacy per protoco.  Baseline PreE labs (protein-creatinine ratio, CMP) ordered per protocol  Early one-hour glucose tolerance test ordered per protocol  Referral to Maternal Fetal Medicine for first trimester screen, anatomy scan, consult if indicated placed 22  Order MSAFP 15w0d to 23w6d  Patient declines COVID vaccine in pregnancy; amenable to flu and TDAP vaccines                T-Dap Vaccine (27-36 weeks): completed tdap and flu vaccine    The patient was instructed on fetal kick counts and was given a kick sheet to complete every 8 hours.  She was instructed that the baby should move at a minimum of ten times within one hour after a meal. The patient was instructed to lay down on her left side twenty minutes after eating and count movements for up to one hour with a target value of ten movements. She was instructed to notify the office if she did not make that target after two attempts or if after any attempt there was less than four movements. The patient reports that the targets have been made Yes.  Testing:  Indicated - pt scheduled at MFM on  - will schedule pt for NSTs on     Assessment:  Clement Zamora is a 30 y.o. female  2. [id number]  3. 31w6d    Patient Active Problem List    Diagnosis Date Noted    Rh+/RI/GBSunk 2023     Priority: Medium    AMA 2022     Priority: Medium    Hx Chlamydia 2022     Priority: Medium     Negative 22      Pregestational DM 2022     Diagnosed in G5 pregnancy, early 1 hr and 3 hr GTT    Novolog 12, NPH 36      cHTN (no meds) 2022: Diagnosed by Dr. Costa Mejia per review of flowsheets. No meds at this time. ASA Rx sent to patient's pharmacy  Baseline PreE labs (CMP, protein-creatinine ratio ordered_      Depression 2022: PHQ=1, loss of interest      Hx hypothyroidism 2022: Patient was prescribed Levothyroxine in  but stopped due to too much weight loss.   TSH wnl 10/10/22      Hx C/S x 1 2022: Patient would like to discuss RCS versus TOLAC      High-risk pregnancy 2022     Risk factors: New partner; chronic hypertension (no meds); advanced maternal age; H/O hypothyroidism; irritable bowel syndrome; obesity; H/O  x1; H/O vertigo; COVID unvaccinated    Fetal testing indicated: Yes  Fetal testing indication: Advanced maternal age  Fetal testing initiation: Individualized  Fetal testing frequency: Individualized      Former smoker 2022: Patient reports quitting 2022      Laparoscopic converted to open wedge resection of right cornual ectopic, right salpingectomy and evacuation of hemoperitoneum 2019     Patient counseled extensively on the need to not get pregnant for at least 18 months. IBS 2015: Patient denies meds at this time          Diagnosis Orders   1. HRP (high risk pregnancy), third trimester        2. 31 weeks gestation of pregnancy                  Plan:  The patient will return to the office for her next visit in 2 weeks. See antepartum flow sheet. Reviewed s/s of PTL, preeclampsia and decreased FM - encouraged pt to go to [facility] with any concerns. Pt current insulin regimen  Novolog with meals and 45 NPH at night - pt is keeping a log of her sugars and reporting them to Dr. Debbie Guevara    Pt needs  testing - will schedule pt for VIET and NSTs on  here at our office, and she is scheduled [facility] ultrasounds at [facility] on     Patient was seen with total face to face time of 20 minutes. More than 50% of this visit was on counseling and education regarding her    Diagnosis Orders   1. HRP (high risk pregnancy), third trimester        2. 31 weeks gestation of pregnancy         and her options. She was also counseled on her preventative health maintenance recommendations and follow-up.     Ilana Samuel DO

## 2023-01-30 ENCOUNTER — HOSPITAL ENCOUNTER (OUTPATIENT)
Age: 38
Discharge: HOME | End: 2023-01-30
Attending: STUDENT IN AN ORGANIZED HEALTH CARE EDUCATION/TRAINING PROGRAM | Admitting: STUDENT IN AN ORGANIZED HEALTH CARE EDUCATION/TRAINING PROGRAM
Payer: COMMERCIAL

## 2023-01-30 ENCOUNTER — ROUTINE PRENATAL (OUTPATIENT)
Dept: OBGYN | Age: 38
End: 2023-01-30
Payer: COMMERCIAL

## 2023-01-30 VITALS
DIASTOLIC BLOOD PRESSURE: 74 MMHG | BODY MASS INDEX: 39.44 KG/M2 | WEIGHT: 237 LBS | SYSTOLIC BLOOD PRESSURE: 128 MMHG | HEART RATE: 95 BPM

## 2023-01-30 VITALS
RESPIRATION RATE: 18 BRPM | TEMPERATURE: 99 F | HEART RATE: 93 BPM | DIASTOLIC BLOOD PRESSURE: 73 MMHG | SYSTOLIC BLOOD PRESSURE: 141 MMHG

## 2023-01-30 DIAGNOSIS — O24.319 PREGESTATIONAL DIABETES MELLITUS, MODIFIED WHITE CLASS B: Primary | ICD-10-CM

## 2023-01-30 PROBLEM — Z3A.32 32 WEEKS GESTATION OF PREGNANCY: Status: ACTIVE | Noted: 2023-01-30

## 2023-01-30 LAB
ABSOLUTE EOS #: 0.05 K/UL (ref 0–0.44)
ABSOLUTE IMMATURE GRANULOCYTE: 0.36 K/UL (ref 0–0.3)
ABSOLUTE LYMPH #: 1.92 K/UL (ref 1.1–3.7)
ABSOLUTE MONO #: 1.06 K/UL (ref 0.1–1.2)
ALBUMIN SERPL-MCNC: 3.4 G/DL (ref 3.5–5.2)
ALBUMIN/GLOBULIN RATIO: 1.1 (ref 1–2.5)
ALP BLD-CCNC: 86 U/L (ref 35–104)
ALT SERPL-CCNC: 11 U/L (ref 5–33)
ANION GAP SERPL CALCULATED.3IONS-SCNC: 11 MMOL/L (ref 9–17)
AST SERPL-CCNC: 17 U/L
BASOPHILS # BLD: 1 % (ref 0–2)
BASOPHILS ABSOLUTE: 0.07 K/UL (ref 0–0.2)
BILIRUB SERPL-MCNC: 0.2 MG/DL (ref 0.3–1.2)
BUN BLDV-MCNC: 6 MG/DL (ref 6–20)
CALCIUM SERPL-MCNC: 9.1 MG/DL (ref 8.6–10.4)
CHLORIDE BLD-SCNC: 104 MMOL/L (ref 98–107)
CO2: 18 MMOL/L (ref 20–31)
CREAT SERPL-MCNC: 0.48 MG/DL (ref 0.5–0.9)
CREATININE URINE: 135.6 MG/DL (ref 28–217)
EOSINOPHILS RELATIVE PERCENT: 0 % (ref 1–4)
GFR SERPL CREATININE-BSD FRML MDRD: >60 ML/MIN/1.73M2
GLUCOSE BLD-MCNC: 88 MG/DL (ref 70–99)
HCT VFR BLD CALC: 41.7 % (ref 36.3–47.1)
HEMOGLOBIN: 12.9 G/DL (ref 11.9–15.1)
IMMATURE GRANULOCYTES: 3 %
LYMPHOCYTES # BLD: 15 % (ref 24–43)
MCH RBC QN AUTO: 27.7 PG (ref 25.2–33.5)
MCHC RBC AUTO-ENTMCNC: 30.9 G/DL (ref 28.4–34.8)
MCV RBC AUTO: 89.7 FL (ref 82.6–102.9)
MONOCYTES # BLD: 8 % (ref 3–12)
NRBC AUTOMATED: 0 PER 100 WBC
PDW BLD-RTO: 15 % (ref 11.8–14.4)
PLATELET # BLD: 251 K/UL (ref 138–453)
PMV BLD AUTO: 10.1 FL (ref 8.1–13.5)
POTASSIUM SERPL-SCNC: 4.1 MMOL/L (ref 3.7–5.3)
RBC # BLD: 4.65 M/UL (ref 3.95–5.11)
RBC # BLD: ABNORMAL 10*6/UL
SEG NEUTROPHILS: 73 % (ref 36–65)
SEGMENTED NEUTROPHILS ABSOLUTE COUNT: 9.33 K/UL (ref 1.5–8.1)
SODIUM BLD-SCNC: 133 MMOL/L (ref 135–144)
THYROXINE, FREE: 0.75 NG/DL (ref 0.93–1.7)
TOTAL PROTEIN, URINE: 12 MG/DL
TOTAL PROTEIN: 6.5 G/DL (ref 6.4–8.3)
TSH SERPL DL<=0.05 MIU/L-ACNC: 1.5 UIU/ML (ref 0.3–5)
URINE TOTAL PROTEIN CREATININE RATIO: 0.09 (ref 0–0.2)
WBC # BLD: 12.8 K/UL (ref 3.5–11.3)

## 2023-01-30 PROCEDURE — 59025 FETAL NON-STRESS TEST: CPT | Performed by: STUDENT IN AN ORGANIZED HEALTH CARE EDUCATION/TRAINING PROGRAM

## 2023-01-30 PROCEDURE — 84443 ASSAY THYROID STIM HORMONE: CPT

## 2023-01-30 PROCEDURE — 85025 COMPLETE CBC W/AUTO DIFF WBC: CPT

## 2023-01-30 PROCEDURE — 84156 ASSAY OF PROTEIN URINE: CPT

## 2023-01-30 PROCEDURE — 82570 ASSAY OF URINE CREATININE: CPT

## 2023-01-30 PROCEDURE — 99213 OFFICE O/P EST LOW 20 MIN: CPT

## 2023-01-30 PROCEDURE — 36415 COLL VENOUS BLD VENIPUNCTURE: CPT

## 2023-01-30 PROCEDURE — 80053 COMPREHEN METABOLIC PANEL: CPT

## 2023-01-30 PROCEDURE — 84439 ASSAY OF FREE THYROXINE: CPT

## 2023-01-30 RX ORDER — ONDANSETRON 4 MG/1
4 TABLET, ORALLY DISINTEGRATING ORAL EVERY 8 HOURS PRN
Status: DISCONTINUED | OUTPATIENT
Start: 2023-01-30 | End: 2023-01-30 | Stop reason: HOSPADM

## 2023-01-30 RX ORDER — ACETAMINOPHEN 500 MG
1000 TABLET ORAL EVERY 6 HOURS
Status: DISCONTINUED | OUTPATIENT
Start: 2023-01-30 | End: 2023-01-30 | Stop reason: HOSPADM

## 2023-01-30 RX ORDER — ONDANSETRON 2 MG/ML
4 INJECTION INTRAMUSCULAR; INTRAVENOUS EVERY 6 HOURS PRN
Status: DISCONTINUED | OUTPATIENT
Start: 2023-01-30 | End: 2023-01-30 | Stop reason: HOSPADM

## 2023-01-30 NOTE — PROGRESS NOTES
Obstetric/Gynecology Resident Interval Note    Writer to bedside. Patient continues to have no obstetric complaints, vital signs stable. Fetal tachycardia has been improved,  Fetal heart rate baseline at 150-160. BPP performed at bedside:    Biophysical Profile:   Amniotic Fluid Index: 2x2 pocket   Tone: Present  Movement: Present  Breathing: Present  Biophysical Score: 8 / 8  Fetal Position: Cephalic       cEFM is category one and reactive. BPP 8/8. Fetal tachycardia resolved. Patient is currently scheduled for routine  testing with MFM BPP every Thursday and NST every Monday. Patient reports she is compliant with all  testing, and plans to continue to report for set appointments. Patient also has chronic hypertension, on no medications. Elevated but nonsevere blood pressure upon arrival to triage. Preeclampsia labs ordered and unremarkable, Signs and symptoms of preeclampsia P/C 0.09. Denies signs and symptoms of preeclampsia. Patient has a history of hypothyroidism, clinically asymptomatic at this time. TSH on arrival wnl @ 1.50, free T4 0.75. No need for intervention at this time. Pregestational DM, only on Novolog  and NPH 45 Units qhs reports compliance. Blood glucose 88 on arrival.  Continue to follow with maternal-fetal medicine for further management of her gestational diabetes. Patient denies any headache, visual changes, difficulty breathing, RUQ pain, N/V, F/C, and pain/swelling in lower extremities. Denies any dysuria or vaginal discharge. Patient may be discharged to home. Patient counseled on  labor precautions, preE precautions, adequate PO hydration, and fetal kick counts. All questions answered and concerns addressed. Patient vocalized understanding.   Patient again advised to continue vaginal  testing        Breanna Veloz MD  OB/GYN Resident, PGY1  [address]   2023, 4:43 PM

## 2023-01-30 NOTE — H&P
OBSTETRICAL HISTORY Formerly [facility]    Date: 2023       Time: 3:47 PM   Patient Name: Bessie Sebastian     Patient : 1985  Room/Bed: [place] [address]    Admission Date/Time: 2023  2:29 PM      CC: Fetal Tachycardia in office     HPI: Bessie Sebsatian is a 30 y.o.  at [place] [address] who presents from her OB clinic for fetal tachycardia on NST. Patient presented from her routine prenatal appointment with  testing secondary to pregestational diabetes and chronic hypertension. At this time, fetal tachycardia was noted with a fetal heart rate baseline of 170. The patient reports fetal movement is present, denies contractions, denies loss of fluid, denies vaginal bleeding. Patient denies headache, vision changes, nausea, vomiting, fever, chills, shortness of breath, chest pain, RUQ pain, abdominal pain, diarrhea, change in color/amount/odor of vaginal discharge, dysuria or, hematuria. DATING:  LMP: Patient's last menstrual period was 06/10/2022.   Estimated Date of Delivery: 3/25/23   Based on: early ultrasound, at 6 6/7 weeks GA    PREGNANCY RISK FACTORS:  Patient Active Problem List   Diagnosis    IBS    Laparoscopic converted to open wedge resection of right cornual ectopic, right salpingectomy and evacuation of hemoperitoneum 19    cHTN (no meds)    Depression    AMA    Hx Chlamydia    Hx hypothyroidism    Hx C/S x 1    High-risk pregnancy    Former smoker    Pregestational DM    Rh+/RI/GBSunk    32 weeks gestation of pregnancy      Steroids Given In This Pregnancy:  no     REVIEW OF SYSTEMS:   Constitutional: negative fever, negative chills, negative weight changes   HEENT: negative visual disturbances, negative headaches, negative dizziness, negative hearing loss  Breast: Negative breast abnormalities, negative breast lumps, negative nipple discharge  Respiratory: negative dyspnea, negative cough, negative SOB  Cardiovascular: negative chest pain,  negative palpitations, negative arrhythmia, negative syncope   Gastrointestinal: negative abdominal pain, negative RUQ pain, negative N/V, negative diarrhea, negative constipation, negative bowel changes, negative heartburn   Genitourinary: negative dysuria, negative hematuria, negative urinary incontinence, negative vaginal discharge, negative vaginal bleeding or spotting  Dermatological: negative rash, negative pruritis, negative mole or other skin changes  Hematologic: negative bruising  Immunologic/Lymphatic: negative recent illness, negative recent sick contact  Musculoskeletal: negative back pain, negative myalgias, negative arthralgias  Neurological:  negative dizziness, negative migraines, negative seizures, negative weakness  Behavior/Psych: negative depression, negative anxiety, negative SI, negative HI    OBSTETRIC HISTORY:   OB History    Para Term  AB Living   5 1 1 0 3 1   SAB IAB Ectopic Molar Multiple Live Births   1 1 1 0 0 1      # Outcome Date GA Lbr Gael/2nd Weight Sex Delivery Anes PTL Lv   5 Current            4 SAB 2020           3 Ectopic 19              Birth Comments: Right cornual ectopic,wedge resection with R salpingectomy, evac hemoperitoneum   2 IAB               Birth Comments: Patient unsure of year but knows many years after G1   1 Term 09 40w0d  9 lb 10 oz (4.366 kg) M CS-Unspec Spinal  FLORENCIO      Obstetric Comments   G1: FOB #1   G2: FOB #2   G3-G5: FOB #3       PAST MEDICAL HISTORY:   has a past medical history of Chlamydia, Ectopic pregnancy, History of blood transfusion, Hypertension, Hypothyroidism, IBS (irritable bowel syndrome), Infertility, female, secondary, Obesity (BMI 30-39.9), STD (sexually transmitted disease), Trauma, and Trichomonas vaginalis infection. PAST SURGICAL HISTORY:   has a past surgical history that includes pelvic laparoscopy (2019); salpingectomy (Right, 2019);  Cholecystectomy; laparoscopy (N/A, 2019);  section; and Liposuction w/ fat injection (2020). ALLERGIES:  has No Known Allergies. MEDICATIONS:  Prior to Admission medications    Medication Sig Start Date End Date Taking? Authorizing Provider   aspirin (ASPIRIN CHILDRENS) 81 MG chewable tablet Take 1 tablet by mouth daily  Patient not taking: No sig reported 23   Eden Nuñez MD   HUMULIN N 100 UNIT/ML injection vial inject 36 units subcutaneously once daily at bedtime  Patient not taking: No sig reported 23   Historical Provider, MD   Authur Glimpse 100 UNIT/ML injection pen 3 times daily (before meals) 22   Historical Provider, MD   labetalol (NORMODYNE) 100 MG tablet Take 1 tablet by mouth 2 times daily  Patient not taking: No sig reported 22   Rosemary Ch MD   GNP SLEEP AID 25 MG tablet take 1 tablet by mouth every evening  Patient not taking: No sig reported 22   Rosemary Ch MD   HUMALOG KWIKPEN 100 UNIT/ML SOPN Inject 14 Units into the skin 3 times daily (before meals) 14u before breakfast and dinner and 16u before dinner  Patient not taking: No sig reported 10/5/22   Historical Provider, MD   DROPLET PEN NEEDLES 31G X 5 MM MISC use four times a day 10/11/22   Historical Provider, MD   aspirin EC 81 MG EC tablet Take 1 tablet by mouth daily 10/7/22   Gina Wyatt MD   blood glucose monitor strips Test 4 times a day & as needed for symptoms of irregular blood glucose. Dispense sufficient amount for indicated testing frequency plus additional to accommodate PRN testing needs.  10/7/22   Gina Wyatt MD   Lancets ([address]) MISC use 1 LANCET to TEST BLOOD SUGAR four times a day 22   Historical Provider, MD   ONETOUCH ULTRA strip use 1 TEST STRIP to TEST BLOOD SUGAR four times a day 22   Historical Provider, MD   Blood Glucose Monitoring Suppl (ONE TOUCH ULTRA 2) w/Device KIT USE AS DIRECTED to CHECK BLOOD SUGAR four times a day 22   Historical Provider, MD SWANN Alcohol Swabs 70 % PADS USE TO CLEANSE AREA BEFORE INJECTING INSULIN AND BEFORE TESTING BLOOD SUGAR four times a day 9/13/22   Historical Provider, MD   ferrous sulfate (FE TABS) 325 (65 Fe) MG EC tablet 1 tablet by mouth in AM and PM every Monday, Wednesday and Friday. Patient not taking: No sig reported 9/12/22   Juni Billings MD   Prenatal Vit-Fe Fumarate-FA (PRENATAL VITAMIN) 27-0.8 MG TABS Take 1 tablet by mouth daily May substitute with any prenatal vitamin covered by the patient's insurance. 8/30/22   Juni Billings MD   pyridoxine (RA VITAMIN B-6) 50 MG tablet Take 0.5 tablets by mouth in the morning and 0.5 tablets at noon and 0.5 tablets in the evening. Patient not taking: No sig reported 8/8/22 8/8/23  Juni Billings MD       FAMILY HISTORY:  family history includes Diabetes in her maternal grandmother; Hypertension in her maternal grandmother; No Known Problems in her brother, father, maternal grandfather, mother, paternal grandfather, paternal grandmother, and sister. SOCIAL HISTORY:   reports that she quit smoking about 6 months ago. Her smoking use included cigars. She started smoking about 5 years ago. She has never used smokeless tobacco. She reports that she does not currently use alcohol. She reports that she does not use drugs.     VITALS:  Vitals:    01/30/23 1438 01/30/23 1455   BP: (!) 150/84 (!) 141/73   Pulse: 89 93   Resp: 18    Temp: 99 °F (37.2 °C)    TempSrc: Oral        PHYSICAL EXAM:  Fetal Heart Monitor:  Baseline Heart Rate 160, moderate variability, present accelerations, absent decelerations  [place]: contractions, none    General appearance:  no apparent distress, alert and cooperative  HEENT: head atraumatic, normocephalic, moist mucous membranes, trachea midline  Neurologic:  alert, oriented, normal speech, no focal findings or movement disorder noted  Lungs:  No increased work of breathing  Heart:  regular rate and rhythm and no murmur, rubs, gallops  Abdomen:  soft, gravid, non-tender, no rebound, guarding, or rigidity, no RUQ or epigastric tenderness, no signs or symptoms of abruption, no signs or symptoms of chorioamnionitis  Extremities:  no calf tenderness, non edematous  Musculoskeletal: Gross strength equal and intact throughout, no gross abnormalities  Psychiatric: Mood appropriate, normal affect   Rectal Exam: not indicated    PRENATAL LAB RESULTS:   Blood Type/Rh: O pos  Antibody Screen: negative  Hemoglobin, Hematocrit, Platelets: 15.5/08.1/784  Rubella: immune  T. Pallidum, IgG: non-reactive  Hepatitis B Surface Antigen: non-reactive   Hepatitis C Antibody: non-reactive   HIV: non-reactive   Gonorrhea: negative  Chlamydia: negative  Urine culture: negative, date: 22    Early 1 hour Glucose Tolerance Test:  158  Early 3 hour Glucose Tolerance Test: Fasting 91/ 1hr 191/ 2 hr 164/ 3 hr 199    Group B Strep: not done   Cystic Fibrosis Screen: negative  Sickle Cell Screen: negative  First Trimester Screen: not done  QUAD: not done  MSAFP: negative  Non-Invasive Prenatal Testing: low risk for aneuploidy  Anatomy US: Anterior placenta, 3VC, female gender, normal anatomy    ASSESSMENT & PLAN:  Tin Kimble is a 30 y.o. female [id number] at [address]   - GBS unknown / Rh positive / R immune   - Pen G for GBS prophylaxis if delivery    - VSS     Fetal Tachycardia    - Patient presented from her routine prenatal appointment with  testing secondary to pregestational diabetes and chronic hypertension.  Fetal tachycardia was noted with a fetal heart rate baseline of 170 and patient was sent for continuous monitoring    - Vital signs stable, no maternal tachycardia on arrival   - Patient denies obstetric complaints at this time, fetal movement present, no contractions, no change in vaginal discharge, fetal movement present   - Denies signs and symptoms of preeclampsia   - Denies fever, chills, nausea, vomiting, dysuria, urinary urgency   - cEFM/TOCO: baseline of 160 bpm, category 1 FHT, no contractions    -Fetal tachycardia has improved since arriving from clinic, fetal heart rate baseline at 160 bpm at this time.   No maternal tachycardia noted upon arrival, no concern for maternal infection.   -Elevated blood pressure upon arrival, will work-up to rule out superimposed preeclampsia   -CBC, CMP, P/C pending    -History of hypothyroidism, TSH and free T4 ordered and pending   -Blood glucose ordered and pending at this time as patient has pregestational diabetes   -We will continue to monitor with cEFM to evaluate for fetal tachycardia    cHTN (no meds)    - Denies s/s of PreE   - PreE labs, P/C pending on admission    - Baseline PreE labs wnl on 22; P/C 0.08   - BP's elevated but nonsevere on arrival    - PreE labs ordered and pending on admission to rule out super imposed preeclampsia     Hx Hypothyroidism    - Was previously on Synthroid in  and stopped    - TSH wnl on 10/10/22; TSH 1.76 on 12/15/22   - TSH and Free T4 ordered and pending on admission     Pregestational DM    - Following closely with MFM    - Insulin regimen: Novolog  and NPH 45 Units qhs    - Compliant with  testing and follow up    - Blood glucose ordered and pending today    - Fetal echo wnl    - Continue regular outpatient follow up     AMA   - NIPT wnl    IBS   - Currently managed without mediations   - Clinically asymptomatic at this time     S/p wedge resection of right cornual ectopic, R salpingectomy   - 2019    Depression   - Currently managed without medications    - Denies SI/HI    Hx C/S x 1   - Undecided on repeat C/S vs TOLAC at this time, follows closely with OB      Hx Tobacco use   - Encouraged continued cessation     Hx Chlamydia   - Negative     BMI 39    Patient Active Problem List    Diagnosis Date Noted    32 weeks gestation of pregnancy 2023     Priority: Medium    Rh+/RI/GBSunk 2023     Priority: Medium    AMA 2022     Priority: Medium    Hx Chlamydia 2022     Priority: Medium     Negative 22      Pregestational DM 2022     Diagnosed in G5 pregnancy, early 1 hr and 3 hr GTT    Novolog 12, NPH 36      cHTN (no meds) 2022: Diagnosed by Dr. Pascual per review of flowsheets. No meds at this time.    ASA Rx sent to patient's pharmacy  Baseline PreE labs (CMP, protein-creatinine ratio ordered_      Depression 2022: PHQ=1, loss of interest      Hx hypothyroidism 2022: Patient was prescribed Levothyroxine in  but stopped due to too much weight loss.  TSH wnl 10/10/22      Hx C/S x 1 2022: Patient would like to discuss RCS versus TOLAC      High-risk pregnancy 2022     Risk factors: New partner; chronic hypertension (no meds); advanced maternal age; H/O hypothyroidism; irritable bowel syndrome; obesity; H/O  x1; H/O vertigo; COVID unvaccinated    Fetal testing indicated: Yes  Fetal testing indication: Advanced maternal age  Fetal testing initiation: Individualized  Fetal testing frequency: Individualized      Former smoker 2022: Patient reports quitting 2022      Laparoscopic converted to open wedge resection of right cornual ectopic, right salpingectomy and evacuation of hemoperitoneum 2019     Patient counseled extensively on the need to not get pregnant for at least 18 months.       IBS 2015: Patient denies meds at this time         Plan discussed with Dr. Jamil, who is agreeable.     Steroids given this admission: No    Risks, benefits, alternatives and possible complications have been discussed in detail with the patient. Admission, and post admission procedures and expectations were discussed in detail. All questions were answered.    Attending's Name: Dr. Omero Winslow MD  Ob/Gyn Resident  2023, 3:47 PM

## 2023-01-30 NOTE — FLOWSHEET NOTE
Pt to LD for extended efm due to fetal tachycardia. Pt denies ctx, srom and bleeding. Positive fetal movement.

## 2023-01-30 NOTE — PROGRESS NOTES
TESTING NOTE    Bessie Sebastian is a 30 y.o. female [id number] at 32w2d    The patient was seen and examined. She is here today for  testing for because she is at high risk for the following reasons: Pre-gestational DM. The baby is moving well and she denies any complaints. Patient Active Problem List   Diagnosis    IBS    Laparoscopic converted to open wedge resection of right cornual ectopic, right salpingectomy and evacuation of hemoperitoneum 19    cHTN (no meds)    Depression    AMA    Hx Chlamydia    Hx hypothyroidism    Hx C/S x 1    High-risk pregnancy    Former smoker    Pregestational DM    Rh+/RI/GBSunk       Vitals:  Vitals:    23 1333   BP: 128/74   Pulse: 95   Weight: 237 lb (107.5 kg)         NST:   Fetal heart rate baseline: 170, moderate variability, accelerations present, occasional variable decelerations with spontaneous resolution    The tracing has been reviewed and is considered reassuring however Fetal tachycardia noted. Assessment/Plan:  Bessie Sebastian is a 30 y.o. female [id number] at 32w2d  NST    - Fetal tachycardia on NST; patient to report to labor and delivery for extended fetal monitoring 2/2 fetal tachycardia   - The patient will continue with her scheduled office appointments. Next appointment on 23.    - She will continue with her  testing as scheduled. - Signs and Symptoms of  labor precautions were reviewed. - She was counseled on adequate hydration prior to discharge   - The patient was instructed on fetal kick counts. Dr. Billie Maurice has reviewed this NST and agrees with the above stated assessment and plan. Paulla Romberg, DO  Ob/Gyn Resident   2023, 1:53 PM        Attending Physician Statement  I have discussed the care of Bessie Sebastian, including pertinent history and exam findings,  with the resident. I have reviewed the key elements of all parts of the encounter with the resident.   I agree with the assessment, plan and orders as documented by the resident.   (GE Modifier)    Agustina Salem, DO

## 2023-02-06 ENCOUNTER — HOSPITAL ENCOUNTER (OUTPATIENT)
Age: 38
Discharge: HOME | End: 2023-02-06
Attending: STUDENT IN AN ORGANIZED HEALTH CARE EDUCATION/TRAINING PROGRAM | Admitting: STUDENT IN AN ORGANIZED HEALTH CARE EDUCATION/TRAINING PROGRAM
Payer: COMMERCIAL

## 2023-02-06 ENCOUNTER — ROUTINE PRENATAL (OUTPATIENT)
Dept: OBGYN | Age: 38
End: 2023-02-06
Payer: COMMERCIAL

## 2023-02-06 VITALS
OXYGEN SATURATION: 100 % | HEART RATE: 89 BPM | SYSTOLIC BLOOD PRESSURE: 125 MMHG | BODY MASS INDEX: 39.99 KG/M2 | RESPIRATION RATE: 16 BRPM | DIASTOLIC BLOOD PRESSURE: 84 MMHG | HEIGHT: 65 IN | WEIGHT: 240 LBS | TEMPERATURE: 98.4 F

## 2023-02-06 VITALS
HEART RATE: 100 BPM | BODY MASS INDEX: 39.94 KG/M2 | DIASTOLIC BLOOD PRESSURE: 82 MMHG | WEIGHT: 240 LBS | SYSTOLIC BLOOD PRESSURE: 127 MMHG

## 2023-02-06 DIAGNOSIS — I10 CHRONIC HYPERTENSION: ICD-10-CM

## 2023-02-06 DIAGNOSIS — O24.319 PREGESTATIONAL DIABETES MELLITUS, MODIFIED WHITE CLASS B: ICD-10-CM

## 2023-02-06 DIAGNOSIS — Z3A.33 33 WEEKS GESTATION OF PREGNANCY: Primary | ICD-10-CM

## 2023-02-06 PROBLEM — Z3A.32 32 WEEKS GESTATION OF PREGNANCY: Status: RESOLVED | Noted: 2023-01-30 | Resolved: 2023-02-06

## 2023-02-06 PROCEDURE — 59025 FETAL NON-STRESS TEST: CPT | Performed by: STUDENT IN AN ORGANIZED HEALTH CARE EDUCATION/TRAINING PROGRAM

## 2023-02-06 PROCEDURE — 99211 OFF/OP EST MAY X REQ PHY/QHP: CPT | Performed by: STUDENT IN AN ORGANIZED HEALTH CARE EDUCATION/TRAINING PROGRAM

## 2023-02-06 PROCEDURE — 99213 OFFICE O/P EST LOW 20 MIN: CPT

## 2023-02-06 RX ORDER — ACETAMINOPHEN 500 MG
1000 TABLET ORAL EVERY 6 HOURS PRN
Status: DISCONTINUED | OUTPATIENT
Start: 2023-02-06 | End: 2023-02-06 | Stop reason: HOSPADM

## 2023-02-06 RX ORDER — ONDANSETRON 4 MG/1
4 TABLET, ORALLY DISINTEGRATING ORAL EVERY 8 HOURS PRN
Status: DISCONTINUED | OUTPATIENT
Start: 2023-02-06 | End: 2023-02-06 | Stop reason: HOSPADM

## 2023-02-06 RX ORDER — ONDANSETRON 2 MG/ML
4 INJECTION INTRAMUSCULAR; INTRAVENOUS EVERY 6 HOURS PRN
Status: DISCONTINUED | OUTPATIENT
Start: 2023-02-06 | End: 2023-02-06 | Stop reason: HOSPADM

## 2023-02-06 NOTE — H&P
[address] [facility] Leodan Gomez    Date: 2023       Time: 3:38 PM   Patient Name: Joseph Underwood     Patient : 1985  Room/Bed: [facility]/[place] [address]    Admission Date/Time: 2023  3:07 PM      CC: Continuous Monitoring 2/2 Nonreactive NST with 6/8 BPP @ OB Office      HPI: Joseph Underwood is a 30 y.o. Servando Emma at 33w2d who presents for Continuous Monitoring 2/2 Nonreactive NST with 6/8 BPP @ OB Office. The patient reports fetal movement is present, denies contractions, denies loss of fluid, denies vaginal bleeding. Patient denies any headache, visual changes, difficulty breathing, RUQ pain, N/V, F/C, and pain/swelling in lower extremities. Denies any dysuria or vaginal discharge. DATING:  LMP: Patient's last menstrual period was 06/10/2022.   Estimated Date of Delivery: 3/25/23   Based on: early ultrasound, at 6 6/7 weeks GA    PREGNANCY RISK FACTORS:  Patient Active Problem List   Diagnosis    IBS    Laparoscopic converted to open wedge resection of right cornual ectopic, right salpingectomy and evacuation of hemoperitoneum 19    cHTN (no meds)    Depression    AMA    Hx Chlamydia    Hx hypothyroidism    Hx C/S x 1    High-risk pregnancy    Former smoker    Pregestational DM    Rh+/RI/GBSunk    Rh+/RI/GBSunk       REVIEW OF SYSTEMS:   Constitutional: negative fever, negative chills  HEENT: negative visual disturbances, negative headaches, negative dizziness  Breast: negative breast abnormalities, negative breast lumps, negative nipple discharge  Respiratory: negative dyspnea, negative cough, negative SOB  Cardiovascular: negative chest pain,  negative palpitations, negative arrhythmia, negative syncope   Gastrointestinal: negative abdominal pain, negative RUQ pain, negative N/V/D, negative constipation, negative bowel changes, negative heartburn   Genitourinary: negative pelvic pain, negative dysuria, negative hematuria, negative urinary incontinence, negative vaginal discharge  Dermatological: negative rash, negative pruritis  Hematologic: negative bruising  Immunologic/Lymphatic: negative recent illness, negative recent sick contact  Musculoskeletal: negative back pain, negative myalgias, negative arthralgias  Neurological:  negative migraines, negative seizures, negative weakness  Behavior/Psych: negative depression, negative anxiety, negative SI, negative HI    OBSTETRICAL HISTORY:   OB History    Para Term  AB Living   5 1 1 0 3 1   SAB IAB Ectopic Molar Multiple Live Births   1 1 1 0 0 1      # Outcome Date GA Lbr Gael/2nd Weight Sex Delivery Anes PTL Lv   5 Current            4 SAB 2020           3 Ectopic 19              Birth Comments: Right cornual ectopic,wedge resection with R salpingectomy, evac hemoperitoneum   2 IAB               Birth Comments: Patient unsure of year but knows many years after G1   1 Term 09 40w0d  9 lb 10 oz (4.366 kg) M CS-Unspec Spinal  FLORENCIO      Obstetric Comments   G1: FOB #1   G2: FOB #2   G3-G5: FOB #3       PAST MEDICAL HISTORY:   has a past medical history of Chlamydia, Ectopic pregnancy, History of blood transfusion, Hypertension, Hypothyroidism, IBS (irritable bowel syndrome), Infertility, female, secondary, Obesity (BMI 30-39.9), STD (sexually transmitted disease), Trauma, and Trichomonas vaginalis infection. PAST SURGICAL HISTORY:   has a past surgical history that includes pelvic laparoscopy (2019); salpingectomy (Right, 2019); Cholecystectomy; laparoscopy (N/A, 2019);  section; and Liposuction w/ fat injection (2020). ALLERGIES:  has No Known Allergies. MEDICATIONS:  Prior to Admission medications    Medication Sig Start Date End Date Taking?  Authorizing Provider   aspirin (ASPIRIN CHILDRENS) 81 MG chewable tablet Take 1 tablet by mouth daily  Patient not taking: No sig reported 23   Gino Hodge MD   HUMULIN N 100 UNIT/ML injection vial inject 36 units subcutaneously once daily at bedtime  Patient not taking: No sig reported 1/5/23   Historical Provider, MD Juliet Art 100 UNIT/ML injection pen 3 times daily (before meals) 12/14/22   Historical Provider, MD   labetalol (NORMODYNE) 100 MG tablet Take 1 tablet by mouth 2 times daily  Patient not taking: No sig reported 12/2/22   Mamta Black MD   GNP SLEEP AID 25 MG tablet take 1 tablet by mouth every evening  Patient not taking: No sig reported 11/29/22   Mamta Black MD   HUMALOG KWIKPEN 100 UNIT/ML SOPN Inject 14 Units into the skin 3 times daily (before meals) 14u before breakfast and dinner and 16u before dinner  Patient not taking: No sig reported 10/5/22   Historical Provider, MD   DROPLET PEN NEEDLES 31G X 5 MM MISC use four times a day 10/11/22   Historical Provider, MD   aspirin EC 81 MG EC tablet Take 1 tablet by mouth daily 10/7/22   Tiara Keating MD   blood glucose monitor strips Test 4 times a day & as needed for symptoms of irregular blood glucose. Dispense sufficient amount for indicated testing frequency plus additional to accommodate PRN testing needs. 10/7/22   Tiara Keating MD   Lancets ([address]) MISC use 1 LANCET to TEST BLOOD SUGAR four times a day 9/13/22   Historical Provider, MD   ONETOUCH ULTRA strip use 1 TEST STRIP to TEST BLOOD SUGAR four times a day 9/13/22   Historical Provider, MD   Blood Glucose Monitoring Suppl (ONE TOUCH ULTRA 2) w/Device KIT USE AS DIRECTED to CHECK BLOOD SUGAR four times a day 9/13/22   Historical Provider, MD   RA Alcohol Swabs 70 % PADS USE TO CLEANSE AREA BEFORE INJECTING INSULIN AND BEFORE TESTING BLOOD SUGAR four times a day 9/13/22   Historical Provider, MD   ferrous sulfate (FE TABS) 325 (65 Fe) MG EC tablet 1 tablet by mouth in AM and PM every Monday, Wednesday and Friday.   Patient not taking: No sig reported 9/12/22   Mamta Black MD   Prenatal Vit-Fe Fumarate-FA (PRENATAL VITAMIN) 27-0.8 MG TABS Take 1 tablet by mouth daily May substitute with any prenatal vitamin covered by the patient's insurance. 8/30/22   Riya Cota MD   pyridoxine (RA VITAMIN B-6) 50 MG tablet Take 0.5 tablets by mouth in the morning and 0.5 tablets at noon and 0.5 tablets in the evening. Patient not taking: No sig reported 8/8/22 8/8/23  Riya Cota MD       FAMILY HISTORY:  family history includes Diabetes in her maternal grandmother; Hypertension in her maternal grandmother; No Known Problems in her brother, father, maternal grandfather, mother, paternal grandfather, paternal grandmother, and sister. SOCIAL HISTORY:   reports that she quit smoking about 6 months ago. Her smoking use included cigars. She started smoking about 5 years ago. She has never used smokeless tobacco. She reports that she does not currently use alcohol. She reports that she does not use drugs.     VITALS:  Vitals:    02/06/23 1515 02/06/23 1526 02/06/23 1535   BP:   125/84   Pulse: 91  89   Resp: 16     Temp: 98.4 °F (36.9 °C)     TempSrc: Oral     SpO2: 100% 100%    Weight: 240 lb (108.9 kg)     Height: 5' 5\" (1.651 m)         PHYSICAL EXAM:  Fetal Heart Monitor:  Baseline Heart Rate 155, moderate variability, present accelerations, absent decelerations  [place]: contractions, none    General appearance:  no apparent distress, alert and cooperative  HEENT: head atraumatic, normocephalic, trachea midline, moist mucous membranes   Neurologic:  oriented, normal speech, no focal findings or movement disorder noted  Lungs:  no increased work of breathing, normal chest wall rise bilaterally  Heart:  regular rate and rhythm   Abdomen:  soft, gravid, non-tender on palpation, no right upper quadrant tenderness, no CVA tenderness bilaterally, uterus non-tender, no signs of abruption and no signs of chorioamnionitis  Extremities:  no calf tenderness bilaterally, non-edematous bilaterally  Musculoskeletal: no gross abnormalities, range of motion appropriate for age   Psychiatric: mood appropriate, normal affect     PRENATAL LAB RESULTS:   Blood Type/Rh: O pos  Antibody Screen: negative  Hemoglobin, Hematocrit, Platelets: 04.1 / 66.3 / 288  Rubella: immune  T. Pallidum, IgG: non-reactive   Hep C: non-reactive   Hepatitis B Surface Antigen: non-reactive   HIV: non-reactive   Gonorrhea: negative  Chlamydia: negative  Urine culture: negative, date: 22    Early 1 hour Glucose Tolerance Test: 158  Early 3 hour Glucose Tolerance Test: Fastin; 1 hour: 191; 2 hour  164; 3 hour: 199    Group B Strep: not done  Cystic Fibrosis Screen: negative  Sickle Cell Screen: negative  First Trimester Screen: Not done   MSAFP/Multiple Markers: not done   Non-Invasive Prenatal Testing: low risk for aneuploidy  Anatomy US: atnerior, 3, female, normal anatomy     ASSESSMENT & PLAN:  Ron Goldstein is a 30 y.o. female [id number] at 33w2d IUP   - GBS unknown / Rh positive / R immune   - Pen G for GBS prophylaxis if necessary    - VSS     Continuous Monitoring 2/2 Nonreactive NST with 6/8 BPP @ OB Office    - Nonreactive NST in office 2/2 no acceleration and 1 prolonged deceleration    - 6/8 BPP, off for tone in office  - Patient reports positive fetal movement, denies contractions, denies vaginal bleeding or discharge  - cEFM/TOCO: category 1 FHT, no contractions for 1 hour   - NST reactive consistently for 1 hour    - Next appointment at [facility] on 23 and at [facility] on 23 for continued  testing    - Patient may be discharged to home. Patient counseled on  labor precautions, preE precautions, adequate PO hydration, and fetal kick counts. All questions answered and concerns addressed. Patient vocalized understanding. Patient urged to continue with scheduled  testing.      cHTN (no meds)               - Denies s/s of PreE              - PreE labs, P/C pending on admission               - Baseline PreE labs wnl on 22; P/C 0.08              - BP's elevated but nonsevere on arrival               - PreE labs ordered and pending on admission to rule out super imposed preeclampsia      Hx Hypothyroidism               - Was previously on Synthroid in 2016 and stopped               - TSH wnl on 10/10/22; TSH 1.76 on 12/15/22              - TSH and Free T4 ordered and pending on admission      Pregestational DM               - Following closely with MFM               - Insulin regimen: Novolog  and NPH 45 Units qhs               - Compliant with  testing and follow up               - Blood glucose ordered and pending today               - Fetal echo wnl               - Continue regular outpatient follow up      AMA              - NIPT wnl     IBS              - Currently managed without mediations              - Clinically asymptomatic at this time      S/p wedge resection of right cornual ectopic, R salpingectomy              - 2019     Depression              - Currently managed without medications               - Denies SI/HI     Hx C/S x 1              - Patient desires repeat  section, declining TOLAC      Hx Tobacco use              - Encouraged continued cessation      Hx Chlamydia              - Negative      BMI 39    Patient Active Problem List    Diagnosis Date Noted    Rh+/RI/GBSunk 2023     Priority: Medium    Rh+/RI/GBSunk 2023     Priority: Medium    AMA 2022     Priority: Medium    Hx Chlamydia 2022     Priority: Medium     Negative 22      Pregestational DM 2022     Diagnosed in G5 pregnancy, early 1 hr and 3 hr GTT    Novolog 12, NPH 36      cHTN (no meds) 2022: Diagnosed by Dr. Samantha Hamlin per review of flowsheets. No meds at this time.     ASA Rx sent to patient's pharmacy  Baseline PreE labs (CMP, protein-creatinine ratio ordered_      Depression 2022: PHQ=1, loss of interest      Hx hypothyroidism 2022: Patient was prescribed Levothyroxine in  but stopped due to too much weight loss. TSH wnl 10/10/22      Hx C/S x 1 2022: Patient would like to discuss RCS versus TOLAC      High-risk pregnancy 2022     Risk factors: New partner; chronic hypertension (no meds); advanced maternal age; H/O hypothyroidism; irritable bowel syndrome; obesity; H/O  x1; H/O vertigo; COVID unvaccinated    Fetal testing indicated: Yes  Fetal testing indication: Advanced maternal age  Fetal testing initiation: Individualized  Fetal testing frequency: Individualized      Former smoker 2022: Patient reports quitting 2022      Laparoscopic converted to open wedge resection of right cornual ectopic, right salpingectomy and evacuation of hemoperitoneum 2019     Patient counseled extensively on the need to not get pregnant for at least 18 months. IBS 2015: Patient denies meds at this time         Plan discussed with on-call physician Dr. Mk Mathews, who is agreeable. Risks, benefits, alternatives and possible complications have been discussed in detail with the patient. Admission, and post admission procedures and expectations were discussed in detail. All questions were answered.     Patient's primary ob/gyn provider: Dr. Mk Mathews      Steroids Given In This Pregnancy: no  Steroids given this admission: no    Gibson Langley MD  Ob/Gyn Resident  2023, 3:38 PM

## 2023-02-06 NOTE — PROGRESS NOTES
Discharge order rec'd from Dr. Primo Luu. Discharge instructions reviewed with patient. Pt verbalizes understanding. Pt ambulates off the unit accomplanied by her mother.

## 2023-02-06 NOTE — DISCHARGE INSTRUCTIONS
Notify physician if you experience: Dizziness or severe headache  Spots before eyes or blurred vision    Chills and or fever  Vaginal pressure  Epigastric pain  Uterine contractions are regular and 5 minutes apart if 1st baby or 10 minutes apart if not 1st baby  Bag or mclaughlin leaking or gush of fluid from vagina  Any vaginal bleeding that is heavier than a menstrual period  Intermittent low backache  Vomiting or diarrhea for several hours  Decrease or absence of baby movement  Fetal movement card instructions given  Right sided abdominal pain  Increase or change in vaginal discharge  Abdominal or menstrual like cramping that is constant or heavier, comes and goes  Swelling of face, hands, legs or feet not decreased with rest on left side.

## 2023-02-06 NOTE — PROGRESS NOTES
TESTING NOTE    Brionna [place] is a 30 y.o. female [id number] at 33w2d    The patient was seen and examined. She is here today for  testing for because she is at high risk for the following reasons: chronic hypertension on no medication and pregestational diabetes mellitus. The baby is moving well and she denies any complaints. Patient Active Problem List   Diagnosis    IBS    Laparoscopic converted to open wedge resection of right cornual ectopic, right salpingectomy and evacuation of hemoperitoneum 19    cHTN (no meds)    Depression    AMA    Hx Chlamydia    Hx hypothyroidism    Hx C/S x 1    High-risk pregnancy    Former smoker    Pregestational DM    Rh+/RI/GBSunk       Vitals:  Vitals:    23 1345   BP: 127/82   Pulse: 100   Weight: 240 lb (108.9 kg)         NST:   Fetal heart rate baseline: 160, moderate variability, accelerations absent, prolonged deceleration present    The tracing has been reviewed and is considered nonreactive. Assessment/Plan:  Brionna [place] is a 30 y.o. female [id number] at 33w2d  NST non-reactive   - No accelerations noted, one prolonged deceleration noted   - BPP completed in office and , off for tone   - Above findings discussed with patient, and patient instructed to present to Labor and Delivery for extended monitoring. Patient in agreement with plan. - The patient will continue with her scheduled office appointments. Next appointment at [place] [id number] on 23 and at [facility] on 23.    - She will continue with her  testing as scheduled. - Signs and Symptoms of  labor precautions were reviewed. - She was counseled on adequate hydration prior to discharge   - The patient was instructed on fetal kick counts. Dr. Robert Wood has reviewed this NST and agrees with the above stated assessment and plan.      Emiliano Bowman DO  Ob/Gyn Resident   2023, 2:03 PM         Attending Physician Statement  I have discussed the care of Daryl Vega, including pertinent history and exam findings,  with the resident. I have reviewed the key elements of all parts of the encounter with the resident. I agree with the assessment, plan and orders as documented by the resident.   (GE Modifier)    Dwayne Ballesteros, DO

## 2023-02-06 NOTE — PROGRESS NOTES
Pt ambulates onto the unit, states that she was at the clinic for a NST's for GDM and they sent her to L&D for extended monitoring.

## 2023-02-09 ENCOUNTER — ROUTINE PRENATAL (OUTPATIENT)
Dept: PERINATAL CARE | Age: 38
End: 2023-02-09
Payer: COMMERCIAL

## 2023-02-09 VITALS
HEIGHT: 65 IN | BODY MASS INDEX: 39.99 KG/M2 | TEMPERATURE: 98.1 F | SYSTOLIC BLOOD PRESSURE: 138 MMHG | WEIGHT: 240 LBS | DIASTOLIC BLOOD PRESSURE: 77 MMHG | RESPIRATION RATE: 16 BRPM | HEART RATE: 100 BPM

## 2023-02-09 DIAGNOSIS — O09.523 MULTIGRAVIDA OF ADVANCED MATERNAL AGE IN THIRD TRIMESTER: ICD-10-CM

## 2023-02-09 DIAGNOSIS — Z36.4 ULTRASOUND FOR ANTENATAL SCREENING FOR FETAL GROWTH RESTRICTION: ICD-10-CM

## 2023-02-09 DIAGNOSIS — O99.213 OBESITY AFFECTING PREGNANCY IN THIRD TRIMESTER: ICD-10-CM

## 2023-02-09 DIAGNOSIS — E03.9 HYPOTHYROIDISM AFFECTING PREGNANCY IN THIRD TRIMESTER: ICD-10-CM

## 2023-02-09 DIAGNOSIS — O99.283 HYPOTHYROIDISM AFFECTING PREGNANCY IN THIRD TRIMESTER: ICD-10-CM

## 2023-02-09 DIAGNOSIS — O16.3 HYPERTENSION AFFECTING PREGNANCY IN THIRD TRIMESTER: ICD-10-CM

## 2023-02-09 DIAGNOSIS — Z3A.33 33 WEEKS GESTATION OF PREGNANCY: ICD-10-CM

## 2023-02-09 DIAGNOSIS — O24.119 PRE-EXISTING TYPE 2 DIABETES MELLITUS DURING PREGNANCY, ANTEPARTUM: Primary | ICD-10-CM

## 2023-02-09 PROCEDURE — 76820 UMBILICAL ARTERY ECHO: CPT | Performed by: OBSTETRICS & GYNECOLOGY

## 2023-02-09 PROCEDURE — 76816 OB US FOLLOW-UP PER FETUS: CPT | Performed by: OBSTETRICS & GYNECOLOGY

## 2023-02-09 PROCEDURE — 76819 FETAL BIOPHYS PROFIL W/O NST: CPT | Performed by: OBSTETRICS & GYNECOLOGY

## 2023-02-09 PROCEDURE — 93325 DOPPLER ECHO COLOR FLOW MAPG: CPT | Performed by: OBSTETRICS & GYNECOLOGY

## 2023-02-09 PROCEDURE — 76826 ECHO EXAM OF FETAL HEART: CPT | Performed by: OBSTETRICS & GYNECOLOGY

## 2023-02-09 PROCEDURE — 76827 ECHO EXAM OF FETAL HEART: CPT | Performed by: OBSTETRICS & GYNECOLOGY

## 2023-02-10 ENCOUNTER — TELEPHONE (OUTPATIENT)
Dept: PERINATAL CARE | Age: 38
End: 2023-02-10

## 2023-02-10 NOTE — PROGRESS NOTES
Blood sugars reviewed (insulin regimen adjusted, as below). Insulin regimen increased to: NPH Insulin subcutaneously 60 units before bedtime (consistently elevated fasting blood sugars above 90's). Insulin regimen adjusted to subcutaneous (SQ) Novolog 26 units before breakfast, 26 units before lunch and 34 units before dinner.

## 2023-02-10 NOTE — TELEPHONE ENCOUNTER
Patient advised by phone that Dr Thais Ibrahim had reviewed her blood sugar log from 2/5/23-2/9/23 and his new insulin  recommendations is Novolog 26 units before breakfast, lunch and 34 units before dinner. NPH insulin will increase to 60 units at bedtime. Patient understands providers new insulin recommendations. Rx called into [facility] [place] I talked to Bryan. Novolog 26 units before breakfast, lunch 34 units before dinner. Nph insulin 60 units before bed time.  Per Dr Thais Ibrahim by Jamey Kiran

## 2023-02-13 ENCOUNTER — ROUTINE PRENATAL (OUTPATIENT)
Dept: OBGYN | Age: 38
End: 2023-02-13
Payer: COMMERCIAL

## 2023-02-13 VITALS
SYSTOLIC BLOOD PRESSURE: 137 MMHG | WEIGHT: 242 LBS | BODY MASS INDEX: 40.27 KG/M2 | HEART RATE: 98 BPM | DIASTOLIC BLOOD PRESSURE: 84 MMHG

## 2023-02-13 DIAGNOSIS — Z30.09 FAMILY PLANNING: ICD-10-CM

## 2023-02-13 DIAGNOSIS — Z98.891 HISTORY OF CESAREAN SECTION: ICD-10-CM

## 2023-02-13 DIAGNOSIS — Z13.31 POSITIVE SCREENING FOR DEPRESSION ON 2-ITEM PATIENT HEALTH QUESTIONNAIRE (PHQ-2): ICD-10-CM

## 2023-02-13 DIAGNOSIS — O24.319 PREGESTATIONAL DIABETES MELLITUS, MODIFIED WHITE CLASS B: ICD-10-CM

## 2023-02-13 DIAGNOSIS — Z3A.34 34 WEEKS GESTATION OF PREGNANCY: Primary | ICD-10-CM

## 2023-02-13 DIAGNOSIS — I10 CHRONIC HYPERTENSION: ICD-10-CM

## 2023-02-13 PROBLEM — Z3A.33 33 WEEKS GESTATION OF PREGNANCY: Status: RESOLVED | Noted: 2023-02-06 | Resolved: 2023-02-13

## 2023-02-13 PROBLEM — Z3A.30 30 WEEKS GESTATION OF PREGNANCY: Status: RESOLVED | Noted: 2023-01-17 | Resolved: 2023-02-13

## 2023-02-13 PROCEDURE — 3074F SYST BP LT 130 MM HG: CPT | Performed by: STUDENT IN AN ORGANIZED HEALTH CARE EDUCATION/TRAINING PROGRAM

## 2023-02-13 PROCEDURE — G8417 CALC BMI ABV UP PARAM F/U: HCPCS | Performed by: STUDENT IN AN ORGANIZED HEALTH CARE EDUCATION/TRAINING PROGRAM

## 2023-02-13 PROCEDURE — 1036F TOBACCO NON-USER: CPT | Performed by: STUDENT IN AN ORGANIZED HEALTH CARE EDUCATION/TRAINING PROGRAM

## 2023-02-13 PROCEDURE — 99213 OFFICE O/P EST LOW 20 MIN: CPT | Performed by: STUDENT IN AN ORGANIZED HEALTH CARE EDUCATION/TRAINING PROGRAM

## 2023-02-13 PROCEDURE — 3046F HEMOGLOBIN A1C LEVEL >9.0%: CPT | Performed by: STUDENT IN AN ORGANIZED HEALTH CARE EDUCATION/TRAINING PROGRAM

## 2023-02-13 PROCEDURE — 3078F DIAST BP <80 MM HG: CPT | Performed by: STUDENT IN AN ORGANIZED HEALTH CARE EDUCATION/TRAINING PROGRAM

## 2023-02-13 PROCEDURE — 99211 OFF/OP EST MAY X REQ PHY/QHP: CPT | Performed by: STUDENT IN AN ORGANIZED HEALTH CARE EDUCATION/TRAINING PROGRAM

## 2023-02-13 PROCEDURE — G8482 FLU IMMUNIZE ORDER/ADMIN: HCPCS | Performed by: STUDENT IN AN ORGANIZED HEALTH CARE EDUCATION/TRAINING PROGRAM

## 2023-02-13 PROCEDURE — G8427 DOCREV CUR MEDS BY ELIG CLIN: HCPCS | Performed by: STUDENT IN AN ORGANIZED HEALTH CARE EDUCATION/TRAINING PROGRAM

## 2023-02-13 PROCEDURE — 2022F DILAT RTA XM EVC RTNOPTHY: CPT | Performed by: STUDENT IN AN ORGANIZED HEALTH CARE EDUCATION/TRAINING PROGRAM

## 2023-02-13 NOTE — PROGRESS NOTES
Prenatal Visit    Romi Byrd is a 30 y.o. female [id number] at 34w2d    The patient was seen and evaluated. She complains of fatigue but otherwise denies any complaints. She reports positive fetal movements. She denies contractions, vaginal bleeding and leakage of fluid. Signs and symptoms of labor and pre-eclampsia were reviewed with the patient in detail. She is to report any of these if they occur. She currently denies any of these. The patient is Rh positive and Rhogam is not indicated in this pregnancy. The patient already received  the T-Dap Vaccine (27-36 weeks) this pregnancy. The patient already received  the influenza vaccine this year. The patient declined the COVID-19 vaccine this year. The problem list reflects the active issues addressed during today's visit    Vitals:    BP: 137/84  Weight: 242 lb (109.8 kg)  Heart Rate: 98  Albumin: Negative  Glucose: Negative  Fundal Height (cm): 36 cm  Fetal HR: 160  Movement: Present     28 week labs: .  28 week CBC:   Lab Results   Component Value Date    WBC 12.8 (H) 2023    HGB 12.9 2023    HCT 41.7 2023    MCV 89.7 2023     2023     UA w/ Ur C&S: negative 22     Assessment & Plan:  Romi Byrd is a 30 y.o. female  at 35w2d   - 29 week labs completed   - Tdap vaccination: already received     - Influenza vaccination: already received     - Rhogam: is not indicated in this pregnancy    - COVID-19 vaccination: R/B/A discussed with increased risk of both maternal and fetal morbidity and mortality in unvaccinated pregnant patients who contract COVID-19- patient declined today   -  testing indication starting 32 weeks GA: cHTN (no meds), pregestational DM, AMA, obesity - scheduled for twice weekly BPP/NSTs   - NST reactive in office today   -Indications for  section: repeat  section, declining TOLAC, ERAS protocols: not yet reviewed, will schedule for upcoming [facility] visit   - Warning signs reviewed and recommendations when to call or present to the hospital if she experiences signs or symptoms of  labor and pre-eclampsia were reviewed. - The patient was instructed on fetal kick counts. She was instructed that the baby should move at a minimum of ten times within one hour after a meal. The patient was instructed to lay down on her left side twenty minutes after eating and count movements for up to one hour with a target value of ten movements. She was instructed to notify the office if she did not make that target after two attempts or if after any attempt there was less than four movements. cHTN (no meds)   - BP normotensive   - Denies any s/s of preeclampsia   - PreE labs wnl, P/C 0.09   - Continue to monitor closely    Pregestational DM   - Diagnosed this pregnancy with early GTT   - Insulin regimen: Novolog  U with meals and NPH 60U nightly   - Most recent MFM US showed EFW 6#6 (81%) and AC >97%   - Fetal echo wnl 23   - Following with MFM    Hx CS x1, declines TOLAC   - G1 in    - Plan to discuss ERAS at upcoming [facility] visit    - Desires risk reducing bilateral salpingectomy; Medicaid form signed today    - Discussed at length the risks and benefits of concurrent bilateral salpingectomy with patient's upcoming scheduled abdominal or pelvic surgery per recommendation of the Society of Gynecologic Oncology, [address] [place] [address] of Obstetricians and Gynecologists as this patient is at above average risk for development of ovarian cancer due to history of tobacco use and obesity. Advised patient that the primary benefit of such surgery is a 65% reduction in future risk of ovarian cancer. Patient advised that large scale studies have not demonstrated an increase in estimated blood loss, complications, or operating time but that bleeding, infection, and injury to nearby organs are potential complications with this additional surgery.  Finally, patient has been thoroughly counseled regarding the consequence of loss of fertility following this procedure. Patient understands that this loss of fertility can not be reversed and has expressed via verbal and written consent that her wishes are to proceed with this surgery for the purposes of ovarian cancer reduction. Patient Active Problem List    Diagnosis Date Noted    Desires RRS 2023     Priority: Medium     Medicaid form signed 23      AMA 2022     Priority: Medium    Hx Chlamydia 2022     Priority: Medium     Negative 22      Pregestational DM 2022     Diagnosed in G5 pregnancy, early 1 hr and 3 hr GTT    Novolog 12, NPH 36      cHTN (no meds) 2022: Diagnosed by Dr. Claudeen Ares per review of flowsheets. No meds at this time. ASA Rx sent to patient's pharmacy  Baseline PreE labs (CMP, protein-creatinine ratio ordered_      Depression 2022: PHQ=1, loss of interest      Hx hypothyroidism 2022: Patient was prescribed Levothyroxine in  but stopped due to too much weight loss. TSH wnl 10/10/22      Hx C/S x 1 2022: Patient would like to discuss RCS versus TOLAC      High-risk pregnancy 2022     Risk factors: New partner; chronic hypertension (no meds); advanced maternal age; H/O hypothyroidism; irritable bowel syndrome; obesity; H/O  x1; H/O vertigo; COVID unvaccinated    Fetal testing indicated: Yes  Fetal testing indication: Advanced maternal age  Fetal testing initiation: Individualized  Fetal testing frequency: Individualized      Former smoker 2022: Patient reports quitting 2022      Laparoscopic converted to open wedge resection of right cornual ectopic, right salpingectomy and evacuation of hemoperitoneum 2019     Patient counseled extensively on the need to not get pregnant for at least 18 months.        IBS 2015: Patient denies meds at this time       Return for 1 week ANDER Jewell DO  Ob/Gyn Resident  [facility] [id number]  2/13/2023, 1:59 PM        Attending Physician Statement  I have discussed the care of Rhonda Wilder, including pertinent history and exam findings,  with the resident. I have reviewed the key elements of all parts of the encounter with the resident. I agree with the assessment, plan and orders as documented by the resident.   (GE Modifier)    Mayda Villegas DO

## 2023-02-16 ENCOUNTER — ROUTINE PRENATAL (OUTPATIENT)
Dept: PERINATAL CARE | Age: 38
End: 2023-02-16
Payer: COMMERCIAL

## 2023-02-16 VITALS
DIASTOLIC BLOOD PRESSURE: 81 MMHG | RESPIRATION RATE: 16 BRPM | HEART RATE: 104 BPM | WEIGHT: 244 LBS | TEMPERATURE: 98.1 F | BODY MASS INDEX: 40.65 KG/M2 | SYSTOLIC BLOOD PRESSURE: 130 MMHG | HEIGHT: 65 IN

## 2023-02-16 DIAGNOSIS — E03.9 HYPOTHYROIDISM AFFECTING PREGNANCY IN THIRD TRIMESTER: ICD-10-CM

## 2023-02-16 DIAGNOSIS — O99.213 OBESITY AFFECTING PREGNANCY IN THIRD TRIMESTER: ICD-10-CM

## 2023-02-16 DIAGNOSIS — O16.3 HYPERTENSION AFFECTING PREGNANCY IN THIRD TRIMESTER: Primary | ICD-10-CM

## 2023-02-16 DIAGNOSIS — O24.119 PRE-EXISTING TYPE 2 DIABETES MELLITUS DURING PREGNANCY, ANTEPARTUM: ICD-10-CM

## 2023-02-16 DIAGNOSIS — O09.523 MULTIGRAVIDA OF ADVANCED MATERNAL AGE IN THIRD TRIMESTER: ICD-10-CM

## 2023-02-16 DIAGNOSIS — Z3A.34 34 WEEKS GESTATION OF PREGNANCY: ICD-10-CM

## 2023-02-16 DIAGNOSIS — O99.283 HYPOTHYROIDISM AFFECTING PREGNANCY IN THIRD TRIMESTER: ICD-10-CM

## 2023-02-16 PROCEDURE — 76820 UMBILICAL ARTERY ECHO: CPT | Performed by: OBSTETRICS & GYNECOLOGY

## 2023-02-16 PROCEDURE — 76819 FETAL BIOPHYS PROFIL W/O NST: CPT | Performed by: OBSTETRICS & GYNECOLOGY

## 2023-02-16 NOTE — PROGRESS NOTES
Pt states not taking NPH insulin \"because pharmacy awaiting insurance approval\"    Insurance prior Auth sent on 2/13/2023- instructed pt to contact pharmacy today for f/u

## 2023-02-16 NOTE — PROGRESS NOTES
Blood sugars reviewed (patient is not taking NPH insulin as previously advised). Insulin regimen advised to start: NPH Insulin subcutaneously 10 units before bedtime (consistently elevated fasting blood sugars above 90's). Insulin regimen adjusted to: subcutaneous (SQ) Novolog 30 units before breakfast, 30 units before lunch and 40 units before dinner. Please continue to send blood glucose monitoring information to [facility] office so that insulin and/or dietary changes can be made if necessary weekly. Diabetic education/nutrition counseling completed. Continue recommended ADA diet therapy for diabetes. Compliance with regular prenatal care and blood sugar monitoring strongly encouraged.       Strongly advised patient to be compliant with blood sugar monitoring as previously advised to minimize the potential of adverse  outcomes (e.g. fetal demise/stillbirth, congenital birth/heart defects, polyhydramnios/oligohydramnios, fetal growth abnormalities, pregnancy loss, hypertensive disorders of pregnancy, indicated  delivery, etc.), potential maternal morbidities, etc.

## 2023-02-20 ENCOUNTER — HOSPITAL ENCOUNTER (OUTPATIENT)
Age: 38
Setting detail: SPECIMEN
Discharge: HOME | End: 2023-02-20

## 2023-02-20 ENCOUNTER — ROUTINE PRENATAL (OUTPATIENT)
Dept: OBGYN | Age: 38
End: 2023-02-20
Payer: COMMERCIAL

## 2023-02-20 VITALS
SYSTOLIC BLOOD PRESSURE: 134 MMHG | WEIGHT: 244 LBS | HEART RATE: 98 BPM | DIASTOLIC BLOOD PRESSURE: 89 MMHG | BODY MASS INDEX: 40.6 KG/M2

## 2023-02-20 DIAGNOSIS — I10 CHRONIC HYPERTENSION: ICD-10-CM

## 2023-02-20 DIAGNOSIS — Z30.09 FAMILY PLANNING: ICD-10-CM

## 2023-02-20 DIAGNOSIS — Z3A.35 35 WEEKS GESTATION OF PREGNANCY: ICD-10-CM

## 2023-02-20 DIAGNOSIS — Z98.891 HISTORY OF CESAREAN SECTION: ICD-10-CM

## 2023-02-20 DIAGNOSIS — Z3A.35 35 WEEKS GESTATION OF PREGNANCY: Primary | ICD-10-CM

## 2023-02-20 DIAGNOSIS — O24.319 PREGESTATIONAL DIABETES MELLITUS, MODIFIED WHITE CLASS B: ICD-10-CM

## 2023-02-20 DIAGNOSIS — Z13.31 POSITIVE SCREENING FOR DEPRESSION ON 2-ITEM PATIENT HEALTH QUESTIONNAIRE (PHQ-2): ICD-10-CM

## 2023-02-20 PROCEDURE — G8482 FLU IMMUNIZE ORDER/ADMIN: HCPCS | Performed by: STUDENT IN AN ORGANIZED HEALTH CARE EDUCATION/TRAINING PROGRAM

## 2023-02-20 PROCEDURE — 99213 OFFICE O/P EST LOW 20 MIN: CPT | Performed by: STUDENT IN AN ORGANIZED HEALTH CARE EDUCATION/TRAINING PROGRAM

## 2023-02-20 PROCEDURE — G8417 CALC BMI ABV UP PARAM F/U: HCPCS | Performed by: STUDENT IN AN ORGANIZED HEALTH CARE EDUCATION/TRAINING PROGRAM

## 2023-02-20 PROCEDURE — 99211 OFF/OP EST MAY X REQ PHY/QHP: CPT | Performed by: STUDENT IN AN ORGANIZED HEALTH CARE EDUCATION/TRAINING PROGRAM

## 2023-02-20 PROCEDURE — 2022F DILAT RTA XM EVC RTNOPTHY: CPT | Performed by: STUDENT IN AN ORGANIZED HEALTH CARE EDUCATION/TRAINING PROGRAM

## 2023-02-20 PROCEDURE — G8427 DOCREV CUR MEDS BY ELIG CLIN: HCPCS | Performed by: STUDENT IN AN ORGANIZED HEALTH CARE EDUCATION/TRAINING PROGRAM

## 2023-02-20 PROCEDURE — 1036F TOBACCO NON-USER: CPT | Performed by: STUDENT IN AN ORGANIZED HEALTH CARE EDUCATION/TRAINING PROGRAM

## 2023-02-20 PROCEDURE — 3074F SYST BP LT 130 MM HG: CPT | Performed by: STUDENT IN AN ORGANIZED HEALTH CARE EDUCATION/TRAINING PROGRAM

## 2023-02-20 PROCEDURE — 3078F DIAST BP <80 MM HG: CPT | Performed by: STUDENT IN AN ORGANIZED HEALTH CARE EDUCATION/TRAINING PROGRAM

## 2023-02-20 PROCEDURE — 3046F HEMOGLOBIN A1C LEVEL >9.0%: CPT | Performed by: STUDENT IN AN ORGANIZED HEALTH CARE EDUCATION/TRAINING PROGRAM

## 2023-02-20 NOTE — PROGRESS NOTES
Prenatal Visit    Heri Najera is a 30 y.o. female [id number] at 35w2d    The patient was seen and evaluated. She denies any complaints. She is wondering when her CS will be scheduled. She reports positive fetal movements. She denies contractions, vaginal bleeding and leakage of fluid. Signs and symptoms of labor and pre-eclampsia were reviewed with the patient in detail. She is to report any of these if they occur. She currently denies any signs or symptoms of pre-clampsia including headache, vision changes, RUQ pain. The patient is Rh positive and Rhogam is not indicated in this pregnancy  The patient already received  the T-Dap Vaccine (27-36 weeks) this pregnancy. The patient already received  the influenza vaccine this year. The patient declined the COVID-19 vaccine this year. The problem list reflects the active issues addressed during today's visit. Allergies:  No Known Allergies    Vitals:    BP: 134/89  Weight: 244 lb (110.7 kg)  Heart Rate: 98  Albumin: Trace  Glucose: 1+  Fundal Height (cm): 37 cm  Fetal HR: 160  Movement: Present     Labs:  Group Beta Strep collection was done. Assessment & Plan:  Heri Najera is a 30 y.o. female [id number] at 35w2d   - GBS testing was completed, sensitivities for clindamycin and erythromycin were not indicated   - Tdap vaccination: patient already received.    - Rhogam: not indicated   - Influenza vaccination: 10/7/22   - COVID-19 vaccination: R/B/A discussed with increased risk of both maternal and fetal morbidity and mortality in unvaccinated pregnant patients who contract COVID-19- patient declined today   -  testing indication: cHTN (no meds) and pregestational DM - scheduled for weekly NSTs     - Indications for  section: repeat  section, declining TOLAC, ERAS protocols: not yet reviewed, will schedule for next VIET visit   - Warning signs reviewed and recommendations when to call or present to the hospital if she experiences signs or symptoms of  labor and pre-eclampsia were reviewed. - The patient was instructed on fetal kick counts. She was instructed that the baby should move at a minimum of ten times within one hour after a meal. The patient was instructed to lay down on her left side twenty minutes after eating and count movements for up to one hour with a target value of ten movements. She was instructed to notify the office if she did not make that target after two attempts or if after any attempt there was less than four movements. - Route of delivery and counseling on vaginal, operative vaginal, and  sections were completed with the risks of each to both the patient as well as her baby. The possibility of a blood transfusion was discussed as well. The patient was not opposed to receiving a transfusion if needed. - The patient was counseled on types of analgesia during labor and is considering spinal.  - The patient was counseled on the need to choose her pediatrician for her baby. - The patient was counseled on postpartum plans for dysmenorrhea and she is undecided.    - NST Category 1 reactive today     cHTN (no meds)   - BPs elevated with normotensive repeat    - Denies any s/s of preeclampsia   - PreE labs wnl, P/C 0.09   - Of note, Labetalol previously prescribed in December however patient never started it and BP have remained stable    - Continue to monitor closely    Pregestational DM   - Following with MFM   - Insulin regimen: Novolog  and NPH 10U nightly    - Fetal echo wnl 23   - Encouraged compliance with checking blood sugars and insulin    Hx CS x1, declines TOLAC   - Repeat CS with RRS scheduled for 3/15 at 0900 at [facility]      Patient Active Problem List    Diagnosis Date Noted    Desires RRS 2023     Priority: Medium     Medicaid form signed 23      AMA 2022     Priority: Medium    Hx Chlamydia 2022     Priority: Medium     Negative 22 Pregestational DM 2022     Diagnosed in G5 pregnancy, early 1 hr and 3 hr GTT    Novolog 12, NPH 36      cHTN (no meds) 2022: Diagnosed by Dr. Deepa Whitney per review of flowsheets. No meds at this time. ASA Rx sent to patient's pharmacy  Baseline PreE labs (CMP, protein-creatinine ratio ordered_      Depression 2022: PHQ=1, loss of interest      Hx hypothyroidism 2022: Patient was prescribed Levothyroxine in  but stopped due to too much weight loss. TSH wnl 10/10/22      Hx C/S x 1 2022: Patient would like to discuss RCS versus TOLAC    APPROVED OVARIAN CA RRBS FORM SCANNED INTO MEDIA -SK        High-risk pregnancy 2022     Risk factors: New partner; chronic hypertension (no meds); advanced maternal age; H/O hypothyroidism; irritable bowel syndrome; obesity; H/O  x1; H/O vertigo; COVID unvaccinated    Fetal testing indicated: Yes  Fetal testing indication: Advanced maternal age  Fetal testing initiation: Individualized  Fetal testing frequency: Individualized      Former smoker 2022: Patient reports quitting 2022      Laparoscopic converted to open wedge resection of right cornual ectopic, right salpingectomy and evacuation of hemoperitoneum 2019     Patient counseled extensively on the need to not get pregnant for at least 18 months. IBS 2015: Patient denies meds at this time       Return in about 1 week (around 2023) for 1 week VIET and NST. Pipe Tdaeo DO  Ob/Gyn Resident  [facility] [place] [facility], [address]  2023, 2:04 PM        Attending Physician Statement  I have discussed the care of Darling Martins, including pertinent history and exam findings,  with the resident. I have reviewed the key elements of all parts of the encounter with the resident. I agree with the assessment, plan and orders as documented by the resident.   (GE Modifier)    Enoc Wilcox,

## 2023-02-23 ENCOUNTER — ROUTINE PRENATAL (OUTPATIENT)
Dept: PERINATAL CARE | Age: 38
End: 2023-02-23
Payer: COMMERCIAL

## 2023-02-23 VITALS
TEMPERATURE: 97.7 F | SYSTOLIC BLOOD PRESSURE: 131 MMHG | HEIGHT: 65 IN | HEART RATE: 100 BPM | RESPIRATION RATE: 16 BRPM | BODY MASS INDEX: 40.48 KG/M2 | WEIGHT: 243 LBS | DIASTOLIC BLOOD PRESSURE: 82 MMHG

## 2023-02-23 DIAGNOSIS — O43.193 PLACENTAL CYST AFFECTING PREGNANCY IN THIRD TRIMESTER: ICD-10-CM

## 2023-02-23 DIAGNOSIS — O16.3 HYPERTENSION AFFECTING PREGNANCY IN THIRD TRIMESTER: Primary | ICD-10-CM

## 2023-02-23 DIAGNOSIS — E03.9 HYPOTHYROIDISM AFFECTING PREGNANCY IN THIRD TRIMESTER: ICD-10-CM

## 2023-02-23 DIAGNOSIS — Z3A.35 35 WEEKS GESTATION OF PREGNANCY: ICD-10-CM

## 2023-02-23 DIAGNOSIS — O09.523 MULTIGRAVIDA OF ADVANCED MATERNAL AGE IN THIRD TRIMESTER: ICD-10-CM

## 2023-02-23 DIAGNOSIS — O99.213 OBESITY AFFECTING PREGNANCY IN THIRD TRIMESTER: ICD-10-CM

## 2023-02-23 DIAGNOSIS — O24.119 PRE-EXISTING TYPE 2 DIABETES MELLITUS DURING PREGNANCY, ANTEPARTUM: ICD-10-CM

## 2023-02-23 DIAGNOSIS — O99.283 HYPOTHYROIDISM AFFECTING PREGNANCY IN THIRD TRIMESTER: ICD-10-CM

## 2023-02-23 LAB
MICROORGANISM SPEC CULT: NORMAL
SPECIMEN DESCRIPTION: NORMAL

## 2023-02-23 PROCEDURE — 76819 FETAL BIOPHYS PROFIL W/O NST: CPT | Performed by: OBSTETRICS & GYNECOLOGY

## 2023-02-23 PROCEDURE — 76820 UMBILICAL ARTERY ECHO: CPT | Performed by: OBSTETRICS & GYNECOLOGY

## 2023-02-23 PROCEDURE — 76815 OB US LIMITED FETUS(S): CPT | Performed by: OBSTETRICS & GYNECOLOGY

## 2023-02-23 NOTE — PROGRESS NOTES
Blood sugars reviewed (patient is not taking NPH insulin as previously advised). Insulin regimen advised to start: NPH Insulin subcutaneously 10 units before bedtime (consistently elevated fasting blood sugars above 90's). Insulin regimen adjusted to: subcutaneous (SQ) Novolog 34 units before lunch and 46 units before dinner. Continue subcutaneous (SQ) Novolog 30 units before breakfast.    Please continue to send blood glucose monitoring information to [facility] office so that insulin and/or dietary changes can be made if necessary weekly. Diabetic education/nutrition counseling completed. Continue recommended ADA diet therapy for diabetes. Compliance with regular prenatal care and blood sugar monitoring strongly encouraged.       Strongly advised patient to be compliant with blood sugar monitoring as previously advised to minimize the potential of adverse  outcomes (e.g. fetal demise/stillbirth, congenital birth/heart defects, polyhydramnios/oligohydramnios, fetal growth abnormalities, pregnancy loss, hypertensive disorders of pregnancy, indicated  delivery, etc.), potential maternal morbidities, etc.

## 2023-02-23 NOTE — PROGRESS NOTES
Obstetric/Gynecology Maternal Fetal Medicine Resident Note    Updated patient regarding new US finding of placental cyst. She declines formal MFM consult.      DO CLAUDIA Gore Resident, PGY2  [facility]  2/23/2023, 4:02 PM

## 2023-02-27 ENCOUNTER — ROUTINE PRENATAL (OUTPATIENT)
Dept: OBGYN | Age: 38
End: 2023-02-27
Payer: COMMERCIAL

## 2023-02-27 ENCOUNTER — HOSPITAL ENCOUNTER (OUTPATIENT)
Age: 38
Discharge: HOME | End: 2023-02-27
Attending: OBSTETRICS & GYNECOLOGY | Admitting: OBSTETRICS & GYNECOLOGY
Payer: COMMERCIAL

## 2023-02-27 VITALS
DIASTOLIC BLOOD PRESSURE: 93 MMHG | HEART RATE: 93 BPM | BODY MASS INDEX: 40.6 KG/M2 | SYSTOLIC BLOOD PRESSURE: 145 MMHG | WEIGHT: 244 LBS

## 2023-02-27 VITALS
RESPIRATION RATE: 18 BRPM | SYSTOLIC BLOOD PRESSURE: 133 MMHG | TEMPERATURE: 98.3 F | HEART RATE: 93 BPM | OXYGEN SATURATION: 97 % | DIASTOLIC BLOOD PRESSURE: 87 MMHG

## 2023-02-27 DIAGNOSIS — Z3A.36 36 WEEKS GESTATION OF PREGNANCY: ICD-10-CM

## 2023-02-27 DIAGNOSIS — Z98.891 HISTORY OF CESAREAN SECTION: ICD-10-CM

## 2023-02-27 DIAGNOSIS — O24.319 PREGESTATIONAL DIABETES MELLITUS, MODIFIED WHITE CLASS B: ICD-10-CM

## 2023-02-27 DIAGNOSIS — O28.8 NON-REACTIVE NST (NON-STRESS TEST): ICD-10-CM

## 2023-02-27 DIAGNOSIS — I10 CHRONIC HYPERTENSION: Primary | ICD-10-CM

## 2023-02-27 DIAGNOSIS — Z13.31 POSITIVE SCREENING FOR DEPRESSION ON 2-ITEM PATIENT HEALTH QUESTIONNAIRE (PHQ-2): ICD-10-CM

## 2023-02-27 PROBLEM — O09.90 HIGH-RISK PREGNANCY, UNSPECIFIED TRIMESTER: Status: ACTIVE | Noted: 2023-02-27

## 2023-02-27 LAB
ABSOLUTE EOS #: <0.03 K/UL (ref 0–0.44)
ABSOLUTE IMMATURE GRANULOCYTE: 0.23 K/UL (ref 0–0.3)
ABSOLUTE LYMPH #: 1.73 K/UL (ref 1.1–3.7)
ABSOLUTE MONO #: 0.93 K/UL (ref 0.1–1.2)
ALBUMIN SERPL-MCNC: 3.5 G/DL (ref 3.5–5.2)
ALBUMIN/GLOBULIN RATIO: 1.1 (ref 1–2.5)
ALP SERPL-CCNC: 110 U/L (ref 35–104)
ALT SERPL-CCNC: 15 U/L (ref 5–33)
ANION GAP SERPL CALCULATED.3IONS-SCNC: 15 MMOL/L (ref 9–17)
AST SERPL-CCNC: 16 U/L
BASOPHILS # BLD: 1 % (ref 0–2)
BASOPHILS ABSOLUTE: 0.08 K/UL (ref 0–0.2)
BILIRUB SERPL-MCNC: 0.2 MG/DL (ref 0.3–1.2)
BUN SERPL-MCNC: 7 MG/DL (ref 6–20)
CALCIUM SERPL-MCNC: 9.5 MG/DL (ref 8.6–10.4)
CHLORIDE SERPL-SCNC: 101 MMOL/L (ref 98–107)
CO2 SERPL-SCNC: 18 MMOL/L (ref 20–31)
CREAT SERPL-MCNC: 0.57 MG/DL (ref 0.5–0.9)
CREATININE URINE: 147.6 MG/DL (ref 28–217)
EOSINOPHILS RELATIVE PERCENT: 0 % (ref 1–4)
GFR SERPL CREATININE-BSD FRML MDRD: >60 ML/MIN/1.73M2
GLUCOSE SERPL-MCNC: 130 MG/DL (ref 70–99)
HCT VFR BLD AUTO: 43.1 % (ref 36.3–47.1)
HGB BLD-MCNC: 13.6 G/DL (ref 11.9–15.1)
IMMATURE GRANULOCYTES: 2 %
LYMPHOCYTES # BLD: 13 % (ref 24–43)
MCH RBC QN AUTO: 28.2 PG (ref 25.2–33.5)
MCHC RBC AUTO-ENTMCNC: 31.6 G/DL (ref 28.4–34.8)
MCV RBC AUTO: 89.4 FL (ref 82.6–102.9)
MONOCYTES # BLD: 7 % (ref 3–12)
NRBC AUTOMATED: 0 PER 100 WBC
PDW BLD-RTO: 15 % (ref 11.8–14.4)
PLATELET # BLD AUTO: 264 K/UL (ref 138–453)
PMV BLD AUTO: 10.4 FL (ref 8.1–13.5)
POTASSIUM SERPL-SCNC: 4 MMOL/L (ref 3.7–5.3)
PROT SERPL-MCNC: 6.6 G/DL (ref 6.4–8.3)
RBC # BLD: 4.82 M/UL (ref 3.95–5.11)
RBC # BLD: ABNORMAL 10*6/UL
SEG NEUTROPHILS: 77 % (ref 36–65)
SEGMENTED NEUTROPHILS ABSOLUTE COUNT: 10.17 K/UL (ref 1.5–8.1)
SODIUM SERPL-SCNC: 134 MMOL/L (ref 135–144)
TOTAL PROTEIN, URINE: 19 MG/DL
URINE TOTAL PROTEIN CREATININE RATIO: 0.13 (ref 0–0.2)
WBC # BLD AUTO: 13.2 K/UL (ref 3.5–11.3)

## 2023-02-27 PROCEDURE — 80053 COMPREHEN METABOLIC PANEL: CPT

## 2023-02-27 PROCEDURE — 99213 OFFICE O/P EST LOW 20 MIN: CPT | Performed by: STUDENT IN AN ORGANIZED HEALTH CARE EDUCATION/TRAINING PROGRAM

## 2023-02-27 PROCEDURE — G8482 FLU IMMUNIZE ORDER/ADMIN: HCPCS | Performed by: STUDENT IN AN ORGANIZED HEALTH CARE EDUCATION/TRAINING PROGRAM

## 2023-02-27 PROCEDURE — 3078F DIAST BP <80 MM HG: CPT | Performed by: STUDENT IN AN ORGANIZED HEALTH CARE EDUCATION/TRAINING PROGRAM

## 2023-02-27 PROCEDURE — 85025 COMPLETE CBC W/AUTO DIFF WBC: CPT

## 2023-02-27 PROCEDURE — G8427 DOCREV CUR MEDS BY ELIG CLIN: HCPCS | Performed by: STUDENT IN AN ORGANIZED HEALTH CARE EDUCATION/TRAINING PROGRAM

## 2023-02-27 PROCEDURE — 84156 ASSAY OF PROTEIN URINE: CPT

## 2023-02-27 PROCEDURE — 1036F TOBACCO NON-USER: CPT | Performed by: STUDENT IN AN ORGANIZED HEALTH CARE EDUCATION/TRAINING PROGRAM

## 2023-02-27 PROCEDURE — 99234 HOSP IP/OBS SM DT SF/LOW 45: CPT | Performed by: OBSTETRICS & GYNECOLOGY

## 2023-02-27 PROCEDURE — G8417 CALC BMI ABV UP PARAM F/U: HCPCS | Performed by: STUDENT IN AN ORGANIZED HEALTH CARE EDUCATION/TRAINING PROGRAM

## 2023-02-27 PROCEDURE — 2022F DILAT RTA XM EVC RTNOPTHY: CPT | Performed by: STUDENT IN AN ORGANIZED HEALTH CARE EDUCATION/TRAINING PROGRAM

## 2023-02-27 PROCEDURE — 36415 COLL VENOUS BLD VENIPUNCTURE: CPT

## 2023-02-27 PROCEDURE — 76818 FETAL BIOPHYS PROFILE W/NST: CPT | Performed by: OBSTETRICS & GYNECOLOGY

## 2023-02-27 PROCEDURE — 3046F HEMOGLOBIN A1C LEVEL >9.0%: CPT | Performed by: STUDENT IN AN ORGANIZED HEALTH CARE EDUCATION/TRAINING PROGRAM

## 2023-02-27 PROCEDURE — 3074F SYST BP LT 130 MM HG: CPT | Performed by: STUDENT IN AN ORGANIZED HEALTH CARE EDUCATION/TRAINING PROGRAM

## 2023-02-27 PROCEDURE — 82570 ASSAY OF URINE CREATININE: CPT

## 2023-02-27 PROCEDURE — 99213 OFFICE O/P EST LOW 20 MIN: CPT

## 2023-02-27 RX ORDER — ONDANSETRON 4 MG/1
4 TABLET, ORALLY DISINTEGRATING ORAL EVERY 8 HOURS PRN
Status: DISCONTINUED | OUTPATIENT
Start: 2023-02-27 | End: 2023-02-27 | Stop reason: HOSPADM

## 2023-02-27 RX ORDER — ONDANSETRON 2 MG/ML
4 INJECTION INTRAMUSCULAR; INTRAVENOUS EVERY 6 HOURS PRN
Status: DISCONTINUED | OUTPATIENT
Start: 2023-02-27 | End: 2023-02-27 | Stop reason: HOSPADM

## 2023-02-27 RX ORDER — ACETAMINOPHEN 325 MG/1
650 TABLET ORAL EVERY 4 HOURS PRN
Status: DISCONTINUED | OUTPATIENT
Start: 2023-02-27 | End: 2023-02-27 | Stop reason: HOSPADM

## 2023-02-27 NOTE — DISCHARGE INSTRUCTIONS
Notify Physician for:       *  Regular contractions that are  5 minutes apart or less lasting longer than 1 hr for 1st baby, 10 mins apart or less if 2nd baby or greater. *  Leaking or gush of fluid from vagina. *  Any vaginal bleeding that is heavier than a menstrual period. *  Decrease or absence of baby movement. *  Vaginal pressure, low backache. *  Vomiting or diarrhea for several hours, and unable to take in/ keep fluids or food down. *  Increase or change in vaginal discharge. *  Abdominal or menstrual- like cramping that is constant or intermittent. *  Fever and/ or chills. *  Headache              *  Blurred and or visual changes-  (spots before eyes, \"floaters\")              *  Upper abdominal/ epigastric pain  Activities:       As tolerated                      Diet:         As tolerated    Drink 10- 12 glasses of water daily.     Be sure to keep next scheduled appt, and call your Dr. With any questions

## 2023-02-27 NOTE — FLOWSHEET NOTE
Patient to labor and delivery ambulatory form the clinic for extended monitoring.Patient denies contractions, LOF or vaginal bleeding and states she feels some fetal movement.

## 2023-02-27 NOTE — PROGRESS NOTES
Prenatal Visit    Khai Soriano is a 30 y.o. female [id number] at 36w2d    The patient was seen and evaluated. She is complaining of heartburn at night. She reports positive fetal movements. She denies contractions, vaginal bleeding and leakage of fluid. Signs and symptoms of labor and pre-eclampsia were reviewed with the patient in detail. She is to report any of these if they occur. She currently denies any signs or symptoms of pre-clampsia including headache, vision changes, RUQ pain. The patient is Rh positive and Rhogam is not indicated in this pregnancy  The patient already received  the T-Dap Vaccine (27-36 weeks) this pregnancy. The patient declined the influenza vaccine this year. The patient declined the COVID-19 vaccine this year. The problem list reflects the active issues addressed during today's visit. Allergies:  No Known Allergies    Vitals:  Vitals:    23 1340 23 1415   BP: (!) 140/89 (!) 145/93   Site:  Right Upper Arm   Position:  Sitting   Cuff Size:  Large Adult   Pulse: (!) 102 93   Weight: 244 lb (110.7 kg)        BP: (!) 145/93  Weight: 244 lb (110.7 kg)  Heart Rate: 93  Patient Position: Sitting  Albumin: Trace  Glucose: Trace  Fetal HR: 170  Movement: Present     Labs:  Group Beta Strep collection was negative.      Assessment & Plan:  Khai Soriano is a 30 y.o. female [id number] at 37w1d   - GBS testing was completed, sensitivities for clindamycin and erythromycin were not ordered   - Tdap vaccination: patient already received 22   - Rhogam: not indicated   - Influenza vaccination: already received    - COVID-19 vaccination: R/B/A discussed with increased risk of both maternal and fetal morbidity and mortality in unvaccinated pregnant patients who contract COVID-19- patient declined today   -  testing indication: cHTN (no meds) and pregestational DM - scheduled for weekly BPP/dopplers at [facility] and weekly NSTs    - Indications for  section: repeat  section, declining TOLAC, ERAS protocols: reviewed with patient today   - Warning signs reviewed and recommendations when to call or present to the hospital if she experiences signs or symptoms of  labor and pre-eclampsia were reviewed. - The patient was instructed on fetal kick counts. She was instructed that the baby should move at a minimum of ten times within one hour after a meal. The patient was instructed to lay down on her left side twenty minutes after eating and count movements for up to one hour with a target value of ten movements. She was instructed to notify the office if she did not make that target after two attempts or if after any attempt there was less than four movements. - Route of delivery and counseling on vaginal, operative vaginal, and  sections were completed with the risks of each to both the patient as well as her baby. The possibility of a blood transfusion was discussed as well. The patient was not opposed to receiving a transfusion if needed. - The patient was counseled on types of analgesia during labor and is considering spinal.  - The patient was counseled on the need to choose her pediatrician for her baby. - The patient was counseled on postpartum plans for dysmenorrhea and she is undecided. - Repeat CS w/ RRS previously scheduled for 3/15/23. Discussed ERAS protocol today  - NST non reactive with baseline 170. Recommend [facility] for extended monitoring and BPP. Patient agreeable. OB residents, charge RN and Dr. Lyric Gray updated.      cHTN (no meds)   - BPs elevated x2   - Denies any s/s of preeclampsia   - PreE labs wnl, P/C 0.09   - Continue to monitor closely   - Consider starting antihypertensive given persistently elevated BP    Pregestational DM    - Diagnosed this pregnancy with early GTT   - Insulin regimen: Novolog  with meals; NPH 10U nightly   - Following with MFM       Patient Active Problem List    Diagnosis Date Noted    Desires RRS 2023     Priority: Medium     Medicaid form signed 23      AMA 2022     Priority: Medium    Hx Chlamydia 2022     Priority: Medium     Negative 22      Pregestational DM 2022     Diagnosed in G5 pregnancy, early 1 hr and 3 hr GTT    Novolog 12, NPH 36      cHTN (no meds) 2022: Diagnosed by Dr. Mor Collins per review of flowsheets. No meds at this time. ASA Rx sent to patient's pharmacy  Baseline PreE labs (CMP, protein-creatinine ratio ordered_      Depression 2022: PHQ=1, loss of interest      Hx hypothyroidism 2022: Patient was prescribed Levothyroxine in  but stopped due to too much weight loss. TSH wnl 10/10/22      Hx C/S x 1 2022: Patient would like to discuss RCS versus TOLAC    APPROVED OVARIAN CA RRBS FORM SCANNED INTO MEDIA -SK        High-risk pregnancy 2022     Risk factors: New partner; chronic hypertension (no meds); advanced maternal age; H/O hypothyroidism; irritable bowel syndrome; obesity; H/O  x1; H/O vertigo; COVID unvaccinated    Fetal testing indicated: Yes  Fetal testing indication: Advanced maternal age  Fetal testing initiation: Individualized  Fetal testing frequency: Individualized      Former smoker 2022: Patient reports quitting 2022      Laparoscopic converted to open wedge resection of right cornual ectopic, right salpingectomy and evacuation of hemoperitoneum 2019     Patient counseled extensively on the need to not get pregnant for at least 18 months. IBS 2015: Patient denies meds at this time       Return in about 1 week (around 3/6/2023) for 1 week ANDER Deutsch DO  Ob/Gyn Resident  [facility], [place]  2023, 2:20 PM        Attending Physician Statement  I have discussed the care of Mattie Carmona, including pertinent history and exam findings,  with the resident. I have reviewed the key elements of all parts of the encounter with the resident. I agree with the assessment, plan and orders as documented by the resident.   (GE Modifier)    Monique Penaloza,

## 2023-02-27 NOTE — H&P
OBSTETRICAL HISTORY AND PHYSICAL  [facility]    Date: 2023       Time: 2:40 PM   Patient Name: Florentino rAriaga     Patient : 1985  Room/Bed: TRIA/TRIA-    Admission Date/Time: 2023  2:33 PM      CC: Fetal Tachycardia and non-reactive NST in OB clinic     HPI: Florentino Arriaga is a 37 y.o.  at 36w2d who presents from her primary OB clinic. She was seen for routine  testing for pregestational DM and chronic hypertension (no meds). She has no other complaints at this time.    The patient reports fetal movement is present, denies contractions, denies loss of fluid, denies vaginal bleeding. Patient denies headache, vision changes, nausea, vomiting, fever, chills, shortness of breath, chest pain, RUQ pain, abdominal pain, diarrhea, change in color/amount/odor of vaginal discharge, dysuria or, hematuria.       DATING:  LMP: Patient's last menstrual period was 06/10/2022.  Estimated Date of Delivery: 3/25/23   Based on: early ultrasound, at 6 6/7 weeks GA    PREGNANCY RISK FACTORS:  Patient Active Problem List   Diagnosis    IBS    Laparoscopic converted to open wedge resection of right cornual ectopic, right salpingectomy and evacuation of hemoperitoneum 19    cHTN (no meds)    Depression    AMA    Hx Chlamydia    Hx hypothyroidism    Hx C/S x 1    High-risk pregnancy    Former smoker    Pregestational DM    Desires RRS    High-risk pregnancy, unspecified trimester        Steroids Given In This Pregnancy:  no     REVIEW OF SYSTEMS:   Constitutional: negative fever, negative chills, negative weight changes   HEENT: negative visual disturbances, negative headaches, negative dizziness, negative hearing loss  Breast: Negative breast abnormalities, negative breast lumps, negative nipple discharge  Respiratory: negative dyspnea, negative cough, negative SOB  Cardiovascular: negative chest pain,  negative palpitations, negative arrhythmia, negative  syncope   Gastrointestinal: negative abdominal pain, negative RUQ pain, negative N/V, negative diarrhea, negative constipation, negative bowel changes, negative heartburn   Genitourinary: negative dysuria, negative hematuria, negative urinary incontinence, negative vaginal discharge, negative vaginal bleeding or spotting  Dermatological: negative rash, negative pruritis, negative mole or other skin changes  Hematologic: negative bruising  Immunologic/Lymphatic: negative recent illness, negative recent sick contact  Musculoskeletal: negative back pain, negative myalgias, negative arthralgias  Neurological:  negative dizziness, negative migraines, negative seizures, negative weakness  Behavior/Psych: negative depression, negative anxiety, negative SI, negative HI    OBSTETRIC HISTORY:   OB History    Para Term  AB Living   5 1 1 0 3 1   SAB IAB Ectopic Molar Multiple Live Births   1 1 1 0 0 1      # Outcome Date GA Lbr Gael/2nd Weight Sex Delivery Anes PTL Lv   5 Current            4 SAB 2020           3 Ectopic 19              Birth Comments: Right cornual ectopic,wedge resection with R salpingectomy, evac hemoperitoneum   2 IAB               Birth Comments: Patient unsure of year but knows many years after G1   1 Term 09 40w0d  9 lb 10 oz (4.366 kg) M CS-Unspec Spinal  FLORENCIO      Obstetric Comments   G1: FOB #1   G2: FOB #2   G3-G5: FOB #3      APPROVED OVARIAN CA RRBS FORM SCANNED INTO MEDIA -SK 2023          PAST MEDICAL HISTORY:   has a past medical history of Chlamydia, Ectopic pregnancy, History of blood transfusion, Hypertension, Hypothyroidism, IBS (irritable bowel syndrome), Infertility, female, secondary, Obesity (BMI 30-39.9), STD (sexually transmitted disease), Trauma, and Trichomonas vaginalis infection. PAST SURGICAL HISTORY:   has a past surgical history that includes pelvic laparoscopy (2019); salpingectomy (Right, 2019);  Cholecystectomy; laparoscopy (N/A, 2019);  section; and Liposuction w/ fat injection (2020). ALLERGIES:  has No Known Allergies. MEDICATIONS:  Prior to Admission medications    Medication Sig Start Date End Date Taking? Authorizing Provider   aspirin (ASPIRIN CHILDRENS) 81 MG chewable tablet Take 1 tablet by mouth daily 23   Timo Guzman MD   HUMULIN N 100 UNIT/ML injection vial Inject 55 Units into the skin nightly  Patient not taking: No sig reported 23   Historical Provider, MD   NOVOLOG FLEXPEN 100 UNIT/ML injection pen Inject 34 Units into the skin 3 times daily (before meals) 26u before breakfast and lunch- 34u before dinner 23   Historical Provider, MD   labetalol (NORMODYNE) 100 MG tablet Take 1 tablet by mouth 2 times daily  Patient not taking: No sig reported 22   Ara Chase MD   GNP SLEEP AID 25 MG tablet take 1 tablet by mouth every evening  Patient not taking: No sig reported 22   rAa Chase MD   DROPLET PEN NEEDLES 31G X 5 MM MISC use four times a day  Patient not taking: No sig reported 10/11/22   Historical Provider, MD   blood glucose monitor strips Test 4 times a day & as needed for symptoms of irregular blood glucose. Dispense sufficient amount for indicated testing frequency plus additional to accommodate PRN testing needs.   Patient not taking: No sig reported 10/7/22   Britni Cantor MD   Lancets ([address]) [address] use 1 LANCET to TEST BLOOD SUGAR four times a day  Patient not taking: No sig reported 22   Historical MD Fidel Lopez Cap strip use 1 TEST STRIP to TEST BLOOD SUGAR four times a day  Patient not taking: No sig reported 22   Historical MD Jessica   Blood Glucose Monitoring Suppl (ONE TOUCH ULTRA 2) w/Device KIT USE AS DIRECTED to CHECK BLOOD SUGAR four times a day  Patient not taking: No sig reported 22   Historical MD HUE Lopez Alcohol Swabs 70 % PADS USE TO CLEANSE AREA BEFORE INJECTING INSULIN AND BEFORE TESTING BLOOD SUGAR four times a day  Patient not taking: No sig reported 9/13/22   Historical Provider, MD   ferrous sulfate (FE TABS) 325 (65 Fe) MG EC tablet 1 tablet by mouth in AM and PM every Monday, Wednesday and Friday. Patient not taking: No sig reported 9/12/22   Roosevelt Cheng MD   Prenatal Vit-Fe Fumarate-FA (PRENATAL VITAMIN) 27-0.8 MG TABS Take 1 tablet by mouth daily May substitute with any prenatal vitamin covered by the patient's insurance. 8/30/22   Roosevelt Cheng MD   pyridoxine (RA VITAMIN B-6) 50 MG tablet Take 0.5 tablets by mouth in the morning and 0.5 tablets at noon and 0.5 tablets in the evening. Patient not taking: No sig reported 8/8/22 8/8/23  Roosevelt Cheng MD       FAMILY HISTORY:  family history includes Diabetes in her maternal grandmother; Hypertension in her maternal grandmother; No Known Problems in her brother, father, maternal grandfather, mother, paternal grandfather, paternal grandmother, and sister. SOCIAL HISTORY:   reports that she quit smoking about 7 months ago. Her smoking use included cigars. She started smoking about 5 years ago. She has never used smokeless tobacco. She reports that she does not currently use alcohol. She reports that she does not use drugs.     VITALS:  Vitals:    02/27/23 1441 02/27/23 1531   BP: 133/83 (!) 125/90   Pulse: (!) 103 99   Resp: 18 18   Temp: 98.3 °F (36.8 °C)    TempSrc: Oral    SpO2: 97% 97%         PHYSICAL EXAM:  Fetal Heart Monitor:  Baseline Heart Rate 160, moderate variability, present accelerations, absent decelerations  Dumas: contractions, occasional rare contraction    General appearance:  no apparent distress, alert and cooperative  HEENT: head atraumatic, normocephalic, moist mucous membranes, trachea midline  Neurologic:  alert, oriented, normal speech, no focal findings or movement disorder noted  Lungs:  No increased work of breathing, good air exchange, clear to auscultation bilaterally, no crackles or wheezing  Heart:  regular rate and rhythm and no murmur, rubs, gallops  Abdomen:  soft, gravid, non-tender, no rebound, guarding, or rigidity, no RUQ or epigastric tenderness, no signs or symptoms of abruption, no signs or symptoms of chorioamnionitis  Extremities:  no calf tenderness, non edematous, no varicosities, full range of motion in all four extremities   Musculoskeletal: Gross strength equal and intact throughout, no gross abnormalities, range of motion normal in hips, knees, shoulders and spine, CVA tenderness: none  Psychiatric: Mood appropriate, normal affect   Rectal Exam: not indicated  Pelvic Exam: Deferred at this time    Biophysical Profile:   Amniotic Fluid Index: 15.3  Tone: Present  Movement: Present  Breathing: Present    Biophysical Score: 8 / 8    Fetal Position: Cephalic      PRENATAL LAB RESULTS:   Blood Type/Rh: O pos  Antibody Screen: negative  Hemoglobin, Hematocrit, Platelets: 12.5/38.0/288  Rubella: immune  T. Pallidum, IgG: non-reactive  Hepatitis B Surface Antigen: non-reactive   Hepatitis C Antibody: non-reactive   HIV: non-reactive   Gonorrhea: negative  Chlamydia: negative  Urine culture: negative, date: 22 and 23    Early 1 hour Glucose Tolerance Test:  158  Early 3 hour Glucose Tolerance Test: Fasting 91/ 1hr 191/ 2 hr 164/ 3 hr 199    Group B Strep: negative on 23  Cystic Fibrosis Screen: negative  Sickle Cell Screen: not done  First Trimester Screen: low risk for aneuploidy  QUAD: not done  MSAFP: negative  Non-Invasive Prenatal Testing: low risk for aneuploidy  Anatomy US: anterior placenta, 3VC, female gender, normal anatomy    ASSESSMENT & PLAN:  Florentino Arriaga is a 37 y.o. female  at 36w2d IUP   - GBS negative / Rh positive / R immune   - No indication for GBS prophylaxis   - VSS, afebrile     Fetal tachycardia/Non-reactive NST    - Patient sent from her primary OB clinic where her  testing NST was noted to be non-reactive. Baby was also  noted to be tachycardic into the 170's    - Patient feeling good fetal movement and without complaint at this time    - FHT Cat 1 at this time without contractions    - BPP 8/8    - Patient has follow up  testing on 3/2 with MFM   - Reassured of fetal status at this time will discharge with precautions   - Continue with routine follow up     Patient may be discharged to home. Patient counseled on  labor precautions, preE precautions, adequate PO hydration, and fetal kick counts. All questions answered and concerns addressed. Patient vocalized understanding.      cHTN (no meds)   - Denies s/s of PreE   - PreE labs wnl, P/C 0.13 on admission    - BP's normotensive since arrival    - Will continue to monitor closely     Pre gestational DM   - Patient met criteria based on an early 1 hr GTT and early 3 hr GTT   - Novolog ; NPH 10 units qhs  - Patient denies feeling hypoglycemic at this time  - Following with MFM    AMA   - 1st trimester screen and NIPT wnl     IBS   - Currently managed without medication    S/p wedge resection of right cornual ectopic  - with right salpingectomy   - 2019    Depression    - Denies SI/HI   - Currently managed without medications    Hx hypothyroidism   - Patient diagnosed in  and started on Synthroid, stopped shortly after   - TSH wnl on 10/10/22 and 1.76 on 12/15/22; TSH 1.5 on 23   - T4 0.75 on 23; lindaley 2/2 subclinical hypothyroidism     Hx C/S x 1   - Desires repeat c/s    - Approved for RRS     Hx smoking   - Encouraged continued cessation     BMI 40        Patient Active Problem List    Diagnosis Date Noted    High-risk pregnancy, unspecified trimester 2023     Priority: Medium    AMA 2022     Priority: Medium    Hx Chlamydia 2022     Priority: Medium     Negative 22      Desires RRS 2023     Medicaid form signed 23      Pregestational DM 2022     Diagnosed in G5 pregnancy, early 1 hr and 3 hr GTT    Novolog 12, NPH 36      cHTN (no meds) 2022: Diagnosed by Dr. Yasmeen Barron per review of flowsheets. No meds at this time. ASA Rx sent to patient's pharmacy  Baseline PreE labs (CMP, protein-creatinine ratio ordered_      Depression 2022: PHQ=1, loss of interest      Hx hypothyroidism 2022: Patient was prescribed Levothyroxine in  but stopped due to too much weight loss. TSH wnl 10/10/22      Hx C/S x 1 2022: Patient would like to discuss RCS versus TOLAC    APPROVED OVARIAN CA RRBS FORM SCANNED INTO MEDIA -SK        High-risk pregnancy 2022     Risk factors: New partner; chronic hypertension (no meds); advanced maternal age; H/O hypothyroidism; irritable bowel syndrome; obesity; H/O  x1; H/O vertigo; COVID unvaccinated    Fetal testing indicated: Yes  Fetal testing indication: Advanced maternal age  Fetal testing initiation: Individualized  Fetal testing frequency: Individualized      Former smoker 2022: Patient reports quitting 2022      Laparoscopic converted to open wedge resection of right cornual ectopic, right salpingectomy and evacuation of hemoperitoneum 2019     Patient counseled extensively on the need to not get pregnant for at least 18 months. IBS 2015: Patient denies meds at this time         Plan discussed with Dr. Ralf Tate, who is agreeable. Steroids given this admission: No    Risks, benefits, alternatives and possible complications have been discussed in detail with the patient. Admission, and post admission procedures and expectations were discussed in detail. All questions were answered.     Attending's Name: Dr. Yokasta Pascual DO  Ob/Gyn Resident  2023, 2:40 PM

## 2023-03-01 ENCOUNTER — TELEPHONE (OUTPATIENT)
Dept: OBGYN | Age: 38
End: 2023-03-01

## 2023-03-01 ENCOUNTER — HOSPITAL ENCOUNTER (OUTPATIENT)
Age: 38
Discharge: HOME | End: 2023-03-01
Attending: OBSTETRICS & GYNECOLOGY | Admitting: OBSTETRICS & GYNECOLOGY
Payer: COMMERCIAL

## 2023-03-01 VITALS
SYSTOLIC BLOOD PRESSURE: 142 MMHG | DIASTOLIC BLOOD PRESSURE: 89 MMHG | RESPIRATION RATE: 19 BRPM | HEIGHT: 65 IN | TEMPERATURE: 98.9 F | BODY MASS INDEX: 40.65 KG/M2 | OXYGEN SATURATION: 96 % | WEIGHT: 244 LBS | HEART RATE: 84 BPM

## 2023-03-01 PROBLEM — O28.8 NST (NON-STRESS TEST) NONREACTIVE: Status: ACTIVE | Noted: 2023-03-01

## 2023-03-01 PROBLEM — O43.193 PLACENTAL CYST AFFECTING PREGNANCY IN THIRD TRIMESTER: Status: ACTIVE | Noted: 2023-03-01

## 2023-03-01 PROCEDURE — 99213 OFFICE O/P EST LOW 20 MIN: CPT

## 2023-03-01 PROCEDURE — 99236 HOSP IP/OBS SAME DATE HI 85: CPT | Performed by: OBSTETRICS & GYNECOLOGY

## 2023-03-01 PROCEDURE — 76818 FETAL BIOPHYS PROFILE W/NST: CPT | Performed by: OBSTETRICS & GYNECOLOGY

## 2023-03-01 PROCEDURE — 2580000003 HC RX 258: Performed by: STUDENT IN AN ORGANIZED HEALTH CARE EDUCATION/TRAINING PROGRAM

## 2023-03-01 RX ORDER — 0.9 % SODIUM CHLORIDE 0.9 %
1000 INTRAVENOUS SOLUTION INTRAVENOUS ONCE
Status: COMPLETED | OUTPATIENT
Start: 2023-03-01 | End: 2023-03-01

## 2023-03-01 RX ORDER — ACETAMINOPHEN 325 MG/1
650 TABLET ORAL EVERY 4 HOURS PRN
Status: DISCONTINUED | OUTPATIENT
Start: 2023-03-01 | End: 2023-03-01 | Stop reason: HOSPADM

## 2023-03-01 RX ORDER — ONDANSETRON 2 MG/ML
4 INJECTION INTRAMUSCULAR; INTRAVENOUS EVERY 6 HOURS PRN
Status: DISCONTINUED | OUTPATIENT
Start: 2023-03-01 | End: 2023-03-01 | Stop reason: HOSPADM

## 2023-03-01 RX ORDER — ONDANSETRON 4 MG/1
4 TABLET, ORALLY DISINTEGRATING ORAL EVERY 8 HOURS PRN
Status: DISCONTINUED | OUTPATIENT
Start: 2023-03-01 | End: 2023-03-01 | Stop reason: HOSPADM

## 2023-03-01 RX ADMIN — SODIUM CHLORIDE 1000 ML: 9 INJECTION, SOLUTION INTRAVENOUS at 10:38

## 2023-03-01 NOTE — H&P
[address] [facility]    Date: 3/1/2023       Time: 11:39 AM   Patient Name: Tj Carrillo     Patient : 1985  Room/Bed: [place] [address]    Admission Date/Time: 3/1/2023 10:05 AM      CC: Decreased Fetal movement     HPI: Tj Carrillo is a 30 y.o. [id number] at 36w4d who presents with complaints of decreased fetal movement, she states since Monday she feels baby less than normal. She denies feeling contractions. She is otherwise without complaint. The patient reports fetal movement is present, denies contractions, denies loss of fluid, denies vaginal bleeding. Patient denies headache, vision changes, nausea, vomiting, fever, chills, shortness of breath, chest pain, RUQ pain, abdominal pain, diarrhea, change in color/amount/odor of vaginal discharge, dysuria or, hematuria. DATING:  LMP: Patient's last menstrual period was 06/10/2022.   Estimated Date of Delivery: 3/25/23   Based on: early ultrasound, at 6 6/7 weeks GA    PREGNANCY RISK FACTORS:  Patient Active Problem List   Diagnosis    IBS    Laparoscopic converted to open wedge resection of right cornual ectopic, right salpingectomy and evacuation of hemoperitoneum 19    cHTN (no meds)    Depression    AMA    Hx Chlamydia    Hx hypothyroidism    Hx C/S x 1    High-risk pregnancy    Former smoker    Pregestational DM    Desires RRS    High-risk pregnancy, unspecified trimester        Steroids Given In This Pregnancy:  no     REVIEW OF SYSTEMS:    Constitutional: negative fever, negative chills, negative weight changes   HEENT: negative visual disturbances, negative headaches, negative dizziness, negative hearing loss  Breast: Negative breast abnormalities, negative breast lumps, negative nipple discharge  Respiratory: negative dyspnea, negative cough, negative SOB  Cardiovascular: negative chest pain,  negative palpitations, negative arrhythmia, negative syncope   Gastrointestinal: negative abdominal pain, negative RUQ pain, negative N/V, negative diarrhea, negative constipation, negative bowel changes, negative heartburn   Genitourinary: negative dysuria, negative hematuria, negative urinary incontinence, negative vaginal discharge, negative vaginal bleeding or spotting  Dermatological: negative rash, negative pruritis, negative mole or other skin changes  Hematologic: negative bruising  Immunologic/Lymphatic: negative recent illness, negative recent sick contact  Musculoskeletal: negative back pain, negative myalgias, negative arthralgias  Neurological:  negative dizziness, negative migraines, negative seizures, negative weakness  Behavior/Psych: negative depression, negative anxiety, negative SI, negative HI    OBSTETRIC HISTORY:   OB History    Para Term  AB Living   5 1 1 0 3 1   SAB IAB Ectopic Molar Multiple Live Births   1 1 1 0 0 1      # Outcome Date GA Lbr Gael/2nd Weight Sex Delivery Anes PTL Lv   5 Current            4 SAB 2020           3 Ectopic 19              Birth Comments: Right cornual ectopic,wedge resection with R salpingectomy, evac hemoperitoneum   2 IAB               Birth Comments: Patient unsure of year but knows many years after G1   1 Term 09 40w0d  9 lb 10 oz (4.366 kg) M CS-Unspec Spinal  FLORENCIO      Obstetric Comments   G1: FOB #1   G2: FOB #2   G3-G5: FOB #3      APPROVED OVARIAN CA RRBS FORM SCANNED INTO MEDIA -SK 2023          PAST MEDICAL HISTORY:   has a past medical history of Chlamydia, Ectopic pregnancy, History of blood transfusion, Hypertension, Hypothyroidism, IBS (irritable bowel syndrome), Infertility, female, secondary, Obesity (BMI 30-39.9), STD (sexually transmitted disease), Trauma, and Trichomonas vaginalis infection. PAST SURGICAL HISTORY:   has a past surgical history that includes pelvic laparoscopy (2019); salpingectomy (Right, 2019); Cholecystectomy; laparoscopy (N/A, 2019);   section; and Liposuction w/ fat injection (03/2020).    ALLERGIES:  has No Known Allergies.    MEDICATIONS:  Prior to Admission medications    Medication Sig Start Date End Date Taking? Authorizing Provider   aspirin (ASPIRIN CHILDRENS) 81 MG chewable tablet Take 1 tablet by mouth daily 1/13/23   Bakari Velasquez MD   HUMULIN N 100 UNIT/ML injection vial Inject 55 Units into the skin nightly 1/30/23   Historical Provider, MD   NOVOLOG FLEXPEN 100 UNIT/ML injection pen Inject 34 Units into the skin 3 times daily (before meals) 26u before breakfast and lunch- 34u before dinner 1/30/23   Historical Provider, MD   DROPLET PEN NEEDLES 31G X 5 MM MISC use four times a day  Patient not taking: No sig reported 10/11/22   Historical Provider, MD   blood glucose monitor strips Test 4 times a day & as needed for symptoms of irregular blood glucose. Dispense sufficient amount for indicated testing frequency plus additional to accommodate PRN testing needs. 10/7/22   Drea Glass MD   Lancets (ONETOUCH DELICA PLUS QUNMFH60A) MISC use 1 LANCET to TEST BLOOD SUGAR four times a day  Patient not taking: No sig reported 9/13/22   Historical Provider, MD   ONETOUCH ULTRA strip use 1 TEST STRIP to TEST BLOOD SUGAR four times a day 9/13/22   Historical Provider, MD   Blood Glucose Monitoring Suppl (ONE TOUCH ULTRA 2) w/Device KIT USE AS DIRECTED to CHECK BLOOD SUGAR four times a day  Patient not taking: No sig reported 9/13/22   Historical ProviderMD SWANN Alcohol Swabs 70 % PADS USE TO CLEANSE AREA BEFORE INJECTING INSULIN AND BEFORE TESTING BLOOD SUGAR four times a day  Patient not taking: No sig reported 9/13/22   Historical Provider, MD   ferrous sulfate (FE TABS) 325 (65 Fe) MG EC tablet 1 tablet by mouth in AM and PM every Monday, Wednesday and Friday. 9/12/22   Mor Pascual MD   Prenatal Vit-Fe Fumarate-FA (PRENATAL VITAMIN) 27-0.8 MG TABS Take 1 tablet by mouth daily May substitute with any prenatal vitamin covered by  the patient's insurance. 8/30/22   Felicity Haynes MD   pyridoxine (RA VITAMIN B-6) 50 MG tablet Take 0.5 tablets by mouth in the morning and 0.5 tablets at noon and 0.5 tablets in the evening. 8/8/22 8/8/23  Felicity Haynes MD       FAMILY HISTORY:  family history includes Diabetes in her maternal grandmother; Hypertension in her maternal grandmother; No Known Problems in her brother, father, maternal grandfather, mother, paternal grandfather, paternal grandmother, and sister. SOCIAL HISTORY:   reports that she quit smoking about 7 months ago. Her smoking use included cigars. She started smoking about 6 years ago. She has never used smokeless tobacco. She reports that she does not currently use alcohol. She reports that she does not use drugs.     VITALS:  Vitals:    03/01/23 1015 03/01/23 1023 03/01/23 1105   BP: (!) 140/92 (!) 140/92 (!) 142/89   Pulse: 100 99 84   Resp:  19    Temp:  98.9 °F (37.2 °C)    TempSrc:  Oral    SpO2:  96%    Weight:  244 lb (110.7 kg)    Height:  5' 5\" (1.651 m)          PHYSICAL EXAM:  Fetal Heart Monitor:  Baseline Heart Rate 160, moderate variability, present accelerations, absent decelerations  [place]: contractions, none    General appearance:  no apparent distress, alert and cooperative  HEENT: head atraumatic, normocephalic, moist mucous membranes, trachea midline  Neurologic:  alert, oriented, normal speech, no focal findings or movement disorder noted  Lungs:  No increased work of breathing, good air exchange, clear to auscultation bilaterally, no crackles or wheezing  Heart:  regular rate and rhythm and no murmur, rubs, gallops  Abdomen:  soft, gravid, non-tender, no rebound, guarding, or rigidity, no RUQ or epigastric tenderness, no signs or symptoms of abruption  Extremities:  no calf tenderness, non edematous, no varicosities, full range of motion in all four extremities  Musculoskeletal: Gross strength equal and intact throughout, no gross abnormalities, range of motion normal in hips, knees, shoulders and spine  Psychiatric: Mood appropriate, normal affect   Rectal Exam: not indicated  Pelvic Exam: Deferred at this time  Biophysical Profile:   Amniotic Fluid Index: 10.2  Tone: Present  Movement: Present  Breathing: Present    Biophysical Score: 8 / 8    Fetal Position: Cephalic      PRENATAL LAB RESULTS:   Blood Type/Rh: O pos  Antibody Screen: negative  Hemoglobin, Hematocrit, Platelets: 96.9/68.7/186  Rubella: immune  T. Pallidum, IgG: non-reactive  Hepatitis B Surface Antigen: non-reactive   Hepatitis C Antibody: non-reactive   HIV: non-reactive   Gonorrhea: negative  Chlamydia: negative  Urine culture: negative, date: 8/30/22; 1/27/23    Early 1 hour Glucose Tolerance Test:  158  Early 3 hour Glucose Tolerance Test: Fasting 91/ 1hr 191/ 2 hr 164/ 3 hr 199    Group B Strep: negative on 2/20/23  Cystic Fibrosis Screen: negative  Sickle Cell Screen: Normal   First Trimester Screen: low risk for aneuploidy  MSAFP: Normal  Non-Invasive Prenatal Testing: low risk for aneuploidy  Anatomy US: Anterior placenta, 3VC, Normal female  anatomy    ASSESSMENT & PLAN:  Linnea Pate is a 30 y.o. female [id number] at 37w2d    - GBS negative / Rh positive / R immune   - No indication for GBS prophylaxis    Decreased Fetal movement    - VSS, Afebrile              - cEFM/TOCO: Cat 1              - BPP 8/8            - IVF bolus x1   - Tylenol for pain control    - She reports she feels well and is more appreciating fetal movements at this time. Patient reassured at this time   - Her next Ob apt is tomorrow with MFM   - Will plan to DC at this time. Patient is aware that she should return to the hospital if she has worsening contractions, LOF, VB or decreased fetal movements.  She voices understanding     cHTN (no meds)              - Denies s/s of PreE              - PreE labs wnl, P/C 0.13 on 2/27/23              - BP mildly elevated on arrival repeat 142/89              - Will continue to monitor closely    - BP cuff at home and instructed to call if pressures consistently above 140/90     Pre gestational DM   - Patient met criteria based on an early 1 hr GTT and early 3 hr GTT   - Novolog 30/34/46; NPH 10 units qhs  - Patient denies feeling hypoglycemic at this time  - Following with MFM     AMA              - 1st trimester screen and NIPT wnl      IBS              - Currently managed without medication     S/p wedge resection of right cornual ectopic  - with right salpingectomy   - 6/2019     Depression                  - Denies SI/HI              - Currently managed without medications     Hx hypothyroidism              - Patient diagnosed in 2016 and started on Synthroid, stopped shortly after              - TSH wnl on 10/10/22 and 1.76 on 12/15/22; TSH 1.5 on 1/30/23              - T4 0.75 on 1/30/23; kashif 2/2 subclinical hypothyroidism      Hx C/S x 1              - Desires repeat c/s               - Approved for RRS      Hx smoking              - Encouraged continued cessation      BMI 40        Patient Active Problem List    Diagnosis Date Noted    High-risk pregnancy, unspecified trimester 02/27/2023     Priority: Medium    AMA 08/30/2022     Priority: Medium    Hx Chlamydia 08/30/2022     Priority: Medium     Negative 9/9/22      Desires RRS 02/13/2023     Medicaid form signed 2/13/23      Pregestational DM 09/12/2022     Diagnosed in G5 pregnancy, early 1 hr and 3 hr GTT    Novolog 8/6/12, NPH 36      cHTN (no meds) 08/30/2022 8/30/22: Diagnosed by Dr. Matt Poole per review of flowsheets. No meds at this time. ASA Rx sent to patient's pharmacy  Baseline PreE labs (CMP, protein-creatinine ratio ordered_      Depression 08/30/2022 8/30/22: PHQ=1, loss of interest      Hx hypothyroidism 08/30/2022 8/30/22: Patient was prescribed Levothyroxine in 2016 but stopped due to too much weight loss.   TSH wnl 10/10/22      Hx C/S x 1 08/30/2022 8/30/22: Patient would like to discuss RCS versus TOLAC    APPROVED OVARIAN CA RRBS FORM SCANNED INTO MEDIA -SK        High-risk pregnancy 2022     Risk factors: New partner; chronic hypertension (no meds); advanced maternal age; H/O hypothyroidism; irritable bowel syndrome; obesity; H/O  x1; H/O vertigo; COVID unvaccinated    Fetal testing indicated: Yes  Fetal testing indication: Advanced maternal age  Fetal testing initiation: Individualized  Fetal testing frequency: Individualized      Former smoker 2022: Patient reports quitting 2022      Laparoscopic converted to open wedge resection of right cornual ectopic, right salpingectomy and evacuation of hemoperitoneum 2019     Patient counseled extensively on the need to not get pregnant for at least 18 months. IBS 2015: Patient denies meds at this time         Plan discussed with Dr. Edison Ngay, who is agreeable. Steroids given this admission: No    Risks, benefits, alternatives and possible complications have been discussed in detail with the patient. Admission, and post admission procedures and expectations were discussed in detail. All questions were answered.     Attending's Name: Dr. María Elena Norris DO  Ob/Gyn Resident  3/1/2023, 11:39 AM

## 2023-03-01 NOTE — FLOWSHEET NOTE
Pt arrives ambulatory from home with c/o decreased fetal movement. Pt to triage 1, given gown to change into and urine sample obtained. Pt then placed on EFM and VS's as charted.

## 2023-03-01 NOTE — FLOWSHEET NOTE
Pt provided discharge instructions both verbally and written. Pt acknowledges understanding. Pt d/c'd home ambulatory and undelivered.

## 2023-03-01 NOTE — TELEPHONE ENCOUNTER
Patient called in stating she has not felt her baby move as much as normal. Patient was advised to go to [facility] and Delivery per office protocol. Milind Teran RN was notified of patient.

## 2023-03-02 ENCOUNTER — ROUTINE PRENATAL (OUTPATIENT)
Dept: PERINATAL CARE | Age: 38
End: 2023-03-02
Payer: COMMERCIAL

## 2023-03-02 VITALS
BODY MASS INDEX: 40.65 KG/M2 | SYSTOLIC BLOOD PRESSURE: 132 MMHG | WEIGHT: 244 LBS | DIASTOLIC BLOOD PRESSURE: 77 MMHG | TEMPERATURE: 98.6 F | RESPIRATION RATE: 16 BRPM | HEART RATE: 96 BPM | HEIGHT: 65 IN

## 2023-03-02 DIAGNOSIS — O99.213 OBESITY AFFECTING PREGNANCY IN THIRD TRIMESTER: ICD-10-CM

## 2023-03-02 DIAGNOSIS — Z36.4 ULTRASOUND FOR ANTENATAL SCREENING FOR FETAL GROWTH RESTRICTION: ICD-10-CM

## 2023-03-02 DIAGNOSIS — Z3A.36 36 WEEKS GESTATION OF PREGNANCY: ICD-10-CM

## 2023-03-02 DIAGNOSIS — O24.119 PRE-EXISTING TYPE 2 DIABETES MELLITUS DURING PREGNANCY, ANTEPARTUM: ICD-10-CM

## 2023-03-02 DIAGNOSIS — O16.3 HYPERTENSION AFFECTING PREGNANCY IN THIRD TRIMESTER: Primary | ICD-10-CM

## 2023-03-02 DIAGNOSIS — O43.193 PLACENTAL CYST AFFECTING PREGNANCY IN THIRD TRIMESTER: ICD-10-CM

## 2023-03-02 DIAGNOSIS — O09.523 MULTIGRAVIDA OF ADVANCED MATERNAL AGE IN THIRD TRIMESTER: ICD-10-CM

## 2023-03-02 DIAGNOSIS — O99.283 HYPOTHYROIDISM AFFECTING PREGNANCY IN THIRD TRIMESTER: ICD-10-CM

## 2023-03-02 DIAGNOSIS — E03.9 HYPOTHYROIDISM AFFECTING PREGNANCY IN THIRD TRIMESTER: ICD-10-CM

## 2023-03-02 PROCEDURE — 99999 PR OFFICE/OUTPT VISIT,PROCEDURE ONLY: CPT | Performed by: OBSTETRICS & GYNECOLOGY

## 2023-03-02 PROCEDURE — 76819 FETAL BIOPHYS PROFIL W/O NST: CPT | Performed by: OBSTETRICS & GYNECOLOGY

## 2023-03-02 PROCEDURE — 76820 UMBILICAL ARTERY ECHO: CPT | Performed by: OBSTETRICS & GYNECOLOGY

## 2023-03-02 PROCEDURE — 76816 OB US FOLLOW-UP PER FETUS: CPT | Performed by: OBSTETRICS & GYNECOLOGY

## 2023-03-02 NOTE — PROGRESS NOTES
Blood sugars reviewed (patient is not taking NPH insulin as previously advised). Insulin regimen: advised to start NPH Insulin subcutaneously 10 units before bedtime (consistently elevated fasting blood sugars above 90's). Insulin regimen adjusted to: Continue subcutaneous (SQ) Novolog 30 units before breakfast, increase Novolog to 40 units before lunch and 52 units before dinner. Please continue to send blood glucose monitoring information to [facility] office so that insulin and/or dietary changes can be made if necessary weekly. Diabetic education/nutrition counseling completed. Continue recommended ADA diet therapy for diabetes. Compliance with regular prenatal care and blood sugar monitoring strongly encouraged.       Strongly advised patient to be compliant with blood sugar monitoring as previously advised to minimize the potential of adverse  outcomes (e.g. fetal demise/stillbirth, congenital birth/heart defects, polyhydramnios/oligohydramnios, fetal growth abnormalities, pregnancy loss, hypertensive disorders of pregnancy, indicated  delivery, etc.), potential maternal morbidities, etc.

## 2023-03-06 ENCOUNTER — HOSPITAL ENCOUNTER (INPATIENT)
Age: 38
LOS: 4 days | Discharge: HOME | DRG: 540 | End: 2023-03-10
Attending: OBSTETRICS & GYNECOLOGY | Admitting: OBSTETRICS & GYNECOLOGY
Payer: COMMERCIAL

## 2023-03-06 ENCOUNTER — ANESTHESIA (OUTPATIENT)
Dept: LABOR AND DELIVERY | Age: 38
End: 2023-03-06
Payer: COMMERCIAL

## 2023-03-06 ENCOUNTER — ROUTINE PRENATAL (OUTPATIENT)
Dept: OBGYN | Age: 38
End: 2023-03-06
Payer: COMMERCIAL

## 2023-03-06 ENCOUNTER — ANESTHESIA EVENT (OUTPATIENT)
Dept: LABOR AND DELIVERY | Age: 38
End: 2023-03-06
Payer: COMMERCIAL

## 2023-03-06 VITALS
DIASTOLIC BLOOD PRESSURE: 86 MMHG | SYSTOLIC BLOOD PRESSURE: 136 MMHG | WEIGHT: 244.8 LBS | BODY MASS INDEX: 40.74 KG/M2 | HEART RATE: 97 BPM

## 2023-03-06 DIAGNOSIS — O28.8 NST (NON-STRESS TEST) NONREACTIVE: ICD-10-CM

## 2023-03-06 DIAGNOSIS — O24.319 PREGESTATIONAL DIABETES MELLITUS, MODIFIED WHITE CLASS B: ICD-10-CM

## 2023-03-06 DIAGNOSIS — Z13.31 POSITIVE SCREENING FOR DEPRESSION ON 2-ITEM PATIENT HEALTH QUESTIONNAIRE (PHQ-2): ICD-10-CM

## 2023-03-06 DIAGNOSIS — I10 CHRONIC HYPERTENSION: Primary | ICD-10-CM

## 2023-03-06 LAB
ABO/RH: NORMAL
ALBUMIN SERPL-MCNC: 3.2 G/DL (ref 3.5–5.2)
ALBUMIN/GLOBULIN RATIO: 0.9 (ref 1–2.5)
ALP SERPL-CCNC: 111 U/L (ref 35–104)
ALT SERPL-CCNC: 16 U/L (ref 5–33)
AMPHETAMINE SCREEN URINE: NEGATIVE
ANION GAP SERPL CALCULATED.3IONS-SCNC: 14 MMOL/L (ref 9–17)
ANTIBODY SCREEN: NEGATIVE
ARM BAND NUMBER: NORMAL
AST SERPL-CCNC: 19 U/L
BARBITURATE SCREEN URINE: NEGATIVE
BENZODIAZEPINE SCREEN, URINE: NEGATIVE
BILIRUB SERPL-MCNC: 0.2 MG/DL (ref 0.3–1.2)
BUN SERPL-MCNC: 8 MG/DL (ref 6–20)
CALCIUM SERPL-MCNC: 9.3 MG/DL (ref 8.6–10.4)
CANNABINOID SCREEN URINE: NEGATIVE
CHLORIDE SERPL-SCNC: 102 MMOL/L (ref 98–107)
CO2 SERPL-SCNC: 17 MMOL/L (ref 20–31)
COCAINE METABOLITE, URINE: NEGATIVE
CREAT SERPL-MCNC: 0.47 MG/DL (ref 0.5–0.9)
CREATININE URINE: 168.4 MG/DL (ref 28–217)
EXPIRATION DATE: NORMAL
FENTANYL URINE: NEGATIVE
GFR SERPL CREATININE-BSD FRML MDRD: >60 ML/MIN/1.73M2
GLUCOSE BLD-MCNC: 74 MG/DL (ref 65–105)
GLUCOSE BLD-MCNC: 89 MG/DL (ref 65–105)
GLUCOSE SERPL-MCNC: 74 MG/DL (ref 70–99)
HCT VFR BLD AUTO: 43.6 % (ref 36.3–47.1)
HGB BLD-MCNC: 13.7 G/DL (ref 11.9–15.1)
MCH RBC QN AUTO: 28 PG (ref 25.2–33.5)
MCHC RBC AUTO-ENTMCNC: 31.4 G/DL (ref 28.4–34.8)
MCV RBC AUTO: 89.2 FL (ref 82.6–102.9)
METHADONE SCREEN, URINE: NEGATIVE
NRBC AUTOMATED: 0.2 PER 100 WBC
OPIATES, URINE: NEGATIVE
OXYCODONE SCREEN URINE: NEGATIVE
PDW BLD-RTO: 15.2 % (ref 11.8–14.4)
PHENCYCLIDINE, URINE: NEGATIVE
PLATELET # BLD AUTO: 242 K/UL (ref 138–453)
PMV BLD AUTO: 10.7 FL (ref 8.1–13.5)
POTASSIUM SERPL-SCNC: 4.1 MMOL/L (ref 3.7–5.3)
PROT SERPL-MCNC: 6.6 G/DL (ref 6.4–8.3)
RBC # BLD: 4.89 M/UL (ref 3.95–5.11)
SODIUM SERPL-SCNC: 133 MMOL/L (ref 135–144)
T PALLIDUM AB SER QL IA: NONREACTIVE
TEST INFORMATION: NORMAL
TOTAL PROTEIN, URINE: 23 MG/DL
URINE TOTAL PROTEIN CREATININE RATIO: 0.14 (ref 0–0.2)
WBC # BLD AUTO: 12.1 K/UL (ref 3.5–11.3)

## 2023-03-06 PROCEDURE — 80307 DRUG TEST PRSMV CHEM ANLYZR: CPT

## 2023-03-06 PROCEDURE — 99213 OFFICE O/P EST LOW 20 MIN: CPT | Performed by: STUDENT IN AN ORGANIZED HEALTH CARE EDUCATION/TRAINING PROGRAM

## 2023-03-06 PROCEDURE — 2500000003 HC RX 250 WO HCPCS: Performed by: ANESTHESIOLOGY

## 2023-03-06 PROCEDURE — 99211 OFF/OP EST MAY X REQ PHY/QHP: CPT | Performed by: STUDENT IN AN ORGANIZED HEALTH CARE EDUCATION/TRAINING PROGRAM

## 2023-03-06 PROCEDURE — G8427 DOCREV CUR MEDS BY ELIG CLIN: HCPCS | Performed by: STUDENT IN AN ORGANIZED HEALTH CARE EDUCATION/TRAINING PROGRAM

## 2023-03-06 PROCEDURE — 3075F SYST BP GE 130 - 139MM HG: CPT | Performed by: STUDENT IN AN ORGANIZED HEALTH CARE EDUCATION/TRAINING PROGRAM

## 2023-03-06 PROCEDURE — 86780 TREPONEMA PALLIDUM: CPT

## 2023-03-06 PROCEDURE — 86850 RBC ANTIBODY SCREEN: CPT

## 2023-03-06 PROCEDURE — 2709999900 HC NON-CHARGEABLE SUPPLY: Performed by: OBSTETRICS & GYNECOLOGY

## 2023-03-06 PROCEDURE — 6370000000 HC RX 637 (ALT 250 FOR IP): Performed by: STUDENT IN AN ORGANIZED HEALTH CARE EDUCATION/TRAINING PROGRAM

## 2023-03-06 PROCEDURE — 3700000000 HC ANESTHESIA ATTENDED CARE: Performed by: OBSTETRICS & GYNECOLOGY

## 2023-03-06 PROCEDURE — 2580000003 HC RX 258: Performed by: STUDENT IN AN ORGANIZED HEALTH CARE EDUCATION/TRAINING PROGRAM

## 2023-03-06 PROCEDURE — 85027 COMPLETE CBC AUTOMATED: CPT

## 2023-03-06 PROCEDURE — 3700000001 HC ADD 15 MINUTES (ANESTHESIA): Performed by: OBSTETRICS & GYNECOLOGY

## 2023-03-06 PROCEDURE — 1036F TOBACCO NON-USER: CPT | Performed by: STUDENT IN AN ORGANIZED HEALTH CARE EDUCATION/TRAINING PROGRAM

## 2023-03-06 PROCEDURE — 3046F HEMOGLOBIN A1C LEVEL >9.0%: CPT | Performed by: STUDENT IN AN ORGANIZED HEALTH CARE EDUCATION/TRAINING PROGRAM

## 2023-03-06 PROCEDURE — 82947 ASSAY GLUCOSE BLOOD QUANT: CPT

## 2023-03-06 PROCEDURE — 6360000002 HC RX W HCPCS: Performed by: NURSE ANESTHETIST, CERTIFIED REGISTERED

## 2023-03-06 PROCEDURE — A4216 STERILE WATER/SALINE, 10 ML: HCPCS | Performed by: STUDENT IN AN ORGANIZED HEALTH CARE EDUCATION/TRAINING PROGRAM

## 2023-03-06 PROCEDURE — 82570 ASSAY OF URINE CREATININE: CPT

## 2023-03-06 PROCEDURE — G8482 FLU IMMUNIZE ORDER/ADMIN: HCPCS | Performed by: STUDENT IN AN ORGANIZED HEALTH CARE EDUCATION/TRAINING PROGRAM

## 2023-03-06 PROCEDURE — G8417 CALC BMI ABV UP PARAM F/U: HCPCS | Performed by: STUDENT IN AN ORGANIZED HEALTH CARE EDUCATION/TRAINING PROGRAM

## 2023-03-06 PROCEDURE — 84156 ASSAY OF PROTEIN URINE: CPT

## 2023-03-06 PROCEDURE — 36415 COLL VENOUS BLD VENIPUNCTURE: CPT

## 2023-03-06 PROCEDURE — 1220000000 HC SEMI PRIVATE OB R&B

## 2023-03-06 PROCEDURE — 58700 REMOVAL OF FALLOPIAN TUBE: CPT | Performed by: OBSTETRICS & GYNECOLOGY

## 2023-03-06 PROCEDURE — 59514 CESAREAN DELIVERY ONLY: CPT | Performed by: OBSTETRICS & GYNECOLOGY

## 2023-03-06 PROCEDURE — 2022F DILAT RTA XM EVC RTNOPTHY: CPT | Performed by: STUDENT IN AN ORGANIZED HEALTH CARE EDUCATION/TRAINING PROGRAM

## 2023-03-06 PROCEDURE — 86900 BLOOD TYPING SEROLOGIC ABO: CPT

## 2023-03-06 PROCEDURE — 2580000003 HC RX 258

## 2023-03-06 PROCEDURE — 6360000002 HC RX W HCPCS

## 2023-03-06 PROCEDURE — 2580000003 HC RX 258: Performed by: NURSE ANESTHETIST, CERTIFIED REGISTERED

## 2023-03-06 PROCEDURE — 88305 TISSUE EXAM BY PATHOLOGIST: CPT

## 2023-03-06 PROCEDURE — 2500000003 HC RX 250 WO HCPCS: Performed by: STUDENT IN AN ORGANIZED HEALTH CARE EDUCATION/TRAINING PROGRAM

## 2023-03-06 PROCEDURE — 80053 COMPREHEN METABOLIC PANEL: CPT

## 2023-03-06 PROCEDURE — 7100000000 HC PACU RECOVERY - FIRST 15 MIN: Performed by: OBSTETRICS & GYNECOLOGY

## 2023-03-06 PROCEDURE — 3079F DIAST BP 80-89 MM HG: CPT | Performed by: STUDENT IN AN ORGANIZED HEALTH CARE EDUCATION/TRAINING PROGRAM

## 2023-03-06 PROCEDURE — 6360000002 HC RX W HCPCS: Performed by: STUDENT IN AN ORGANIZED HEALTH CARE EDUCATION/TRAINING PROGRAM

## 2023-03-06 PROCEDURE — 7100000001 HC PACU RECOVERY - ADDTL 15 MIN: Performed by: OBSTETRICS & GYNECOLOGY

## 2023-03-06 PROCEDURE — 3609079900 HC CESAREAN SECTION: Performed by: OBSTETRICS & GYNECOLOGY

## 2023-03-06 PROCEDURE — 86901 BLOOD TYPING SEROLOGIC RH(D): CPT

## 2023-03-06 RX ORDER — DEXAMETHASONE SODIUM PHOSPHATE 10 MG/ML
INJECTION, SOLUTION INTRAMUSCULAR; INTRAVENOUS PRN
Status: DISCONTINUED | OUTPATIENT
Start: 2023-03-06 | End: 2023-03-06 | Stop reason: SDUPTHER

## 2023-03-06 RX ORDER — SODIUM CHLORIDE 9 MG/ML
25 INJECTION, SOLUTION INTRAVENOUS PRN
Status: DISCONTINUED | OUTPATIENT
Start: 2023-03-06 | End: 2023-03-07

## 2023-03-06 RX ORDER — INSULIN LISPRO 100 [IU]/ML
0-4 INJECTION, SOLUTION INTRAVENOUS; SUBCUTANEOUS NIGHTLY
Status: DISCONTINUED | OUTPATIENT
Start: 2023-03-06 | End: 2023-03-06

## 2023-03-06 RX ORDER — SODIUM CHLORIDE, SODIUM LACTATE, POTASSIUM CHLORIDE, CALCIUM CHLORIDE 600; 310; 30; 20 MG/100ML; MG/100ML; MG/100ML; MG/100ML
INJECTION, SOLUTION INTRAVENOUS CONTINUOUS
Status: DISCONTINUED | OUTPATIENT
Start: 2023-03-06 | End: 2023-03-06

## 2023-03-06 RX ORDER — ONDANSETRON 2 MG/ML
4 INJECTION INTRAMUSCULAR; INTRAVENOUS EVERY 6 HOURS PRN
Status: DISCONTINUED | OUTPATIENT
Start: 2023-03-06 | End: 2023-03-07

## 2023-03-06 RX ORDER — SODIUM CHLORIDE 0.9 % (FLUSH) 0.9 %
10 SYRINGE (ML) INJECTION PRN
Status: DISCONTINUED | OUTPATIENT
Start: 2023-03-06 | End: 2023-03-07

## 2023-03-06 RX ORDER — SIMETHICONE 80 MG
80 TABLET,CHEWABLE ORAL 4 TIMES DAILY PRN
Qty: 60 TABLET | Refills: 1 | Status: SHIPPED | OUTPATIENT
Start: 2023-03-06

## 2023-03-06 RX ORDER — SENNA AND DOCUSATE SODIUM 50; 8.6 MG/1; MG/1
1 TABLET, FILM COATED ORAL DAILY
Qty: 30 TABLET | Refills: 1 | Status: SHIPPED | OUTPATIENT
Start: 2023-03-06

## 2023-03-06 RX ORDER — ACETAMINOPHEN 500 MG
1000 TABLET ORAL EVERY 6 HOURS PRN
Qty: 30 TABLET | Refills: 1 | Status: SHIPPED | OUTPATIENT
Start: 2023-03-06

## 2023-03-06 RX ORDER — KETOROLAC TROMETHAMINE 30 MG/ML
30 INJECTION, SOLUTION INTRAMUSCULAR; INTRAVENOUS EVERY 6 HOURS
Status: COMPLETED | OUTPATIENT
Start: 2023-03-06 | End: 2023-03-07

## 2023-03-06 RX ORDER — ACETAMINOPHEN 325 MG/1
975 TABLET ORAL ONCE
Status: COMPLETED | OUTPATIENT
Start: 2023-03-06 | End: 2023-03-06

## 2023-03-06 RX ORDER — OXYCODONE HYDROCHLORIDE 5 MG/1
5 TABLET ORAL EVERY 6 HOURS PRN
Qty: 20 TABLET | Refills: 0 | Status: SHIPPED | OUTPATIENT
Start: 2023-03-06 | End: 2023-03-11

## 2023-03-06 RX ORDER — NIFEDIPINE 30 MG/1
30 TABLET, EXTENDED RELEASE ORAL DAILY
Status: DISCONTINUED | OUTPATIENT
Start: 2023-03-07 | End: 2023-03-08

## 2023-03-06 RX ORDER — DEXTROSE MONOHYDRATE 100 MG/ML
INJECTION, SOLUTION INTRAVENOUS CONTINUOUS PRN
Status: DISCONTINUED | OUTPATIENT
Start: 2023-03-06 | End: 2023-03-10 | Stop reason: HOSPADM

## 2023-03-06 RX ORDER — NIFEDIPINE 30 MG/1
30 TABLET, EXTENDED RELEASE ORAL DAILY
Qty: 30 TABLET | Refills: 2 | Status: SHIPPED | OUTPATIENT
Start: 2023-03-06

## 2023-03-06 RX ORDER — SODIUM CHLORIDE 9 MG/ML
INJECTION, SOLUTION INTRAVENOUS CONTINUOUS
Status: DISCONTINUED | OUTPATIENT
Start: 2023-03-06 | End: 2023-03-07

## 2023-03-06 RX ORDER — TRISODIUM CITRATE DIHYDRATE AND CITRIC ACID MONOHYDRATE 500; 334 MG/5ML; MG/5ML
30 SOLUTION ORAL ONCE
Status: COMPLETED | OUTPATIENT
Start: 2023-03-06 | End: 2023-03-06

## 2023-03-06 RX ORDER — SODIUM CHLORIDE 0.9 % (FLUSH) 0.9 %
10 SYRINGE (ML) INJECTION EVERY 12 HOURS SCHEDULED
Status: DISCONTINUED | OUTPATIENT
Start: 2023-03-06 | End: 2023-03-07

## 2023-03-06 RX ORDER — SODIUM CHLORIDE, SODIUM LACTATE, POTASSIUM CHLORIDE, CALCIUM CHLORIDE 600; 310; 30; 20 MG/100ML; MG/100ML; MG/100ML; MG/100ML
INJECTION, SOLUTION INTRAVENOUS CONTINUOUS
Status: DISCONTINUED | OUTPATIENT
Start: 2023-03-06 | End: 2023-03-07

## 2023-03-06 RX ORDER — SODIUM CHLORIDE, SODIUM LACTATE, POTASSIUM CHLORIDE, AND CALCIUM CHLORIDE .6; .31; .03; .02 G/100ML; G/100ML; G/100ML; G/100ML
1000 INJECTION, SOLUTION INTRAVENOUS ONCE
Status: DISCONTINUED | OUTPATIENT
Start: 2023-03-06 | End: 2023-03-07

## 2023-03-06 RX ORDER — ONDANSETRON 2 MG/ML
INJECTION INTRAMUSCULAR; INTRAVENOUS PRN
Status: DISCONTINUED | OUTPATIENT
Start: 2023-03-06 | End: 2023-03-06 | Stop reason: SDUPTHER

## 2023-03-06 RX ORDER — INSULIN LISPRO 100 [IU]/ML
0-4 INJECTION, SOLUTION INTRAVENOUS; SUBCUTANEOUS
Status: DISCONTINUED | OUTPATIENT
Start: 2023-03-07 | End: 2023-03-06

## 2023-03-06 RX ORDER — GLUCAGON 1 MG/ML
1 KIT INJECTION PRN
Status: DISCONTINUED | OUTPATIENT
Start: 2023-03-06 | End: 2023-03-09

## 2023-03-06 RX ORDER — BUPIVACAINE HYDROCHLORIDE 7.5 MG/ML
INJECTION, SOLUTION INTRASPINAL
Status: COMPLETED | OUTPATIENT
Start: 2023-03-06 | End: 2023-03-06

## 2023-03-06 RX ORDER — INSULIN LISPRO 100 [IU]/ML
0-4 INJECTION, SOLUTION INTRAVENOUS; SUBCUTANEOUS
Status: DISCONTINUED | OUTPATIENT
Start: 2023-03-07 | End: 2023-03-09

## 2023-03-06 RX ORDER — MORPHINE SULFATE 0.5 MG/ML
INJECTION, SOLUTION EPIDURAL; INTRATHECAL; INTRAVENOUS
Status: COMPLETED | OUTPATIENT
Start: 2023-03-06 | End: 2023-03-06

## 2023-03-06 RX ORDER — IBUPROFEN 600 MG/1
600 TABLET ORAL EVERY 6 HOURS PRN
Qty: 40 TABLET | Refills: 0 | Status: SHIPPED | OUTPATIENT
Start: 2023-03-06

## 2023-03-06 RX ADMIN — Medication 909 ML/HR: at 20:51

## 2023-03-06 RX ADMIN — SODIUM CHLORIDE: 9 INJECTION, SOLUTION INTRAVENOUS at 19:19

## 2023-03-06 RX ADMIN — Medication 2000 MG: at 20:10

## 2023-03-06 RX ADMIN — ONDANSETRON 4 MG: 2 INJECTION INTRAMUSCULAR; INTRAVENOUS at 20:28

## 2023-03-06 RX ADMIN — ACETAMINOPHEN 975 MG: 325 TABLET ORAL at 20:10

## 2023-03-06 RX ADMIN — KETOROLAC TROMETHAMINE 30 MG: 30 INJECTION, SOLUTION INTRAMUSCULAR; INTRAVENOUS at 23:05

## 2023-03-06 RX ADMIN — FAMOTIDINE 20 MG: 10 INJECTION, SOLUTION INTRAVENOUS at 20:10

## 2023-03-06 RX ADMIN — DEXAMETHASONE SODIUM PHOSPHATE 10 MG: 10 INJECTION, SOLUTION INTRAMUSCULAR; INTRAVENOUS at 20:51

## 2023-03-06 RX ADMIN — Medication 500 MG: at 20:30

## 2023-03-06 RX ADMIN — BUPIVACAINE HYDROCHLORIDE IN DEXTROSE 13.5 MG: 7.5 INJECTION, SOLUTION SUBARACHNOID at 20:25

## 2023-03-06 RX ADMIN — SODIUM CITRATE AND CITRIC ACID MONOHYDRATE 30 ML: 500; 334 SOLUTION ORAL at 20:10

## 2023-03-06 RX ADMIN — MORPHINE SULFATE 0.2 MG: 0.5 INJECTION, SOLUTION EPIDURAL; INTRATHECAL; INTRAVENOUS at 20:25

## 2023-03-06 RX ADMIN — PHENYLEPHRINE HYDROCHLORIDE 50 MCG/MIN: 10 INJECTION INTRAVENOUS at 20:30

## 2023-03-06 RX ADMIN — SODIUM CHLORIDE, POTASSIUM CHLORIDE, SODIUM LACTATE AND CALCIUM CHLORIDE 24.7 ML: 600; 310; 30; 20 INJECTION, SOLUTION INTRAVENOUS at 22:50

## 2023-03-06 RX ADMIN — SODIUM CHLORIDE: 9 INJECTION, SOLUTION INTRAVENOUS at 21:04

## 2023-03-06 ASSESSMENT — PAIN DESCRIPTION - LOCATION: LOCATION: ABDOMEN

## 2023-03-06 ASSESSMENT — PAIN SCALES - GENERAL: PAINLEVEL_OUTOF10: 7

## 2023-03-06 ASSESSMENT — LIFESTYLE VARIABLES: SMOKING_STATUS: 1

## 2023-03-06 NOTE — PROGRESS NOTES
Prenatal Visit    Neel Lafleur is a 30 y.o. female [id number] at 42w2d    The patient was seen and evaluated. The patient denies any complaints. She has been compliant with blood sugar and blood pressure logs. BP elevated 140-150s/90s and intermittently normotensive. No severe range BP. There was positive fetal movements. She denies contractions, vaginal bleeding and leakage of fluid. Signs and symptoms of labor were reviewed. The S/S of Pre-Eclampsia were reviewed with the patient in detail. She is to report any of these if they occur. She currently denies any signs or symptoms of pre-eclampsia which include headache, vision changes, RUQ pain. The patient already received the T-Dap Vaccine (27-36 weeks) this pregnancy on 22. The patient is Rh positive and Rhogam is not indicated in this pregnancy  The patient already received the influenza vaccine this year. The patient declined the COVID-19 vaccine this year. Allergies:  Patient has no known allergies. Vitals and physical exam:    BP: 136/86  Weight: 244 lb 12.8 oz (111 kg)  Heart Rate: 97  Patient Position: Sitting  Albumin: Trace  Glucose: Trace  Fundal Height (cm): 39 cm  Fetal HR: 170  Movement: Present       Labs:  Group Beta Strep RV culture: negative. Sensitivities for clindamycin and erythromycin: not indicated    Assessment & Plan:  Neel Lafleur is a 30 y.o. female [id number] at 42w2d   - GBS RV culture: negative, sensitivities for clindamycin and erythromycin were not indicated   - Tdap vaccination: patient already received.    - Influenza vaccination: already received   - Rhogam: not indicated   - COVID-19 vaccination: R/B/A discussed with increased risk of both maternal and fetal morbidity and mortality in unvaccinated pregnant patients who contract COVID-19- patient declined today   -  testing indication: cHTN (meds) and pregestational DM- scheduled for weekly NST and MFM scans     - Indications for  section: repeat  section, declining TOLAC, ERAS protocols: reviewed with patient on 23   - Warning signs of  labor, pre-eclampsia, decreased fetal movement and recommendations when to call or present to the hospital reviewed   - Route of delivery and counseling on vaginal, operative vaginal, and  sections were completed with the risks of each to both the patient as well as her baby. The possibility of a blood transfusion was discussed as well. The patient was not opposed to receiving a transfusion if needed. - The patient was counseled on types of analgesia during labor and is considering spinal.  - The patient was counseled on the need to choose her pediatrician for her baby. - The patient was counseled on postpartum plans for dysmenorrhea, she is undecided  - NST non reactive with fetal tachycardia (baseline 170)  - Patient sent to L&D for extended monitoring     cHTN (no meds>meds)   - BP normotensive   - BP log reviewed and over half of BP elevated, non severe (140-150s/90s)   - Denies any s/s of preeclampsia   - PreE labs wnl, P/C 0.13   - Plan to start Procardia 30 XL daily today given persistently elevated BP   - Patient is within her delivery window for cHTN (meds) today.  Discussed possibility of delivery given cHTN (meds) and non-reactive NST with fetal tachycardia today     Pregestational DM   - Following with MFM   - Compliant with blood sugar logs and insulin   - Insulin regimen: Novolog 30/40/52 units with meals; NPH 10 units    - Most recent MFM scan wnl; EFW 7#14       Patient Active Problem List    Diagnosis Date Noted    NST (non-stress test) nonreactive 2023     Priority: Medium    High-risk pregnancy, unspecified trimester 2023     Priority: Medium    AMA 2022     Priority: Medium    Hx Chlamydia 2022     Priority: Medium     Negative 22      Placental cyst affecting pregnancy in third trimester 2023 MFM ultrasound Desires RRS 2023     Medicaid form signed 23      Pregestational DM 2022     Diagnosed in G5 pregnancy, early 1 hr and 3 hr GTT    Novolog 12, NPH 36      cHTN (meds) 2022: Diagnosed by Dr. Tonya Stack per review of flowsheets. No meds at this time. ASA Rx sent to patient's pharmacy  Baseline PreE labs (CMP, protein-creatinine ratio ordered_      Depression 2022: PHQ=1, loss of interest      Hx hypothyroidism 2022: Patient was prescribed Levothyroxine in  but stopped due to too much weight loss. TSH wnl 10/10/22      Hx C/S x 1 2022: Patient would like to discuss RCS versus TOLAC    APPROVED OVARIAN CA RRBS FORM SCANNED INTO MEDIA -SK        High-risk pregnancy 2022     Risk factors: New partner; chronic hypertension (no meds); advanced maternal age; H/O hypothyroidism; irritable bowel syndrome; obesity; H/O  x1; H/O vertigo; COVID unvaccinated    Fetal testing indicated: Yes  Fetal testing indication: Advanced maternal age  Fetal testing initiation: Individualized  Fetal testing frequency: Individualized      Former smoker 2022: Patient reports quitting 2022      Laparoscopic converted to open wedge resection of right cornual ectopic, right salpingectomy and evacuation of hemoperitoneum 2019     Patient counseled extensively on the need to not get pregnant for at least 18 months. IBS 2015: Patient denies meds at this time       Return in about 1 week (around 3/13/2023) for 1 week ANDER Negro  Ob/Gyn Resident  [facility], [address] Claudio Se  3/6/2023, 9:05 PM        Attending Physician Statement  I have discussed the care of Cherie Olszewski, including pertinent history and exam findings, with the resident. I have reviewed the key elements of all parts of the encounter with the resident.   I agree with the assessment, plan and orders as documented by the resident.   (GE Modifier)    Danielito Perez,

## 2023-03-06 NOTE — FLOWSHEET NOTE
Dr Peguero Innocent in to discuss plan of care with pt. Pt is agreeable to repeat c/s. Information provided, consent received.

## 2023-03-06 NOTE — PROGRESS NOTES
Into see patient to discuss the risks, benefits common complications of  delivery. We discussed the risks of continued attempt at labor vs the risks of  section including risk of infection, bleeding, damage to surrounding organs including bladder, bowel, ureters which would necessitate further surgery for repair. We also discussed the risk of subsequent surgery and that while the risks of complication with the primary  section are less than 1%, this risk increases with each subsequent surgery, this will be the second  section and a history of cornual ectopic with wedge resection. We also discussed the risk of hysterectomy should we encounter catastrophic bleeding and that hysterectomy would be used only as life saving surgery. The patient verbalized her understanding of our discussion and these risks and benefits. She verbalized her consent and had an opportunity to have any questions she may have answered. Her partner was also present for this discussion.       Mar Ordoñez MD

## 2023-03-06 NOTE — DISCHARGE SUMMARY
Obstetric Discharge Summary  [facility]    Patient Name: Cherie Olszewski  Patient : 1985  Primary Care Physician: Holly Jimenes MD  Admit Date: 3/6/2023    Principal Diagnosis: IUP at 37w2d, admitted for Repeat  Section with RRS for cHTN on medications and Hx Wedge Resection     Her pregnancy has been complicated by:   Patient Active Problem List   Diagnosis    IBS    Laparoscopic converted to open wedge resection of right cornual ectopic, right salpingectomy and evacuation of hemoperitoneum 19    cHTN (meds)    Depression    AMA    Hx Chlamydia    Hx hypothyroidism    Hx C/S x 1    Former smoker    Pregestational DM    Desires RRS    NST (non-stress test) nonreactive    Placental cyst affecting pregnancy in third trimester    RLTCS w/ left RRS 3/6/23 F Apg 8/9 Wt 7#15     Chronic hypertension affecting pregnancy       Infection Present?: No  Hospital Acquired: No    Surgical Operations & Procedures:  Analgesia: spinal  Delivery Type:  Delivery: See Operative Report    Laceration(s): Absent    Consultations:  NICU, and Anesthesia    Pertinent Findings & Procedures:   Cherie Olszewski is a 30 y.o. female [id number] at 42w2d admitted for Repeat  Section w/ RRS with delivery indicated for cHTN (on medications), Hx Wedge Resection; Patient initially presented to Labor and Delivery for fetal tachycardia noted on NST in the office. Upon arrival to Labor and Delivery patient informed she is in her delivery window. The patient has history of  section x 1. Risks and benefits of TOLAC vs repeat  section were discussed and patient declined TOLAC and decided to continue pursue repeat  section. Consent was signed. All patients questions were answered. She received Ancef, Azithro, Bicitra, Tylenol and Pepcid preoperatively. She delivered by repeat low transverse  w/ RRS a Live Born infant on 3/6/23.        Information for the patient's :  Doloris Amanda Girl Chelsea Pierre [[id number]]   female   Birth Weight: 7 lb 15.7 oz (3.62 kg)     Apgars: 8 at 1 minute and 9 at 5 minutes. ERAS for  Section  Received ERAS education outpatient: No  Consumed electrolyte-rich clear fluid prior to surgery: No  Received pre-operative Tylenol and Pepcid: Yes  Received post-operative scheduled Motrin and Tylenol: Yes  Goldman catheter discontinued 12 hours post-operatively: Yes        ERAS requirements met (3/5): Yes    Postpartum course: . She received Keflex/Flagyl x48 hours for wound prophylaxis and Lovenox 30mg BID for DVT prophylaxis   POD#1: Hgb 11.5. POD#2: Patient declined her Procardia. Bps have been well controlled off meds. Will send with BP cuff     Course of patient: uncomplicated    Discharge to: Home    Readmission planned: no     Recommendations on Discharge:     Medications:      Medication List        START taking these medications      acetaminophen 500 MG tablet  Commonly known as: TYLENOL  Take 2 tablets by mouth every 6 hours as needed for Pain     ibuprofen 600 MG tablet  Commonly known as: ADVIL;MOTRIN  Take 1 tablet by mouth every 6 hours as needed for Pain     NIFEdipine 30 MG extended release tablet  Commonly known as: PROCARDIA XL  Take 1 tablet by mouth daily     oxyCODONE 5 MG immediate release tablet  Commonly known as: Roxicodone  Take 1 tablet by mouth every 6 hours as needed for Pain for up to 5 days. Intended supply: 3 days.  Take lowest dose possible to manage pain Max Daily Amount: 20 mg     sennosides-docusate sodium 8.6-50 MG tablet  Commonly known as: SENOKOT-S  Take 1 tablet by mouth daily     simethicone 80 MG chewable tablet  Commonly known as: MYLICON  Take 1 tablet by mouth 4 times daily as needed for Flatulence (gas pain)            CONTINUE taking these medications      aspirin 81 MG chewable tablet  Commonly known as: Aspirin Childrens  Take 1 tablet by mouth daily     Droplet Pen Needles 31G X 5 MM Misc  Generic drug: Insulin Pen Needle     ferrous sulfate 325 (65 Fe) MG EC tablet  Commonly known as: Fe Tabs  1 tablet by mouth in AM and PM every Monday, Wednesday and Friday. HumuLIN N 100 UNIT/ML injection vial  Generic drug: insulin NPH     NovoLOG FlexPen 100 UNIT/ML injection pen  Generic drug: insulin aspart     ONE TOUCH ULTRA 2 w/Device Kit     OneTouch Delica Plus [id number] Misc     * OneTouch Ultra strip  Generic drug: blood glucose test strips     * blood glucose test strips  Test 4 times a day & as needed for symptoms of irregular blood glucose. Dispense sufficient amount for indicated testing frequency plus additional to accommodate PRN testing needs. Prenatal Vitamin 27-0.8 MG Tabs  Take 1 tablet by mouth daily May substitute with any prenatal vitamin covered by the patient's insurance. pyridoxine 50 MG tablet  Commonly known as: RA Vitamin B-6  Take 0.5 tablets by mouth in the morning and 0.5 tablets at noon and 0.5 tablets in the evening. RA Alcohol Swabs 70 % Pads           * This list has 2 medication(s) that are the same as other medications prescribed for you. Read the directions carefully, and ask your doctor or other care provider to review them with you.                    Where to Get Your Medications        These medications were sent to [address] [place] [address] 18, [address] [place] [place] [address]      Phone: [phone]   NIFEdipine 30 MG extended release tablet       These medications were sent to [facility] [address] [facility], [address] [facility]  [address], [address] [place] [address]      Phone: [phone]   acetaminophen 500 MG tablet  ibuprofen 600 MG tablet  oxyCODONE 5 MG immediate release tablet  sennosides-docusate sodium 8.6-50 MG tablet  simethicone 80 MG chewable tablet           Activity: pelvic rest x 6 weeks, no driving on narcotics, no lifting greater than 15 lbs  Diet: regular diet  Follow up: 1 week for BP check and Prevena dressing removal    Condition on discharge: stable    Discharge date: 3/10/23    Toyin Galvez DO  Ob/Gyn Resident    Comments:  Home care and follow-up care were reviewed. Pelvic rest, and birth control were reviewed. Signs and symptoms of mastitis and post partum depression were reviewed. The patient is to notify her physician if any of these occur. The patient was counseled on secondary smoke risks and the increased risk of sudden infant death syndrome and respiratory problems to her baby with exposure. She was counseled on various alternate recommendations to decrease the exposure to secondary smoke to her children.

## 2023-03-06 NOTE — H&P
[address] [facility] [place]    Date: 3/6/2023       Time: 6:13 PM   Patient Name: Rhonda Diez     Patient : 1985  Room/Bed: 3392/7374-18    Admission Date/Time: 3/6/2023  2:25 PM      CC: Fetal tachycardia      HPI: Rhonda Diez is a 30 y.o.  at 42w2d who presents from the [facility] office where baby was tachycardic on her NST. Patient has chronic hypertension on medication and within her delivery window so decision was made to proceed with  section at this time per patient wishes. The patient reports fetal movement is present, denies contractions, denies loss of fluid, denies vaginal bleeding. Patient denies headache, vision changes, nausea, vomiting, fever, chills, shortness of breath, chest pain, RUQ pain, abdominal pain, diarrhea, change in color/amount/odor of vaginal discharge, dysuria or, hematuria. DATING:  LMP: Patient's last menstrual period was 06/10/2022.   Estimated Date of Delivery: 3/25/23   Based on: early ultrasound, at 6 6/7 weeks GA    PREGNANCY RISK FACTORS:  Patient Active Problem List   Diagnosis    IBS    Laparoscopic converted to open wedge resection of right cornual ectopic, right salpingectomy and evacuation of hemoperitoneum 19    cHTN (meds)    Depression    AMA    Hx Chlamydia    Hx hypothyroidism    Hx C/S x 1    High-risk pregnancy    Former smoker    Pregestational DM    Desires RRS    High-risk pregnancy, unspecified trimester    NST (non-stress test) nonreactive    Placental cyst affecting pregnancy in third trimester        Steroids Given In This Pregnancy:  no     REVIEW OF SYSTEMS:   Constitutional: negative fever, negative chills, negative weight changes   HEENT: negative visual disturbances, negative headaches, negative dizziness, negative hearing loss  Breast: Negative breast abnormalities, negative breast lumps, negative nipple discharge  Respiratory: negative dyspnea, negative cough, negative SOB  Cardiovascular: negative chest pain,  negative palpitations, negative arrhythmia, negative syncope   Gastrointestinal: negative abdominal pain, negative RUQ pain, negative N/V, negative diarrhea, negative constipation, negative bowel changes, negative heartburn   Genitourinary: negative dysuria, negative hematuria, negative urinary incontinence, negative vaginal discharge, negative vaginal bleeding or spotting  Dermatological: negative rash, negative pruritis, negative mole or other skin changes  Hematologic: negative bruising  Immunologic/Lymphatic: negative recent illness, negative recent sick contact  Musculoskeletal: negative back pain, negative myalgias, negative arthralgias  Neurological:  negative dizziness, negative migraines, negative seizures, negative weakness  Behavior/Psych: negative depression, negative anxiety, negative SI, negative HI    OBSTETRIC HISTORY:   OB History    Para Term  AB Living   5 1 1 0 3 1   SAB IAB Ectopic Molar Multiple Live Births   1 1 1 0 0 1      # Outcome Date GA Lbr Gael/2nd Weight Sex Delivery Anes PTL Lv   5 Current            4 SAB 2020           3 Ectopic 19              Birth Comments: Right cornual ectopic,wedge resection with R salpingectomy, evac hemoperitoneum   2 IAB               Birth Comments: Patient unsure of year but knows many years after G1   1 Term 09 40w0d  9 lb 10 oz (4.366 kg) M CS-Unspec Spinal  FLORENCIO      Obstetric Comments   G1: FOB #1   G2: FOB #2   G3-G5: FOB #3      APPROVED OVARIAN CA RRBS FORM SCANNED INTO MEDIA -SK 2023          PAST MEDICAL HISTORY:   has a past medical history of Chlamydia, Ectopic pregnancy, History of blood transfusion, Hypertension, Hypothyroidism, IBS (irritable bowel syndrome), Infertility, female, secondary, Obesity (BMI 30-39.9), STD (sexually transmitted disease), Trauma, and Trichomonas vaginalis infection.     PAST SURGICAL HISTORY:   has a past surgical history that includes pelvic laparoscopy (2019); salpingectomy (Right, 2019); Cholecystectomy; laparoscopy (N/A, 2019);  section; and Liposuction w/ fat injection (2020). ALLERGIES:  has No Known Allergies. MEDICATIONS:  Prior to Admission medications    Medication Sig Start Date End Date Taking? Authorizing Provider   NIFEdipine (PROCARDIA XL) 30 MG extended release tablet Take 1 tablet by mouth daily 3/6/23   Hebert Large, DO   aspirin (ASPIRIN CHILDRENS) 81 MG chewable tablet Take 1 tablet by mouth daily 23   Karen Khan MD   HUMULIN N 100 UNIT/ML injection vial Inject 10 Units into the skin nightly 23   Historical Provider, MD   NOVOLOG FLEXPEN 100 UNIT/ML injection pen Inject 46 Units into the skin 3 times daily (before meals) 30u before breakfast 34u  lunch- 46u before dinner 23   Historical Provider, MD   DROPLET PEN NEEDLES 31G X 5 MM MISC use four times a day 10/11/22   Historical Provider, MD   blood glucose monitor strips Test 4 times a day & as needed for symptoms of irregular blood glucose. Dispense sufficient amount for indicated testing frequency plus additional to accommodate PRN testing needs. 10/7/22   Jelly Masters MD   Lancets ([address]) MISC use 1 LANCET to TEST BLOOD SUGAR four times a day 22   Historical MD Jessica   ONETOUCH ULTRA strip use 1 TEST STRIP to TEST BLOOD SUGAR four times a day 22   Historical Provider, MD   Blood Glucose Monitoring Suppl (ONE TOUCH ULTRA 2) w/Device KIT USE AS DIRECTED to CHECK BLOOD SUGAR four times a day 22   Historical Provider, MD   RA Alcohol Swabs 70 % PADS USE TO CLEANSE AREA BEFORE INJECTING INSULIN AND BEFORE TESTING BLOOD SUGAR four times a day 22   Historical Provider, MD   ferrous sulfate (FE TABS) 325 (65 Fe) MG EC tablet 1 tablet by mouth in AM and PM every Monday, Wednesday and Friday.  22   Juliet Maldonado MD   Prenatal Vit-Fe Fumarate-FA (PRENATAL VITAMIN) 27-0.8 MG TABS Take 1 tablet by mouth daily May substitute with any prenatal vitamin covered by the patient's insurance. 8/30/22   Millie Arvizu MD   pyridoxine (RA VITAMIN B-6) 50 MG tablet Take 0.5 tablets by mouth in the morning and 0.5 tablets at noon and 0.5 tablets in the evening. Patient not taking: No sig reported 8/8/22 8/8/23  Millie Arvizu MD       FAMILY HISTORY:  family history includes Diabetes in her maternal grandmother; Hypertension in her maternal grandmother; No Known Problems in her brother, father, maternal grandfather, mother, paternal grandfather, paternal grandmother, and sister. SOCIAL HISTORY:   reports that she quit smoking about 7 months ago. Her smoking use included cigars. She started smoking about 6 years ago. She has never used smokeless tobacco. She reports that she does not currently use alcohol. She reports that she does not use drugs.     VITALS:  Vitals:    03/06/23 1441 03/06/23 1534 03/06/23 1535   BP: 137/82 133/86 133/86   Pulse: 89 86 87   Resp: 20 18    Temp: 98.8 °F (37.1 °C) 98.8 °F (37.1 °C)    TempSrc: Oral Oral    SpO2:  97%          PHYSICAL EXAM:  Fetal Heart Monitor:  Baseline Heart Rate 160, moderate variability, present accelerations, absent decelerations  [place]: contractions, irregular, every 10 minutes    General appearance:  no apparent distress, alert and cooperative  HEENT: head atraumatic, normocephalic, moist mucous membranes, trachea midline  Neurologic:  alert, oriented, normal speech, no focal findings or movement disorder noted  Lungs:  No increased work of breathing, good air exchange, clear to auscultation bilaterally, no crackles or wheezing  Heart:  regular rate and rhythm and no murmur, rubs, gallops  Abdomen:  soft, gravid, non-tender, no rebound, guarding, or rigidity, no RUQ or epigastric tenderness, no signs or symptoms of abruption, no signs or symptoms of chorioamnionitis  Extremities:  no calf tenderness, non edematous, no varicosities, full range of motion in all four extremities   Musculoskeletal: Gross strength equal and intact throughout, no gross abnormalities, range of motion normal in hips, knees, shoulders and spine, CVA tenderness: none  Psychiatric: Mood appropriate, normal affect   Rectal Exam: not indicated  Pelvic Exam: deferred to [address] [place] [address] Drive:  Position: Cephalic  Placental Location: anterior  Fetal Heart Tones: Present  Fetal Movement: Present   Amniotic Fluid Index/Volume: adequate 2x2 cm fluid pocket      PRENATAL LAB RESULTS:   Blood Type/Rh: O pos  Antibody Screen: negative  Hemoglobin, Hematocrit, Platelets: 57.7/92.2/609  Rubella: immune  T. Pallidum, IgG: non-reactive  Hepatitis B Surface Antigen: non-reactive   Hepatitis C Antibody: non-reactive   HIV: non-reactive   Gonorrhea: negative  Chlamydia: negative  Urine culture: negative, date: 8/30/22 and 1/27/23    Early 1 hour Glucose Tolerance Test:  158  Early 3 hour Glucose Tolerance Test: Fasting 91/ 1hr 191/ 2 hr 164/ 3 hr 199    Group B Strep: negative on 2/20/23  Cystic Fibrosis Screen: negative  Sickle Cell Screen: negative  First Trimester Screen: low risk for aneuploidy  MSAFP: negative  Non-Invasive Prenatal Testing: low risk for aneuploidy  Anatomy US: anterior placenta, 3VC, female gender, normal anatomy        ASSESSMENT & PLAN:  Neel Lafleur is a 30 y.o. female [id number] at 37w2d Repeat C/S w/  RRS   - GBS negative / Rh positive / R immune   - No indication for GBS prophylaxis   - Stefanie Pang   - CBC, TPall, T&S   - UDS R/B/A discussed, consent obtained and in chart   - Cat 1 FHT, TOCO q none min   - Ancef 2g/Azithro/Bicitra preoperatively   - C/S informed consent obtained, signed and on chart   - Patient ready for transfer to OR  -Discussed at length the risks and benefits of concurrent bilateral salpingectomy with c/s.   Advised patient that the primary benefit of such surgery is a 65% reduction in future risk of ovarian cancer. Patient advised that large scale studies have not demonstrated an increase in estimated blood loss, complications, or operating time but that bleeding, infection, and injury to nearby organs are potential complications with this additional surgery. Finally, patient has been thoroughly counseled regarding the consequence of loss of fertility following this procedure. Patient understands that this loss of fertility can not be reversed and has expressed via verbal and written consent that her wishes are to proceed with the this surgery for the purposes of ovarian cancer reduction.     cHTN (meds)   - On Procardia 30 mg XL qd    - Denies s/s of PreE   - PreE labs and P/C pending on admission    - BP's normotensive on admission    - Will continue to monitor closely    - Plan for repeat C/S today     Depression   - Denies SI/HI   - Currently managed without medication     Hx hypothyroidism   - No medications needed at this time    - Patient diagnosed in 2016 and started on Synthroid, stopped shortly after              - TSH wnl on 10/10/22 and 1.76 on 12/15/22; TSH 1.5 on 1/30/23              - T4 0.75 on 1/30/23; kashif 2/2 subclinical hypothyroidism       Hx C/S x 1   - Plan for repeat w/ RRS today     Hx wedge resection of ectopic    - right cornual ectopic    - 6/22/19    Pregestational DM   - Elevated early 1 hr GTT and 3 hr GTT   - NPH 10U; Novolog 30/40/52U   - POCT on admission    - Insulin currently held and NPO at this time    - fasting and 2 hr PP BG    - Follows with MFM    - Will need 2 hr GTT postpartum     IBS    - Currently managed without medications    AMA   - NIPT wnl     BMI 40    Patient Active Problem List    Diagnosis Date Noted    NST (non-stress test) nonreactive 03/01/2023     Priority: Medium    High-risk pregnancy, unspecified trimester 02/27/2023     Priority: Medium    AMA 08/30/2022     Priority: Medium    Hx Chlamydia 08/30/2022     Priority: Medium     Negative 9/9/22      Placental cyst affecting pregnancy in third trimester 2023 MFM ultrasound      Desires RRS 2023     Medicaid form signed 23      Pregestational DM 2022     Diagnosed in G5 pregnancy, early 1 hr and 3 hr GTT    Novolog 12, NPH 36      cHTN (meds) 2022: Diagnosed by Dr. Aníbal Dumas per review of flowsheets. No meds at this time. ASA Rx sent to patient's pharmacy  Baseline PreE labs (CMP, protein-creatinine ratio ordered_      Depression 2022: PHQ=1, loss of interest      Hx hypothyroidism 2022: Patient was prescribed Levothyroxine in  but stopped due to too much weight loss. TSH wnl 10/10/22      Hx C/S x 1 2022: Patient would like to discuss RCS versus TOLAC    APPROVED OVARIAN CA RRBS FORM SCANNED INTO MEDIA -SK        High-risk pregnancy 2022     Risk factors: New partner; chronic hypertension (no meds); advanced maternal age; H/O hypothyroidism; irritable bowel syndrome; obesity; H/O  x1; H/O vertigo; COVID unvaccinated    Fetal testing indicated: Yes  Fetal testing indication: Advanced maternal age  Fetal testing initiation: Individualized  Fetal testing frequency: Individualized      Former smoker 2022: Patient reports quitting 2022      Laparoscopic converted to open wedge resection of right cornual ectopic, right salpingectomy and evacuation of hemoperitoneum 2019     Patient counseled extensively on the need to not get pregnant for at least 18 months. IBS 2015: Patient denies meds at this time         Plan discussed with Dr. Shameka Dale, who is agreeable. Steroids given this admission: No    Risks, benefits, alternatives and possible complications have been discussed in detail with the patient. Admission, and post admission procedures and expectations were discussed in detail. All questions were answered.     Attending's Name:  53 Trudy Olea DO  Ob/Gyn Resident  3/6/2023, 6:13 PM

## 2023-03-06 NOTE — FLOWSHEET NOTE
Pt received to L&D per ambulatory; sent from clinic for non-reactive  NST. Placed in room 711. Up to BR et gowned.

## 2023-03-06 NOTE — CARE COORDINATION
ANTEPARTUM NOTE    Delivery by elective  section [O82]  Chronic hypertension in obstetric context in third trimester [O10.913]  High-risk pregnancy, unspecified trimester [O09.90]    Davonte was admitted to L&D on 3/6/23 for c/s @ 37w2d due to fetal tachycardia, cHTN on medication    OB GYN Provider: [facility]    Will meet with patient after delivery to verify name/address/phone/insurance and discuss discharge planning. Anticipate DC home 2 nights after vaginal delivery or 4 nights after C/S delivery as long as hemodynamically stable.

## 2023-03-06 NOTE — FLOWSHEET NOTE
Okay per dr Argenis Bryant for pt to be off monitor to shower. Chlorhexidine wash provided after education for pre op wash. Pt does not need to be clipped.

## 2023-03-06 NOTE — FLOWSHEET NOTE
[place] of patient care at this time. Admission process started. Dr Tl Garzon in to discuss POC with patient.

## 2023-03-07 PROBLEM — Z3A.37 37 WEEKS GESTATION OF PREGNANCY: Status: ACTIVE | Noted: 2023-03-07

## 2023-03-07 PROBLEM — O10.919 CHRONIC HYPERTENSION AFFECTING PREGNANCY: Status: ACTIVE | Noted: 2023-03-07

## 2023-03-07 LAB
ABSOLUTE EOS #: <0.03 K/UL (ref 0–0.44)
ABSOLUTE IMMATURE GRANULOCYTE: 0.14 K/UL (ref 0–0.3)
ABSOLUTE LYMPH #: 1.36 K/UL (ref 1.1–3.7)
ABSOLUTE MONO #: 1.24 K/UL (ref 0.1–1.2)
BASOPHILS # BLD: 0 % (ref 0–2)
BASOPHILS ABSOLUTE: 0.05 K/UL (ref 0–0.2)
EOSINOPHILS RELATIVE PERCENT: 0 % (ref 1–4)
GLUCOSE BLD-MCNC: 100 MG/DL (ref 65–105)
GLUCOSE BLD-MCNC: 107 MG/DL (ref 65–105)
GLUCOSE BLD-MCNC: 118 MG/DL (ref 65–105)
GLUCOSE BLD-MCNC: 137 MG/DL (ref 65–105)
HCT VFR BLD AUTO: 36.2 % (ref 36.3–47.1)
HGB BLD-MCNC: 11.5 G/DL (ref 11.9–15.1)
IMMATURE GRANULOCYTES: 1 %
LYMPHOCYTES # BLD: 8 % (ref 24–43)
MCH RBC QN AUTO: 28.3 PG (ref 25.2–33.5)
MCHC RBC AUTO-ENTMCNC: 31.8 G/DL (ref 28.4–34.8)
MCV RBC AUTO: 89.2 FL (ref 82.6–102.9)
MONOCYTES # BLD: 7 % (ref 3–12)
NRBC AUTOMATED: 0 PER 100 WBC
PDW BLD-RTO: 14.9 % (ref 11.8–14.4)
PLATELET # BLD AUTO: 201 K/UL (ref 138–453)
PMV BLD AUTO: 10.7 FL (ref 8.1–13.5)
RBC # BLD: 4.06 M/UL (ref 3.95–5.11)
RBC # BLD: ABNORMAL 10*6/UL
SEG NEUTROPHILS: 84 % (ref 36–65)
SEGMENTED NEUTROPHILS ABSOLUTE COUNT: 14.34 K/UL (ref 1.5–8.1)
WBC # BLD AUTO: 17.1 K/UL (ref 3.5–11.3)

## 2023-03-07 PROCEDURE — 6370000000 HC RX 637 (ALT 250 FOR IP)

## 2023-03-07 PROCEDURE — 6360000002 HC RX W HCPCS: Performed by: EMERGENCY MEDICINE

## 2023-03-07 PROCEDURE — 6360000002 HC RX W HCPCS

## 2023-03-07 PROCEDURE — 6370000000 HC RX 637 (ALT 250 FOR IP): Performed by: STUDENT IN AN ORGANIZED HEALTH CARE EDUCATION/TRAINING PROGRAM

## 2023-03-07 PROCEDURE — 85025 COMPLETE CBC W/AUTO DIFF WBC: CPT

## 2023-03-07 PROCEDURE — 36415 COLL VENOUS BLD VENIPUNCTURE: CPT

## 2023-03-07 PROCEDURE — 99024 POSTOP FOLLOW-UP VISIT: CPT | Performed by: OBSTETRICS & GYNECOLOGY

## 2023-03-07 PROCEDURE — 2580000003 HC RX 258: Performed by: EMERGENCY MEDICINE

## 2023-03-07 PROCEDURE — 1220000000 HC SEMI PRIVATE OB R&B

## 2023-03-07 PROCEDURE — 82947 ASSAY GLUCOSE BLOOD QUANT: CPT

## 2023-03-07 PROCEDURE — 2580000003 HC RX 258

## 2023-03-07 RX ORDER — DIPHENHYDRAMINE HCL 25 MG
25 TABLET ORAL EVERY 6 HOURS PRN
Status: DISCONTINUED | OUTPATIENT
Start: 2023-03-07 | End: 2023-03-10 | Stop reason: HOSPADM

## 2023-03-07 RX ORDER — VITAMIN A, ASCORBIC ACID, CHOLECALCIFEROL, .ALPHA.-TOCOPHEROL ACETATE, DL-, THIAMINE MONONITRATE, RIBOFLAVIN, NIACINAMIDE, PYRIDOXINE HYDROCHLORIDE, FOLIC ACID, CYANOCOBALAMIN, CALCIUM CARBONATE, IRON, ZINC OXIDE, AND CUPRIC OXIDE 4000; 120; 400; 22; 1.84; 3; 20; 10; 1; 12; 200; 29; 25; 2 [IU]/1; MG/1; [IU]/1; [IU]/1; MG/1; MG/1; MG/1; MG/1; MG/1; UG/1; MG/1; MG/1; MG/1; MG/1
1 TABLET ORAL DAILY
Status: DISCONTINUED | OUTPATIENT
Start: 2023-03-07 | End: 2023-03-10 | Stop reason: HOSPADM

## 2023-03-07 RX ORDER — NALBUPHINE HCL 10 MG/ML
5 AMPUL (ML) INJECTION
Status: DISCONTINUED | OUTPATIENT
Start: 2023-03-07 | End: 2023-03-10 | Stop reason: HOSPADM

## 2023-03-07 RX ORDER — OXYCODONE HYDROCHLORIDE 5 MG/1
5 TABLET ORAL EVERY 4 HOURS PRN
Status: DISCONTINUED | OUTPATIENT
Start: 2023-03-07 | End: 2023-03-10 | Stop reason: HOSPADM

## 2023-03-07 RX ORDER — METRONIDAZOLE 500 MG/1
500 TABLET ORAL EVERY 8 HOURS SCHEDULED
Status: COMPLETED | OUTPATIENT
Start: 2023-03-07 | End: 2023-03-08

## 2023-03-07 RX ORDER — 0.9 % SODIUM CHLORIDE 0.9 %
1000 INTRAVENOUS SOLUTION INTRAVENOUS ONCE
Status: COMPLETED | OUTPATIENT
Start: 2023-03-07 | End: 2023-03-07

## 2023-03-07 RX ORDER — ACETAMINOPHEN 500 MG
1000 TABLET ORAL EVERY 6 HOURS
Status: DISCONTINUED | OUTPATIENT
Start: 2023-03-07 | End: 2023-03-10 | Stop reason: HOSPADM

## 2023-03-07 RX ORDER — ONDANSETRON 2 MG/ML
4 INJECTION INTRAMUSCULAR; INTRAVENOUS EVERY 6 HOURS PRN
Status: DISCONTINUED | OUTPATIENT
Start: 2023-03-07 | End: 2023-03-10 | Stop reason: HOSPADM

## 2023-03-07 RX ORDER — OXYCODONE HYDROCHLORIDE 5 MG/1
10 TABLET ORAL EVERY 4 HOURS PRN
Status: DISCONTINUED | OUTPATIENT
Start: 2023-03-07 | End: 2023-03-10 | Stop reason: HOSPADM

## 2023-03-07 RX ORDER — DIPHENHYDRAMINE HYDROCHLORIDE 50 MG/ML
INJECTION INTRAMUSCULAR; INTRAVENOUS
Status: COMPLETED
Start: 2023-03-07 | End: 2023-03-07

## 2023-03-07 RX ORDER — SODIUM CHLORIDE 0.9 % (FLUSH) 0.9 %
5-40 SYRINGE (ML) INJECTION PRN
Status: DISCONTINUED | OUTPATIENT
Start: 2023-03-07 | End: 2023-03-10 | Stop reason: HOSPADM

## 2023-03-07 RX ORDER — DIPHENHYDRAMINE HCL 25 MG
25 TABLET ORAL EVERY 6 HOURS PRN
Status: DISCONTINUED | OUTPATIENT
Start: 2023-03-07 | End: 2023-03-07

## 2023-03-07 RX ORDER — SIMETHICONE 80 MG
80 TABLET,CHEWABLE ORAL EVERY 6 HOURS PRN
Status: DISCONTINUED | OUTPATIENT
Start: 2023-03-07 | End: 2023-03-09

## 2023-03-07 RX ORDER — POLYETHYLENE GLYCOL 3350 17 G/17G
17 POWDER, FOR SOLUTION ORAL DAILY
Status: DISCONTINUED | OUTPATIENT
Start: 2023-03-07 | End: 2023-03-10 | Stop reason: HOSPADM

## 2023-03-07 RX ORDER — CEPHALEXIN 500 MG/1
500 CAPSULE ORAL EVERY 8 HOURS SCHEDULED
Status: COMPLETED | OUTPATIENT
Start: 2023-03-07 | End: 2023-03-08

## 2023-03-07 RX ORDER — BISACODYL 10 MG
10 SUPPOSITORY, RECTAL RECTAL DAILY PRN
Status: DISCONTINUED | OUTPATIENT
Start: 2023-03-07 | End: 2023-03-10 | Stop reason: HOSPADM

## 2023-03-07 RX ORDER — NALOXONE HYDROCHLORIDE 0.4 MG/ML
0.4 INJECTION, SOLUTION INTRAMUSCULAR; INTRAVENOUS; SUBCUTANEOUS PRN
Status: DISCONTINUED | OUTPATIENT
Start: 2023-03-07 | End: 2023-03-10 | Stop reason: HOSPADM

## 2023-03-07 RX ORDER — LANOLIN 72 %
OINTMENT (GRAM) TOPICAL
Status: DISCONTINUED | OUTPATIENT
Start: 2023-03-07 | End: 2023-03-10 | Stop reason: HOSPADM

## 2023-03-07 RX ORDER — DOCUSATE SODIUM 100 MG/1
100 CAPSULE, LIQUID FILLED ORAL 2 TIMES DAILY
Status: DISCONTINUED | OUTPATIENT
Start: 2023-03-07 | End: 2023-03-09

## 2023-03-07 RX ORDER — DIPHENHYDRAMINE HYDROCHLORIDE 50 MG/ML
25 INJECTION INTRAMUSCULAR; INTRAVENOUS EVERY 6 HOURS PRN
Status: DISCONTINUED | OUTPATIENT
Start: 2023-03-07 | End: 2023-03-10 | Stop reason: HOSPADM

## 2023-03-07 RX ORDER — IBUPROFEN 600 MG/1
600 TABLET ORAL EVERY 6 HOURS
Status: DISCONTINUED | OUTPATIENT
Start: 2023-03-07 | End: 2023-03-10 | Stop reason: HOSPADM

## 2023-03-07 RX ORDER — ENOXAPARIN SODIUM 100 MG/ML
30 INJECTION SUBCUTANEOUS 2 TIMES DAILY
Status: DISCONTINUED | OUTPATIENT
Start: 2023-03-07 | End: 2023-03-10 | Stop reason: HOSPADM

## 2023-03-07 RX ORDER — SODIUM CHLORIDE 0.9 % (FLUSH) 0.9 %
5-40 SYRINGE (ML) INJECTION EVERY 12 HOURS SCHEDULED
Status: DISCONTINUED | OUTPATIENT
Start: 2023-03-07 | End: 2023-03-10 | Stop reason: HOSPADM

## 2023-03-07 RX ORDER — SODIUM CHLORIDE 9 MG/ML
INJECTION, SOLUTION INTRAVENOUS PRN
Status: DISCONTINUED | OUTPATIENT
Start: 2023-03-07 | End: 2023-03-10 | Stop reason: HOSPADM

## 2023-03-07 RX ADMIN — ACETAMINOPHEN 1000 MG: 500 TABLET ORAL at 06:02

## 2023-03-07 RX ADMIN — INSULIN LISPRO 1 UNITS: 100 INJECTION, SOLUTION INTRAVENOUS; SUBCUTANEOUS at 09:00

## 2023-03-07 RX ADMIN — DOCUSATE SODIUM 100 MG: 100 CAPSULE ORAL at 09:05

## 2023-03-07 RX ADMIN — METRONIDAZOLE 500 MG: 500 TABLET, FILM COATED ORAL at 06:02

## 2023-03-07 RX ADMIN — SODIUM CHLORIDE, PRESERVATIVE FREE 10 ML: 5 INJECTION INTRAVENOUS at 09:06

## 2023-03-07 RX ADMIN — ACETAMINOPHEN 1000 MG: 500 TABLET ORAL at 12:26

## 2023-03-07 RX ADMIN — KETOROLAC TROMETHAMINE 30 MG: 30 INJECTION, SOLUTION INTRAMUSCULAR; INTRAVENOUS at 06:02

## 2023-03-07 RX ADMIN — NALBUPHINE HYDROCHLORIDE 5 MG: 10 INJECTION, SOLUTION INTRAMUSCULAR; INTRAVENOUS; SUBCUTANEOUS at 06:16

## 2023-03-07 RX ADMIN — CEPHALEXIN 500 MG: 500 CAPSULE ORAL at 21:47

## 2023-03-07 RX ADMIN — METRONIDAZOLE 500 MG: 500 TABLET, FILM COATED ORAL at 15:38

## 2023-03-07 RX ADMIN — POLYETHYLENE GLYCOL 3350 17 G: 17 POWDER, FOR SOLUTION ORAL at 09:05

## 2023-03-07 RX ADMIN — KETOROLAC TROMETHAMINE 30 MG: 30 INJECTION, SOLUTION INTRAMUSCULAR; INTRAVENOUS at 12:26

## 2023-03-07 RX ADMIN — Medication 1 TABLET: at 09:05

## 2023-03-07 RX ADMIN — OXYCODONE HYDROCHLORIDE 10 MG: 5 TABLET ORAL at 21:47

## 2023-03-07 RX ADMIN — METRONIDAZOLE 500 MG: 500 TABLET, FILM COATED ORAL at 21:47

## 2023-03-07 RX ADMIN — DIPHENHYDRAMINE HYDROCHLORIDE 25 MG: 50 INJECTION, SOLUTION INTRAMUSCULAR; INTRAVENOUS at 11:17

## 2023-03-07 RX ADMIN — DIPHENHYDRAMINE HYDROCHLORIDE 25 MG: 50 INJECTION, SOLUTION INTRAMUSCULAR; INTRAVENOUS at 23:02

## 2023-03-07 RX ADMIN — SODIUM CHLORIDE, PRESERVATIVE FREE 10 ML: 5 INJECTION INTRAVENOUS at 11:17

## 2023-03-07 RX ADMIN — ACETAMINOPHEN 1000 MG: 500 TABLET ORAL at 18:37

## 2023-03-07 RX ADMIN — CEPHALEXIN 500 MG: 500 CAPSULE ORAL at 06:02

## 2023-03-07 RX ADMIN — DOCUSATE SODIUM 100 MG: 100 CAPSULE ORAL at 21:47

## 2023-03-07 RX ADMIN — ENOXAPARIN SODIUM 30 MG: 100 INJECTION SUBCUTANEOUS at 09:05

## 2023-03-07 RX ADMIN — IBUPROFEN 600 MG: 600 TABLET, FILM COATED ORAL at 18:33

## 2023-03-07 RX ADMIN — SODIUM CHLORIDE 1000 ML: 9 INJECTION, SOLUTION INTRAVENOUS at 06:21

## 2023-03-07 RX ADMIN — DIPHENHYDRAMINE HYDROCHLORIDE 25 MG: 50 INJECTION, SOLUTION INTRAMUSCULAR; INTRAVENOUS at 03:50

## 2023-03-07 RX ADMIN — ENOXAPARIN SODIUM 30 MG: 100 INJECTION SUBCUTANEOUS at 21:47

## 2023-03-07 RX ADMIN — CEPHALEXIN 500 MG: 500 CAPSULE ORAL at 15:38

## 2023-03-07 ASSESSMENT — PAIN DESCRIPTION - ORIENTATION
ORIENTATION: LOWER
ORIENTATION: MID;LOWER

## 2023-03-07 ASSESSMENT — PAIN SCALES - GENERAL
PAINLEVEL_OUTOF10: 2
PAINLEVEL_OUTOF10: 6
PAINLEVEL_OUTOF10: 7
PAINLEVEL_OUTOF10: 5

## 2023-03-07 ASSESSMENT — PAIN DESCRIPTION - PAIN TYPE: TYPE: SURGICAL PAIN

## 2023-03-07 ASSESSMENT — PAIN DESCRIPTION - DESCRIPTORS
DESCRIPTORS: SHARP
DESCRIPTORS: ACHING;SORE
DESCRIPTORS: ACHING;DISCOMFORT;SORE
DESCRIPTORS: ACHING;SORE

## 2023-03-07 ASSESSMENT — PAIN DESCRIPTION - LOCATION
LOCATION: ABDOMEN;INCISION
LOCATION: ABDOMEN;INCISION
LOCATION: ABDOMEN
LOCATION: ABDOMEN;INCISION

## 2023-03-07 ASSESSMENT — PAIN DESCRIPTION - ONSET: ONSET: PROGRESSIVE

## 2023-03-07 ASSESSMENT — PAIN - FUNCTIONAL ASSESSMENT
PAIN_FUNCTIONAL_ASSESSMENT: ACTIVITIES ARE NOT PREVENTED

## 2023-03-07 ASSESSMENT — PAIN DESCRIPTION - FREQUENCY: FREQUENCY: CONTINUOUS
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
## 2023-03-07 NOTE — BRIEF OP NOTE
Department of Obstetrics and Gynecology  Obstetrical Brief Operative Report  [facility]    Patient: Rajeev Gama   : 1985  MRN: [id number]       Acct: [de-identified]   Date of Procedure: 3/6/23    Pre-operative Diagnosis: 30 y.o. female [id number] at 42w2d   Repeat  Section with Risk Reducing Salpingectomy   Chronic Hypertension on Medication   Pregestational Diabetes   Hx C/S x 1, Declined TOLAC   Hx Wedge Resection   BMI 40     Post-operative Diagnosis: Same as above and  living infant     Procedure: repeat low transverse  section with risk reducing salpingectomy     Surgeon: Dr. Jer Marcial): Janiece Runner, PGY3; Kaylee Blake, PGY1    Anesthesia: spinal with Duramorph    Information for the patient's :  Merlin Muse [[id number]]   female   Birth Weight: 7 lb 15.7 oz (3.62 kg)   Information for the patient's :  Merlin Muse [[id number]]        Findings:  Live Born 7 lb 15 oz female infant in cephalic presentation with Apgars of 8 at 1 minute and 9 at five minutes, normal appearing uterus and ovaries. Right fallopian tube surgically absent from patient's previous cornua wedge resection. Left fallopian tube appeared normal.  Estimated Blood Loss: unable to be calculated immediately post-operatively  Total IV Fluids: 1000ml  Urine output: 200ml clear urine   Drains:  griffith catheter  Specimens:  cord blood and cord gases  Instrument and Sponge Count: Correct  Complications: none  Condition: Infant stable, transfer to [facility], Mother stable, transfer to post anesthesia recovery    See dictated operative report for full details.     Kaylee Blake DO  Ob/Gyn Resident  3/6/2023, 10:15 PM

## 2023-03-07 NOTE — PLAN OF CARE
Problem: Pain  Goal: Verbalizes/displays adequate comfort level or baseline comfort level  Outcome: Progressing     Problem: Infection - Adult  Goal: Absence of infection during hospitalization  Outcome: Progressing     Problem: Safety - Adult  Goal: Free from fall injury  Outcome: Progressing     Problem: Postpartum  Goal: Experiences normal postpartum course  Description:  Postpartum OB-Pregnancy care plan goal which identifies if the mother is experiencing a normal postpartum course  Outcome: Progressing  Goal: Appropriate maternal -  bonding  Description:  Postpartum OB-Pregnancy care plan goal which identifies if the mother and  are bonding appropriately  Outcome: Progressing  Goal: Establishment of infant feeding pattern  Description:  Postpartum OB-Pregnancy care plan goal which identifies if the mother is establishing a feeding pattern with their   Outcome: Progressing  Goal: Incisions, wounds, or drain sites healing without S/S of infection  Outcome: Progressing

## 2023-03-07 NOTE — PROGRESS NOTES
Patient admitted to room 747 from l&d via bed. Oriented to room and surroundings. Plan of care reviewed. Verbalized understanding. Instructed on infant security and safe sleep practices. Preventing falls education provided . The following handouts given: A New Beginning: Your Guide to Postpartum Care, Rounding, gs Security System,Babies Cry A lot, Safe Sleep, Security and Visitation Guidelines. Call light placed within reach.       Electronically signed by Surendra Oro RN on 3/7/2023 at 12:50 AM

## 2023-03-07 NOTE — ANESTHESIA PROCEDURE NOTES
Spinal Block    End time: 3/6/2023 8:30 PM  Reason for block: procedure for pain, primary anesthetic and at surgeon's request  Staffing  Performed: resident/CRNA   Anesthesiologist: Shreyas Medina MD  Resident/CRNA: RIO Pozo CRNA  Spinal Block  Patient position: sitting  Prep: Betadine  Patient monitoring: continuous pulse ox and frequent blood pressure checks  Approach: midline  Location: L4/L5  Provider prep: mask and sterile gloves  Local infiltration: lidocaine  Needle  Needle type:  Jessy   Needle gauge: 24 G  Assessment  Sensory level: T4  Events: cerebrospinal fluid and paresthesia  Swirl obtained: Yes  CSF: clear  Attempts: 2  Hemodynamics: stable  Preanesthetic Checklist  Completed: patient identified, IV checked, risks and benefits discussed, surgical/procedural consents, equipment checked, pre-op evaluation, timeout performed, anesthesia consent given, oxygen available, monitors applied/VS acknowledged, fire risk safety assessment completed and verbalized and blood product R/B/A discussed and consented

## 2023-03-07 NOTE — CARE COORDINATION
CASE MANAGEMENT POST-PARTUM TRANSITIONAL CARE PLAN    Delivery by elective  section [O82]  Chronic hypertension in obstetric context in third trimester [O10.913]  High-risk pregnancy, unspecified trimester [O09.90]    OB Provider: [facility]    Writer met w/ Jenni Scott and BJ Herrera at bedside to discuss DCP. She is S/P CS on 3/6/23 @ 37w2d at 2049 of female infant. Due to hypoglycemia shortly after birth infant was transferred to [address] [place] [address] ([facility]) NICU     Writer verified address/phone number correct on facesheet. She states she lives with her son. Jenni Scott verbalized no difficulties with transportation to and from doctors appointments or with paying for medications upon discharge home. [facility] insurance correct. Writer notified Jenni Melgozavelia she has 30 days from date of birth to add  to insurance policy by contacting [address] [place] [address]. She verbalized understanding. Jenni Scott confirmed a safe place for infant to sleep at home. Infant name on BC: Tremayne Jeffries. Infant PCP [facility].      DME: none  HOME CARE: none    Anticipate DC 3/10/23    This CM verbally notified Jenni Scott and Joni of :  Daily bedside rounds    Home Away from Home and/or u.sit if applicable  Locked Unit and need for ID band through infant's discharge  FOB and any visitors must go to 6th floor and obtain a badge or sticker  Phone Number to reach NICU      Readmission Risk              Risk of Unplanned Readmission:  8

## 2023-03-07 NOTE — ANESTHESIA POSTPROCEDURE EVALUATION
Department of Anesthesiology  Postprocedure Note    Patient: Romi Byrd  MRN: [id number]  YOB: 1985  Date of evaluation: 3/7/2023      Procedure Summary     Date: 23 Room / Location: [facility] OR [address] [place] [address]    Anesthesia Start:  Anesthesia Stop:     Procedure:  SECTION (Abdomen) Diagnosis:       H/O  section      (H/O  section [Z98.891])    Surgeons: Celio Wilcox MD Responsible Provider: Carmencita Hodge MD    Anesthesia Type: general, spinal ASA Status: 2          Anesthesia Type: No value filed.     Bhavani Phase I: Bhavani Score: 10    Bhavani Phase II:        Anesthesia Post Evaluation    Patient location during evaluation: PACU  Patient participation: complete - patient participated  Level of consciousness: awake and alert  Airway patency: patent  Nausea & Vomiting: no nausea and no vomiting  Complications: no  Cardiovascular status: blood pressure returned to baseline  Respiratory status: acceptable  Hydration status: euvolemic

## 2023-03-07 NOTE — PLAN OF CARE
Problem: Pain  Goal: Verbalizes/displays adequate comfort level or baseline comfort level  3/7/2023 162 by Joy Lion RN  Outcome: Progressing  3/7/2023 032 by Tanika Velasquez RN  Outcome: Progressing     Problem: Infection - Adult  Goal: Absence of infection during hospitalization  3/7/2023 1629 by Joy Lion RN  Outcome: Progressing  3/7/2023 032 by Tanika Velasquez RN  Outcome: Progressing     Problem: Safety - Adult  Goal: Free from fall injury  3/7/2023 162 by Joy Lion RN  Outcome: Progressing  3/7/2023 032 by Tanika Velasquez RN  Outcome: Progressing     Problem: Postpartum  Goal: Experiences normal postpartum course  Description:  Postpartum OB-Pregnancy care plan goal which identifies if the mother is experiencing a normal postpartum course  3/7/2023 162 by Joy Lion RN  Outcome: Progressing  3/7/2023 032 by Tanika Velasquez RN  Outcome: Progressing  Goal: Appropriate maternal -  bonding  Description:  Postpartum OB-Pregnancy care plan goal which identifies if the mother and  are bonding appropriately  3/7/2023 162 by Joy Lion RN  Outcome: Progressing  3/7/2023 032 by Tanika Velasquez RN  Outcome: Progressing  Goal: Establishment of infant feeding pattern  Description:  Postpartum OB-Pregnancy care plan goal which identifies if the mother is establishing a feeding pattern with their   3/7/2023 1629 by Joy Lion RN  Outcome: Progressing  3/7/2023 032 by Tanika Velasquez RN  Outcome: Progressing  Goal: Incisions, wounds, or drain sites healing without S/S of infection  3/7/2023 1629 by Joy Lion RN  Outcome: Progressing  3/7/2023 032 by Tanika Velasquez RN  Outcome: Progressing

## 2023-03-07 NOTE — CARE COORDINATION
Social Work     Sw reviewed medical record (current active problem list) and tox screens and found no current concerns. Sw spoke with mom briefly to explain Sw role, inquire if any needs or concerns, and provide safe sleep education and discuss. Mom denied any needs or questions and informs baby has a safe sleep environment (crib). Mom denied any current s/s of anxiety or depression and is aware to reach out to OB if any s/s occur after dc. Mom reports a \"perfect\" support system with fob and supportive family,  and denied any current questions or needs. Mom reports this is her 2nd child, she also has a 15year old son who is excited for baby. Mom states ped will be [facility]. Sw encouraged parents to reach out if any issues or concerns arise.

## 2023-03-07 NOTE — OP NOTE
Operative Note  Department of Obstetrics and Gynecology  [facility]     Patient: Romi Byrd   : 1985  MRN: [id number]       Acct: [de-identified]   PCP: Azra Harper MD  Date of Procedure: 3/6/23    Pre-operative Diagnosis: 30 y.o. female [id number] at 37w2d   Chronic Hypertension on Medication   Hx  Section x 1 - Declined TOLAC   Pregestational Diabetes   Hypothyroidism   Hx Wedge Resection of Ectopic   BMI 40     Post-operative Diagnosis: same as above and  living infant female     Procedure: repeat low transverse  section with risk reducing salpingectomy     Indications: Patient is a 30year old  at 42 weeks and 2 days gestational age with a history of chronic hypertension on medication and hx wedge resection of ectopic. Patient has hx c/section x1. Risks and benefits of TOLAC vs repeat  section were discussed and patient declined TOLAC and decided to continue pursue repeat  section. Consent was signed. All patients questions were answered. She received Ancef and Bicitra pre op. Surgeon: Dr. Carl Mcgrath  Assistants: Jordy Ma, PGY3; Chito Truong, PGY1    Anesthesia: spinal with duramorph    Procedure Details   The patient was seen pre-operatively. The risks, benefits, complications, treatment options, and expected outcomes were discussed with the patient. The patient concurred with the proposed plan, giving informed consent. The patient was taken to the Operating Room, identified as Romi Byrd and the procedure verified as  Delivery. A Time Out was held and the above information confirmed. After spinal anesthesia, the patient was draped and prepped in the usual sterile manner. A Pfannenstiel incision was made and carried down through the subcutaneous tissue to the fascia using scalpel. Fascial incision was made and extended transversely using aranda scissors for sharp dissection.  The fascia was  from the underlying rectus tissue superiorly and inferiorly using blunt dissection. The peritoneum was identified and entered bluntly. Metzenbaum scissors were used to take the peritoneum down sharply. Peritoneal incision was extended longitudinally with blunt stretch, bladder retractor was placed. A low transverse uterine incision was made using a new scalpel blade. Blunt stretch on the hysterotomy incision was made and the amniotomy was performed revealing Ruptured clear fluid fluid. Delivered from cephalic presentation was a Live Born female infant. The infant was suctioned, dried and the umbilical cord was clamped and cut after one minute delayed cord clamping. The infant was taken to the warmer and attended by NICU for evaluation. A second section of cord was clamped and cut and sent for gases. Cord blood was obtained for evaluation. The placenta was removed spontaneously with gentle traction and appeared intact, whole, and that the umbilical cord had three vessels noted. Pitocin was started. The uterine outline appeared normal. The uterus was exteriorized and cleaned of all clots and debris. The uterine incision was closed with running suture of 0 Vicryl. Bovie electrocautery and surgicel were used to obtain excellent hemostasis. Attention was then turned to the RRS portion of the case. The left fallopian tube was grasped and using two Babcocks and mesosalpinx was coagulated and cut with the Ligasure in order to perform the salpingectomy. The same procedure was not performed on the right as the fallopian tube was surgically absent. Coagulation noted. The uterus was reintroduced into the abdominal cavity. Bilateral abdominal gutters were cleared of all clots and debris.  The right fallopian tube was noted to be surgically absent and the left fallopian tube appeared normal prior to salpingectomy; bilateral ovaries were visualized and appeared normal. The hysterotomy was again inspected and found to be hemostatic. Surgicel was placed over the incision. The fascia was then reapproximated with running sutures of 1 PDS. The subcuticular space was irrigated copiously. The subcuticular space was closed using a 3-0 Plain gut suture in a running fashion. The skin was reapproximated with a 3-0 Monocryl. The skin was then cleansed and dressed with a Prevena dressing in sterile fashion. Instrument, sponge, and needle counts were correct prior the abdominal closure and at the conclusion of the case. The urine remained clear throughout the case. Ancef and azithromycin was given for antibiotic prophylaxis. SCDs for DVT prophylaxis remain in place for the post operative period. Dr. Tameka Starkey was present for the entire operation. Findings:  Viable 7 lb 15 oz female infant in cephalic presentation with Apgars of 8 at 1 minute and 9 at five minutes,   Quantitative Blood Loss: 519ml  Total IV Fluids: 1000ml  Urine output: 200ml clear urine   Drains:  griffith catheter  Specimens:  cord blood and cord gases  Instrument and Sponge Count: Correct  Complications: none  Condition: Infant stable, transfer to [facility], Mother stable, transfer to post anesthesia recovery      Curtis Garcia DO  OB/GYN Resident  3/7/2023, 9:13 AM    This dictation was performed by a resident physician and then was thoroughly reviewed by the attending prior to being signed. I was present and scrubbed for the entire uncomplicated repeat  section and  unilateral salpingectomy as above.    Maury Morrison MD

## 2023-03-08 PROBLEM — O09.90 HIGH-RISK PREGNANCY: Status: RESOLVED | Noted: 2022-08-30 | Resolved: 2023-03-08

## 2023-03-08 PROBLEM — O09.90 HIGH-RISK PREGNANCY, UNSPECIFIED TRIMESTER: Status: RESOLVED | Noted: 2023-02-27 | Resolved: 2023-03-08

## 2023-03-08 PROBLEM — Z3A.37 37 WEEKS GESTATION OF PREGNANCY: Status: RESOLVED | Noted: 2023-03-07 | Resolved: 2023-03-08

## 2023-03-08 LAB — SURGICAL PATHOLOGY REPORT: NORMAL

## 2023-03-08 PROCEDURE — 6370000000 HC RX 637 (ALT 250 FOR IP)

## 2023-03-08 PROCEDURE — 2580000003 HC RX 258

## 2023-03-08 PROCEDURE — 1220000000 HC SEMI PRIVATE OB R&B

## 2023-03-08 PROCEDURE — 6360000002 HC RX W HCPCS

## 2023-03-08 RX ADMIN — DOCUSATE SODIUM 100 MG: 100 CAPSULE ORAL at 22:54

## 2023-03-08 RX ADMIN — ENOXAPARIN SODIUM 30 MG: 100 INJECTION SUBCUTANEOUS at 22:54

## 2023-03-08 RX ADMIN — CEPHALEXIN 500 MG: 500 CAPSULE ORAL at 13:04

## 2023-03-08 RX ADMIN — DOCUSATE SODIUM 100 MG: 100 CAPSULE ORAL at 09:10

## 2023-03-08 RX ADMIN — IBUPROFEN 600 MG: 600 TABLET, FILM COATED ORAL at 12:58

## 2023-03-08 RX ADMIN — BISACODYL 10 MG: 10 SUPPOSITORY RECTAL at 22:54

## 2023-03-08 RX ADMIN — OXYCODONE HYDROCHLORIDE 10 MG: 5 TABLET ORAL at 05:39

## 2023-03-08 RX ADMIN — ACETAMINOPHEN 1000 MG: 500 TABLET ORAL at 22:54

## 2023-03-08 RX ADMIN — METRONIDAZOLE 500 MG: 500 TABLET, FILM COATED ORAL at 22:53

## 2023-03-08 RX ADMIN — METRONIDAZOLE 500 MG: 500 TABLET, FILM COATED ORAL at 05:39

## 2023-03-08 RX ADMIN — OXYCODONE HYDROCHLORIDE 10 MG: 5 TABLET ORAL at 09:28

## 2023-03-08 RX ADMIN — SODIUM CHLORIDE, PRESERVATIVE FREE 10 ML: 5 INJECTION INTRAVENOUS at 09:15

## 2023-03-08 RX ADMIN — SODIUM CHLORIDE, PRESERVATIVE FREE 10 ML: 5 INJECTION INTRAVENOUS at 22:53

## 2023-03-08 RX ADMIN — ACETAMINOPHEN 1000 MG: 500 TABLET ORAL at 03:31

## 2023-03-08 RX ADMIN — OXYCODONE HYDROCHLORIDE 10 MG: 5 TABLET ORAL at 19:07

## 2023-03-08 RX ADMIN — Medication 1 TABLET: at 09:10

## 2023-03-08 RX ADMIN — OXYCODONE HYDROCHLORIDE 10 MG: 5 TABLET ORAL at 14:48

## 2023-03-08 RX ADMIN — ACETAMINOPHEN 1000 MG: 500 TABLET ORAL at 15:35

## 2023-03-08 RX ADMIN — POLYETHYLENE GLYCOL 3350 17 G: 17 POWDER, FOR SOLUTION ORAL at 09:10

## 2023-03-08 RX ADMIN — IBUPROFEN 600 MG: 600 TABLET, FILM COATED ORAL at 05:39

## 2023-03-08 RX ADMIN — CEPHALEXIN 500 MG: 500 CAPSULE ORAL at 05:39

## 2023-03-08 RX ADMIN — ENOXAPARIN SODIUM 30 MG: 100 INJECTION SUBCUTANEOUS at 09:11

## 2023-03-08 RX ADMIN — METRONIDAZOLE 500 MG: 500 TABLET, FILM COATED ORAL at 13:03

## 2023-03-08 RX ADMIN — IBUPROFEN 600 MG: 600 TABLET, FILM COATED ORAL at 00:32

## 2023-03-08 RX ADMIN — IBUPROFEN 600 MG: 600 TABLET, FILM COATED ORAL at 19:03

## 2023-03-08 RX ADMIN — ACETAMINOPHEN 1000 MG: 500 TABLET ORAL at 09:28

## 2023-03-08 RX ADMIN — CEPHALEXIN 500 MG: 500 CAPSULE ORAL at 22:53

## 2023-03-08 ASSESSMENT — PAIN - FUNCTIONAL ASSESSMENT
PAIN_FUNCTIONAL_ASSESSMENT: ACTIVITIES ARE NOT PREVENTED

## 2023-03-08 ASSESSMENT — PAIN DESCRIPTION - DESCRIPTORS
DESCRIPTORS: ACHING
DESCRIPTORS: ACHING;DISCOMFORT
DESCRIPTORS: DISCOMFORT;ACHING
DESCRIPTORS: DISCOMFORT;ACHING;SPASM
DESCRIPTORS: SHARP
DESCRIPTORS: ACHING
DESCRIPTORS: SHARP

## 2023-03-08 ASSESSMENT — PAIN SCALES - GENERAL
PAINLEVEL_OUTOF10: 7
PAINLEVEL_OUTOF10: 8
PAINLEVEL_OUTOF10: 7
PAINLEVEL_OUTOF10: 6
PAINLEVEL_OUTOF10: 6
PAINLEVEL_OUTOF10: 4
PAINLEVEL_OUTOF10: 7
PAINLEVEL_OUTOF10: 7
PAINLEVEL_OUTOF10: 8
PAINLEVEL_OUTOF10: 7
PAINLEVEL_OUTOF10: 8

## 2023-03-08 ASSESSMENT — PAIN DESCRIPTION - LOCATION
LOCATION: ABDOMEN
LOCATION: ABDOMEN
LOCATION: ABDOMEN;BACK
LOCATION: ABDOMEN

## 2023-03-08 ASSESSMENT — PAIN DESCRIPTION - ORIENTATION
ORIENTATION: LOWER
ORIENTATION: LOWER;MID
ORIENTATION: LOWER;MID
ORIENTATION: MID;LOWER
ORIENTATION: LOWER;MID
ORIENTATION: LOWER

## 2023-03-08 NOTE — PROGRESS NOTES
POST OPERATIVE DAY # 2    Benjamin Zamora is a 30 y.o. female POD # 2 s/p RLTCS w/ left RRS on 3/6/23  This patient was seen and examined. Today she is doing well without any chief complaint. Her pain is controlled with motrin/tylenol/clifton. Her lochia is light. She denies chest pain, shortness of breath, headache, lightheadedness, blurred vision, and peripheral edema. She is bottle feeding and she denies any signs or symptoms of mastitis. She is ambulating well. She is voiding without difficulty. She currently denies S/S of postpartum depression. Flatus present. Bowel movement present. She is tolerating solids.     Her pregnancy was complicated by:   Patient Active Problem List   Diagnosis    IBS    Laparoscopic converted to open wedge resection of right cornual ectopic, right salpingectomy and evacuation of hemoperitoneum 6/22/19    cHTN (meds)    Depression    AMA    Hx Chlamydia    Hx hypothyroidism    Hx C/S x 1    Former smoker    Pregestational DM    Desires RRS    NST (non-stress test) nonreactive    Placental cyst affecting pregnancy in third trimester    RLTCS w/ left RRS 3/6/23 F Apg 8/9 Wt 7#15     Chronic hypertension affecting pregnancy         Vital Signs:  Vitals:    03/07/23 1500 03/07/23 1945 03/07/23 2147 03/08/23 0029   BP:  (!) 140/84 (!) 111/99 113/70   Pulse:  92 94 88   Resp:  16  16   Temp:  98.3 °F (36.8 °C)  97.9 °F (36.6 °C)   TempSrc:  Oral  Oral   SpO2: 98% 98% 99% 98%         Urine Input & Output last 24hrs:     Intake/Output Summary (Last 24 hours) at 3/8/2023 0705  Last data filed at 3/7/2023 1515  Gross per 24 hour   Intake 600 ml   Output 900 ml   Net -300 ml     `  Physical Exam:  General:  awake, alert, cooperative, no apparent distress, and appears stated age  Neurologic:  alert, oriented, normal speech, no focal findings or movement disorder noted  Lungs:  No increased work of breathing, good air exchange, clear to auscultation bilaterally, no crackles or wheezing  Heart:  Normal apical impulse, regular rate and rhythm, normal S1 and S2, no S3 or S4, and no murmur noted    Abdomen: Soft, nontender, nondistended, bowel sounds present, no rebound, rigidity or guarding   Fundus: non-tender, firm, below umbilicus  Incision: Prevena in place and functioning  Extremities:  no calf tenderness, non edematous    Labs:  Lab Results   Component Value Date    WBC 17.1 (H) 03/07/2023    HGB 11.5 (L) 03/07/2023    HCT 36.2 (L) 03/07/2023    MCV 89.2 03/07/2023     03/07/2023       Assessment/Plan:  Chano Jaffe is a [id number] POD # 2 s/p RLTCS w/ left risk reducing salpingectomy    - Doing well, VSS    - female infant in NICU   - Encourage ambulation and use of incentive spirometer   - S/p griffith catheter and saline lock IV on POD #1    - Post-op Hgb 11.5   - Motrin/Tylenol scheduled, Roxicodone PRN for pain control    - Prevena dressing    - Keflex/Flagyl x48h   - Lovenox 30mg BID    - Continue post-op care  Rh positive/Rubella immune  - Rhogam/MMR not indicated  Bottle feeding   - Denies s/sx mastitis   Chronic hypertension  - Controlled on Procardia XL 30mg qd  - PreE labs wnl, P/C 0.14 on admission   - Patient currently refusing medication  - BP intermittently elevated, nonsevere  - Denies s/sx PreE  - Continue to monitor closely   Pregestational DM   - Diagnosed based on early 1-hr GTT   - POC glucose testing AC/HS completed x24h, patient required minimal glucose control with SSI  - Will discontinue glucose checks today  - Patient will need to follow up for 6-week PP 2-hr GTT   Hypothyroidism  - Controlled on no medications currently  - Denies symptoms of hypo/hyperthyroidism  Depression  - Controlled on no medications  - Denies SI/HI, mood changes   BMI 40      Counseling Completed:  Secondary Smoke risks and Sudden Infant Death Syndrome were reviewed with recommendations.  Infant sleeping, \"back to sleep\" and avoidance of co-sleeping recommendations were reviewed. Signs and Symptoms of Post Partum Depression were reviewed. The patient is to call if any occur. Signs and symptoms of Mastitis were reviewed. The patient is to call if any occur for follow up.   Discharge instructions including pelvic rest, incision care, 15 lb weight restriction, no driving with pain medicine and office follow-up were reviewed with patient     Attending Physician: Dr. Chaparro Mcrae DO  Ob/Gyn Resident  3/8/2023, 7:05 AM

## 2023-03-08 NOTE — PLAN OF CARE
Problem: Pain  Goal: Verbalizes/displays adequate comfort level or baseline comfort level  3/8/2023 1851 by Zay Barillas RN  Outcome: Progressing  Flowsheets (Taken 3/8/2023 0800)  Verbalizes/displays adequate comfort level or baseline comfort level:   Encourage patient to monitor pain and request assistance   Assess pain using appropriate pain scale   Administer analgesics based on type and severity of pain and evaluate response   Implement non-pharmacological measures as appropriate and evaluate response   Consider cultural and social influences on pain and pain management   Notify Licensed Independent Practitioner if interventions unsuccessful or patient reports new pain  3/8/2023 0520 by Radha Gibson RN  Outcome: Progressing  Flowsheets (Taken 3/7/2023 2147)  Verbalizes/displays adequate comfort level or baseline comfort level:   Encourage patient to monitor pain and request assistance   Assess pain using appropriate pain scale   Administer analgesics based on type and severity of pain and evaluate response   Implement non-pharmacological measures as appropriate and evaluate response     Problem: Infection - Adult  Goal: Absence of infection during hospitalization  3/8/2023 1851 by Zay Barillas RN  Outcome: Progressing  3/8/2023 05 by Radha Gibson RN  Outcome: Progressing     Problem: Safety - Adult  Goal: Free from fall injury  3/8/2023 1851 by Zay Barillas RN  Outcome: Progressing  3/8/2023 0520 by Radha Gibson RN  Outcome: Progressing     Problem: Postpartum  Goal: Experiences normal postpartum course  Description:  Postpartum OB-Pregnancy care plan goal which identifies if the mother is experiencing a normal postpartum course  3/8/2023 1851 by Zay Barillas RN  Outcome: Progressing  3/8/2023 0520 by Radha Gibson RN  Outcome: Progressing  Goal: Appropriate maternal -  bonding  Description:  Postpartum OB-Pregnancy care plan goal which identifies if the mother and  are bonding appropriately  3/8/2023 1851 by Jurgen Monique RN  Outcome: Progressing  3/8/2023 05 by Gayathri Pierson RN  Outcome: Progressing  Goal: Establishment of infant feeding pattern  Description:  Postpartum OB-Pregnancy care plan goal which identifies if the mother is establishing a feeding pattern with their   3/8/2023 1851 by Jurgen Monique RN  Outcome: Progressing  3/8/2023 05 by Gayathri Pierson RN  Outcome: Progressing  Goal: Incisions, wounds, or drain sites healing without S/S of infection  3/8/2023 1851 by Jurgen Monique RN  Outcome: Progressing  3/8/2023 0520 by Gayathri Pierson RN  Outcome: Progressing

## 2023-03-08 NOTE — PLAN OF CARE
Problem: Pain  Goal: Verbalizes/displays adequate comfort level or baseline comfort level  3/8/2023 0520 by Melida Wise RN  Outcome: Progressing  Flowsheets (Taken 3/7/2023 2147)  Verbalizes/displays adequate comfort level or baseline comfort level:   Encourage patient to monitor pain and request assistance   Assess pain using appropriate pain scale   Administer analgesics based on type and severity of pain and evaluate response   Implement non-pharmacological measures as appropriate and evaluate response  3/7/2023 1629 by Alejo Lara RN  Outcome: Progressing     Problem: Infection - Adult  Goal: Absence of infection during hospitalization  3/8/2023 0520 by Melida Wise RN  Outcome: Progressing  3/7/2023 1629 by Alejo Lara RN  Outcome: Progressing     Problem: Safety - Adult  Goal: Free from fall injury  3/8/2023 0520 by Melida Wise RN  Outcome: Progressing  3/7/2023 1629 by Alejo Lara RN  Outcome: Progressing     Problem: Postpartum  Goal: Experiences normal postpartum course  Description:  Postpartum OB-Pregnancy care plan goal which identifies if the mother is experiencing a normal postpartum course  3/8/2023 0520 by Melida Wise RN  Outcome: Progressing  3/7/2023 1629 by Alejo Lara RN  Outcome: Progressing  Goal: Appropriate maternal -  bonding  Description:  Postpartum OB-Pregnancy care plan goal which identifies if the mother and  are bonding appropriately  3/8/2023 0520 by Melida Wise RN  Outcome: Progressing  3/7/2023 1629 by Alejo Lara RN  Outcome: Progressing  Goal: Establishment of infant feeding pattern  Description:  Postpartum OB-Pregnancy care plan goal which identifies if the mother is establishing a feeding pattern with their   3/8/2023 0520 by Melida Wise RN  Outcome: Progressing  3/7/2023 1629 by Alejo Lara RN  Outcome: Progressing  Goal: Incisions, wounds, or drain sites healing without S/S of infection  3/8/2023 0520 by Melida Wise RN  Outcome: Progressing  3/7/2023 1629 by Tj Gardner RN  Outcome: Progressing

## 2023-03-09 PROCEDURE — 6370000000 HC RX 637 (ALT 250 FOR IP)

## 2023-03-09 PROCEDURE — 6360000002 HC RX W HCPCS

## 2023-03-09 PROCEDURE — 6370000000 HC RX 637 (ALT 250 FOR IP): Performed by: STUDENT IN AN ORGANIZED HEALTH CARE EDUCATION/TRAINING PROGRAM

## 2023-03-09 PROCEDURE — 1220000000 HC SEMI PRIVATE OB R&B

## 2023-03-09 PROCEDURE — 2580000003 HC RX 258

## 2023-03-09 RX ORDER — GABAPENTIN 300 MG/1
300 CAPSULE ORAL 3 TIMES DAILY
Status: DISCONTINUED | OUTPATIENT
Start: 2023-03-09 | End: 2023-03-10 | Stop reason: HOSPADM

## 2023-03-09 RX ORDER — LIDOCAINE 4 G/G
1 PATCH TOPICAL DAILY
Status: DISCONTINUED | OUTPATIENT
Start: 2023-03-09 | End: 2023-03-10 | Stop reason: HOSPADM

## 2023-03-09 RX ORDER — SENNA AND DOCUSATE SODIUM 50; 8.6 MG/1; MG/1
2 TABLET, FILM COATED ORAL 2 TIMES DAILY
Status: DISCONTINUED | OUTPATIENT
Start: 2023-03-09 | End: 2023-03-10 | Stop reason: HOSPADM

## 2023-03-09 RX ORDER — CYCLOBENZAPRINE HCL 10 MG
10 TABLET ORAL 3 TIMES DAILY PRN
Status: DISCONTINUED | OUTPATIENT
Start: 2023-03-09 | End: 2023-03-10 | Stop reason: HOSPADM

## 2023-03-09 RX ORDER — SIMETHICONE 80 MG
80 TABLET,CHEWABLE ORAL 4 TIMES DAILY
Status: DISCONTINUED | OUTPATIENT
Start: 2023-03-09 | End: 2023-03-10 | Stop reason: HOSPADM

## 2023-03-09 RX ADMIN — ACETAMINOPHEN 1000 MG: 500 TABLET ORAL at 04:33

## 2023-03-09 RX ADMIN — SIMETHICONE 80 MG: 80 TABLET, CHEWABLE ORAL at 12:07

## 2023-03-09 RX ADMIN — GABAPENTIN 300 MG: 300 CAPSULE ORAL at 21:08

## 2023-03-09 RX ADMIN — ENOXAPARIN SODIUM 30 MG: 100 INJECTION SUBCUTANEOUS at 08:53

## 2023-03-09 RX ADMIN — GABAPENTIN 300 MG: 300 CAPSULE ORAL at 08:52

## 2023-03-09 RX ADMIN — IBUPROFEN 600 MG: 600 TABLET, FILM COATED ORAL at 04:32

## 2023-03-09 RX ADMIN — ENOXAPARIN SODIUM 30 MG: 100 INJECTION SUBCUTANEOUS at 21:08

## 2023-03-09 RX ADMIN — POLYETHYLENE GLYCOL 3350 17 G: 17 POWDER, FOR SOLUTION ORAL at 08:52

## 2023-03-09 RX ADMIN — ACETAMINOPHEN 1000 MG: 500 TABLET ORAL at 12:07

## 2023-03-09 RX ADMIN — ACETAMINOPHEN 1000 MG: 500 TABLET ORAL at 18:38

## 2023-03-09 RX ADMIN — DOCUSATE SODIUM 50 MG AND SENNOSIDES 8.6 MG 2 TABLET: 8.6; 5 TABLET, FILM COATED ORAL at 08:52

## 2023-03-09 RX ADMIN — IBUPROFEN 600 MG: 600 TABLET, FILM COATED ORAL at 12:07

## 2023-03-09 RX ADMIN — DOCUSATE SODIUM 50 MG AND SENNOSIDES 8.6 MG 2 TABLET: 8.6; 5 TABLET, FILM COATED ORAL at 21:07

## 2023-03-09 RX ADMIN — MAGNESIUM HYDROXIDE 30 ML: 400 SUSPENSION ORAL at 23:12

## 2023-03-09 RX ADMIN — GABAPENTIN 300 MG: 300 CAPSULE ORAL at 01:13

## 2023-03-09 RX ADMIN — SIMETHICONE 80 MG: 80 TABLET, CHEWABLE ORAL at 01:13

## 2023-03-09 RX ADMIN — OXYCODONE HYDROCHLORIDE 10 MG: 5 TABLET ORAL at 00:12

## 2023-03-09 RX ADMIN — CYCLOBENZAPRINE 10 MG: 10 TABLET, FILM COATED ORAL at 01:13

## 2023-03-09 RX ADMIN — GABAPENTIN 300 MG: 300 CAPSULE ORAL at 14:12

## 2023-03-09 RX ADMIN — SIMETHICONE 80 MG: 80 TABLET, CHEWABLE ORAL at 21:07

## 2023-03-09 RX ADMIN — SIMETHICONE 80 MG: 80 TABLET, CHEWABLE ORAL at 08:52

## 2023-03-09 RX ADMIN — IBUPROFEN 600 MG: 600 TABLET, FILM COATED ORAL at 18:38

## 2023-03-09 RX ADMIN — Medication 1 TABLET: at 14:12

## 2023-03-09 RX ADMIN — SODIUM CHLORIDE, PRESERVATIVE FREE 10 ML: 5 INJECTION INTRAVENOUS at 09:00

## 2023-03-09 RX ADMIN — DOCUSATE SODIUM 50 MG AND SENNOSIDES 8.6 MG 2 TABLET: 8.6; 5 TABLET, FILM COATED ORAL at 01:13

## 2023-03-09 RX ADMIN — SODIUM CHLORIDE, PRESERVATIVE FREE 10 ML: 5 INJECTION INTRAVENOUS at 21:09

## 2023-03-09 ASSESSMENT — PAIN DESCRIPTION - DESCRIPTORS
DESCRIPTORS: ACHING;DISCOMFORT
DESCRIPTORS: DISCOMFORT;ACHING

## 2023-03-09 ASSESSMENT — PAIN SCALES - GENERAL
PAINLEVEL_OUTOF10: 7
PAINLEVEL_OUTOF10: 6
PAINLEVEL_OUTOF10: 8
PAINLEVEL_OUTOF10: 6

## 2023-03-09 ASSESSMENT — PAIN DESCRIPTION - ORIENTATION
ORIENTATION: MID;LOWER
ORIENTATION: LOWER;MID

## 2023-03-09 ASSESSMENT — PAIN DESCRIPTION - LOCATION
LOCATION: ABDOMEN
LOCATION: ABDOMEN;BACK
LOCATION: ABDOMEN;BACK

## 2023-03-09 ASSESSMENT — PAIN - FUNCTIONAL ASSESSMENT
PAIN_FUNCTIONAL_ASSESSMENT: ACTIVITIES ARE NOT PREVENTED
PAIN_FUNCTIONAL_ASSESSMENT: ACTIVITIES ARE NOT PREVENTED

## 2023-03-09 NOTE — PROGRESS NOTES
POST OPERATIVE DAY # 4     Miguel Mckeon is a 30 y.o. female   This patient was seen and examined today. Today she is doing well without any chief complaint. Her lochia is light. She denies chest pain, shortness of breath, headache, lightheadedness, blurred vision and peripheral edema. She is breast feeding and she denies any signs or symptoms of mastitis. She is ambulating well. She is voiding without difficulty. She currently denies S/S of postpartum depression. Flatus present. Bowel movement present. She is tolerating solids.     Vital Signs:  Vitals:    03/09/23 0830 03/09/23 1207 03/09/23 2105 03/10/23 0431   BP: 130/85 131/84 (!) 137/97 (!) 147/82   Pulse: 85 84 95 95   Resp: 16 16 16 16   Temp: 98.1 °F (36.7 °C) 98.2 °F (36.8 °C) 98.2 °F (36.8 °C) 98.2 °F (36.8 °C)   TempSrc: Oral Oral Oral Oral   SpO2: 99% 98% 98% 96%         Urine Input & Output last 24hrs:   No intake or output data in the 24 hours ending 03/09/23 1551    Physical Exam:  General:  no apparent distress, alert and cooperative  Neurologic:  alert, oriented, normal speech, no focal findings or movement disorder noted  Lungs:  No increased work of breathing, good air exchange, clear to auscultation bilaterally, no crackles or wheezing  Heart:  Regular rate and rhythm, normal S1 and S2  Abdomen: abdomen soft, non-distended, non-tender  Fundus: non-tender, firm, below umbilicus  Incision: Prevena dressing in place and functioning   Extremities:  no calf tenderness, non edematous    Labs:  Lab Results   Component Value Date    WBC 17.1 (H) 03/07/2023    HGB 11.5 (L) 03/07/2023    HCT 36.2 (L) 03/07/2023    MCV 89.2 03/07/2023     03/07/2023       Assessment/Plan:  Miguel Mckeon is a [id number] POD # 4 s/p RLTCS w/ left RRS   - Doing well, BP elevated, non-severe    - female infant in NICU   - Encourage ambulation and use of incentive spirometer   - D/C griffith catheter and saline lock IV on POD #1    - CBC completed; Hgb 11.5 on POD#1   - Motrin/Tylenol/Angelina for pain control   - Keflex/Flagyl x 48hr for wound prophylaxis    - Lovenox 30 mg BID for DVT prophylaxis     Rh positive/Rubella immune   - Rhogam and MMR not indicated    Bottle feeding   - Denies s/s mastitis    Abdominal distension   - Abdominal exam shows distention, no tenderness to palpation.    - Bowel sounds present    - Patient has passed flatus and has had small BM    - Continue Senokot-S/Glycolax/Simethicone; Miralax    - Low concern for postoperative ileus, SBO at this time     cHTN (meds)  - BP elevated, non-severe overnight   - Denies s/s PreE   - PreE labs wnl, P/C 0.14  - Patient currently refusing Procardia XL 30mg QD  - Will need BP check in one week  - Continue to monitor closely     Pregestational DM   - Diagnosed from early 1 hr GTT   - Insulin regimen currently held   - Patient to follow up with primary care physician     Hypothyroidism   - Stable on no medications    Depression   - Stable on no medications   - Denies s/s PPD     BMI 40    Continue post-op care. Counseling Completed:  Secondary Smoke risks and Sudden Infant Death Syndrome were reviewed with recommendations. Infant sleeping, \"back to sleep\" and avoidance of co-sleeping recommendations were reviewed. Signs and Symptoms of Post Partum Depression were reviewed. The patient is to call if any occur. Signs and symptoms of Mastitis were reviewed. The patient is to call if any occur for follow up.   Discharge instructions including pelvic rest, incision care, 15 lb weight restriction, no driving with pain medicine and office follow-up were reviewed with patient     Attending Physician: Dr. Tyrel Beckford DO  Ob/Gyn Resident  3/9/2023, 3:51 PM    Date: 3/10/2023  Time: 10:01 AM      Patient Name: Tonio Bridges  Patient : 1985  Room/Bed: 5357/1852-09  Admission Date/Time: 3/6/2023  2:25 PM        Attending Physician Statement  I have personally seen, evaluated and discussed the care of Stephanie Fairbanks, including pertinent history and exam findings with the resident. I have reviewed and edited their note in the electronic medical record. The key elements of all parts of the encounter have been performed/reviewed by me. I agree with the assessment, plan and orders as documented by the resident. The level of care submitted represents to the best of my ability the care documented in the medical record today. [facility] Modifier. This service has been performed in part by a resident under the direction of a teaching physician. Attending's Name:  Ben Willett MD      Patient doing well. She has no concerns or complaints. Continue routine post-partum care. Stable for discharge.

## 2023-03-09 NOTE — PROGRESS NOTES
POST OPERATIVE DAY # 3     Alejandra Powell is a 30 y.o. female   This patient was seen and examined today. Today she is doing well but reports pain and abdominal distension. She is passing flatus but has not had a bowel movement. She reports some low back pain that is achy and itermittently sharp. Her lochia is light. She denies chest pain, shortness of breath, headache, lightheadedness, blurred vision and peripheral edema. She is bottle feeding and she denies any signs or symptoms of mastitis. She is ambulating well. She is voiding without difficulty. She currently denies S/S of postpartum depression. She is tolerating solids.     Vital Signs:  Vitals:    03/08/23 1200 03/08/23 1448 03/08/23 1535 03/08/23 1907   BP: 130/84  127/88    Pulse: 93  88    Resp: 18 16 16 16   Temp: 97.9 °F (36.6 °C)  97.9 °F (36.6 °C)    TempSrc: Oral  Oral    SpO2: 98%  98%          Urine Input & Output last 24hrs:   No intake or output data in the 24 hours ending 03/09/23 0227    Physical Exam:  General:  no apparent distress, alert and cooperative  Neurologic:  alert, oriented, normal speech, no focal findings or movement disorder noted  Lungs:  No increased work of breathing, good air exchange, clear to auscultation bilaterally, no crackles or wheezing  Heart:  Regular rate and rhythm, normal S1 and S2  Abdomen: abdomen soft, non-distended, non-tender  Fundus: non-tender, firm, below umbilicus  Incision: Prevena in place and functioning   Extremities:  no calf tenderness, non edematous    Labs:  Lab Results   Component Value Date    WBC 17.1 (H) 03/07/2023    HGB 11.5 (L) 03/07/2023    HCT 36.2 (L) 03/07/2023    MCV 89.2 03/07/2023     03/07/2023       Assessment/Plan:  Alejandra Powell is a [id number] POD # 3 s/p RLTCS   - Doing well, VSS    - female infant in NICU   - Encourage ambulation and use of incentive spirometer   - D/C griffith catheter and saline lock IV on POD #1    - CBC completed; Hgb 11.5 on POD #1   - Motrin/Tylenol/gabapentin scheduled for pain control   - Roxicodone/Flexeril ordered PRN pain    - Lidocaine patch ordered for back pain   - Keflex/Flagyl x 48hr for wound prophylaxis    - Lovenox 30 mg BID for DVT prophylaxis     Rh positive/Rubella immune   - Rhogam and MMR not indicated    Bottle feeding   - Denies s/s mastitis    cHTN (meds)   - BP stable   - PreE labs wnl, P/C 0.14   - Patient refusing Procardia XL 30mg QD   - Denies s/s PreE   - Will need BP check in one week   - Continue to monitor closely    Pregestational DM   - Diagnosed from early 1 hr GTT   - Insulin regimen currently held   - Patient to follow up with primary care    Hypothyroidism   - Stable on no medications    Constipation  - Patient reports she has not yet had a bowel movement   - Abdomen is soft but distended on exam   - Patient reports passing flatus, denies nausea, vomiting, or anorexia   - Simethicone now scheduled  - Senokot-S 2 tabs BID ordered    Depression   - Stable on no medications   - Denies s/s PPD    BMI 40    Continue post-op care. Counseling Completed:  Secondary Smoke risks and Sudden Infant Death Syndrome were reviewed with recommendations. Infant sleeping, \"back to sleep\" and avoidance of co-sleeping recommendations were reviewed. Signs and Symptoms of Post Partum Depression were reviewed. The patient is to call if any occur. Signs and symptoms of Mastitis were reviewed. The patient is to call if any occur for follow up. Discharge instructions including pelvic rest, incision care, 15 lb weight restriction, no driving with pain medicine and office follow-up were reviewed with patient     Attending Physician: Dr. Dilip Basilio, DO  Ob/Gyn Resident  3/9/2023, 2:27 AM        Attending Physician Statement  I have discussed the care of Tiny Salinas, including pertinent history and exam findings,  with the resident.  I have reviewed the key elements of all parts of the encounter with the resident. I agree with the assessment, plan and orders as documented by the resident. [facility] Modifier)    Electronically signed by Alicia Hernandez DO  3/9/2023  1:06 PM    Pt overall doing well. Pt notes prior h/o readmit for ileus in prior pregnancy s/p . Pt passing flatus and small BMs. Belly slightly distended but soft. Pt thinks its getting smaller vs yesterday. Pt overall feels like things moving in the right direction. Continue bowel regimen. Will monitor progress overnight and likely D/C tomorrow. Reviewed BPs, overall in normotensive range. Counseled about meds previously, and rational if BPs increase again. Pt desires to hold off on meds now. Pt has home BP cuff to monitor. All questions answered. Precautions reviewed to return.

## 2023-03-10 VITALS
TEMPERATURE: 98.1 F | SYSTOLIC BLOOD PRESSURE: 142 MMHG | HEART RATE: 87 BPM | OXYGEN SATURATION: 96 % | RESPIRATION RATE: 19 BRPM | DIASTOLIC BLOOD PRESSURE: 96 MMHG

## 2023-03-10 PROCEDURE — 6370000000 HC RX 637 (ALT 250 FOR IP)

## 2023-03-10 PROCEDURE — 6370000000 HC RX 637 (ALT 250 FOR IP): Performed by: STUDENT IN AN ORGANIZED HEALTH CARE EDUCATION/TRAINING PROGRAM

## 2023-03-10 PROCEDURE — 6360000002 HC RX W HCPCS

## 2023-03-10 RX ADMIN — Medication 1 TABLET: at 09:54

## 2023-03-10 RX ADMIN — GABAPENTIN 300 MG: 300 CAPSULE ORAL at 09:54

## 2023-03-10 RX ADMIN — IBUPROFEN 600 MG: 600 TABLET, FILM COATED ORAL at 00:35

## 2023-03-10 RX ADMIN — ACETAMINOPHEN 1000 MG: 500 TABLET ORAL at 06:47

## 2023-03-10 RX ADMIN — IBUPROFEN 600 MG: 600 TABLET, FILM COATED ORAL at 06:47

## 2023-03-10 RX ADMIN — SIMETHICONE 80 MG: 80 TABLET, CHEWABLE ORAL at 09:53

## 2023-03-10 RX ADMIN — ACETAMINOPHEN 1000 MG: 500 TABLET ORAL at 00:36

## 2023-03-10 RX ADMIN — DOCUSATE SODIUM 50 MG AND SENNOSIDES 8.6 MG 2 TABLET: 8.6; 5 TABLET, FILM COATED ORAL at 09:54

## 2023-03-10 RX ADMIN — ENOXAPARIN SODIUM 30 MG: 100 INJECTION SUBCUTANEOUS at 09:55

## 2023-03-10 ASSESSMENT — PAIN SCALES - GENERAL: PAINLEVEL_OUTOF10: 7

## 2023-03-10 NOTE — FLOWSHEET NOTE
Discharge instructions given, all questions answered. I have reviewed all [facility] Post-Birth Warning Signs and essential teaching points for pulmonary embolism, cardiac disease, hypertensive disorders of pregnancy, obstetric hemorrhage, venous thromboembolism, infection, and postpartum depression with the patient and her mother (support person) . I have informed the patient on when to call their healthcare provider and when to call 911. I have discussed with the patient  the importance of scheduling a follow-up visit with their physician, nurse practitioner or midwife and provided them with correct contact information for appointment. I have provided the patient with a copy of the \"Save Your Life\" handout. The patient has acknowledged receiving and understanding this education with her signature.

## 2023-03-10 NOTE — PLAN OF CARE
Problem: Pain  Goal: Verbalizes/displays adequate comfort level or baseline comfort level  Outcome: Progressing     Problem: Infection - Adult  Goal: Absence of infection during hospitalization  Outcome: Progressing     Problem: Safety - Adult  Goal: Free from fall injury  Outcome: Progressing     Problem: Postpartum  Goal: Experiences normal postpartum course  Description:  Postpartum OB-Pregnancy care plan goal which identifies if the mother is experiencing a normal postpartum course  Outcome: Progressing  Goal: Appropriate maternal -  bonding  Description:  Postpartum OB-Pregnancy care plan goal which identifies if the mother and  are bonding appropriately  Outcome: Progressing  Goal: Establishment of infant feeding pattern  Description:  Postpartum OB-Pregnancy care plan goal which identifies if the mother is establishing a feeding pattern with their   Outcome: Progressing  Goal: Incisions, wounds, or drain sites healing without S/S of infection  Outcome: Progressing   Plans of care ongoing.   Will continue with POC's

## 2023-03-10 NOTE — PROGRESS NOTES
CLINICAL PHARMACY NOTE: MEDS TO BEDS    Total # of Prescriptions Filled: 5   The following medications were delivered to the patient:  Acetaminophen 500mg  Ibuprofen 600mg  Oxycodone 5mg  Simethicone 80mg chew  Senokot 8.6-50mg    Additional Documentation: delivered to patient in room 747 3/10 at 10:35am. No co-pay.

## 2023-03-10 NOTE — PLAN OF CARE
Problem: Pain  Goal: Verbalizes/displays adequate comfort level or baseline comfort level  3/10/2023 1209 by Paola Moran RN  Outcome: Completed  3/10/2023 0820 by Barbara Aviles  Outcome: Progressing     Problem: Infection - Adult  Goal: Absence of infection during hospitalization  3/10/2023 1209 by Paola Moran RN  Outcome: Completed  3/10/2023 0820 by Barbara Aviles  Outcome: Progressing     Problem: Safety - Adult  Goal: Free from fall injury  3/10/2023 1209 by Paola Moran RN  Outcome: Completed  3/10/2023 0820 by Stephanie Aviles  Outcome: Progressing     Problem: Postpartum  Goal: Experiences normal postpartum course  Description:  Postpartum OB-Pregnancy care plan goal which identifies if the mother is experiencing a normal postpartum course  3/10/2023 1209 by Paola Moran RN  Outcome: Completed  3/10/2023 0820 by Barbara Aviles  Outcome: Progressing  Goal: Appropriate maternal -  bonding  Description:  Postpartum OB-Pregnancy care plan goal which identifies if the mother and  are bonding appropriately  3/10/2023 1209 by Paola Moran RN  Outcome: Completed  3/10/2023 0820 by Stephanie Aviles  Outcome: Progressing  Goal: Establishment of infant feeding pattern  Description:  Postpartum OB-Pregnancy care plan goal which identifies if the mother is establishing a feeding pattern with their   3/10/2023 1209 by Paola Moran RN  Outcome: Completed  3/10/2023 0820 by Barbara Aviles  Outcome: Progressing  Goal: Incisions, wounds, or drain sites healing without S/S of infection  3/10/2023 1209 by Paola Moran RN  Outcome: Completed  3/10/2023 0820 by Barbara Aviles  Outcome: Progressing

## 2023-03-13 ENCOUNTER — POSTPARTUM VISIT (OUTPATIENT)
Dept: OBGYN | Age: 38
End: 2023-03-13
Payer: COMMERCIAL

## 2023-03-13 VITALS
HEART RATE: 91 BPM | SYSTOLIC BLOOD PRESSURE: 138 MMHG | WEIGHT: 244 LBS | BODY MASS INDEX: 40.6 KG/M2 | DIASTOLIC BLOOD PRESSURE: 94 MMHG

## 2023-03-13 DIAGNOSIS — I10 CHRONIC HYPERTENSION: ICD-10-CM

## 2023-03-13 DIAGNOSIS — O10.919 CHRONIC HYPERTENSION AFFECTING PREGNANCY: ICD-10-CM

## 2023-03-13 DIAGNOSIS — O24.319 PREGESTATIONAL DIABETES MELLITUS, MODIFIED WHITE CLASS B: Primary | ICD-10-CM

## 2023-03-13 DIAGNOSIS — G89.18 POST-OP PAIN: ICD-10-CM

## 2023-03-13 PROCEDURE — 99211 OFF/OP EST MAY X REQ PHY/QHP: CPT

## 2023-03-13 PROCEDURE — 99024 POSTOP FOLLOW-UP VISIT: CPT

## 2023-03-13 RX ORDER — CYCLOBENZAPRINE HCL 5 MG
5 TABLET ORAL 2 TIMES DAILY PRN
Qty: 10 TABLET | Refills: 0 | Status: SHIPPED | OUTPATIENT
Start: 2023-03-13 | End: 2023-03-23

## 2023-03-13 RX ORDER — NIFEDIPINE 30 MG/1
30 TABLET, EXTENDED RELEASE ORAL DAILY
Qty: 90 TABLET | Refills: 1 | Status: SHIPPED | OUTPATIENT
Start: 2023-03-13

## 2023-03-13 RX ORDER — GABAPENTIN 100 MG/1
100 CAPSULE ORAL NIGHTLY
Qty: 30 CAPSULE | Refills: 0 | Status: SHIPPED | OUTPATIENT
Start: 2023-03-13 | End: 2023-04-12

## 2023-03-13 NOTE — PROGRESS NOTES
Postpartum Visit    Piter Sloan is a 30 y.o. female [id number], 1 week Post Partum s/p repeat  section, low transverse incision w/ Left RRS on 3/6/23    The patient was seen. She has no chief complaint today. Her pregnancy was complicated by Chronic Hypertension (on medication), Pregestational Diabetes (Dx via Early 1hr GTT). She is  bottle feeding and denies signs or symptoms of mastitis. The patient completed the E.P.D.S. Post Partum Depression Evaluation form and EPDS Score of 0. She does not have signs or symptoms of post partum depression. She denies any suicidal thoughts with a plan, intent to harm others, and delusional ideas. She does admit to having good home support. Today her lochia is light she denies any dizziness or shortness of breath. Her bowels are regular and she denies any urinary tract symptomology. She is using tubal ligation for history of dysmenorrhea and condoms for STD prevention. She denies headaches, visual changes, RUQ pain, or LE edema. Her pregnancy was complicated by:  Patient Active Problem List    Diagnosis Date Noted    Chronic hypertension affecting pregnancy 2023     Priority: Medium    RLTCS w/ left RRS 3/6/23 F Apg 8/ Wt 7#15  2023     Priority: Medium    NST (non-stress test) nonreactive 2023     Priority: Medium    AMA 2022     Priority: Medium    Hx Chlamydia 2022     Priority: Medium     Overview Note:     Negative 22      Placental cyst affecting pregnancy in third trimester 2023     Overview Note:     23 MFM ultrasound      Desires RRS 2023     Overview Note:     Medicaid form signed 23      Pregestational DM 2022     Overview Note:     Diagnosed in G5 pregnancy, early 1 hr and 3 hr GTT    Novolog 12, NPH 36      cHTN (meds) 2022     Overview Note:     22: Diagnosed by Dr. Lily Pathak per review of flowsheets. No meds at this time.     ASA Rx sent to patient's pharmacy  Baseline PreE labs (CMP, protein-creatinine ratio ordered_      Depression 2022     Overview Note:     22: PHQ=1, loss of interest      Hx hypothyroidism 2022     Overview Note:     22: Patient was prescribed Levothyroxine in  but stopped due to too much weight loss. TSH wnl 10/10/22      Hx C/S x 1 2022     Overview Note:     22: Patient would like to discuss RCS versus TOLAC    APPROVED OVARIAN CA RRBS FORM SCANNED INTO MEDIA -SK        Former smoker 2022     Overview Note:     : Patient reports quitting 2022      Laparoscopic converted to open wedge resection of right cornual ectopic, right salpingectomy and evacuation of hemoperitoneum 2019     Overview Note:     Patient counseled extensively on the need to not get pregnant for at least 18 months. IBS 2015     Overview Note:     22: Patient denies meds at this time         Vitals:   Blood pressure (!) 138/94, pulse 91, weight 244 lb (110.7 kg), last menstrual period 06/10/2022, not currently breastfeeding. Physical Exam:  Chaperone for Intimate Exam: Not indicated    General:  no apparent distress, alert, and cooperative  Lungs:  No increased work of breathing, good air exchange, clear to auscultation bilaterally, no crackles or wheezing  Heart:  normal S1 and S2, regular rate and rhythm, and no murmurs, rubs, gallops  Abdomen: Abdomen soft, non-tender, no rebound, guarding, rigidity, BS normal. no masses  Fundus: non-tender, normal size, firm, below umbilicus  Perineum: not inspected  Incision: clean, dry, intact, and Prevena dressing removed.   Extremities:  no calf tenderness, non edematous, non erythematous     Assessment:  Darlingsylvester Martins is a 30 y.o. female [id number], 1 week post partum s/p repeat  section, low transverse incision w/ Left RRS on 3/6/23   - Doing well, continue routine post partum care   - EPDS: 0   - Family planning: RRS   - Prevena dressing removed, patient's incision appears clean, dry and intact    - Patient reported some incisional pain, burning. Gabapentin rx sent to patient's pharmacy.   - Gardisil vaccination: patient received series    - Last pap smear: 10/19/20, NILM    - Indications for 2hr 75g GTT: elevated early 1hr GTT; patient made aware of 6 week indication for 2hr 75g GTT     cHTN (previously on medication)   - BP elevated, non-severe; 138/94    - Patient was previously on Procardia 30 XL, but she declined taking this medication on postpartum unit    - Patient denies s/sx PreE    - Patient agreeable to taking Procardia 30 XL at this time. Rx sent to patient's pharmacy   - Patient counseled on s/sx Preeclampsia    Pregestational Diabetes  - Diagnosed via early 1hr GTT  - Patient will need 2hr 75 GTT at 6 weeks   - Patient made aware     Hypothyroidism    - Patient's symptoms controlled on no medication     - Patient asymptomatic at this time    - Last TSH: 1/20/23, wnl; T4 1/20/23 wnl.    BMI 40    Patient Active Problem List    Diagnosis Date Noted    Chronic hypertension affecting pregnancy 03/07/2023     Priority: Medium    RLTCS w/ left RRS 3/6/23 F Apg 8/9 Wt 7#15  03/06/2023     Priority: Medium    NST (non-stress test) nonreactive 03/01/2023     Priority: Medium    AMA 08/30/2022     Priority: Medium    Hx Chlamydia 08/30/2022     Priority: Medium     Negative 9/9/22      Placental cyst affecting pregnancy in third trimester 03/01/2023 2/23/23 MFM ultrasound      Desires RRS 02/13/2023     Medicaid form signed 2/13/23      Pregestational DM 09/12/2022     Diagnosed in G5 pregnancy, early 1 hr and 3 hr GTT    Novolog 8/6/12, NPH 36      cHTN (meds) 08/30/2022 8/30/22: Diagnosed by Dr. Pascual per review of flowsheets. No meds at this time.    ASA Rx sent to patient's pharmacy  Baseline PreE labs (CMP, protein-creatinine ratio ordered_      Depression 08/30/2022 8/30/22: PHQ=1, loss of interest      Hx hypothyroidism 08/30/2022      8/30/22: Patient was prescribed Levothyroxine in 2016 but stopped due to too much weight loss. TSH wnl 10/10/22      Hx C/S x 1 08/30/2022 8/30/22: Patient would like to discuss RCS versus TOLAC    APPROVED OVARIAN CA RRBS FORM SCANNED INTO MEDIA -SK        Former smoker 08/30/2022 8/300/22: Patient reports quitting 7/21/2022      Laparoscopic converted to open wedge resection of right cornual ectopic, right salpingectomy and evacuation of hemoperitoneum 6/22/19 06/22/2019     Patient counseled extensively on the need to not get pregnant for at least 18 months. IBS 05/07/2015 8/30/22: Patient denies meds at this time       No follow-ups on file. Counseling Completed:  Signs & Symptoms of mastitis and when to notify office discussed.   Secondary smoke risks including increased risks of respiratory problems, Sudden infant death syndrome, and potential malignancies discussed  Abstinence, family planning counseling and STD counseling discussed  Continue with post operative restrictions until 6 weeks post partum  No heavy lifting or Roma until 6 weeks post partum    Antonieta Dobbins DO  Ob/Gyn Resident  [facility], [address]  3/13/2023, 3:49 PM

## 2023-03-15 NOTE — PROGRESS NOTES
Attending Physician Statement  I have discussed the care of Whitley Parker, including pertinent history and exam findings,  with the resident. I have reviewed the key elements of all parts of the encounter with the resident. I agree with the assessment, plan and orders as documented by the resident.   (GE Modifier)    Estefany Paige, DO

## 2023-03-20 ENCOUNTER — TELEPHONE (OUTPATIENT)
Dept: OBGYN | Age: 38
End: 2023-03-20

## 2023-03-20 DIAGNOSIS — R30.0 DYSURIA: Primary | ICD-10-CM

## 2023-03-20 NOTE — TELEPHONE ENCOUNTER
Pt called stating she just had a baby a could weeks ago and she is having urinary urgency and painful urination.   She is wondering if she could be tested/treated

## 2023-03-23 ENCOUNTER — APPOINTMENT (OUTPATIENT)
Dept: CT IMAGING | Age: 38
End: 2023-03-23
Payer: COMMERCIAL

## 2023-03-23 ENCOUNTER — TELEPHONE (OUTPATIENT)
Dept: OBGYN | Age: 38
End: 2023-03-23

## 2023-03-23 ENCOUNTER — HOSPITAL ENCOUNTER (EMERGENCY)
Age: 38
Discharge: HOME | End: 2023-03-23
Attending: EMERGENCY MEDICINE
Payer: COMMERCIAL

## 2023-03-23 VITALS
DIASTOLIC BLOOD PRESSURE: 84 MMHG | OXYGEN SATURATION: 99 % | RESPIRATION RATE: 16 BRPM | SYSTOLIC BLOOD PRESSURE: 136 MMHG | BODY MASS INDEX: 36.61 KG/M2 | WEIGHT: 220 LBS | TEMPERATURE: 99.4 F | HEART RATE: 89 BPM

## 2023-03-23 DIAGNOSIS — R19.7 DIARRHEA, UNSPECIFIED TYPE: Primary | ICD-10-CM

## 2023-03-23 DIAGNOSIS — R11.0 NAUSEA: ICD-10-CM

## 2023-03-23 DIAGNOSIS — R10.84 GENERALIZED ABDOMINAL PAIN: ICD-10-CM

## 2023-03-23 DIAGNOSIS — N39.0 URINARY TRACT INFECTION WITHOUT HEMATURIA, SITE UNSPECIFIED: ICD-10-CM

## 2023-03-23 LAB
ABSOLUTE EOS #: 0.05 K/UL (ref 0–0.44)
ABSOLUTE IMMATURE GRANULOCYTE: 0.02 K/UL (ref 0–0.3)
ABSOLUTE LYMPH #: 0.96 K/UL (ref 1.1–3.7)
ABSOLUTE MONO #: 0.36 K/UL (ref 0.1–1.2)
ALBUMIN SERPL-MCNC: 4 G/DL (ref 3.5–5.2)
ALP SERPL-CCNC: 104 U/L (ref 35–104)
ALT SERPL-CCNC: 26 U/L (ref 5–33)
AMORPHOUS: ABNORMAL
ANION GAP SERPL CALCULATED.3IONS-SCNC: 6 MMOL/L (ref 9–17)
AST SERPL-CCNC: 29 U/L
BACTERIA: ABNORMAL
BASOPHILS # BLD: 0 % (ref 0–2)
BASOPHILS ABSOLUTE: <0.03 K/UL (ref 0–0.2)
BILIRUB SERPL-MCNC: 0.3 MG/DL (ref 0.3–1.2)
BILIRUBIN URINE: NEGATIVE
BUN SERPL-MCNC: 9 MG/DL (ref 6–20)
BUN/CREAT BLD: 12 (ref 9–20)
CALCIUM SERPL-MCNC: 8.9 MG/DL (ref 8.6–10.4)
CHLORIDE SERPL-SCNC: 104 MMOL/L (ref 98–107)
CO2 SERPL-SCNC: 28 MMOL/L (ref 20–31)
COLOR: YELLOW
CREAT SERPL-MCNC: 0.74 MG/DL (ref 0.5–0.9)
EOSINOPHILS RELATIVE PERCENT: 1 % (ref 1–4)
EPITHELIAL CELLS UA: ABNORMAL /HPF (ref 0–5)
GFR SERPL CREATININE-BSD FRML MDRD: >60 ML/MIN/1.73M2
GLUCOSE SERPL-MCNC: 88 MG/DL (ref 70–99)
GLUCOSE UR STRIP.AUTO-MCNC: NEGATIVE MG/DL
HCT VFR BLD AUTO: 43.1 % (ref 36.3–47.1)
HGB BLD-MCNC: 13.2 G/DL (ref 11.9–15.1)
IMMATURE GRANULOCYTES: 0 %
KETONES UR STRIP.AUTO-MCNC: NEGATIVE MG/DL
LEUKOCYTE ESTERASE UR QL STRIP.AUTO: NEGATIVE
LIPASE SERPL-CCNC: 17 U/L (ref 13–60)
LYMPHOCYTES # BLD: 15 % (ref 24–43)
MCH RBC QN AUTO: 27 PG (ref 25.2–33.5)
MCHC RBC AUTO-ENTMCNC: 30.6 G/DL (ref 28.4–34.8)
MCV RBC AUTO: 88.3 FL (ref 82.6–102.9)
MONOCYTES # BLD: 6 % (ref 3–12)
MUCUS: ABNORMAL
NITRITE UR QL STRIP.AUTO: NEGATIVE
NRBC AUTOMATED: 0 PER 100 WBC
PDW BLD-RTO: 13.7 % (ref 11.8–14.4)
PLATELET # BLD AUTO: 350 K/UL (ref 138–453)
PMV BLD AUTO: 9.7 FL (ref 8.1–13.5)
POTASSIUM SERPL-SCNC: 4.1 MMOL/L (ref 3.7–5.3)
PROT SERPL-MCNC: 7.3 G/DL (ref 6.4–8.3)
PROT UR STRIP.AUTO-MCNC: 6 MG/DL (ref 5–8)
PROT UR STRIP.AUTO-MCNC: ABNORMAL MG/DL
RBC # BLD: 4.88 M/UL (ref 3.95–5.11)
RBC CLUMPS #/AREA URNS AUTO: ABNORMAL /HPF (ref 0–2)
SEG NEUTROPHILS: 78 % (ref 36–65)
SEGMENTED NEUTROPHILS ABSOLUTE COUNT: 4.99 K/UL (ref 1.5–8.1)
SODIUM SERPL-SCNC: 138 MMOL/L (ref 135–144)
SPECIFIC GRAVITY UA: 1.02 (ref 1–1.03)
TURBIDITY: ABNORMAL
URINE HGB: ABNORMAL
UROBILINOGEN, URINE: NORMAL
WBC # BLD AUTO: 6.4 K/UL (ref 3.5–11.3)
WBC UA: ABNORMAL /HPF (ref 0–5)

## 2023-03-23 PROCEDURE — 96375 TX/PRO/DX INJ NEW DRUG ADDON: CPT

## 2023-03-23 PROCEDURE — 85025 COMPLETE CBC W/AUTO DIFF WBC: CPT

## 2023-03-23 PROCEDURE — 6360000002 HC RX W HCPCS: Performed by: PHYSICIAN ASSISTANT

## 2023-03-23 PROCEDURE — 83690 ASSAY OF LIPASE: CPT

## 2023-03-23 PROCEDURE — 6360000004 HC RX CONTRAST MEDICATION: Performed by: PHYSICIAN ASSISTANT

## 2023-03-23 PROCEDURE — 2500000003 HC RX 250 WO HCPCS: Performed by: PHYSICIAN ASSISTANT

## 2023-03-23 PROCEDURE — 99285 EMERGENCY DEPT VISIT HI MDM: CPT

## 2023-03-23 PROCEDURE — 81001 URINALYSIS AUTO W/SCOPE: CPT

## 2023-03-23 PROCEDURE — 80053 COMPREHEN METABOLIC PANEL: CPT

## 2023-03-23 PROCEDURE — 96374 THER/PROPH/DIAG INJ IV PUSH: CPT

## 2023-03-23 PROCEDURE — 2580000003 HC RX 258: Performed by: PHYSICIAN ASSISTANT

## 2023-03-23 PROCEDURE — 74177 CT ABD & PELVIS W/CONTRAST: CPT

## 2023-03-23 RX ORDER — CEPHALEXIN 500 MG/1
500 CAPSULE ORAL 2 TIMES DAILY
Qty: 14 CAPSULE | Refills: 0 | Status: SHIPPED | OUTPATIENT
Start: 2023-03-23 | End: 2023-03-30

## 2023-03-23 RX ORDER — MORPHINE SULFATE 4 MG/ML
4 INJECTION, SOLUTION INTRAMUSCULAR; INTRAVENOUS ONCE
Status: COMPLETED | OUTPATIENT
Start: 2023-03-23 | End: 2023-03-23

## 2023-03-23 RX ORDER — 0.9 % SODIUM CHLORIDE 0.9 %
80 INTRAVENOUS SOLUTION INTRAVENOUS ONCE
Status: COMPLETED | OUTPATIENT
Start: 2023-03-23 | End: 2023-03-23

## 2023-03-23 RX ORDER — DICYCLOMINE HCL 20 MG
20 TABLET ORAL 4 TIMES DAILY
Qty: 40 TABLET | Refills: 0 | Status: SHIPPED | OUTPATIENT
Start: 2023-03-23 | End: 2023-04-02

## 2023-03-23 RX ORDER — ONDANSETRON 4 MG/1
4 TABLET, ORALLY DISINTEGRATING ORAL EVERY 8 HOURS PRN
Qty: 21 TABLET | Refills: 0 | Status: SHIPPED | OUTPATIENT
Start: 2023-03-23 | End: 2023-03-30

## 2023-03-23 RX ORDER — 0.9 % SODIUM CHLORIDE 0.9 %
1000 INTRAVENOUS SOLUTION INTRAVENOUS ONCE
Status: COMPLETED | OUTPATIENT
Start: 2023-03-23 | End: 2023-03-23

## 2023-03-23 RX ORDER — SODIUM CHLORIDE 0.9 % (FLUSH) 0.9 %
10 SYRINGE (ML) INJECTION PRN
Status: DISCONTINUED | OUTPATIENT
Start: 2023-03-23 | End: 2023-03-23 | Stop reason: HOSPADM

## 2023-03-23 RX ORDER — ONDANSETRON 2 MG/ML
4 INJECTION INTRAMUSCULAR; INTRAVENOUS ONCE
Status: COMPLETED | OUTPATIENT
Start: 2023-03-23 | End: 2023-03-23

## 2023-03-23 RX ADMIN — SODIUM CHLORIDE, PRESERVATIVE FREE 10 ML: 5 INJECTION INTRAVENOUS at 17:39

## 2023-03-23 RX ADMIN — SODIUM CHLORIDE 80 ML: 9 INJECTION, SOLUTION INTRAVENOUS at 17:39

## 2023-03-23 RX ADMIN — SODIUM CHLORIDE 1000 ML: 9 INJECTION, SOLUTION INTRAVENOUS at 16:04

## 2023-03-23 RX ADMIN — MORPHINE SULFATE 4 MG: 4 INJECTION, SOLUTION INTRAMUSCULAR; INTRAVENOUS at 16:04

## 2023-03-23 RX ADMIN — IOPAMIDOL 75 ML: 755 INJECTION, SOLUTION INTRAVENOUS at 17:39

## 2023-03-23 RX ADMIN — ONDANSETRON 4 MG: 2 INJECTION INTRAMUSCULAR; INTRAVENOUS at 16:04

## 2023-03-23 RX ADMIN — SODIUM CHLORIDE, PRESERVATIVE FREE 20 MG: 5 INJECTION INTRAVENOUS at 16:03

## 2023-03-23 ASSESSMENT — PAIN DESCRIPTION - LOCATION: LOCATION: ABDOMEN

## 2023-03-23 ASSESSMENT — PAIN DESCRIPTION - FREQUENCY: FREQUENCY: INTERMITTENT

## 2023-03-23 ASSESSMENT — PAIN - FUNCTIONAL ASSESSMENT: PAIN_FUNCTIONAL_ASSESSMENT: 0-10

## 2023-03-23 ASSESSMENT — PAIN DESCRIPTION - DESCRIPTORS: DESCRIPTORS: CRAMPING;SHARP

## 2023-03-23 ASSESSMENT — PAIN SCALES - GENERAL: PAINLEVEL_OUTOF10: 8

## 2023-03-23 NOTE — TELEPHONE ENCOUNTER
Patient delivered by  on 3/6/23. She is c/o severe abdominal pain and diarrhea times 2 days. She rates her pain 8-9 out of 10. Per Dr. Eun Sheridan she was advised to go to the ER for evaluation.

## 2023-03-24 NOTE — ED PROVIDER NOTES
eMERGENCY dEPARTMENT eNCOUnter   Independent Attestation     Pt Name: Joseph Underwood  MRN: [id number]  [place] 1985  Date of evaluation: 3/23/23     Joseph Underwood is a 30 y.o. female with CC: Abdominal Pain (Onset 3 days, post-op csection 3/6) and Diarrhea      This visit was performed by both a physician and an APC. I performed all aspects of the MDM as documented.     Kumar Albrecht DO  Attending Emergency Physician                  Ignacia Sinha DO  03/23/23 [id number]
Disp-30 tablet, R-1Normal      aspirin (ASPIRIN CHILDRENS) 81 MG chewable tablet Take 1 tablet by mouth daily, Disp-30 tablet, R-3Normal      HUMULIN N 100 UNIT/ML injection vial Inject 10 Units into the skin nightly, [facility]      NOVOLOG FLEXPEN 100 UNIT/ML injection pen Inject 46 Units into the skin 3 times daily (before meals) 30u before breakfast 34u  lunch- 46u before dinner, [facility]      DROPLET PEN NEEDLES 31G X 5 MM MISC use four times a day, [facility]      !! blood glucose monitor strips Test 4 times a day & as needed for symptoms of irregular blood glucose. Dispense sufficient amount for indicated testing frequency plus additional to accommodate PRN testing needs. , Disp-50 strip, R-2, Normal      Lancets (ONETOUCH DELICA PLUS [id number]) MISC use 1 LANCET to TEST BLOOD SUGAR four times a dayHistorical Med      !! ONETOUCH ULTRA strip use 1 TEST STRIP to TEST BLOOD SUGAR four times a day, [facility]      Blood Glucose Monitoring Suppl (ONE TOUCH ULTRA 2) w/Device KIT Historical Med      RA Alcohol Swabs 70 % PADS Historical Med      ferrous sulfate (FE TABS) 325 (65 Fe) MG EC tablet 1 tablet by mouth in AM and PM every Monday, Wednesday and Friday., Disp-90 tablet, R-3Normal      Prenatal Vit-Fe Fumarate-FA (PRENATAL VITAMIN) 27-0.8 MG TABS Take 1 tablet by mouth daily May substitute with any prenatal vitamin covered by the patient's insurance., Disp-30 tablet, R-12Normal      pyridoxine (RA VITAMIN B-6) 50 MG tablet Take 0.5 tablets by mouth in the morning and 0.5 tablets at noon and 0.5 tablets in the evening., Disp-30 tablet, R-3Normal       !! - Potential duplicate medications found. Please discuss with provider.           PAST MEDICAL HISTORY         Diagnosis Date    Chlamydia     Ectopic pregnancy     Right, salpingectomy    History of blood transfusion     6/2019 after lap    Hypertension     Hypothyroidism     Pt took levothyroxine previously, no meds currently

## 2023-04-14 RX ORDER — INSULIN HUMAN 100 [IU]/ML
INJECTION, SUSPENSION SUBCUTANEOUS
COMMUNITY
Start: 2023-02-24

## 2023-04-17 ENCOUNTER — POSTPARTUM VISIT (OUTPATIENT)
Dept: OBGYN | Age: 38
End: 2023-04-17
Payer: COMMERCIAL

## 2023-04-17 VITALS
HEART RATE: 71 BPM | BODY MASS INDEX: 37.28 KG/M2 | WEIGHT: 224 LBS | SYSTOLIC BLOOD PRESSURE: 117 MMHG | DIASTOLIC BLOOD PRESSURE: 77 MMHG

## 2023-04-17 DIAGNOSIS — I10 CHRONIC HYPERTENSION: ICD-10-CM

## 2023-04-17 PROBLEM — Z30.09 FAMILY PLANNING: Status: RESOLVED | Noted: 2023-02-13 | Resolved: 2023-04-17

## 2023-04-17 PROBLEM — O24.319 PREGESTATIONAL DIABETES MELLITUS, MODIFIED WHITE CLASS B: Status: RESOLVED | Noted: 2022-09-12 | Resolved: 2023-04-17

## 2023-04-17 PROBLEM — O43.193 PLACENTAL CYST AFFECTING PREGNANCY IN THIRD TRIMESTER: Status: RESOLVED | Noted: 2023-03-01 | Resolved: 2023-04-17

## 2023-04-17 PROBLEM — O28.8 NST (NON-STRESS TEST) NONREACTIVE: Status: RESOLVED | Noted: 2023-03-01 | Resolved: 2023-04-17

## 2023-04-17 PROBLEM — O09.529 ADVANCED MATERNAL AGE IN MULTIGRAVIDA: Status: RESOLVED | Noted: 2022-08-30 | Resolved: 2023-04-17

## 2023-04-17 PROCEDURE — 99211 OFF/OP EST MAY X REQ PHY/QHP: CPT

## 2023-04-17 NOTE — PROGRESS NOTES
Attending Physician Statement  I have discussed the care of Niall Mayorga, including pertinent history and exam findings, with the resident. I have reviewed the key elements of all parts of the encounter with the resident. I agree with the assessment, plan and orders as documented by the resident.   (GE Modifier)    Cejuliano Sharp, [facility], [address]  4/17/2023, 3:53 PM
2022      Laparoscopic converted to open wedge resection of right cornual ectopic, right salpingectomy and evacuation of hemoperitoneum 2019     Overview Note:     Patient counseled extensively on the need to not get pregnant for at least 18 months. IBS 2015     Overview Note:     22: Patient denies meds at this time         Vitals:   Blood pressure 117/77, pulse 71, weight 224 lb (101.6 kg), last menstrual period 06/10/2022, not currently breastfeeding.     Physical Exam:  Chaperone for Intimate Exam: NA    General:  no apparent distress, alert, and cooperative  Lungs:  No increased work of breathing, good air exchange, clear to auscultation bilaterally, no crackles or wheezing  Heart:  normal S1 and S2, regular rate and rhythm, and no murmurs, rubs, gallops  Abdomen: Abdomen soft, non-tender, no rebound, guarding, rigidity, BS normal. no masses  Fundus: non-tender, normal size, firm, below umbilicus  Perineum: not inspected  Incision: completely healed, clean, dry and intact without any signs of infection  Extremities:  no calf tenderness, non edematous, non erythematous     Assessment:  Agueda Guardado is a 30 y.o. female [id number],  6 weeks Post Partum s/p  repeat  section w/ bilateral risk reducing salpingectomy, low transverse incision on 3/6/23   - Doing well, continue routine post partum care   - EPDS: 0   - Family planning: RRS   - COVID-19 vaccination: R/B/A discussed with increased risk of both maternal and fetal morbidity and mortality in unvaccinated pregnant patients who contract COVID-19- patient declined today   - Gardisil vaccination: UTD   - Last pap smear: 10/2020, due Oct 2023   - Indications for 2hr 75g GTT: indicated due to elevated early 1 hr gtt and early 3 hr gtt, pt given lab requisition to complete within 6-12 weeks PP    Chronic hypertension   - BP normotensive today    - Denies s/s PreE   - Was supposed to be on Procardia 30 XL QD but patient

## 2023-04-21 ENCOUNTER — HOSPITAL ENCOUNTER (OUTPATIENT)
Age: 38
Discharge: HOME | End: 2023-04-21
Payer: COMMERCIAL

## 2023-04-21 DIAGNOSIS — O24.319 PREGESTATIONAL DIABETES MELLITUS, MODIFIED WHITE CLASS B: ICD-10-CM

## 2023-04-21 LAB
AMOUNT GLUCOSE GIVEN: ABNORMAL G
GLUCOSE FASTING: 104 MG/DL (ref 65–99)
GLUCOSE TOLERANCE TEST 1 HOUR: 122 MG/DL (ref 65–184)
GLUCOSE TOLERANCE TEST 2 HOUR: 133 MG/DL (ref 65–139)

## 2023-04-21 PROCEDURE — 82951 GLUCOSE TOLERANCE TEST (GTT): CPT

## 2023-08-23 ENCOUNTER — OFFICE VISIT (OUTPATIENT)
Dept: INTERNAL MEDICINE | Age: 38
End: 2023-08-23
Payer: COMMERCIAL

## 2023-08-23 ENCOUNTER — HOSPITAL ENCOUNTER (OUTPATIENT)
Age: 38
Setting detail: SPECIMEN
Discharge: HOME | End: 2023-08-23

## 2023-08-23 VITALS
TEMPERATURE: 98.1 F | BODY MASS INDEX: 31.64 KG/M2 | HEART RATE: 71 BPM | HEIGHT: 69 IN | SYSTOLIC BLOOD PRESSURE: 131 MMHG | WEIGHT: 213.6 LBS | DIASTOLIC BLOOD PRESSURE: 74 MMHG | OXYGEN SATURATION: 98 %

## 2023-08-23 DIAGNOSIS — Z00.00 ENCOUNTER FOR WELL ADULT EXAM WITHOUT ABNORMAL FINDINGS: ICD-10-CM

## 2023-08-23 DIAGNOSIS — Z86.39 HISTORY OF HYPOTHYROIDISM: ICD-10-CM

## 2023-08-23 DIAGNOSIS — Z02.0 ENCOUNTER FOR DAY CARE PHYSICAL: ICD-10-CM

## 2023-08-23 DIAGNOSIS — Z02.0 ENCOUNTER FOR DAY CARE PHYSICAL: Primary | ICD-10-CM

## 2023-08-23 PROBLEM — Z86.19 HISTORY OF CHLAMYDIA INFECTION: Status: RESOLVED | Noted: 2022-08-30 | Resolved: 2023-08-23

## 2023-08-23 PROBLEM — Z13.31 POSITIVE SCREENING FOR DEPRESSION ON 2-ITEM PATIENT HEALTH QUESTIONNAIRE (PHQ-2): Status: RESOLVED | Noted: 2022-08-30 | Resolved: 2023-08-23

## 2023-08-23 PROBLEM — I10 CHRONIC HYPERTENSION: Status: RESOLVED | Noted: 2022-08-30 | Resolved: 2023-08-23

## 2023-08-23 PROCEDURE — 99211 OFF/OP EST MAY X REQ PHY/QHP: CPT | Performed by: STUDENT IN AN ORGANIZED HEALTH CARE EDUCATION/TRAINING PROGRAM

## 2023-08-23 PROCEDURE — 99395 PREV VISIT EST AGE 18-39: CPT

## 2023-08-23 SDOH — ECONOMIC STABILITY: HOUSING INSECURITY
IN THE LAST 12 MONTHS, WAS THERE A TIME WHEN YOU DID NOT HAVE A STEADY PLACE TO SLEEP OR SLEPT IN A SHELTER (INCLUDING NOW)?: NO

## 2023-08-23 SDOH — ECONOMIC STABILITY: INCOME INSECURITY: HOW HARD IS IT FOR YOU TO PAY FOR THE VERY BASICS LIKE FOOD, HOUSING, MEDICAL CARE, AND HEATING?: NOT HARD AT ALL

## 2023-08-23 SDOH — ECONOMIC STABILITY: FOOD INSECURITY: WITHIN THE PAST 12 MONTHS, YOU WORRIED THAT YOUR FOOD WOULD RUN OUT BEFORE YOU GOT MONEY TO BUY MORE.: NEVER TRUE

## 2023-08-23 SDOH — ECONOMIC STABILITY: FOOD INSECURITY: WITHIN THE PAST 12 MONTHS, THE FOOD YOU BOUGHT JUST DIDN'T LAST AND YOU DIDN'T HAVE MONEY TO GET MORE.: NEVER TRUE

## 2023-08-23 ASSESSMENT — ENCOUNTER SYMPTOMS
EYE PAIN: 0
ABDOMINAL DISTENTION: 0
ABDOMINAL PAIN: 0
COUGH: 0
CHEST TIGHTNESS: 0
SORE THROAT: 0
CONSTIPATION: 0

## 2023-08-23 NOTE — PROGRESS NOTES
Well Adult Note  Name: Jazzy Alston Date: 2023   MRN: [id number] Sex: Female   Age: 41 y.o. Ethnicity: Non- / Gilma Figures   : 1985 Race: Lupe Kolb / Harshil Ros is here for well adult exam.  History:  45-year-old female with past medical history of  delivery x2-last in 2023, gestational diabetes, hypertension, hypothyroidism presented to the clinic as she needed clearance as she planning to start working in . Patient denies any acute complaints. Has not been taking any medications. Patient received Tdap vaccine in the last pregnancy. Rubella titer in 2022 was adequate. No titer for measles and mumps in the chart. Last MMR vaccine was in . No tuberculosis testing in the chart. Patient has not visited any other high risk countries in last 5 years. Blood pressure in the clinic is well controlled 131/74, patient has significant weight loss after pregnancy. Denies any symptoms of hypothyroidism. Last TSH was 1.5 in 2023 while off the medication. Last HbA1c was 5.6 in 2022  Last CBC and BMP in 2023 were unremarkable. Patient is sexually active with 1 partner and refuses to be checked for STD. Review of Systems   Constitutional:  Negative for activity change and fatigue. HENT:  Negative for sore throat. Eyes:  Negative for pain. Respiratory:  Negative for cough and chest tightness. Cardiovascular:  Negative for chest pain and leg swelling. Gastrointestinal:  Negative for abdominal distention, abdominal pain and constipation. Genitourinary:  Negative for dysuria and vaginal discharge. Musculoskeletal:  Negative for arthralgias and myalgias. Skin:  Negative for pallor. Neurological:  Negative for dizziness and headaches. Psychiatric/Behavioral:  Negative for agitation and confusion.       No Known Allergies      Prior to Visit Medications    Not on File         Past Medical

## 2023-08-23 NOTE — PROGRESS NOTES
Attending Physician Statement  I have discussed the care of Armond Haney, including pertinent history and exam findings with the resident. I have reviewed the key elements of all parts of the encounter with the resident. I agree with the assessment, and status of the problem list as documented and this was also documented by the resident. The medication list was reviewed with the resident and is up to date. Diagnosis Orders   1. Encounter for day care physical  Mumps Antibody, IgG    Rubeola Antibody IgG    Quantiferon (R) TB Gold, (Incubated)      2. Hx hypothyroidism        3.  Encounter for well adult exam without abnormal findings             Rajni Friend MD   Attending Physician, [facility]   Faculty, [place] Medicine Residency Program  [address] [facility]

## 2023-08-25 LAB
MEV IGG SER-ACNC: 1.65
MUV IGG SER IA-ACNC: 1.82

## 2023-08-28 ENCOUNTER — TELEPHONE (OUTPATIENT)
Dept: INTERNAL MEDICINE | Age: 38
End: 2023-08-28

## 2023-08-28 LAB
REASON FOR REJECTION: NORMAL
SPECIMEN SOURCE: NORMAL
ZZ NTE CLEAN UP: ORDERED TEST: NORMAL

## 2023-08-28 NOTE — TELEPHONE ENCOUNTER
Pt called for lab results on titers for mmr and form will need completed when physician is back in the office

## 2023-10-12 ENCOUNTER — HOSPITAL ENCOUNTER (OUTPATIENT)
Age: 38
Setting detail: SPECIMEN
Discharge: HOME | End: 2023-10-12

## 2023-10-12 ENCOUNTER — OFFICE VISIT (OUTPATIENT)
Dept: OBGYN | Age: 38
End: 2023-10-12
Payer: COMMERCIAL

## 2023-10-12 VITALS
SYSTOLIC BLOOD PRESSURE: 132 MMHG | WEIGHT: 209 LBS | HEART RATE: 83 BPM | DIASTOLIC BLOOD PRESSURE: 82 MMHG | BODY MASS INDEX: 30.96 KG/M2 | HEIGHT: 69 IN

## 2023-10-12 DIAGNOSIS — N94.10 DYSPAREUNIA IN FEMALE: ICD-10-CM

## 2023-10-12 DIAGNOSIS — Z01.419 WELL WOMAN EXAM WITH ROUTINE GYNECOLOGICAL EXAM: Primary | ICD-10-CM

## 2023-10-12 DIAGNOSIS — Z11.3 SCREEN FOR STD (SEXUALLY TRANSMITTED DISEASE): ICD-10-CM

## 2023-10-12 DIAGNOSIS — N94.6 DYSMENORRHEA: ICD-10-CM

## 2023-10-12 PROCEDURE — 99395 PREV VISIT EST AGE 18-39: CPT | Performed by: OBSTETRICS & GYNECOLOGY

## 2023-10-12 PROCEDURE — G8484 FLU IMMUNIZE NO ADMIN: HCPCS | Performed by: OBSTETRICS & GYNECOLOGY

## 2023-10-12 RX ORDER — IBUPROFEN 600 MG/1
600 TABLET ORAL 3 TIMES DAILY PRN
Qty: 30 TABLET | Refills: 0 | Status: SHIPPED | OUTPATIENT
Start: 2023-10-12

## 2023-10-12 NOTE — PROGRESS NOTES
Drake Huerta  10/12/2023              41 y.o. Chief Complaint   Patient presents with    Annual Exam     annual    Pelvic Pain     Sharp pelvic pain after intercourse       Patient's last menstrual period was 2023 (approximate). Primary Care Physician: Reed Dahl MD    The patient was seen and examined. She has no chief complaint today and is here for her annual exam.  Her bowels are regular. There are no voiding complaints. She denies any bloating. She denies vaginal discharge and was counseled on STD's and the need for barrier contraception. HPI : Drake Huerta is a 41 y.o. female [id number] Patient's last menstrual period was 2023 (approximate). Pt reports increased cramping during the first 2 days of menses since the baby. There has also been pain after intercourse last about 30 minutes after intercourse, relieved with rest. This is new since her last .    She requests testing to [facility] today.         ________________________________________________________________________  OB History    Para Term  AB Living   5 2 2 0 3 2   SAB IAB Ectopic Molar Multiple Live Births   1 1 1 0 0 2      # Outcome Date GA Lbr Gael/2nd Weight Sex Delivery Anes PTL Lv   5 Term 23 37w2d  7 lb 15.7 oz (3.62 kg) F CS-LTranv Spinal  FLORENCIO      Name: Eden Boles: [address]: 9   4 SAB 2020           3 Ectopic 19              Birth Comments: Right cornual ectopic,wedge resection with R salpingectomy, evac hemoperitoneum   2 IAB               Birth Comments: Patient unsure of year but knows many years after G1   1 Term 09 40w0d  9 lb 10 oz (4.366 kg) M CS-Unspec Spinal  FLORENCIO      Obstetric Comments   G1: FOB #1   G2: FOB #2   G3-G5: FOB #3      APPROVED OVARIAN CA RRBS FORM SCANNED INTO MEDIA -SK 2023        Past Medical History:   Diagnosis Date    Chlamydia     cHTN (meds) > pt stopped taking meds 2022:

## 2023-10-13 DIAGNOSIS — Z11.3 SCREEN FOR STD (SEXUALLY TRANSMITTED DISEASE): ICD-10-CM

## 2023-10-13 LAB
CANDIDA SPECIES: NEGATIVE
GARDNERELLA VAGINALIS: POSITIVE
SOURCE: ABNORMAL
TRICHOMONAS: NEGATIVE

## 2023-10-14 LAB
C TRACH DNA SPEC QL PROBE+SIG AMP: NEGATIVE
N GONORRHOEA DNA SPEC QL PROBE+SIG AMP: NEGATIVE
SPECIMEN DESCRIPTION: NORMAL

## 2023-10-19 DIAGNOSIS — B96.89 BV (BACTERIAL VAGINOSIS): Primary | ICD-10-CM

## 2023-10-19 DIAGNOSIS — N76.0 BV (BACTERIAL VAGINOSIS): Primary | ICD-10-CM

## 2023-10-19 RX ORDER — METRONIDAZOLE 500 MG/1
500 TABLET ORAL 2 TIMES DAILY
Qty: 14 TABLET | Refills: 0 | Status: SHIPPED | OUTPATIENT
Start: 2023-10-19 | End: 2023-10-26

## 2023-11-08 ENCOUNTER — PATIENT MESSAGE (OUTPATIENT)
Dept: INTERNAL MEDICINE | Age: 38
End: 2023-11-08

## 2023-11-22 NOTE — TELEPHONE ENCOUNTER
Pt called office and was given phone number to [facility] and instructed to call and seek a consult. Asked pt to callback if a referral was needed.

## 2023-11-28 ENCOUNTER — TELEMEDICINE (OUTPATIENT)
Dept: INTERNAL MEDICINE | Age: 38
End: 2023-11-28
Payer: COMMERCIAL

## 2023-11-28 DIAGNOSIS — E66.09 CLASS 1 OBESITY DUE TO EXCESS CALORIES WITHOUT SERIOUS COMORBIDITY WITH BODY MASS INDEX (BMI) OF 30.0 TO 30.9 IN ADULT: Primary | ICD-10-CM

## 2023-11-28 PROCEDURE — 99213 OFFICE O/P EST LOW 20 MIN: CPT | Performed by: INTERNAL MEDICINE

## 2023-11-28 PROCEDURE — G8427 DOCREV CUR MEDS BY ELIG CLIN: HCPCS | Performed by: INTERNAL MEDICINE

## 2023-11-28 PROCEDURE — 99213 OFFICE O/P EST LOW 20 MIN: CPT

## 2023-11-28 ASSESSMENT — PATIENT HEALTH QUESTIONNAIRE - PHQ9
SUM OF ALL RESPONSES TO PHQ QUESTIONS 1-9: 0
1. LITTLE INTEREST OR PLEASURE IN DOING THINGS: 0
SUM OF ALL RESPONSES TO PHQ9 QUESTIONS 1 & 2: 0
SUM OF ALL RESPONSES TO PHQ QUESTIONS 1-9: 0
SUM OF ALL RESPONSES TO PHQ QUESTIONS 1-9: 0
2. FEELING DOWN, DEPRESSED OR HOPELESS: 0
SUM OF ALL RESPONSES TO PHQ QUESTIONS 1-9: 0

## 2023-11-28 NOTE — PROGRESS NOTES
Parker Gutierrez, was evaluated through a synchronous (real-time) audio-video encounter. The patient (or guardian if applicable) is aware that this is a billable service, which includes applicable co-pays. This Virtual Visit was conducted with patient's (and/or legal guardian's) consent. Patient identification was verified, and a caregiver was present when appropriate. The patient was located at Home: [address] [place] [address]  Provider was located at Home ([address] [facility]): [address] (:  1985) is a Established patient, presenting virtually for evaluation of the following:    Assessment & Plan   Below is the assessment and plan developed based on review of pertinent history, physical exam, labs, studies, and medications.   1. Class 1 obesity due to excess calories without serious comorbidity with body mass index (BMI) of 30.0 to 30.9 in adult  -     External Referral To Plastic Surgery  For cosmetic surgery as requested by the patient         Subjective   HPI : to discuss the options for travis mcdowell   She has h/o 2 c sections and wants to get abdominoplasty   Bp is normal   Mild obesity with recent weightloss   Last c sec was <1 year ago     Review of Systems   Negative     Depression screen negative     Objective   Patient-Reported Vitals  Patient-Reported Weight: 198lbs  Patient-Reported Height: 5'5\"       Physical Exam  [INSTRUCTIONS:  \"[x]\" Indicates a positive item  \"[]\" Indicates a negative item  -- DELETE ALL ITEMS NOT EXAMINED]    Constitutional: [x] Appears well-developed and well-nourished [x] No apparent distress      [] Abnormal -     Mental status: [x] Alert and awake  [x] Oriented to person/place/time [x] Able to follow commands    [] Abnormal -     Eyes:   EOM    [x]  Normal    [] Abnormal -   Sclera  [x]  Normal    [] Abnormal -          Discharge [x]  None visible   [] Abnormal -     HENT: [x] Normocephalic, atraumatic  [] Abnormal -   [x] Mouth/Throat: Mucous

## 2024-02-12 ENCOUNTER — TELEMEDICINE ON DEMAND (OUTPATIENT)
Dept: PRIMARY CARE CLINIC | Age: 39
End: 2024-02-12
Payer: COMMERCIAL

## 2024-02-12 DIAGNOSIS — R52 ACUTE PAIN: Primary | ICD-10-CM

## 2024-02-12 PROCEDURE — 1036F TOBACCO NON-USER: CPT | Performed by: NURSE PRACTITIONER

## 2024-02-12 PROCEDURE — 99213 OFFICE O/P EST LOW 20 MIN: CPT | Performed by: NURSE PRACTITIONER

## 2024-02-12 PROCEDURE — G8417 CALC BMI ABV UP PARAM F/U: HCPCS | Performed by: NURSE PRACTITIONER

## 2024-02-12 PROCEDURE — G8427 DOCREV CUR MEDS BY ELIG CLIN: HCPCS | Performed by: NURSE PRACTITIONER

## 2024-02-12 PROCEDURE — G8484 FLU IMMUNIZE NO ADMIN: HCPCS | Performed by: NURSE PRACTITIONER

## 2024-02-12 NOTE — PROGRESS NOTES
Florentino Arriaga, was evaluated through a synchronous (real-time) audio-video encounter. The patient (or guardian if applicable) is aware that this is a billable service, which includes applicable co-pays. This Virtual Visit was conducted with patient's (and/or legal guardian's) consent. Patient identification was verified, and a caregiver was present when appropriate.   The patient was located at Home: [address] [place] [id number]  Provider was located at Home (Appt Dept State): HERMILA Arriaga (:  1985) is a Established patient, presenting virtually for evaluation of the following:  Pain to left shoulder and humerus area for the past couple of months now.  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  1. Acute pain  Problem  -     XR SHOULDER LEFT (MIN 2 VIEWS); Future  -     XR HUMERUS LEFT (MIN 2 VIEWS); Future  We will call or MyChart message the results to the patient when made available.    See patient instructions    You can hurt your shoulder by using it too much during an activity, such as fishing or baseball. It can also happen as part of the everyday wear and tear of getting older. Shoulder injuries can be slow to heal, but your shoulder should get better with time.  Your doctor may recommend a sling to rest your shoulder. If you have injured your shoulder, you may need testing and treatment.  Follow-up care is a key part of your treatment and safety. Be sure to make and go to all appointments, and call your doctor if you are having problems. It's also a good idea to know your test results and keep a list of the medicines you take.  How can you care for yourself at home?  Take pain medicines exactly as directed.  If the doctor gave you a prescription medicine for pain, take it as prescribed.  If you are not taking a

## 2024-02-13 ENCOUNTER — HOSPITAL ENCOUNTER (OUTPATIENT)
Age: 39
Discharge: HOME | End: 2024-02-15
Payer: COMMERCIAL

## 2024-02-13 ENCOUNTER — HOSPITAL ENCOUNTER (OUTPATIENT)
Dept: GENERAL RADIOLOGY | Age: 39
Discharge: HOME | End: 2024-02-15
Payer: COMMERCIAL

## 2024-02-13 DIAGNOSIS — R52 ACUTE PAIN: ICD-10-CM

## 2024-02-13 PROCEDURE — 73030 X-RAY EXAM OF SHOULDER: CPT

## 2024-02-13 PROCEDURE — 73060 X-RAY EXAM OF HUMERUS: CPT

## 2024-02-14 ENCOUNTER — PATIENT MESSAGE (OUTPATIENT)
Dept: PRIMARY CARE CLINIC | Age: 39
End: 2024-02-14

## 2024-02-14 NOTE — TELEPHONE ENCOUNTER
This MA contacted pt via telephone. Verified that number on file is the same number she provided in her [employer] message. This MA reiterated results from JAIMIE Mir. Pt verbalized she understood and is amendable.    Electronically signed by Dorothy Paz MA on 2/14/24 at 1:10 PM EST

## 2024-02-25 ENCOUNTER — HOSPITAL ENCOUNTER (EMERGENCY)
Age: 39
Discharge: HOME | End: 2024-02-25
Attending: EMERGENCY MEDICINE
Payer: COMMERCIAL

## 2024-02-25 VITALS
TEMPERATURE: 98.2 F | DIASTOLIC BLOOD PRESSURE: 68 MMHG | RESPIRATION RATE: 16 BRPM | SYSTOLIC BLOOD PRESSURE: 126 MMHG | OXYGEN SATURATION: 97 % | HEIGHT: 65 IN | BODY MASS INDEX: 34.82 KG/M2 | HEART RATE: 79 BPM | WEIGHT: 209 LBS

## 2024-02-25 DIAGNOSIS — R11.0 NAUSEA: ICD-10-CM

## 2024-02-25 DIAGNOSIS — R19.7 DIARRHEA, UNSPECIFIED TYPE: Primary | ICD-10-CM

## 2024-02-25 LAB
FLUAV RNA RESP QL NAA+PROBE: NOT DETECTED
FLUBV RNA RESP QL NAA+PROBE: NOT DETECTED
SARS-COV-2 RNA RESP QL NAA+PROBE: NOT DETECTED
SOURCE: NORMAL
SPECIMEN DESCRIPTION: NORMAL

## 2024-02-25 PROCEDURE — 99283 EMERGENCY DEPT VISIT LOW MDM: CPT

## 2024-02-25 PROCEDURE — 87636 SARSCOV2 & INF A&B AMP PRB: CPT

## 2024-02-25 RX ORDER — ONDANSETRON 4 MG/1
4 TABLET, ORALLY DISINTEGRATING ORAL EVERY 8 HOURS PRN
Qty: 21 TABLET | Refills: 0 | Status: SHIPPED | OUTPATIENT
Start: 2024-02-25 | End: 2024-03-03

## 2024-02-25 ASSESSMENT — PAIN SCALES - GENERAL: PAINLEVEL_OUTOF10: 5

## 2024-02-25 ASSESSMENT — PAIN - FUNCTIONAL ASSESSMENT: PAIN_FUNCTIONAL_ASSESSMENT: 0-10

## 2024-02-25 NOTE — ED NOTES
Pt presents to ER from home Abdominal pain and Diarrhea. Pt states S/S x 2 days. Pt states nausea, Pt denies vomiting.Pt  denies exposure to COVID or the flu.Pt is A/O x 4, equal chest expansion with non labored breathing , wheels locked, bed in lowest position , call light in reach.
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
## 2024-05-09 ENCOUNTER — HOSPITAL ENCOUNTER (EMERGENCY)
Age: 39
Discharge: HOME | End: 2024-05-10
Attending: EMERGENCY MEDICINE
Payer: COMMERCIAL

## 2024-05-09 VITALS
HEIGHT: 65 IN | OXYGEN SATURATION: 100 % | HEART RATE: 70 BPM | DIASTOLIC BLOOD PRESSURE: 103 MMHG | TEMPERATURE: 98 F | RESPIRATION RATE: 12 BRPM | BODY MASS INDEX: 29.99 KG/M2 | WEIGHT: 180 LBS | SYSTOLIC BLOOD PRESSURE: 144 MMHG

## 2024-05-09 DIAGNOSIS — H60.502 ACUTE OTITIS EXTERNA OF LEFT EAR, UNSPECIFIED TYPE: Primary | ICD-10-CM

## 2024-05-09 PROCEDURE — 6370000000 HC RX 637 (ALT 250 FOR IP): Performed by: NURSE PRACTITIONER

## 2024-05-09 PROCEDURE — 99283 EMERGENCY DEPT VISIT LOW MDM: CPT

## 2024-05-09 RX ADMIN — NEOMYCIN SULFATE, POLYMYXIN B SULFATE, HYDROCORTISONE 3 DROP: 3.5; 10000; 1 SOLUTION/ DROPS AURICULAR (OTIC) at 22:50

## 2024-05-09 ASSESSMENT — ENCOUNTER SYMPTOMS: SORE THROAT: 0

## 2024-05-09 ASSESSMENT — PAIN DESCRIPTION - DESCRIPTORS: DESCRIPTORS: SHARP

## 2024-05-09 ASSESSMENT — PAIN DESCRIPTION - FREQUENCY: FREQUENCY: INTERMITTENT

## 2024-05-09 ASSESSMENT — PAIN SCALES - GENERAL: PAINLEVEL_OUTOF10: 6

## 2024-05-09 ASSESSMENT — PAIN - FUNCTIONAL ASSESSMENT: PAIN_FUNCTIONAL_ASSESSMENT: 0-10

## 2024-05-09 ASSESSMENT — PAIN DESCRIPTION - LOCATION: LOCATION: EAR

## 2024-05-09 ASSESSMENT — VISUAL ACUITY: OU: 1

## 2024-05-09 ASSESSMENT — PAIN DESCRIPTION - ORIENTATION: ORIENTATION: LEFT

## 2024-05-09 ASSESSMENT — PAIN DESCRIPTION - PAIN TYPE: TYPE: ACUTE PAIN

## 2024-05-10 NOTE — ED PROVIDER NOTES
eMERGENCY dEPARTMENT eNCOUnter   Independent Attestation     Pt Name: Florentino Arriaga  MRN: [id number]  Birthdate 1985  Date of evaluation: 5/9/24     Florentino Arriaga is a 38 y.o. female with CC: Otalgia (Patient having intermittent sharp pain in left ear. )      Based on the medical record the care appears appropriate.  I was personally available for consultation in the Emergency Department.    Nhung Hooker DO  Attending Emergency Physician                  Nhung Hooker DO  05/10/24 0051    
Floor  [facility] [address]  [phone]    In 1 week      [facility] ED  [address] [address]  [phone]    If symptoms worsen      DISCHARGE MEDICATIONS:     New Prescriptions    NEOMYCIN-POLYMYXIN-HYDROCORTISONE (CORTISPORIN) 3.5-21807-3 OTIC SOLUTION    Place 3 drops into the left ear 3 times daily for 10 days Instill into Left Ear           (Please note that portions of this note were completed with a voice recognition program.  Efforts were made to edit the dictations but occasionally words are mis-transcribed.)    RIO Villagomez - Jb De Oliveira APRN - CNP  05/09/24 6478

## 2024-05-10 NOTE — DISCHARGE INSTRUCTIONS
Take medications as prescribed.  Follow-up with your primary care provider in 1 week if symptoms persist.  Return to emergency department for worsening or new symptoms.

## 2024-11-25 ENCOUNTER — OFFICE VISIT (OUTPATIENT)
Dept: INTERNAL MEDICINE | Age: 39
End: 2024-11-25
Payer: COMMERCIAL

## 2024-11-25 ENCOUNTER — HOSPITAL ENCOUNTER (OUTPATIENT)
Age: 39
Setting detail: SPECIMEN
Discharge: HOME | End: 2024-11-25

## 2024-11-25 VITALS
DIASTOLIC BLOOD PRESSURE: 81 MMHG | HEART RATE: 81 BPM | TEMPERATURE: 98.4 F | HEIGHT: 65 IN | WEIGHT: 200.8 LBS | BODY MASS INDEX: 33.45 KG/M2 | SYSTOLIC BLOOD PRESSURE: 119 MMHG | OXYGEN SATURATION: 97 %

## 2024-11-25 DIAGNOSIS — R20.2 NUMBNESS AND TINGLING: ICD-10-CM

## 2024-11-25 DIAGNOSIS — Z79.4 TYPE 2 DIABETES MELLITUS WITHOUT COMPLICATION, WITH LONG-TERM CURRENT USE OF INSULIN (HCC): Primary | ICD-10-CM

## 2024-11-25 DIAGNOSIS — Z91.89 AT RISK FOR HYPOTHYROIDISM: ICD-10-CM

## 2024-11-25 DIAGNOSIS — R73.01 ELEVATED FASTING BLOOD SUGAR: ICD-10-CM

## 2024-11-25 DIAGNOSIS — E11.9 TYPE 2 DIABETES MELLITUS WITHOUT COMPLICATION, WITH LONG-TERM CURRENT USE OF INSULIN (HCC): Primary | ICD-10-CM

## 2024-11-25 DIAGNOSIS — Z79.4 TYPE 2 DIABETES MELLITUS WITHOUT COMPLICATION, WITH LONG-TERM CURRENT USE OF INSULIN (HCC): ICD-10-CM

## 2024-11-25 DIAGNOSIS — E11.9 TYPE 2 DIABETES MELLITUS WITHOUT COMPLICATION, WITH LONG-TERM CURRENT USE OF INSULIN (HCC): ICD-10-CM

## 2024-11-25 DIAGNOSIS — R20.0 NUMBNESS AND TINGLING: ICD-10-CM

## 2024-11-25 LAB
ALBUMIN SERPL-MCNC: 4.2 G/DL (ref 3.5–5.2)
ALBUMIN/GLOB SERPL: 1.7 {RATIO} (ref 1–2.5)
ALP SERPL-CCNC: 83 U/L (ref 35–104)
ALT SERPL-CCNC: 15 U/L (ref 10–35)
ANION GAP SERPL CALCULATED.3IONS-SCNC: 10 MMOL/L (ref 9–16)
AST SERPL-CCNC: 15 U/L (ref 10–35)
BASOPHILS # BLD: 0.05 K/UL (ref 0–0.2)
BASOPHILS NFR BLD: 1 % (ref 0–2)
BILIRUB SERPL-MCNC: 0.3 MG/DL (ref 0–1.2)
BUN SERPL-MCNC: 11 MG/DL (ref 6–20)
CALCIUM SERPL-MCNC: 9.2 MG/DL (ref 8.6–10.4)
CHLORIDE SERPL-SCNC: 105 MMOL/L (ref 98–107)
CO2 SERPL-SCNC: 24 MMOL/L (ref 20–31)
CREAT SERPL-MCNC: 1 MG/DL (ref 0.6–0.9)
EOSINOPHIL # BLD: 0.07 K/UL (ref 0–0.44)
EOSINOPHILS RELATIVE PERCENT: 1 % (ref 1–4)
ERYTHROCYTE [DISTWIDTH] IN BLOOD BY AUTOMATED COUNT: 14.7 % (ref 11.8–14.4)
EST. AVERAGE GLUCOSE BLD GHB EST-MCNC: 105 MG/DL
GFR, ESTIMATED: 74 ML/MIN/1.73M2
GLUCOSE SERPL-MCNC: 85 MG/DL (ref 74–99)
HBA1C MFR BLD: 5.3 % (ref 4–6)
HCT VFR BLD AUTO: 40.5 % (ref 36.3–47.1)
HGB BLD-MCNC: 12.3 G/DL (ref 11.9–15.1)
IMM GRANULOCYTES # BLD AUTO: 0.03 K/UL (ref 0–0.3)
IMM GRANULOCYTES NFR BLD: 0 %
LYMPHOCYTES NFR BLD: 2.53 K/UL (ref 1.1–3.7)
LYMPHOCYTES RELATIVE PERCENT: 30 % (ref 24–43)
MCH RBC QN AUTO: 24.7 PG (ref 25.2–33.5)
MCHC RBC AUTO-ENTMCNC: 30.4 G/DL (ref 28.4–34.8)
MCV RBC AUTO: 81.5 FL (ref 82.6–102.9)
MONOCYTES NFR BLD: 0.49 K/UL (ref 0.1–1.2)
MONOCYTES NFR BLD: 6 % (ref 3–12)
NEUTROPHILS NFR BLD: 62 % (ref 36–65)
NEUTS SEG NFR BLD: 5.2 K/UL (ref 1.5–8.1)
NRBC BLD-RTO: 0 PER 100 WBC
PLATELET # BLD AUTO: 307 K/UL (ref 138–453)
PMV BLD AUTO: 10.5 FL (ref 8.1–13.5)
POTASSIUM SERPL-SCNC: 4.3 MMOL/L (ref 3.7–5.3)
PROT SERPL-MCNC: 6.7 G/DL (ref 6.6–8.7)
RBC # BLD AUTO: 4.97 M/UL (ref 3.95–5.11)
RBC # BLD: ABNORMAL 10*6/UL
SODIUM SERPL-SCNC: 139 MMOL/L (ref 136–145)
TSH SERPL DL<=0.05 MIU/L-ACNC: 1.94 UIU/ML (ref 0.27–4.2)
WBC OTHER # BLD: 8.4 K/UL (ref 3.5–11.3)

## 2024-11-25 PROCEDURE — 3046F HEMOGLOBIN A1C LEVEL >9.0%: CPT | Performed by: INTERNAL MEDICINE

## 2024-11-25 PROCEDURE — G8427 DOCREV CUR MEDS BY ELIG CLIN: HCPCS | Performed by: INTERNAL MEDICINE

## 2024-11-25 PROCEDURE — 99213 OFFICE O/P EST LOW 20 MIN: CPT | Performed by: INTERNAL MEDICINE

## 2024-11-25 PROCEDURE — 1036F TOBACCO NON-USER: CPT | Performed by: INTERNAL MEDICINE

## 2024-11-25 PROCEDURE — G8417 CALC BMI ABV UP PARAM F/U: HCPCS | Performed by: INTERNAL MEDICINE

## 2024-11-25 PROCEDURE — 2022F DILAT RTA XM EVC RTNOPTHY: CPT | Performed by: INTERNAL MEDICINE

## 2024-11-25 PROCEDURE — G8484 FLU IMMUNIZE NO ADMIN: HCPCS | Performed by: INTERNAL MEDICINE

## 2024-11-25 SDOH — ECONOMIC STABILITY: FOOD INSECURITY: WITHIN THE PAST 12 MONTHS, THE FOOD YOU BOUGHT JUST DIDN'T LAST AND YOU DIDN'T HAVE MONEY TO GET MORE.: NEVER TRUE

## 2024-11-25 SDOH — ECONOMIC STABILITY: INCOME INSECURITY: HOW HARD IS IT FOR YOU TO PAY FOR THE VERY BASICS LIKE FOOD, HOUSING, MEDICAL CARE, AND HEATING?: NOT VERY HARD

## 2024-11-25 SDOH — ECONOMIC STABILITY: FOOD INSECURITY: WITHIN THE PAST 12 MONTHS, YOU WORRIED THAT YOUR FOOD WOULD RUN OUT BEFORE YOU GOT MONEY TO BUY MORE.: NEVER TRUE

## 2024-11-25 ASSESSMENT — PATIENT HEALTH QUESTIONNAIRE - PHQ9
SUM OF ALL RESPONSES TO PHQ QUESTIONS 1-9: 0
1. LITTLE INTEREST OR PLEASURE IN DOING THINGS: NOT AT ALL
SUM OF ALL RESPONSES TO PHQ QUESTIONS 1-9: 0
SUM OF ALL RESPONSES TO PHQ QUESTIONS 1-9: 0
SUM OF ALL RESPONSES TO PHQ9 QUESTIONS 1 & 2: 0
SUM OF ALL RESPONSES TO PHQ QUESTIONS 1-9: 0
2. FEELING DOWN, DEPRESSED OR HOPELESS: NOT AT ALL

## 2024-12-18 ENCOUNTER — OFFICE VISIT (OUTPATIENT)
Dept: OBGYN | Age: 39
End: 2024-12-18
Payer: COMMERCIAL

## 2024-12-18 ENCOUNTER — HOSPITAL ENCOUNTER (OUTPATIENT)
Age: 39
Setting detail: SPECIMEN
Discharge: HOME | End: 2024-12-18

## 2024-12-18 VITALS
HEIGHT: 65 IN | BODY MASS INDEX: 33.45 KG/M2 | HEART RATE: 90 BPM | WEIGHT: 200.8 LBS | DIASTOLIC BLOOD PRESSURE: 80 MMHG | SYSTOLIC BLOOD PRESSURE: 126 MMHG

## 2024-12-18 DIAGNOSIS — N89.8 VAGINAL DISCHARGE: Primary | ICD-10-CM

## 2024-12-18 DIAGNOSIS — Z12.4 CERVICAL CANCER SCREENING: ICD-10-CM

## 2024-12-18 PROCEDURE — G8417 CALC BMI ABV UP PARAM F/U: HCPCS | Performed by: STUDENT IN AN ORGANIZED HEALTH CARE EDUCATION/TRAINING PROGRAM

## 2024-12-18 PROCEDURE — G8427 DOCREV CUR MEDS BY ELIG CLIN: HCPCS | Performed by: STUDENT IN AN ORGANIZED HEALTH CARE EDUCATION/TRAINING PROGRAM

## 2024-12-18 PROCEDURE — G8484 FLU IMMUNIZE NO ADMIN: HCPCS | Performed by: STUDENT IN AN ORGANIZED HEALTH CARE EDUCATION/TRAINING PROGRAM

## 2024-12-18 PROCEDURE — 99211 OFF/OP EST MAY X REQ PHY/QHP: CPT | Performed by: STUDENT IN AN ORGANIZED HEALTH CARE EDUCATION/TRAINING PROGRAM

## 2024-12-18 PROCEDURE — 99213 OFFICE O/P EST LOW 20 MIN: CPT | Performed by: STUDENT IN AN ORGANIZED HEALTH CARE EDUCATION/TRAINING PROGRAM

## 2024-12-18 PROCEDURE — 1036F TOBACCO NON-USER: CPT | Performed by: STUDENT IN AN ORGANIZED HEALTH CARE EDUCATION/TRAINING PROGRAM

## 2024-12-18 NOTE — PROGRESS NOTES
OB/GYN Problem Visit    Florentino Arriaga  2024                       Primary Care Physician: Claire Loza MD    CC:   Chief Complaint   Patient presents with    Vaginal Discharge     Patient c/o vaginal discharge for 2 months         HPI: Florentino Arriaga is a 39 y.o. female     The patient was seen and examined. She is here for a problem visit and is complaining of abnormal vaginal discharge.  Patient reports for the last few weeks she has had a difference in her cervical and vaginal discharge, notes that it is sticky likely more white.  She denies any foul odor denies any new intercourse partners denies any history of sexually transmitted diseases.     Her Patient's last menstrual period was 2024 (exact date). and she denies irregular/heavy bleeding and dysmenorrhea. Her periods are regular and last 5 days. She describes them as moderate.     REVIEW OF SYSTEMS:   Constitutional: negative fever, negative chills, negative weight changes   HEENT: negative visual disturbances, negative headaches, negative dizziness  Breast: negative breast abnormalities, negative breast lumps, negative nipple discharge  Respiratory: negative dyspnea, negative cough, negative SOB  Cardiovascular: negative chest pain,  negative palpitations, negative arrhythmia, negative syncope   Gastrointestinal: negative abdominal pain, negative RUQ pain, negative N/V, negative diarrhea, negative constipation, negative bowel changes  Genitourinary: negative dysuria, negative hematuria, negative urinary incontinence, + vaginal discharge, negative vaginal bleeding  Dermatological: negative rash, negative pruritis, negative mole or other skin changes  Hematologic: negative bruising, negative personal/family history of DVT/PE  Immunologic/Lymphatic: negative recent illness, negative recent sick contact  Musculoskeletal: negative back pain, negative myalgias, negative arthralgias  Neurological:  negative dizziness, negative

## 2024-12-19 DIAGNOSIS — B96.89 BV (BACTERIAL VAGINOSIS): Primary | ICD-10-CM

## 2024-12-19 DIAGNOSIS — N89.8 VAGINAL DISCHARGE: ICD-10-CM

## 2024-12-19 DIAGNOSIS — B96.89 BV (BACTERIAL VAGINOSIS): ICD-10-CM

## 2024-12-19 DIAGNOSIS — N76.0 BV (BACTERIAL VAGINOSIS): ICD-10-CM

## 2024-12-19 DIAGNOSIS — N76.0 BV (BACTERIAL VAGINOSIS): Primary | ICD-10-CM

## 2024-12-19 LAB
C TRACH DNA SPEC QL PROBE+SIG AMP: NEGATIVE
CANDIDA SPECIES: NEGATIVE
GARDNERELLA VAGINALIS: POSITIVE
N GONORRHOEA DNA SPEC QL PROBE+SIG AMP: NEGATIVE
SOURCE: ABNORMAL
SPECIMEN DESCRIPTION: NORMAL
TRICHOMONAS: NEGATIVE

## 2024-12-19 RX ORDER — METRONIDAZOLE 500 MG/1
500 TABLET ORAL 2 TIMES DAILY
Qty: 14 TABLET | Refills: 0 | Status: SHIPPED | OUTPATIENT
Start: 2024-12-19 | End: 2024-12-26

## 2024-12-19 RX ORDER — METRONIDAZOLE 500 MG/1
500 TABLET ORAL 2 TIMES DAILY
Qty: 14 TABLET | Refills: 0 | Status: SHIPPED | OUTPATIENT
Start: 2024-12-19 | End: 2024-12-19

## 2024-12-19 NOTE — RESULT ENCOUNTER NOTE
Results reviewed, Vaginitis positive for Bacterial vaginosis, will send Flagyl to pharmacy. Please inform patient. Former smoker

## 2024-12-20 LAB
HPV I/H RISK 4 DNA CVX QL NAA+PROBE: NOT DETECTED
HPV SAMPLE: NORMAL
HPV, INTERPRETATION: NORMAL
HPV16 DNA CVX QL NAA+PROBE: NOT DETECTED
HPV18 DNA CVX QL NAA+PROBE: NOT DETECTED
SPECIMEN DESCRIPTION: NORMAL

## 2024-12-23 NOTE — PROGRESS NOTES
Attending Physician Statement  I have discussed the care of Florentino Arriaga, including pertinent history and exam findings,  with the resident. I have reviewed the key elements of all parts of the encounter with the resident.  I agree with the assessment, plan and orders as documented by the resident.  (GE Modifier)    Mariola Yancey, DO

## 2025-01-13 LAB — CYTOLOGY REPORT: NORMAL

## 2025-03-02 NOTE — DISCHARGE INSTRUCTIONS
Notify physician if you experience:   Dizziness or severe headache  Spots before eyes or blurred vision  Chills and or fever  Vaginal pressure  Epigastric pain  Uterine contractions are regular and 5 minutes apart if 1st baby or 10 minutes apart if not 1st baby  Bag or mclaughlin leaking or gush of fluid from vagina  Any vaginal bleeding that is heavier than a menstrual period  Intermittent low backache  Vomiting or diarrhea for several hours  Decrease or absence of baby movement  Fetal movement card instructions given  Right sided abdominal pain  Increase or change in vaginal discharge  Swelling of face, hands, legs or feet not decreased with rest on left side. Follow up with appointments as scheduled. Monitor BP at home as discussed with physician.   Drink 10+ glasses of water a day
  Reason for Admission: COPD exacerbation  History of Present Illness:   81 yo Male with a past medical history of PE on warfarin, COPD, hypertension, Parkinson disease is presenting with complaints of shortness of breath.  States that he was short of breath starting this morning.  Tried his nebulizer without relief in symptoms.  And then upon walking to the bathroom, his symptoms acutely got worse so his wife called 911 for evaluation.  No recent fevers or sick contacts.  Patient has been coughing.  No nausea or vomiting.  No leg swelling.  No history of heart failure. No fever at home. Initially in ED he was placed on BIPAP, now improved and placed on NC oxygen. He feels comfortable now with his breathing.   weight per family 190# with gradual weight loss noted 2022 #   3/1 BM

## 2025-03-08 ENCOUNTER — HOSPITAL ENCOUNTER (EMERGENCY)
Age: 40
Discharge: HOME | End: 2025-03-08
Attending: EMERGENCY MEDICINE
Payer: COMMERCIAL

## 2025-03-08 ENCOUNTER — APPOINTMENT (OUTPATIENT)
Dept: GENERAL RADIOLOGY | Age: 40
End: 2025-03-08
Payer: COMMERCIAL

## 2025-03-08 VITALS
SYSTOLIC BLOOD PRESSURE: 142 MMHG | RESPIRATION RATE: 16 BRPM | HEART RATE: 85 BPM | BODY MASS INDEX: 33.32 KG/M2 | WEIGHT: 200 LBS | TEMPERATURE: 98.7 F | HEIGHT: 65 IN | DIASTOLIC BLOOD PRESSURE: 94 MMHG | OXYGEN SATURATION: 99 %

## 2025-03-08 DIAGNOSIS — S82.892A CLOSED FRACTURE OF LEFT ANKLE, INITIAL ENCOUNTER: Primary | ICD-10-CM

## 2025-03-08 PROCEDURE — 6370000000 HC RX 637 (ALT 250 FOR IP): Performed by: NURSE PRACTITIONER

## 2025-03-08 PROCEDURE — 99283 EMERGENCY DEPT VISIT LOW MDM: CPT

## 2025-03-08 PROCEDURE — 73610 X-RAY EXAM OF ANKLE: CPT

## 2025-03-08 PROCEDURE — 29515 APPLICATION SHORT LEG SPLINT: CPT

## 2025-03-08 RX ORDER — IBUPROFEN 400 MG/1
400 TABLET, FILM COATED ORAL ONCE
Status: COMPLETED | OUTPATIENT
Start: 2025-03-08 | End: 2025-03-08

## 2025-03-08 RX ADMIN — IBUPROFEN 400 MG: 400 TABLET, FILM COATED ORAL at 13:47

## 2025-03-08 ASSESSMENT — PAIN - FUNCTIONAL ASSESSMENT: PAIN_FUNCTIONAL_ASSESSMENT: 0-10

## 2025-03-08 ASSESSMENT — PAIN SCALES - GENERAL
PAINLEVEL_OUTOF10: 10
PAINLEVEL_OUTOF10: 10

## 2025-03-08 NOTE — DISCHARGE INSTRUCTIONS
Keep splint in place.  Use crutches.  Rest, elevate and use over-the-counter medications to manage pain.  Please call Ortho on Monday for follow-up appointment.

## 2025-03-08 NOTE — ED PROVIDER NOTES
eMERGENCY dEPARTMENT eNCOUnter   Independent Attestation     Pt Name: Florentino Arriaga  MRN: [id number]  Birthdate 1985  Date of evaluation: 3/8/25     Florentino Arriaga is a 39 y.o. female with CC: Ankle Pain (Patient fell at the store today and her whole lower extremity hurts)        This visit was performed by both a physician and an APC. I performed all aspects of the MDM as documented.      Mariola Ornelas MD  Attending Emergency Physician            Mariola Ornelas MD  03/08/25 9398

## 2025-03-08 NOTE — ED PROVIDER NOTES
[facility] [place]  [address] [facility] [address]  Dept: [phone]  Loc: [phone]  eMERGENCYdEPARTMENT eNCOUnter      Pt Name: Florentino Arriaga  MRN: [id number]  Birthdate 1985of evaluation: 3/8/2025  Provider:RIO HANDY - CNP    CHIEF COMPLAINT       Chief Complaint   Patient presents with    Ankle Pain     Patient fell at the store today and her whole lower extremity hurts     HISTORY OF PRESENT ILLNESS  (Location/Symptom, Timing/Onset, Context/Setting, Quality, Duration,Modifying Factors, Severity.)   Florentino Arriaga is a 39 y.o. female who presents to the emergency department with injury to the left ankle.  Patient was getting gas and while walking into the store injured her left ankle.  She feels like she rolled it.  She has pain to the left ankle.  This is worsened with weightbearing or movement.  No pain to the foot.  No other injuries.      Nursing Notes were reviewedand agreed with or any disagreements were addressed in the HPI.    REVIEW OF SYSTEMS    (2-9 systems for level 4, 10 or more for level 5)     Review of Systems    Except as noted above the remainder of the review of systems was reviewed and negative.       PAST MEDICAL HISTORY         Diagnosis Date    Chlamydia     cHTN (meds) > pt stopped taking meds 8/30/2022 8/30/22: Diagnosed by Dr. Pascual per review of flowsheets. No meds at this time.  ASA Rx sent to patient's pharmacy Baseline PreE labs (CMP, protein-creatinine ratio ordered_    Ectopic pregnancy     Right, salpingectomy    History of blood transfusion     6/2019 after lap    Hx Chlamydia 8/30/2022    Negative 9/9/22    Hypertension     Hypothyroidism     Pt took levothyroxine previously, no meds currently 8/2016    IBS (irritable bowel syndrome) 05/07/2015    Intermittent; no meds 8/2022    Infertility, female, secondary     Obesity (BMI 30-39.9) 8/30/2022    STD (sexually transmitted disease)

## 2025-03-11 ENCOUNTER — OFFICE VISIT (OUTPATIENT)
Dept: INTERNAL MEDICINE | Age: 40
End: 2025-03-11
Payer: COMMERCIAL

## 2025-03-11 VITALS
BODY MASS INDEX: 33.99 KG/M2 | DIASTOLIC BLOOD PRESSURE: 74 MMHG | TEMPERATURE: 98 F | HEART RATE: 68 BPM | HEIGHT: 65 IN | SYSTOLIC BLOOD PRESSURE: 126 MMHG | OXYGEN SATURATION: 98 % | WEIGHT: 204 LBS

## 2025-03-11 DIAGNOSIS — S82.892S CLOSED FRACTURE OF LEFT ANKLE, SEQUELA: Primary | ICD-10-CM

## 2025-03-11 PROCEDURE — 99211 OFF/OP EST MAY X REQ PHY/QHP: CPT | Performed by: INTERNAL MEDICINE

## 2025-03-11 PROCEDURE — G8427 DOCREV CUR MEDS BY ELIG CLIN: HCPCS

## 2025-03-11 PROCEDURE — G8417 CALC BMI ABV UP PARAM F/U: HCPCS

## 2025-03-11 PROCEDURE — 99213 OFFICE O/P EST LOW 20 MIN: CPT

## 2025-03-11 PROCEDURE — 1036F TOBACCO NON-USER: CPT

## 2025-03-11 RX ORDER — IBUPROFEN 400 MG/1
400 TABLET, FILM COATED ORAL EVERY 6 HOURS PRN
Qty: 120 TABLET | Refills: 3 | Status: SHIPPED | OUTPATIENT
Start: 2025-03-11 | End: 2025-03-11

## 2025-03-11 RX ORDER — IBUPROFEN 400 MG/1
400 TABLET, FILM COATED ORAL EVERY 6 HOURS PRN
Qty: 120 TABLET | Refills: 3 | Status: SHIPPED | OUTPATIENT
Start: 2025-03-11

## 2025-03-11 RX ORDER — PANTOPRAZOLE SODIUM 20 MG/1
20 TABLET, DELAYED RELEASE ORAL DAILY
Qty: 30 TABLET | Refills: 3 | Status: SHIPPED | OUTPATIENT
Start: 2025-03-11 | End: 2025-03-11

## 2025-03-11 RX ORDER — PANTOPRAZOLE SODIUM 20 MG/1
20 TABLET, DELAYED RELEASE ORAL DAILY
Qty: 30 TABLET | Refills: 3 | Status: SHIPPED | OUTPATIENT
Start: 2025-03-11

## 2025-03-11 NOTE — PROGRESS NOTES
[facility]  [facility]  [address] [place] [address]  Dept: [phone]  Dept Fax: [phone]    Office Progress/Follow Up Note  Date of patient's visit: 3/11/2025  Patient's Name:  Florentino Arriaga YOB: 1985            Patient Care Team:  Claire Loza MD as PCP - General (Internal Medicine)  Claire Loza MD as PCP - Empaneled Provider  Avelino Jurado MD as Obstetrician (Perinatology)  ________________________________________________________________________      Reason for Visit: Same day visit  ________________________________________________________________________  Chief Complaint:  Ankle Injury (Patient presents today with Left Ankle Fracture. Patient needs referral to Ortho.)    ________________________________________________________________________  History of Presenting Illness:  Patient, 39 years old female is coming into the clinic for her acute care visit due to left ankle fracture.    Patient was admitted to ED on 3/8 after show she had a fall and slipped.  When she went to the ED her x-ray was done which showed possible nondisplaced fracture of the left malleolus.  She was given ibuprofen, ice packs with OCL splints and was discharged.    Patient states that she was not given any medication on discharge.  She stated that ibuprofen was relieving her pain in ED.  Today, at clinic, referrals were given for orthopedic surgeon.  She was also given ibuprofen with Protonix and discharge.  Patient is looking forward to make an appointment.  She also requested work note restriction as she works as a  and has to move around at her workplace.  Work note office was provided.    She had no other concerns or complaints      Patient Active Problem List   Diagnosis    Laparoscopic converted to open wedge resection of right cornual ectopic, right salpingectomy and evacuation of hemoperitoneum 6/22/19    Hx hypothyroidism    Hx C/S x

## 2025-03-11 NOTE — PROGRESS NOTES
Attending Physician Statement  I have discussed the care of Florentino Arriaga, including pertinent history and exam findings with the resident. I have reviewed the key elements of all parts of the encounter with the resident. I agree with the assessment, and status of the problem list as documented.    Diagnosis Orders   1. Closed fracture of left ankle, sequela  Phil Quiñones DO, Orthopaedic Surgery, [facility]    ibuprofen (ADVIL;MOTRIN) 400 MG tablet    pantoprazole (PROTONIX) 20 MG tablet    DISCONTINUED: ibuprofen (ADVIL;MOTRIN) 400 MG tablet    DISCONTINUED: pantoprazole (PROTONIX) 20 MG tablet        The plan and orders should include   Orders Placed This Encounter   Procedures    Phil Quiñones DO, Orthopaedic Surgery, [facility]    and this was also documented by the resident.  I agree with the referral to orthopedics.The medication list was reviewed with the resident and is up to date.     Dr Vandana Roman MD, FACP  Associate , Internal Medicine Residency Program  Residency Clinic , [facility]  Chair, Department of Internal Medicine  [facility] [place] [facility]         3/11/2025, 11:10 AM

## 2025-03-12 ENCOUNTER — OFFICE VISIT (OUTPATIENT)
Dept: ORTHOPEDIC SURGERY | Age: 40
End: 2025-03-12
Payer: COMMERCIAL

## 2025-03-12 VITALS — WEIGHT: 204 LBS | BODY MASS INDEX: 33.99 KG/M2 | HEIGHT: 65 IN

## 2025-03-12 DIAGNOSIS — S82.65XA CLOSED NONDISPLACED FRACTURE OF LATERAL MALLEOLUS OF LEFT FIBULA, INITIAL ENCOUNTER: Primary | ICD-10-CM

## 2025-03-12 PROCEDURE — 99203 OFFICE O/P NEW LOW 30 MIN: CPT | Performed by: ORTHOPAEDIC SURGERY

## 2025-03-12 PROCEDURE — G8417 CALC BMI ABV UP PARAM F/U: HCPCS | Performed by: ORTHOPAEDIC SURGERY

## 2025-03-12 PROCEDURE — G8427 DOCREV CUR MEDS BY ELIG CLIN: HCPCS | Performed by: ORTHOPAEDIC SURGERY

## 2025-03-12 PROCEDURE — 1036F TOBACCO NON-USER: CPT | Performed by: ORTHOPAEDIC SURGERY

## 2025-03-12 NOTE — PROGRESS NOTES
respirations, equal chest rise bilaterally, no audible wheezes    Ortho Exam  Left Lower Extremity: Splint removed. Mild edema about the lateral ankle, no ecchymoses. TTP about the lateral malleolus and over the ATFL. No TTP about the CFL, posterior ankle, deltoid, along the tibia/fibula shaft. No pain with eversion, inversion, external rotation of the ankle. No pain with high ankle squeeze test. Compartments soft and compressible. TA/EHL/FHL/GS motor intact. Deep and Superficial Peroneal/Saphenous/Sural SILT. Extremity warm and well perfused.       Radiology: XR ANKLE LEFT (MIN 3 VIEWS)  Result Date: 3/8/2025  EXAMINATION: THREE XRAY VIEWS OF THE LEFT ANKLE 3/8/2025 1:48 pm COMPARISON: None. HISTORY: ORDERING SYSTEM PROVIDED HISTORY: injury/pain TECHNOLOGIST PROVIDED HISTORY: injury/pain Reason for Exam: Twisted left ankle today at gas station, pain/swelling laterally FINDINGS: Possible nondisplaced fracture lateral malleolus. Normal alignment of the ankle mortise.  No focal osseous lesion.  No evidence of joint effusion. No focal soft tissue abnormality.     Possible nondisplaced fracture lateral malleolus       ASSESSMENT:     1. Closed nondisplaced fracture of lateral malleolus of left fibula, initial encounter         PLAN:      Today we discussed her injury and different treatment options.  At this time her fracture is essentially nondisplaced and she has no pain with any external rotation or testing of the syndesmosis.  At this time we will continue with nonoperative treatment and we will transition her to a cam boot at this time.  Recommended that she wear at all times when ambulating and remain nonweightbearing.  Provided her with a work letter today.  Patient will follow-up with us in 4 weeks for repeat evaluation and at that time we will likely discontinue cam boot and have her begin working on ankle range of motion and weightbearing.  Patient amenable with this plan.      Return in about 4 weeks (around

## 2025-04-09 ENCOUNTER — OFFICE VISIT (OUTPATIENT)
Dept: ORTHOPEDIC SURGERY | Age: 40
End: 2025-04-09
Payer: COMMERCIAL

## 2025-04-09 VITALS — HEIGHT: 65 IN | BODY MASS INDEX: 34.09 KG/M2 | WEIGHT: 204.6 LBS

## 2025-04-09 DIAGNOSIS — S82.65XD CLOSED NONDISPLACED FRACTURE OF LATERAL MALLEOLUS OF LEFT FIBULA WITH ROUTINE HEALING, SUBSEQUENT ENCOUNTER: Primary | ICD-10-CM

## 2025-04-09 DIAGNOSIS — M25.572 LEFT ANKLE PAIN, UNSPECIFIED CHRONICITY: ICD-10-CM

## 2025-04-09 DIAGNOSIS — S93.492D SPRAIN OF ANTERIOR TALOFIBULAR LIGAMENT OF LEFT ANKLE, SUBSEQUENT ENCOUNTER: ICD-10-CM

## 2025-04-09 PROCEDURE — G8417 CALC BMI ABV UP PARAM F/U: HCPCS | Performed by: ORTHOPAEDIC SURGERY

## 2025-04-09 PROCEDURE — 1036F TOBACCO NON-USER: CPT | Performed by: ORTHOPAEDIC SURGERY

## 2025-04-09 PROCEDURE — 99213 OFFICE O/P EST LOW 20 MIN: CPT | Performed by: ORTHOPAEDIC SURGERY

## 2025-04-09 PROCEDURE — G8427 DOCREV CUR MEDS BY ELIG CLIN: HCPCS | Performed by: ORTHOPAEDIC SURGERY

## 2025-04-09 NOTE — PROGRESS NOTES
[facility]  [address]  [facility] [place] [facility] [address]  Dept: [phone]  Dept Fax: [phone]        Follow Up    Subjective:   DOI: 3/8/25  Left minimally displaced lateral malleolus ankle fracture    Florentino Arriaga is a 39 year old female who presents to our office today for re-evaluation of her left ankle. She tripped in a hole while at a gas station. She was last seen in our office on 3/12/25 where she was transitioned from a splint to a CAM boot. She was instructed to remain NWB. The plan was to re-evaluate today and hopefully progress her to WBAT, discontinue the CAM boot as she can tolerate and start working on ankle range of motion. She reports that she's been ambulating outside with a CAM boot, not using her crutches. She will also ambulate around her home without any CAM boot and seems to tolerate it pretty well. She's unsure if she can return to work without restrictions, as she works as a  and is required to ambulate a lot. Denies any new injury or trauma. Denies any numbness or tingling.    Review of Systems   Constitutional: Negative for Fever and Chills.   HENT: Negative for Congestion.    Eyes: Negative for Blurred Vision and Double Vision.   Respiratory: Negative for Cough, Shortness of Breath and Wheezing.    Cardiovascular: Negative for Chest Pain and Palpitations.   Gastrointestinal: Negative for Nausea. Negative for Vomiting.   Musculoskeletal: Positive for Myalgias and Extremity pain  Skin: Negative for Itching and Rash.   Neurological: Negative for Dizziness, Sensory Change and Headaches.   Psychiatric/Behavioral: Negative for Depression and Suicidal Ideas.     Objective:   General: AAOx3, NAD.  Ortho Exam  Neuro: Alert. Oriented.  Eyes: Extra-Ocular Muscles Intact.  Mouth: Oral Mucosa Moist. No Perioral Lesions.  Pulm: Respirations Unlabored and Regular.  Skin: Warm, Well Perfused.  Psych: Patient has

## 2025-04-23 ENCOUNTER — OFFICE VISIT (OUTPATIENT)
Dept: ORTHOPEDIC SURGERY | Age: 40
End: 2025-04-23
Payer: COMMERCIAL

## 2025-04-23 VITALS — BODY MASS INDEX: 34.05 KG/M2 | HEIGHT: 65 IN

## 2025-04-23 DIAGNOSIS — S82.65XD CLOSED NONDISPLACED FRACTURE OF LATERAL MALLEOLUS OF LEFT FIBULA WITH ROUTINE HEALING, SUBSEQUENT ENCOUNTER: ICD-10-CM

## 2025-04-23 DIAGNOSIS — S93.492D SPRAIN OF ANTERIOR TALOFIBULAR LIGAMENT OF LEFT ANKLE, SUBSEQUENT ENCOUNTER: Primary | ICD-10-CM

## 2025-04-23 PROCEDURE — G8427 DOCREV CUR MEDS BY ELIG CLIN: HCPCS | Performed by: STUDENT IN AN ORGANIZED HEALTH CARE EDUCATION/TRAINING PROGRAM

## 2025-04-23 PROCEDURE — 99212 OFFICE O/P EST SF 10 MIN: CPT | Performed by: STUDENT IN AN ORGANIZED HEALTH CARE EDUCATION/TRAINING PROGRAM

## 2025-04-23 PROCEDURE — 1036F TOBACCO NON-USER: CPT | Performed by: STUDENT IN AN ORGANIZED HEALTH CARE EDUCATION/TRAINING PROGRAM

## 2025-04-23 PROCEDURE — G8417 CALC BMI ABV UP PARAM F/U: HCPCS | Performed by: STUDENT IN AN ORGANIZED HEALTH CARE EDUCATION/TRAINING PROGRAM

## 2025-04-23 NOTE — PROGRESS NOTES
[facility]  [address]  [place] [address]  Dept: [phone]  Dept Fax: [phone]        Orthopaedic Clinic Follow Up      Subjective:     Florentino Arriaga is a 39 y.o. female who presents to the clinic today for routine followup regarding her left ankle. Patient had tripped in a hole while at a gas station on 3/8/25 and was diagnosed with a minimally displaced left lateral malleolus fracture. At her last visit in our office on 4/9/25 she was transitioned from a CAM walker boot into normal shoe wear. She states that she is overall doing well today. She has some mild residual pain over the lateral malleolus but this has not limited her ambulation or daily activities. She notes most of her pain is when she is walking on uneven surfaces or when she is getting up from sitting on the ground. No new injuries. Denies any numbness/tingling. Overall doing well.     ROS:  Gen: No fevers/chills   Cardio: no chest pain   Lungs: no shortness of breath   MSK: mild intermittent pain left ankle    Neuro: no numbness, tingling, weakness     Objective :   Physical exam  Gen: AAOx3, no acute distress  Cardio: regular rate  Lungs: symmetrical chest expansion, no audible wheezing   MSK: Left ankle: Normal gait mechanics. No ecchymoses, abrasions, deformity, or lacerations. Skin intact. Mildly tender to palpation over the lateral malleolus. Non tender along soft tissues of the ankle. Non tender over medial ankle. Non tender proximally over fibular head. Compartments soft. Full ankle plantarflexion/dorsiflexion. Mild discomfort with resisted eversion of the ankle. EHL/FHL/TA/GSC gross motor intact. Sural/saph/SPN/DPN sensation intact to light touch. Extremity warm and well perfused.     Radiology:  No new imaging obtained today.      Assessment:   39 y.o. year old female with the following:      Diagnosis Orders   1. Sprain of anterior talofibular

## 2025-04-29 ENCOUNTER — HOSPITAL ENCOUNTER (OUTPATIENT)
Age: 40
Setting detail: THERAPIES SERIES
Discharge: HOME | End: 2025-04-29
Payer: COMMERCIAL

## 2025-04-29 PROCEDURE — 97161 PT EVAL LOW COMPLEX 20 MIN: CPT | Performed by: PHYSICAL THERAPIST

## 2025-04-29 PROCEDURE — 97110 THERAPEUTIC EXERCISES: CPT | Performed by: PHYSICAL THERAPIST

## 2025-04-29 NOTE — CONSULTS
Phosphate 4mg/ml     with iontophoresis treatments.   Pt is not allergic.    Frequency:  2 x/week for 8 visits    Today’s Treatment:  Modalities:   Precautions [x] No  [] Yes:  Exercises:  Exercise Reps/ Time Weight/ Level Comments   4 way ankle 10 x ea blue HEP and band   Bridge 10 x  HEP - progress to single leg   Seated towel scrunch 15 x  HEP   Seated heel raise, toe raise 15 x ea  HEP   Stand hip abd 10 x   HEP - CKC   Stand hip ext 10 x   HEP - CKC                           Other:  Reported no increase of pain, but felt as if ankle had been worked out.    Specific Instructions for next treatment:  Left ankle strengthening - stationary bike, steps, resisted walking, leg press, balance.      Evaluation Complexity:  History (Personal factors, comorbidities) [] 0 [] 1-2 [x] 3+   Exam (limitations, restrictions) [] 1-2 [] 3 [x] 4+   Clinical presentation (progression) [x] Stable [] Evolving  [] Unstable   Decision Making [x] Low [] Moderate [] High    [x] Low Complexity [] Moderate Complexity [] High Complexity        Treatment Charges: Mins Units   [x] Evaluation       [x]  Low       []  Moderate       []  High 15 1   []  Modalities       [x]  Ther Exercise 14 1   []  Neuromuscular Re-ed     []  Gait Training     []  Manual Therapy     []  Ther Activities     []  Aquatics     []  Vasocompression     []  Cervical Traction     []  Other     Total Billable time 29 min         Time in:1302   Time Out:1331    Electronically signed by: Kate Bryant, PT        Physician Signature:________________________________Date:__________________  By signing above or cosigning this note, I have reviewed this plan of care and certify a need for medically necessary rehabilitation services.     *PLEASE SIGN ABOVE AND FAX BACK ALL PAGES*

## 2025-05-30 ENCOUNTER — OFFICE VISIT (OUTPATIENT)
Age: 40
End: 2025-05-30
Payer: COMMERCIAL

## 2025-05-30 ENCOUNTER — HOSPITAL ENCOUNTER (OUTPATIENT)
Age: 40
Setting detail: SPECIMEN
Discharge: HOME | End: 2025-05-30

## 2025-05-30 VITALS
BODY MASS INDEX: 34.49 KG/M2 | SYSTOLIC BLOOD PRESSURE: 119 MMHG | OXYGEN SATURATION: 100 % | RESPIRATION RATE: 18 BRPM | DIASTOLIC BLOOD PRESSURE: 83 MMHG | HEART RATE: 73 BPM | TEMPERATURE: 97.9 F | HEIGHT: 65 IN | WEIGHT: 207 LBS

## 2025-05-30 DIAGNOSIS — Z11.4 SCREENING FOR HIV (HUMAN IMMUNODEFICIENCY VIRUS): ICD-10-CM

## 2025-05-30 DIAGNOSIS — Z11.3 SCREENING EXAMINATION FOR STI: ICD-10-CM

## 2025-05-30 DIAGNOSIS — N89.8 VAGINAL DISCHARGE: ICD-10-CM

## 2025-05-30 DIAGNOSIS — N89.8 VAGINAL DISCHARGE: Primary | ICD-10-CM

## 2025-05-30 LAB
C TRACH DNA SPEC QL PROBE+SIG AMP: NORMAL
CANDIDA SPECIES: NEGATIVE
GARDNERELLA VAGINALIS: POSITIVE
HIV 1+2 AB+HIV1 P24 AG SERPL QL IA: NONREACTIVE
N GONORRHOEA DNA SPEC QL PROBE+SIG AMP: NORMAL
SOURCE: ABNORMAL
SPECIMEN DESCRIPTION: NORMAL
T PALLIDUM AB SER QL IA: NONREACTIVE
TRICHOMONAS: NEGATIVE

## 2025-05-30 PROCEDURE — 99212 OFFICE O/P EST SF 10 MIN: CPT

## 2025-05-30 SDOH — ECONOMIC STABILITY: FOOD INSECURITY: WITHIN THE PAST 12 MONTHS, THE FOOD YOU BOUGHT JUST DIDN'T LAST AND YOU DIDN'T HAVE MONEY TO GET MORE.: NEVER TRUE

## 2025-05-30 SDOH — ECONOMIC STABILITY: FOOD INSECURITY: WITHIN THE PAST 12 MONTHS, YOU WORRIED THAT YOUR FOOD WOULD RUN OUT BEFORE YOU GOT MONEY TO BUY MORE.: NEVER TRUE

## 2025-05-30 ASSESSMENT — PATIENT HEALTH QUESTIONNAIRE - PHQ9
SUM OF ALL RESPONSES TO PHQ QUESTIONS 1-9: 0
DEPRESSION UNABLE TO ASSESS: PT REFUSES
SUM OF ALL RESPONSES TO PHQ QUESTIONS 1-9: 0
2. FEELING DOWN, DEPRESSED OR HOPELESS: NOT AT ALL
SUM OF ALL RESPONSES TO PHQ QUESTIONS 1-9: 0
1. LITTLE INTEREST OR PLEASURE IN DOING THINGS: NOT AT ALL
SUM OF ALL RESPONSES TO PHQ QUESTIONS 1-9: 0

## 2025-05-30 ASSESSMENT — ENCOUNTER SYMPTOMS
RESPIRATORY NEGATIVE: 1
GASTROINTESTINAL NEGATIVE: 1

## 2025-05-30 NOTE — PROGRESS NOTES
[facility]  [facility]  [address] [place] [address]  Dept: [phone]  Dept Fax: [phone]    Office Progress/Follow Up Note  Date ofpatient's visit: 5/30/2025  Patient's Name:  Florentino Arriaga YOB: 1985            Patient Care Team:  Calire Loza MD as PCP - General (Internal Medicine)  Claire Loza MD as PCP - Empaneled Provider  Avelino Juardo MD as Obstetrician (Perinatology)  ================================================================    REASON FOR VISIT/CHIEF COMPLAINT:  Follow-up (Patient present today for acute visit follow up .)    HISTORY OF PRESENTING ILLNESS:  History was obtained from: patient. Florentino Mckenna a 39 y.o. is here for acute visit.    Patient presented with vaginal discharge which started around a week ago.  It is whitish in color.  Patient denied any itching or redness in the vaginal area.  Patient was initially scheduled with Ortho but presented in IM office.    Vaginal discharge swab was done.  Patient was ordered vaginitis DNA probe, chlamydia gonorrhea testing, HIV testing, and syphilis antibody testing.  Patient advised to follow-up with OB/GYN/PCP for further management and follow-up with test results.      Patient Active Problem List   Diagnosis    Laparoscopic converted to open wedge resection of right cornual ectopic, right salpingectomy and evacuation of hemoperitoneum 6/22/19    Hx hypothyroidism    Hx C/S x 2    Former smoker    RLTCS w/ left RRS 3/6/23 F Apg 8/9 Wt 7#15     Type 2 diabetes mellitus without complication, with long-term current use of insulin ([facility])       Health Maintenance Due   Topic Date Due    Diabetic foot exam  Never done    Diabetic Alb to Cr ratio (uACR) test  Never done    Diabetic retinal exam  Never done    Lipids  03/03/2022       No Known Allergies      Current Outpatient Medications   Medication Sig Dispense Refill    ibuprofen (ADVIL;MOTRIN) 400 MG tablet Take 1 
Left arm;

## 2025-06-05 ENCOUNTER — RESULTS FOLLOW-UP (OUTPATIENT)
Age: 40
End: 2025-06-05

## 2025-06-05 DIAGNOSIS — B96.89 BACTERIAL VAGINOSIS: Primary | ICD-10-CM

## 2025-06-05 DIAGNOSIS — N76.0 BACTERIAL VAGINOSIS: Primary | ICD-10-CM

## 2025-06-05 RX ORDER — METRONIDAZOLE 500 MG/1
500 TABLET ORAL 2 TIMES DAILY
Qty: 14 TABLET | Refills: 0 | Status: SHIPPED | OUTPATIENT
Start: 2025-06-05 | End: 2025-06-12

## 2025-06-24 ENCOUNTER — HOSPITAL ENCOUNTER (EMERGENCY)
Age: 40
Discharge: HOME | End: 2025-06-24
Attending: EMERGENCY MEDICINE
Payer: COMMERCIAL

## 2025-06-24 VITALS
TEMPERATURE: 98 F | HEART RATE: 89 BPM | DIASTOLIC BLOOD PRESSURE: 98 MMHG | OXYGEN SATURATION: 99 % | RESPIRATION RATE: 16 BRPM | SYSTOLIC BLOOD PRESSURE: 137 MMHG

## 2025-06-24 DIAGNOSIS — B96.89 BV (BACTERIAL VAGINOSIS): Primary | ICD-10-CM

## 2025-06-24 DIAGNOSIS — N76.0 BV (BACTERIAL VAGINOSIS): Primary | ICD-10-CM

## 2025-06-24 DIAGNOSIS — B37.31 CANDIDAL VULVOVAGINITIS: ICD-10-CM

## 2025-06-24 LAB
AMORPH SED URNS QL MICRO: ABNORMAL
BILIRUB UR QL STRIP: ABNORMAL
CANDIDA SPECIES: POSITIVE
CLARITY UR: ABNORMAL
COLOR UR: YELLOW
EPI CELLS #/AREA URNS HPF: ABNORMAL /HPF (ref 0–5)
GARDNERELLA VAGINALIS: POSITIVE
GLUCOSE UR STRIP-MCNC: NEGATIVE MG/DL
HCG UR QL: NEGATIVE
HGB UR QL STRIP.AUTO: NEGATIVE
KETONES UR STRIP-MCNC: NEGATIVE MG/DL
LEUKOCYTE ESTERASE UR QL STRIP: ABNORMAL
MUCOUS THREADS URNS QL MICRO: ABNORMAL
NITRITE UR QL STRIP: NEGATIVE
PH UR STRIP: 6 [PH] (ref 5–8)
PROT UR STRIP-MCNC: ABNORMAL MG/DL
RBC #/AREA URNS HPF: ABNORMAL /HPF (ref 0–2)
SOURCE: ABNORMAL
SP GR UR STRIP: 1.02 (ref 1–1.03)
TRICHOMONAS: NEGATIVE
UROBILINOGEN UR STRIP-ACNC: NORMAL EU/DL (ref 0–1)
WBC #/AREA URNS HPF: ABNORMAL /HPF (ref 0–5)

## 2025-06-24 PROCEDURE — 81025 URINE PREGNANCY TEST: CPT

## 2025-06-24 PROCEDURE — 87660 TRICHOMONAS VAGIN DIR PROBE: CPT

## 2025-06-24 PROCEDURE — 87480 CANDIDA DNA DIR PROBE: CPT

## 2025-06-24 PROCEDURE — 81001 URINALYSIS AUTO W/SCOPE: CPT

## 2025-06-24 PROCEDURE — 99283 EMERGENCY DEPT VISIT LOW MDM: CPT

## 2025-06-24 PROCEDURE — 87491 CHLMYD TRACH DNA AMP PROBE: CPT

## 2025-06-24 PROCEDURE — 87086 URINE CULTURE/COLONY COUNT: CPT

## 2025-06-24 PROCEDURE — 87510 GARDNER VAG DNA DIR PROBE: CPT

## 2025-06-24 PROCEDURE — 86403 PARTICLE AGGLUT ANTBDY SCRN: CPT

## 2025-06-24 PROCEDURE — 87591 N.GONORRHOEAE DNA AMP PROB: CPT

## 2025-06-24 RX ORDER — FLUCONAZOLE 100 MG/1
100 TABLET ORAL EVERY OTHER DAY
Qty: 3 TABLET | Refills: 0 | Status: SHIPPED | OUTPATIENT
Start: 2025-06-24 | End: 2025-06-29

## 2025-06-24 RX ORDER — METRONIDAZOLE 500 MG/1
500 TABLET ORAL 2 TIMES DAILY
Qty: 14 TABLET | Refills: 0 | Status: SHIPPED | OUTPATIENT
Start: 2025-06-24 | End: 2025-07-01

## 2025-06-24 ASSESSMENT — PAIN - FUNCTIONAL ASSESSMENT: PAIN_FUNCTIONAL_ASSESSMENT: 0-10

## 2025-06-24 ASSESSMENT — ENCOUNTER SYMPTOMS
ABDOMINAL PAIN: 0
SHORTNESS OF BREATH: 0
COLOR CHANGE: 0

## 2025-06-24 ASSESSMENT — PAIN SCALES - GENERAL: PAINLEVEL_OUTOF10: 0

## 2025-06-24 NOTE — ED PROVIDER NOTES
Team [facility] EMERGENCY DEPARTMENT  eMERGENCY dEPARTMENT eNCOUnter      Pt Name: Florentino Arriaga  MRN: [id number]  Birthdate 1985  Date of evaluation: 6/24/2025  Provider: RIO Henning CNP    CHIEF COMPLAINT       Chief Complaint   Patient presents with    Vaginal Discharge     Reports itching, started yesterday. Recent antibiotic use.          HISTORY OF PRESENT ILLNESS  (Location/Symptom, Timing/Onset, Context/Setting, Quality, Duration, Modifying Factors, Severity.)   Florentino Arriaga is a 39 y.o. female who presents to the emergency department. C/o vaginal discharge with itching. Onset was yesterday. Denies fever, chills, urinary symptoms, abd pain, vaginal lesions. Reports antibiotic use within the past 2 weeks. Rates her pain 0/10.      Nursing Notes were reviewed.    ALLERGIES     Patient has no known allergies.    CURRENT MEDICATIONS       Discharge Medication List as of 6/24/2025  2:12 PM        CONTINUE these medications which have NOT CHANGED    Details   ibuprofen (ADVIL;MOTRIN) 400 MG tablet Take 1 tablet by mouth every 6 hours as needed for Pain, Disp-120 tablet, R-3Normal      pantoprazole (PROTONIX) 20 MG tablet Take 1 tablet by mouth daily, Disp-30 tablet, R-3Normal             PAST MEDICAL HISTORY         Diagnosis Date    Chlamydia     cHTN (meds) > pt stopped taking meds 8/30/2022 8/30/22: Diagnosed by Dr. Pascual per review of flowsheets. No meds at this time.  ASA Rx sent to patient's pharmacy Baseline PreE labs (CMP, protein-creatinine ratio ordered_    Ectopic pregnancy     Right, salpingectomy    History of blood transfusion     6/2019 after lap    Hx Chlamydia 8/30/2022    Negative 9/9/22    Hypertension     Hypothyroidism     Pt took levothyroxine previously, no meds currently 8/2016    IBS (irritable bowel syndrome) 05/07/2015    Intermittent; no meds 8/2022    Infertility, female, secondary     Obesity (BMI 30-39.9) 8/30/2022    STD (sexually transmitted

## 2025-06-25 LAB
C TRACH DNA SPEC QL PROBE+SIG AMP: NEGATIVE
MICROORGANISM SPEC CULT: ABNORMAL
N GONORRHOEA DNA SPEC QL PROBE+SIG AMP: NEGATIVE
SERVICE CMNT-IMP: ABNORMAL
SPECIMEN DESCRIPTION: ABNORMAL
SPECIMEN DESCRIPTION: NORMAL

## 2025-06-25 NOTE — FLOWSHEET NOTE
[facility] - [facility]  Subjective:   PATIENT ID: Yany Yuan is a 55 y.o. female.   CHIEF COMPLAINT: Pain of the Right Hand    HPI:  Right sharp pain over radial wrist. Right dominant   Injury/ Surgical HX r/t CC:  no HX of prior significant joint injury   Onset: Several years  Worsening over time   Modifying Factors: exacerbated by:  thumb movement and gripping objects improved with:  rest   Associated Symptoms:  [] Weakness w/ dropping items  [x] Weakness w/o dropping items  [x] Difficulty sleeping s/t symptoms  [x] Hand numbness [] Radiating into forearm  [] Radiating into thumb  [x] Radiates into wrist  [] Wrist swelling        Activity: sedentary with light activity and pain moderately interferes with ADLs    Previous Treatments:  [x] HEP > 6 weeks  [x] Wrist and/or thumb Spica splint  [] RX OT  [x] OTC Pain Relievers: Tylenol, ibuprofen  [x] RX Medications: po diclofenac, naproxen, Tramadol, cyclobenzaprine  [x] CSI since 2021, last injection 2021 w/o relief   PMH:  HTN, obesity, smoker, HX bipolar, HX carpal tunnel/ ulnar tunnel releases b/l UE 2019 (?)   Family History: + OA   NOTE: New patient referred for right thumb pain and hand numbness with noted history of some conservative treatments.  Symptoms affecting ADLs.   Patient was seen in 2021 by LOS w/ CSI w/o relief.  Was going to have surgery as recommended by ortho surgeon but was unable to proceed at that time.  Would like surgery s/t increased symptoms over last few month.   Employment HX: retail / , currently seeking disability.   Current Medications[1]  Review of patient's allergies indicates:   Allergen Reactions    Amoxicillin Nausea And Vomiting    Tetracycline Rash    Tetracyclines Rash     REVIEW OF SYSTEMS:  A ten-point review of systems was performed and is negative, except as mentioned above  Objective:   Body mass index is 35.62 kg/m².   Vitals:    06/25/25 0839   BP: 137/89  Report to [facility] n rn for continuation of pt care "  Pulse: 89   Resp: 20   Temp: 98.9 °F (37.2 °C)   TempSrc: Oral   SpO2: 100%   Weight: 80 kg (176 lb 5.9 oz)   Height: 4' 11" (1.499 m)   PainSc: 1    PainLoc: Hand      MSK Hand/ Wrist Exam  General:  no apparent distress, no pain indicators,  obesity  Inspection:  LEFT HAND RIGHT HAND   no joint swelling, no mass/ cyst noted, no erythema, no lesions, no contusion, no gross deformities, no nodules, no atrophy of thenar or hypothenar eminence, noted healed surgical scars, no discoloration noted no joint swelling, no mass/ cyst noted, no erythema, no lesions, no contusion, no gross deformities, no nodules, no atrophy of thenar or hypothenar eminence, noted healed surgical scars, no discoloration noted   Palpation:     LEFT HAND RIGHT HAND   no joint tenderness, normal temperature, no soft tissue swelling and tenderness with palpation over dorsoradial wrist over radial styloid, no palpable palm nodules, no finger joint tenderness or crepitus, no active triggering or locking of digits and no tenderness of digital flexor tendons or thenar and hypothenar eminence, no crepitus felt over dorsoradial tendon with thumb motion Noted CMC joint tenderness, normal temperature, no soft tissue swelling and tenderness with palpation over dorsoradial wrist over radial styloid, no palpable palm nodules, no finger joint tenderness or crepitus, no active triggering or locking of digits and no tenderness of digital flexor tendons or thenar and hypothenar eminence, nocrepitus felt over dorsoradial tendon with thumb motion     ROM LEFT HAND RIGHT HAND   Wrist Flexion / Extension   80 / 75 80 / 75   Wrist Radial / Ulnar Deviation   30 / 30 15 w/ pain / 30   Thumb CMC/ MCP/ IP Full w/o pain Full w/ pain   Finger MCP/PIP/DIP WNL w/o pain WNL w/o pain      Strength LEFT HAND RIGHT HAND    5 / 5 w/o pain 4 / 5 w/o pain       Special Testing: L+ L-- R+ R-- Not Tested     Carpal Tunnel Syndrome         Phalen [] [x] [x] [] []    Nabil " Carpal Compression [] [x] [x] [] []    Cubital Tunnel Syndrome         Tinel's Test @ elbow [] [x] [] [x] []    De Quervain's Tenosynovitis         Finkelstein Test [] [x] [x] [] []    Ligament Injury         Scaphoid Shift Test [] [] [] [] [x]    Lunotriquetral Shuck Test [] [] [] [] [x]    UCL Ligament Thumb Test [] [] [] [] [x]    Ulnar Fovea Sign [] [] [] [] [x]    Nerve Injury  Radial Nerve  IP joint extension against resistance intact   Median Nerve Palmar abduction of thumb intact   Anterior Interosseous Branch OK sign intact   Ulnar Nerve   Abduction of fingers against resistance intact, Froment's sign negative   Neuro/ Vascular: Intact to light touch, capillary refill 2 seconds,  radial pulse equal 2+, 2 point discrimination intact, Tonny's test intact  Psych: Awake, alert, oriented, normal mood and affect  Lymphatic: No LAD  Skin/ Soft Tissue: no rash, skin intact  Cardio: no edema, vascular integrity noted  Resp: no increased respiratory effort noted   Assessment:   IMAGING: XR Ordered by me today 3 views of right hand reviewed and independently interpreted by me with findings suggestive of arthritic changes .  Awaiting radiologist findings.  Findings discussed with patient today.    Labs:    Hemoglobin A1C   Date Value Ref Range Status   12/08/2023 5.4 4.8 - 5.6 % Final     Comment:              Prediabetes: 5.7 - 6.4           Diabetes: >6.4           Glycemic control for adults with diabetes: <7.0   02/09/2022 5.1 4.8 - 5.6 % Final      EMG Study: none  EMR REVIEW: completed with noted Referral documentation reviewed  DX & Plan:   DIAGNOSIS:  Moderate exacerbation of chronic Right    1. Arthritis of carpometacarpal (CMC) joint of right thumb    2. Numbness and tingling in right hand    3. BMI 35.0-35.9,adult       TREATMENT PLAN:  Orders Placed This Encounter    X-Ray Hand 3 view Right     Treatment Plan: CMC arthritis: Conservative treatments have failed and patient having persistent symptoms.   Interested in surgical treatment options.  I recommend referral to ortho res. for further eval and treatment.  Hand numbness: EMG study needed for definitive DX. Patient referred to [facility] [phone] Address: [address] [place] [address] [address] [place] [address]. Patient instructed to call clinic for f/u once study completed. Appropriate treatment plan will be based on EMG findings.   Ongoing education about DX and treatment recommendations including conservative treatments of daily HEP for carpal tunnel, nocturnal splinting of wrist PRN and OTC NSAID as needed according to label instructions if able to tolerate.   Procedure: offered, patient declines   RX Medications: continue medications as RX per PCP .    RTC: next available appt. with ortho res.      Trinh Shinsrigoberto [facility]  Procedure Note:   None  Time Based Billing   Total Time Spent with Patient: 30 minutes .  Visit Start Time: 0830  10 minutes spent prior to visit reviewing EMR, prior labs and x-rays.  10 minutes spent in visit with patient face-to-face time completing exam, obtaining history, educating on DX and treatment plan.  10 minutes spent after visit completing EMR documentation.   Visit End Time: 0900     Please be aware that this note has been generated with the assistance of [employer] voice-to-text.  Please excuse any spelling or grammatical errors.        [1]   Current Outpatient Medications:     VISTARIL 25 mg Cap, Take 25 mg by mouth 2 (two) times daily as needed., Disp: , Rfl:     VRAYLAR 3 mg Cap, , Disp: , Rfl:     amLODIPine (NORVASC) 10 MG tablet, Take 1 tablet (10 mg total) by mouth once daily., Disp: 30 tablet, Rfl: 0    cyclobenzaprine (FLEXERIL) 5 MG tablet, Take 5 mg by mouth. (Patient not taking: Reported on 4/10/2024), Disp: , Rfl:     diclofenac (VOLTAREN) 75 MG EC tablet, Take 1 tablet (75 mg total) by mouth 2 (two) times daily., Disp: 30 tablet, Rfl: 1    ergocalciferol (ERGOCALCIFEROL)  50,000 unit Cap, Take 50,000 Units by mouth every 7 days. (Patient not taking: Reported on 4/10/2024), Disp: , Rfl:     hydroCHLOROthiazide 12.5 MG Tab, Take 1 tablet (12.5 mg total) by mouth once daily., Disp: 30 tablet, Rfl: 0    HYDROcodone-acetaminophen (NORCO)  mg per tablet, Take 1 tablet by mouth. (Patient not taking: Reported on 6/25/2025), Disp: , Rfl:     lamoTRIgine (LAMICTAL) 200 MG tablet, Take 200 mg by mouth., Disp: , Rfl:     naproxen (NAPROSYN) 375 MG tablet, Take 1 tablet (375 mg total) by mouth 2 (two) times daily as needed (pain). (Patient not taking: Reported on 6/25/2025), Disp: 20 tablet, Rfl: 0    pantoprazole (PROTONIX) 40 MG tablet, Take 40 mg by mouth. (Patient not taking: Reported on 6/25/2025), Disp: , Rfl:     SUTAB 1.479-0.188- 0.225 gram tablet, Take 1 tablet by mouth. (Patient not taking: Reported on 4/10/2024), Disp: , Rfl:     temazepam (RESTORIL) 15 mg Cap, Take 1 capsule (15 mg total) by mouth nightly as needed (insomnia)., Disp: 4 capsule, Rfl: 0    traMADoL (ULTRAM) 50 mg tablet, Take by mouth., Disp: , Rfl:     venlafaxine (EFFEXOR-XR) 150 MG Cp24, Take 150 mg by mouth., Disp: , Rfl:     venlafaxine (EFFEXOR-XR) 75 MG 24 hr capsule, Take 75 mg by mouth every morning., Disp: , Rfl:

## 2025-07-10 ENCOUNTER — APPOINTMENT (OUTPATIENT)
Dept: GENERAL RADIOLOGY | Age: 40
End: 2025-07-10
Payer: COMMERCIAL

## 2025-07-10 ENCOUNTER — HOSPITAL ENCOUNTER (EMERGENCY)
Age: 40
Discharge: HOME | End: 2025-07-10
Attending: EMERGENCY MEDICINE
Payer: COMMERCIAL

## 2025-07-10 VITALS
RESPIRATION RATE: 15 BRPM | TEMPERATURE: 98.2 F | OXYGEN SATURATION: 94 % | BODY MASS INDEX: 34.45 KG/M2 | DIASTOLIC BLOOD PRESSURE: 75 MMHG | HEART RATE: 63 BPM | WEIGHT: 207 LBS | SYSTOLIC BLOOD PRESSURE: 120 MMHG

## 2025-07-10 DIAGNOSIS — R42 LIGHTHEADEDNESS: Primary | ICD-10-CM

## 2025-07-10 DIAGNOSIS — R42 DIZZINESS: ICD-10-CM

## 2025-07-10 LAB
ANION GAP SERPL CALCULATED.3IONS-SCNC: 10 MMOL/L (ref 9–16)
BASOPHILS # BLD: 0.05 K/UL (ref 0–0.2)
BASOPHILS NFR BLD: 1 % (ref 0–2)
BUN SERPL-MCNC: 7 MG/DL (ref 6–20)
CALCIUM SERPL-MCNC: 8.4 MG/DL (ref 8.6–10.4)
CHLORIDE SERPL-SCNC: 107 MMOL/L (ref 98–107)
CO2 SERPL-SCNC: 22 MMOL/L (ref 20–31)
CREAT SERPL-MCNC: 0.8 MG/DL (ref 0.5–0.9)
EKG ATRIAL RATE: 60 BPM
EKG P AXIS: 57 DEGREES
EKG P-R INTERVAL: 148 MS
EKG Q-T INTERVAL: 426 MS
EKG QRS DURATION: 74 MS
EKG QTC CALCULATION (BAZETT): 426 MS
EKG R AXIS: 43 DEGREES
EKG T AXIS: 57 DEGREES
EKG VENTRICULAR RATE: 60 BPM
EOSINOPHIL # BLD: 0.09 K/UL (ref 0–0.44)
EOSINOPHILS RELATIVE PERCENT: 1 % (ref 1–4)
ERYTHROCYTE [DISTWIDTH] IN BLOOD BY AUTOMATED COUNT: 15 % (ref 11.8–14.4)
GFR, ESTIMATED: >90 ML/MIN/1.73M2
GLUCOSE SERPL-MCNC: 98 MG/DL (ref 74–99)
HCG UR QL: NEGATIVE
HCT VFR BLD AUTO: 33.5 % (ref 36.3–47.1)
HGB BLD-MCNC: 10.7 G/DL (ref 11.9–15.1)
IMM GRANULOCYTES # BLD AUTO: 0.01 K/UL (ref 0–0.3)
IMM GRANULOCYTES NFR BLD: 0 %
LYMPHOCYTES NFR BLD: 3.18 K/UL (ref 1.1–3.7)
LYMPHOCYTES RELATIVE PERCENT: 35 % (ref 24–43)
MAGNESIUM SERPL-MCNC: 2.2 MG/DL (ref 1.6–2.6)
MCH RBC QN AUTO: 25.2 PG (ref 25.2–33.5)
MCHC RBC AUTO-ENTMCNC: 31.9 G/DL (ref 28.4–34.8)
MCV RBC AUTO: 78.8 FL (ref 82.6–102.9)
MONOCYTES NFR BLD: 0.63 K/UL (ref 0.1–1.2)
MONOCYTES NFR BLD: 7 % (ref 3–12)
NEUTROPHILS NFR BLD: 56 % (ref 36–65)
NEUTS SEG NFR BLD: 5.08 K/UL (ref 1.5–8.1)
NRBC BLD-RTO: 0 PER 100 WBC
PLATELET # BLD AUTO: 314 K/UL (ref 138–453)
PMV BLD AUTO: 10 FL (ref 8.1–13.5)
POTASSIUM SERPL-SCNC: 4 MMOL/L (ref 3.7–5.3)
RBC # BLD AUTO: 4.25 M/UL (ref 3.95–5.11)
RBC # BLD: ABNORMAL 10*6/UL
SODIUM SERPL-SCNC: 138 MMOL/L (ref 136–145)
WBC OTHER # BLD: 9 K/UL (ref 3.5–11.3)

## 2025-07-10 PROCEDURE — 2580000003 HC RX 258: Performed by: EMERGENCY MEDICINE

## 2025-07-10 PROCEDURE — 74022 RADEX COMPL AQT ABD SERIES: CPT

## 2025-07-10 PROCEDURE — 83735 ASSAY OF MAGNESIUM: CPT

## 2025-07-10 PROCEDURE — 80048 BASIC METABOLIC PNL TOTAL CA: CPT

## 2025-07-10 PROCEDURE — 81025 URINE PREGNANCY TEST: CPT

## 2025-07-10 PROCEDURE — 93005 ELECTROCARDIOGRAM TRACING: CPT | Performed by: EMERGENCY MEDICINE

## 2025-07-10 PROCEDURE — 85025 COMPLETE CBC W/AUTO DIFF WBC: CPT

## 2025-07-10 PROCEDURE — 99285 EMERGENCY DEPT VISIT HI MDM: CPT

## 2025-07-10 PROCEDURE — 93010 ELECTROCARDIOGRAM REPORT: CPT | Performed by: INTERNAL MEDICINE

## 2025-07-10 RX ORDER — 0.9 % SODIUM CHLORIDE 0.9 %
1000 INTRAVENOUS SOLUTION INTRAVENOUS ONCE
Status: COMPLETED | OUTPATIENT
Start: 2025-07-10 | End: 2025-07-10

## 2025-07-10 RX ADMIN — SODIUM CHLORIDE 1000 ML: 9 INJECTION, SOLUTION INTRAVENOUS at 03:08

## 2025-07-10 ASSESSMENT — PAIN - FUNCTIONAL ASSESSMENT: PAIN_FUNCTIONAL_ASSESSMENT: NONE - DENIES PAIN

## 2025-07-10 NOTE — ED NOTES
Pt presents to ED via private auto with c/o diarrhea and dizziness, onset two days ago. PT states she may be dehydrated. Pt denies emesis. No diarrhea recently. Pt afebrile, vitals stable. Pt able to ambulate without assist.

## 2025-07-10 NOTE — ED PROVIDER NOTES
[facility] EMERGENCY DEPARTMENT  eMERGENCY dEPARTMENT eNCOUnter    Pt Name: Florentino Arriaga  MRN: [id number]  Birthdate 1985  Date of evaluation: 7/10/25  CHIEF COMPLAINT       Chief Complaint   Patient presents with    Diarrhea     Last few days/ slowed down/concerned about being dehydrated/ eating and drinking ok/ last episode was this am    Dizziness     Feels from dehydrated     HISTORY OF PRESENT ILLNESS   HPI  Patient is a 39-year-old female presents emergency room with complaints of feeling dizzy.  Patient describes feeling lightheaded when she stands up.  Patient does describe a room spinning sensation primarily with head position.  Patient does complain of diarrhea over the past 2 days no nausea no vomiting no shortness of breath no chest pain no abdominal pain or dysuria.  Patient has had a previous episode similar to this and at that time she was told she was dehydrated.  REVIEW OF SYSTEMS     Constitutional: No fever  Eye: No visual changes  Ear/Nose/Mouth/Throat: No sore throat  Respiratory: No shortness of breath  Cardiovascular: No chest pain  Gastrointestinal: No nausea, no vomiting, yes diarrhea  Genitourinary: No dysuria  Musculoskeletal: No joint pain  Integumentary: No rash  Neurologic: Dizziness  Psychiatric: No anxiety, no depression  All systems otherwise negative.        PAST MEDICAL HISTORY     Past Medical History:   Diagnosis Date    Chlamydia     cHTN (meds) > pt stopped taking meds 8/30/2022 8/30/22: Diagnosed by Dr. Pascual per review of flowsheets. No meds at this time.  ASA Rx sent to patient's pharmacy Baseline PreE labs (CMP, protein-creatinine ratio ordered_    Ectopic pregnancy     Right, salpingectomy    History of blood transfusion     6/2019 after lap    Hx Chlamydia 8/30/2022    Negative 9/9/22    Hypertension     Hypothyroidism     Pt took levothyroxine previously, no meds currently 8/2016    IBS (irritable bowel syndrome) 05/07/2015    Intermittent; no meds 8/2022

## 2025-07-11 ENCOUNTER — OFFICE VISIT (OUTPATIENT)
Age: 40
End: 2025-07-11
Payer: COMMERCIAL

## 2025-07-11 VITALS
HEIGHT: 65 IN | OXYGEN SATURATION: 97 % | DIASTOLIC BLOOD PRESSURE: 72 MMHG | SYSTOLIC BLOOD PRESSURE: 116 MMHG | WEIGHT: 199.2 LBS | BODY MASS INDEX: 33.19 KG/M2 | TEMPERATURE: 98.2 F | HEART RATE: 95 BPM

## 2025-07-11 DIAGNOSIS — A09 DIARRHEA OF INFECTIOUS ORIGIN: Primary | ICD-10-CM

## 2025-07-11 PROBLEM — Z79.4 TYPE 2 DIABETES MELLITUS WITHOUT COMPLICATION, WITH LONG-TERM CURRENT USE OF INSULIN (HCC): Status: RESOLVED | Noted: 2024-11-25 | Resolved: 2025-07-11

## 2025-07-11 PROBLEM — E11.9 TYPE 2 DIABETES MELLITUS WITHOUT COMPLICATION, WITH LONG-TERM CURRENT USE OF INSULIN (HCC): Status: RESOLVED | Noted: 2024-11-25 | Resolved: 2025-07-11

## 2025-07-11 PROCEDURE — 4004F PT TOBACCO SCREEN RCVD TLK: CPT | Performed by: INTERNAL MEDICINE

## 2025-07-11 PROCEDURE — G8427 DOCREV CUR MEDS BY ELIG CLIN: HCPCS | Performed by: INTERNAL MEDICINE

## 2025-07-11 PROCEDURE — 99211 OFF/OP EST MAY X REQ PHY/QHP: CPT | Performed by: INTERNAL MEDICINE

## 2025-07-11 PROCEDURE — 99212 OFFICE O/P EST SF 10 MIN: CPT | Performed by: INTERNAL MEDICINE

## 2025-07-11 PROCEDURE — G8417 CALC BMI ABV UP PARAM F/U: HCPCS | Performed by: INTERNAL MEDICINE
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
## 2025-07-22 ENCOUNTER — OFFICE VISIT (OUTPATIENT)
Age: 40
End: 2025-07-22

## 2025-07-22 VITALS
OXYGEN SATURATION: 96 % | WEIGHT: 198 LBS | HEART RATE: 80 BPM | DIASTOLIC BLOOD PRESSURE: 82 MMHG | SYSTOLIC BLOOD PRESSURE: 119 MMHG | BODY MASS INDEX: 32.99 KG/M2 | TEMPERATURE: 98.3 F | HEIGHT: 65 IN

## 2025-07-22 DIAGNOSIS — J00 ACUTE NASOPHARYNGITIS: ICD-10-CM

## 2025-07-22 DIAGNOSIS — J04.0 LARYNGITIS: ICD-10-CM

## 2025-07-22 DIAGNOSIS — J20.9 ACUTE BRONCHITIS, UNSPECIFIED ORGANISM: Primary | ICD-10-CM

## 2025-07-22 LAB
Lab: ABNORMAL
QC PASS/FAIL: ABNORMAL
SARS-COV-2, POC: DETECTED

## 2025-07-22 RX ORDER — DEXTROMETHORPHAN HYDROBROMIDE, GUAIFENESIN AND PSEUDOEPHEDRINE HYDROCHLORIDE 15; 400; 60 MG/1; MG/1; MG/1
1 TABLET ORAL 4 TIMES DAILY PRN
Qty: 20 TABLET | Refills: 0 | Status: SHIPPED | OUTPATIENT
Start: 2025-07-22

## 2025-07-22 ASSESSMENT — ENCOUNTER SYMPTOMS
ABDOMINAL PAIN: 0
SORE THROAT: 0
SWOLLEN GLANDS: 0
RHINORRHEA: 1
SINUS PAIN: 0
NAUSEA: 0
VOMITING: 0
DIARRHEA: 0
COUGH: 1

## 2025-07-22 NOTE — PROGRESS NOTES
[facility], [facility] ([facility])  [facility] [place] [address]  Dept: [phone]    Date: 25    Florentino Arriaga (:  1985) is a 40 y.o. female, here for evaluation of the following chief complaint(s):  Cold Symptoms (Last thursday)      HPI  40 y.o. female presents with nasal congestion, cough, and hoarseness for five days. Waxing and waning.      Cold Symptoms   This is a new problem. There has been no fever. Associated symptoms include congestion, coughing and rhinorrhea. Pertinent negatives include no abdominal pain, chest pain, diarrhea, ear pain, headaches, nausea, rash, sinus pain, sneezing, sore throat, swollen glands or vomiting. She has tried nothing for the symptoms.        ROS  Review of Systems   HENT:  Positive for congestion and rhinorrhea. Negative for ear pain, sinus pain, sneezing and sore throat.    Respiratory:  Positive for cough.    Cardiovascular:  Negative for chest pain.   Gastrointestinal:  Negative for abdominal pain, diarrhea, nausea and vomiting.   Skin:  Negative for rash.   Neurological:  Negative for headaches.       PHYSICAL EXAM  Vitals:    25 1600   BP: 119/82   Pulse: 80   Temp: 98.3 °F (36.8 °C)   SpO2: 96%   Weight: 89.8 kg (198 lb)   Height: 1.651 m (5' 5\")     Physical Exam  Vitals and nursing note reviewed.   Constitutional:       General: She is not in acute distress.     Appearance: Normal appearance. She is ill-appearing. She is not toxic-appearing or diaphoretic.   HENT:      Nose: Congestion present.      Mouth/Throat:      Mouth: Mucous membranes are moist.      Pharynx: Uvula midline. Posterior oropharyngeal erythema and postnasal drip present. No pharyngeal swelling, oropharyngeal exudate or uvula swelling.      Tonsils: No tonsillar exudate or tonsillar abscesses. 0 on the right. 0 on the left.   Eyes:      Conjunctiva/sclera: Conjunctivae normal.   Cardiovascular:      Rate and Rhythm: Normal

## 2025-07-23 NOTE — PATIENT INSTRUCTIONS
Follow-up in five days if no improvement.  Follow-up immediately if the condition worsens or new symptoms develop.  Maintain good fluid intake.   I attempted to call pt, no answer - left voicemail with results.      ----- Message from Jordy Portillo MD sent at 4/3/2023  2:11 PM CDT -----  Cytology negative for malignancy

## 2025-08-19 ENCOUNTER — TELEPHONE (OUTPATIENT)
Age: 40
End: 2025-08-19

## (undated) DEVICE — SUTURE SZ 0 27IN 5/8 CIR UR-6  TAPER PT VIOLET ABSRB VICRYL J603H

## (undated) DEVICE — SUTURE MCRYL SZ 4-0 L18IN ABSRB UD L16MM PC-3 3/8 CIR PRIM Y845G

## (undated) DEVICE — KENDALL SCD EXPRESS SLEEVES, KNEE LENGTH, MEDIUM: Brand: KENDALL SCD

## (undated) DEVICE — GLOVE ORANGE PI 7 1/2   MSG9075

## (undated) DEVICE — SOLUTION ANTIFOG VIS SYS CLEARIFY LAPSCP

## (undated) DEVICE — SUTURE VCRL SZ 0 L27IN ABSRB UD L26MM CT-2 1/2 CIR J270H

## (undated) DEVICE — Z DISCONTINUED USE 2711661 SWAB MEDICATED TINC BENZ

## (undated) DEVICE — TROCAR ENDOSCP L100MM DIA5MM BLDELSS STBL SL OBT RADLUC

## (undated) DEVICE — SPONGE,PEANUT,XRAY,ST,SM,3/8",5/CARD: Brand: MEDLINE INDUSTRIES, INC.

## (undated) DEVICE — Z INACTIVE USE 2527070 DRAPE SURG W40XL44IN UNDERBUTTOCK SMS POLYPR W/ PCH BK DISP

## (undated) DEVICE — TROCARS: Brand: KII® BALLOON BLUNT TIP SYSTEM

## (undated) DEVICE — STRIP,CLOSURE,WOUND,MEDI-STRIP,1/2X4: Brand: MEDLINE

## (undated) DEVICE — Device

## (undated) DEVICE — SVMMC CONV PK

## (undated) DEVICE — GARMENT,MEDLINE,DVT,INT,CALF,MED, GEN2: Brand: MEDLINE

## (undated) DEVICE — GLOVE ORANGE PI 7   MSG9070

## (undated) DEVICE — TROCAR ENDOSCP L100MM DIA5MM BLDELSS STBL SL THRD OPT VW

## (undated) DEVICE — SOLUTION SOD CHL 0.9% 1000ML

## (undated) DEVICE — Z DISCONTINUED BY MEDLINE USE 2711682 TRAY SKIN PREP DRY W/ PREM GLV

## (undated) DEVICE — LEGGINGS, PAIR, 31X48, STERILE: Brand: MEDLINE

## (undated) DEVICE — SUTURE COAT VCRL SZ 0 L36IN ABSRB VLT CTX L48MM TAPERPOINT J370H

## (undated) DEVICE — SOLUTION SURG PREP POV IOD 7.5% 4 OZ

## (undated) DEVICE — TRAY SPNL 24GA L4IN PENCAN PNCL PNT NDL 0.75% BIPIVCAIN W/

## (undated) DEVICE — AGENT HEMSTAT 5GM ARISTA AH

## (undated) DEVICE — SUTURE ABSORBABLE BRAIDED 0 CT-1 8X27 IN UD VICRYL JJ41G

## (undated) DEVICE — SEALER LAP SM L18.8CM OPN JAW HAND/FOOT SWCH FORCETRIAD

## (undated) DEVICE — PAD GEN USE BORDERED ADH 14IN 2IN AND 12IN 4IN GZ UNIV ST

## (undated) DEVICE — SUTURE ABSORBABLE BRAIDED 2-0 CT-1 27 IN UD VICRYL J259H

## (undated) DEVICE — 3M™ STERI-STRIP™ ANTIMICROBIAL SKIN CLOSURES 1 IN X 5 IN, 25/CAR, 4 CAR/CASE A1848: Brand: 3M™ STERI-STRIP™

## (undated) DEVICE — SUTURE PLN GUT SZ 2-0 L27IN ABSRB YELLOWISH TAN L36MM CT-1 843H

## (undated) DEVICE — DRESSING TRNSPAR W2XL2.75IN FLM SHT SEMIPERMEABLE WIND

## (undated) DEVICE — SOLUTION IV IRRIG WATER 500ML POUR BRL ST 2F7113

## (undated) DEVICE — PACK LAP BASIC

## (undated) DEVICE — GLOVE SURG SZ 65 THK91MIL LTX FREE SYN POLYISOPRENE

## (undated) DEVICE — PREP SOL PVP IODINE 4%  4 OZ/BTL

## (undated) DEVICE — CHLORAPREP 26ML ORANGE

## (undated) DEVICE — SEALER TISS L20CM DIA13MM ADV BPLR L CRV JAW OPN APPRCH

## (undated) DEVICE — TOWEL SURG W16XL26IN WHT NONFENESTRATED ST 2 PER PK

## (undated) DEVICE — TOWEL,OR,DSP,ST,NATURAL,DLX,4/PK,20PK/CS: Brand: MEDLINE